# Patient Record
Sex: FEMALE | Race: WHITE | NOT HISPANIC OR LATINO | Employment: OTHER | URBAN - METROPOLITAN AREA
[De-identification: names, ages, dates, MRNs, and addresses within clinical notes are randomized per-mention and may not be internally consistent; named-entity substitution may affect disease eponyms.]

---

## 2020-09-10 ENCOUNTER — APPOINTMENT (OUTPATIENT)
Dept: LAB | Facility: HOSPITAL | Age: 55
End: 2020-09-10
Attending: SPECIALIST
Payer: MEDICARE

## 2020-09-10 ENCOUNTER — TRANSCRIBE ORDERS (OUTPATIENT)
Dept: REGISTRATION | Facility: HOSPITAL | Age: 55
End: 2020-09-10

## 2020-09-10 DIAGNOSIS — M19.011 PRIMARY OSTEOARTHRITIS, RIGHT SHOULDER: ICD-10-CM

## 2020-09-10 DIAGNOSIS — Z11.59 ENCOUNTER FOR SCREENING FOR OTHER VIRAL DISEASES: ICD-10-CM

## 2020-09-10 DIAGNOSIS — Z11.59 ENCOUNTER FOR SCREENING FOR OTHER VIRAL DISEASES: Primary | ICD-10-CM

## 2020-09-10 LAB
ABO + RH BLD: NORMAL
ALBUMIN SERPL-MCNC: 4.1 G/DL (ref 3.4–5)
ALP SERPL-CCNC: 39 IU/L (ref 35–126)
ALT SERPL-CCNC: 13 IU/L (ref 11–54)
ANION GAP SERPL CALC-SCNC: 12 MEQ/L (ref 3–15)
APTT PPP: 27 SEC (ref 23–35)
AST SERPL-CCNC: 20 IU/L (ref 15–41)
BILIRUB SERPL-MCNC: 0.6 MG/DL (ref 0.3–1.2)
BLD GP AB SCN SERPL QL: NEGATIVE
BLOOD BANK CMNT PATIENT-IMP: NORMAL
BUN SERPL-MCNC: 11 MG/DL (ref 8–20)
CALCIUM SERPL-MCNC: 9.3 MG/DL (ref 8.9–10.3)
CHLORIDE SERPL-SCNC: 104 MEQ/L (ref 98–109)
CO2 SERPL-SCNC: 21 MEQ/L (ref 22–32)
CREAT SERPL-MCNC: 0.7 MG/DL (ref 0.6–1.1)
D AG BLD QL: POSITIVE
ERYTHROCYTE [DISTWIDTH] IN BLOOD BY AUTOMATED COUNT: 12.3 % (ref 11.7–14.4)
EST. AVERAGE GLUCOSE BLD GHB EST-MCNC: 111 MG/DL
GFR SERPL CREATININE-BSD FRML MDRD: >60 ML/MIN/1.73M*2
GLUCOSE SERPL-MCNC: 84 MG/DL (ref 70–99)
HBA1C MFR BLD HPLC: 5.5 %
HCT VFR BLDCO AUTO: 34.3 % (ref 35–45)
HGB BLD-MCNC: 11.4 G/DL (ref 11.8–15.7)
INR PPP: 1.2 INR
LABORATORY COMMENT REPORT: NORMAL
MCH RBC QN AUTO: 30.2 PG (ref 28–33.2)
MCHC RBC AUTO-ENTMCNC: 33.2 G/DL (ref 32.2–35.5)
MCV RBC AUTO: 90.7 FL (ref 83–98)
PDW BLD AUTO: 9.3 FL (ref 9.4–12.3)
PLATELET # BLD AUTO: 276 K/UL (ref 150–369)
POTASSIUM SERPL-SCNC: 4.2 MEQ/L (ref 3.6–5.1)
PROT SERPL-MCNC: 6.1 G/DL (ref 6–8.2)
PROTHROMBIN TIME: 14.9 SEC (ref 12.2–14.5)
RBC # BLD AUTO: 3.78 M/UL (ref 3.93–5.22)
SODIUM SERPL-SCNC: 137 MEQ/L (ref 136–144)
WBC # BLD AUTO: 4.52 K/UL (ref 3.8–10.5)

## 2020-09-10 PROCEDURE — 85730 THROMBOPLASTIN TIME PARTIAL: CPT

## 2020-09-10 PROCEDURE — 80053 COMPREHEN METABOLIC PANEL: CPT

## 2020-09-10 PROCEDURE — 85610 PROTHROMBIN TIME: CPT

## 2020-09-10 PROCEDURE — 83036 HEMOGLOBIN GLYCOSYLATED A1C: CPT | Mod: GA

## 2020-09-10 PROCEDURE — 85027 COMPLETE CBC AUTOMATED: CPT

## 2020-09-10 PROCEDURE — 36415 COLL VENOUS BLD VENIPUNCTURE: CPT | Mod: GA

## 2020-09-10 PROCEDURE — 86850 RBC ANTIBODY SCREEN: CPT

## 2020-09-12 ENCOUNTER — APPOINTMENT (OUTPATIENT)
Dept: PREADMISSION TESTING | Facility: HOSPITAL | Age: 55
End: 2020-09-12
Attending: INTERNAL MEDICINE
Payer: MEDICARE

## 2020-09-12 DIAGNOSIS — M19.011 ARTHRITIS OF RIGHT SHOULDER REGION: Primary | ICD-10-CM

## 2020-09-12 PROCEDURE — U0003 INFECTIOUS AGENT DETECTION BY NUCLEIC ACID (DNA OR RNA); SEVERE ACUTE RESPIRATORY SYNDROME CORONAVIRUS 2 (SARS-COV-2) (CORONAVIRUS DISEASE [COVID-19]), AMPLIFIED PROBE TECHNIQUE, MAKING USE OF HIGH THROUGHPUT TECHNOLOGIES AS DESCRIBED BY CMS-2020-01-R: HCPCS

## 2020-09-14 LAB — SARS-COV-2 RNA RESP QL NAA+PROBE: NOT DETECTED

## 2020-09-16 ENCOUNTER — ANESTHESIA EVENT (OUTPATIENT)
Dept: OPERATING ROOM | Facility: HOSPITAL | Age: 55
Setting detail: SURGERY ADMIT
DRG: 483 | End: 2020-09-16
Payer: MEDICARE

## 2020-09-17 ENCOUNTER — APPOINTMENT (INPATIENT)
Dept: RADIOLOGY | Facility: HOSPITAL | Age: 55
DRG: 483 | End: 2020-09-17
Attending: SPECIALIST
Payer: MEDICARE

## 2020-09-17 ENCOUNTER — HOSPITAL ENCOUNTER (INPATIENT)
Facility: HOSPITAL | Age: 55
LOS: 3 days | Discharge: NURSING, REHAB OR LONG-TERM CARE | DRG: 483 | End: 2020-09-20
Attending: SPECIALIST | Admitting: SPECIALIST
Payer: MEDICARE

## 2020-09-17 ENCOUNTER — ANESTHESIA (OUTPATIENT)
Dept: OPERATING ROOM | Facility: HOSPITAL | Age: 55
Setting detail: SURGERY ADMIT
DRG: 483 | End: 2020-09-17
Payer: MEDICARE

## 2020-09-17 ENCOUNTER — BMR PREADMISSION ASSESSMENT (OUTPATIENT)
Dept: ADMISSIONS | Facility: REHABILITATION | Age: 55
End: 2020-09-17

## 2020-09-17 PROBLEM — M19.011 DJD OF RIGHT SHOULDER: Status: ACTIVE | Noted: 2020-09-17

## 2020-09-17 LAB
ABO + RH BLD: NORMAL
D AG BLD QL: POSITIVE
LABORATORY COMMENT REPORT: NORMAL

## 2020-09-17 PROCEDURE — 63700000 HC SELF-ADMINISTRABLE DRUG: Performed by: STUDENT IN AN ORGANIZED HEALTH CARE EDUCATION/TRAINING PROGRAM

## 2020-09-17 PROCEDURE — 63700000 HC SELF-ADMINISTRABLE DRUG: Performed by: INTERNAL MEDICINE

## 2020-09-17 PROCEDURE — 25800000 HC PHARMACY IV SOLUTIONS: Performed by: SPECIALIST

## 2020-09-17 PROCEDURE — 63600000 HC DRUGS/DETAIL CODE: Performed by: NURSE ANESTHETIST, CERTIFIED REGISTERED

## 2020-09-17 PROCEDURE — 25000000 HC PHARMACY GENERAL: Performed by: SPECIALIST

## 2020-09-17 PROCEDURE — 63700000 HC SELF-ADMINISTRABLE DRUG: Performed by: NURSE PRACTITIONER

## 2020-09-17 PROCEDURE — 36000015 HC OR LEVEL 5 EA ADDL MIN: Performed by: SPECIALIST

## 2020-09-17 PROCEDURE — 63700000 HC SELF-ADMINISTRABLE DRUG: Performed by: SPECIALIST

## 2020-09-17 PROCEDURE — L3650 SO 8 ABD RESTRAINT PRE OTS: HCPCS | Performed by: SPECIALIST

## 2020-09-17 PROCEDURE — 37000001 HC ANESTHESIA GENERAL: Performed by: SPECIALIST

## 2020-09-17 PROCEDURE — 99221 1ST HOSP IP/OBS SF/LOW 40: CPT | Performed by: INTERNAL MEDICINE

## 2020-09-17 PROCEDURE — 73030 X-RAY EXAM OF SHOULDER: CPT | Mod: RT

## 2020-09-17 PROCEDURE — C1769 GUIDE WIRE: HCPCS | Performed by: SPECIALIST

## 2020-09-17 PROCEDURE — 71000001 HC PACU PHASE 1 INITIAL 30MIN: Performed by: SPECIALIST

## 2020-09-17 PROCEDURE — 0RRJ0JZ REPLACEMENT OF RIGHT SHOULDER JOINT WITH SYNTHETIC SUBSTITUTE, OPEN APPROACH: ICD-10-PCS | Performed by: SPECIALIST

## 2020-09-17 PROCEDURE — C1776 JOINT DEVICE (IMPLANTABLE): HCPCS | Performed by: SPECIALIST

## 2020-09-17 PROCEDURE — 63600000 HC DRUGS/DETAIL CODE: Performed by: SPECIALIST

## 2020-09-17 PROCEDURE — 36415 COLL VENOUS BLD VENIPUNCTURE: CPT | Performed by: SPECIALIST

## 2020-09-17 PROCEDURE — 27200000 HC STERILE SUPPLY: Performed by: SPECIALIST

## 2020-09-17 PROCEDURE — 63600000 HC DRUGS/DETAIL CODE: Performed by: NURSE PRACTITIONER

## 2020-09-17 PROCEDURE — 63700000 HC SELF-ADMINISTRABLE DRUG: Performed by: HOSPITALIST

## 2020-09-17 PROCEDURE — 25000000 HC PHARMACY GENERAL: Performed by: NURSE ANESTHETIST, CERTIFIED REGISTERED

## 2020-09-17 PROCEDURE — 12000000 HC ROOM AND CARE MED/SURG

## 2020-09-17 PROCEDURE — 71000011 HC PACU PHASE 1 EA ADDL MIN: Performed by: SPECIALIST

## 2020-09-17 PROCEDURE — 36000005 HC OR LEVEL 5 INITIAL 30MIN: Performed by: SPECIALIST

## 2020-09-17 PROCEDURE — C1713 ANCHOR/SCREW BN/BN,TIS/BN: HCPCS | Performed by: SPECIALIST

## 2020-09-17 DEVICE — CEMENT BONE SIMPLEX FULL DOSE: Type: IMPLANTABLE DEVICE | Site: SHOULDER | Status: FUNCTIONAL

## 2020-09-17 DEVICE — *T* NUCLEUS SIMPLICITI SIZE 2: Type: IMPLANTABLE DEVICE | Site: SHOULDER | Status: FUNCTIONAL

## 2020-09-17 DEVICE — GLENOID M35 PEGGED AEQUALIS PERFORM: Type: IMPLANTABLE DEVICE | Site: SHOULDER | Status: FUNCTIONAL

## 2020-09-17 DEVICE — IMPLANTABLE DEVICE: Type: IMPLANTABLE DEVICE | Site: SHOULDER | Status: FUNCTIONAL

## 2020-09-17 RX ORDER — PROPOFOL 10 MG/ML
INJECTION, EMULSION INTRAVENOUS AS NEEDED
Status: DISCONTINUED | OUTPATIENT
Start: 2020-09-17 | End: 2020-09-17 | Stop reason: SURG

## 2020-09-17 RX ORDER — EPHEDRINE SULFATE/0.9% NACL/PF 50 MG/5 ML
10 SYRINGE (ML) INTRAVENOUS AS NEEDED
Status: DISCONTINUED | OUTPATIENT
Start: 2020-09-17 | End: 2020-09-17 | Stop reason: HOSPADM

## 2020-09-17 RX ORDER — DEXTROSE 50 % IN WATER (D50W) INTRAVENOUS SYRINGE
25 AS NEEDED
Status: DISCONTINUED | OUTPATIENT
Start: 2020-09-17 | End: 2020-09-20 | Stop reason: HOSPADM

## 2020-09-17 RX ORDER — FENOFIBRATE 134 MG/1
134 CAPSULE ORAL
Status: DISCONTINUED | OUTPATIENT
Start: 2020-09-18 | End: 2020-09-18

## 2020-09-17 RX ORDER — CARBAMAZEPINE 200 MG/1
600 TABLET, EXTENDED RELEASE ORAL 2 TIMES DAILY
Status: DISCONTINUED | OUTPATIENT
Start: 2020-09-17 | End: 2020-09-18

## 2020-09-17 RX ORDER — DEXTROSE 40 %
15-30 GEL (GRAM) ORAL AS NEEDED
Status: DISCONTINUED | OUTPATIENT
Start: 2020-09-17 | End: 2020-09-20 | Stop reason: HOSPADM

## 2020-09-17 RX ORDER — EPHEDRINE SULFATE 50 MG/ML
INJECTION, SOLUTION INTRAVENOUS AS NEEDED
Status: DISCONTINUED | OUTPATIENT
Start: 2020-09-17 | End: 2020-09-17 | Stop reason: SURG

## 2020-09-17 RX ORDER — ACETAMINOPHEN 500 MG
5 TABLET ORAL NIGHTLY PRN
Status: DISCONTINUED | OUTPATIENT
Start: 2020-09-17 | End: 2020-09-20 | Stop reason: HOSPADM

## 2020-09-17 RX ORDER — GABAPENTIN 300 MG/1
300 CAPSULE ORAL 2 TIMES DAILY
Status: DISCONTINUED | OUTPATIENT
Start: 2020-09-17 | End: 2020-09-17

## 2020-09-17 RX ORDER — ZIPRASIDONE HYDROCHLORIDE 60 MG/1
60 CAPSULE ORAL 2 TIMES DAILY WITH MEALS
Status: DISCONTINUED | OUTPATIENT
Start: 2020-09-17 | End: 2020-09-18

## 2020-09-17 RX ORDER — KETOROLAC TROMETHAMINE 15 MG/ML
15 INJECTION, SOLUTION INTRAMUSCULAR; INTRAVENOUS EVERY 6 HOURS
Status: COMPLETED | OUTPATIENT
Start: 2020-09-17 | End: 2020-09-18

## 2020-09-17 RX ORDER — MEPERIDINE HYDROCHLORIDE 25 MG/ML
12.5 INJECTION INTRAMUSCULAR; INTRAVENOUS; SUBCUTANEOUS EVERY 10 MIN PRN
Status: DISCONTINUED | OUTPATIENT
Start: 2020-09-17 | End: 2020-09-17 | Stop reason: HOSPADM

## 2020-09-17 RX ORDER — HYDROMORPHONE HYDROCHLORIDE 1 MG/ML
0.5 INJECTION, SOLUTION INTRAMUSCULAR; INTRAVENOUS; SUBCUTANEOUS EVERY 2 HOUR PRN
Status: DISCONTINUED | OUTPATIENT
Start: 2020-09-17 | End: 2020-09-20 | Stop reason: HOSPADM

## 2020-09-17 RX ORDER — DIPHENHYDRAMINE HCL 50 MG/ML
12.5 VIAL (ML) INJECTION
Status: DISCONTINUED | OUTPATIENT
Start: 2020-09-17 | End: 2020-09-17 | Stop reason: HOSPADM

## 2020-09-17 RX ORDER — HYDROMORPHONE HYDROCHLORIDE 1 MG/ML
0.5 INJECTION, SOLUTION INTRAMUSCULAR; INTRAVENOUS; SUBCUTANEOUS
Status: DISCONTINUED | OUTPATIENT
Start: 2020-09-17 | End: 2020-09-17 | Stop reason: HOSPADM

## 2020-09-17 RX ORDER — OXYCODONE HYDROCHLORIDE 5 MG/1
5 TABLET ORAL EVERY 4 HOURS PRN
Status: CANCELLED | OUTPATIENT
Start: 2020-09-17

## 2020-09-17 RX ORDER — SODIUM CHLORIDE 9 MG/ML
INJECTION, SOLUTION INTRAVENOUS CONTINUOUS
Status: DISCONTINUED | OUTPATIENT
Start: 2020-09-17 | End: 2020-09-17 | Stop reason: HOSPADM

## 2020-09-17 RX ORDER — VANCOMYCIN HYDROCHLORIDE
1250
Status: COMPLETED | OUTPATIENT
Start: 2020-09-17 | End: 2020-09-17

## 2020-09-17 RX ORDER — BUPIVACAINE HCL/EPINEPHRINE 0.5-1:200K
VIAL (ML) INJECTION AS NEEDED
Status: DISCONTINUED | OUTPATIENT
Start: 2020-09-17 | End: 2020-09-17 | Stop reason: HOSPADM

## 2020-09-17 RX ORDER — FAMOTIDINE 20 MG/1
20 TABLET, FILM COATED ORAL DAILY
Status: DISCONTINUED | OUTPATIENT
Start: 2020-09-18 | End: 2020-09-20 | Stop reason: HOSPADM

## 2020-09-17 RX ORDER — GLYCOPYRROLATE 0.6MG/3ML
SYRINGE (ML) INTRAVENOUS AS NEEDED
Status: DISCONTINUED | OUTPATIENT
Start: 2020-09-17 | End: 2020-09-17 | Stop reason: SURG

## 2020-09-17 RX ORDER — FAMOTIDINE 20 MG/1
20 TABLET, FILM COATED ORAL
Status: COMPLETED | OUTPATIENT
Start: 2020-09-17 | End: 2020-09-17

## 2020-09-17 RX ORDER — DEXAMETHASONE SODIUM PHOSPHATE 4 MG/ML
INJECTION, SOLUTION INTRA-ARTICULAR; INTRALESIONAL; INTRAMUSCULAR; INTRAVENOUS; SOFT TISSUE AS NEEDED
Status: DISCONTINUED | OUTPATIENT
Start: 2020-09-17 | End: 2020-09-17 | Stop reason: SURG

## 2020-09-17 RX ORDER — FENTANYL CITRATE 50 UG/ML
50 INJECTION, SOLUTION INTRAMUSCULAR; INTRAVENOUS
Status: DISCONTINUED | OUTPATIENT
Start: 2020-09-17 | End: 2020-09-17 | Stop reason: HOSPADM

## 2020-09-17 RX ORDER — GABAPENTIN 300 MG/1
600 CAPSULE ORAL NIGHTLY
Status: DISCONTINUED | OUTPATIENT
Start: 2020-09-17 | End: 2020-09-20 | Stop reason: HOSPADM

## 2020-09-17 RX ORDER — ROCURONIUM BROMIDE 10 MG/ML
INJECTION, SOLUTION INTRAVENOUS AS NEEDED
Status: DISCONTINUED | OUTPATIENT
Start: 2020-09-17 | End: 2020-09-17 | Stop reason: SURG

## 2020-09-17 RX ORDER — LIDOCAINE HYDROCHLORIDE 10 MG/ML
INJECTION, SOLUTION INFILTRATION; PERINEURAL AS NEEDED
Status: DISCONTINUED | OUTPATIENT
Start: 2020-09-17 | End: 2020-09-17 | Stop reason: SURG

## 2020-09-17 RX ORDER — ONDANSETRON HYDROCHLORIDE 2 MG/ML
INJECTION, SOLUTION INTRAVENOUS AS NEEDED
Status: DISCONTINUED | OUTPATIENT
Start: 2020-09-17 | End: 2020-09-17 | Stop reason: SURG

## 2020-09-17 RX ORDER — FENTANYL CITRATE 50 UG/ML
INJECTION, SOLUTION INTRAMUSCULAR; INTRAVENOUS AS NEEDED
Status: DISCONTINUED | OUTPATIENT
Start: 2020-09-17 | End: 2020-09-17 | Stop reason: SURG

## 2020-09-17 RX ORDER — ONDANSETRON HYDROCHLORIDE 2 MG/ML
4 INJECTION, SOLUTION INTRAVENOUS EVERY 8 HOURS PRN
Status: DISCONTINUED | OUTPATIENT
Start: 2020-09-17 | End: 2020-09-20 | Stop reason: HOSPADM

## 2020-09-17 RX ORDER — CARBOXYMETHYLCELLULOSE SODIUM 5 MG/ML
1 SOLUTION/ DROPS OPHTHALMIC 2 TIMES DAILY
Status: DISCONTINUED | OUTPATIENT
Start: 2020-09-17 | End: 2020-09-20 | Stop reason: HOSPADM

## 2020-09-17 RX ORDER — CARBAMAZEPINE 200 MG/1
1200 TABLET ORAL NIGHTLY
Status: DISCONTINUED | OUTPATIENT
Start: 2020-09-17 | End: 2020-09-17

## 2020-09-17 RX ORDER — CEFAZOLIN SODIUM/WATER 2 G/20 ML
2 SYRINGE (ML) INTRAVENOUS
Status: COMPLETED | OUTPATIENT
Start: 2020-09-17 | End: 2020-09-17

## 2020-09-17 RX ORDER — HYDROMORPHONE HYDROCHLORIDE 1 MG/ML
1 INJECTION, SOLUTION INTRAMUSCULAR; INTRAVENOUS; SUBCUTANEOUS EVERY 2 HOUR PRN
Status: CANCELLED | OUTPATIENT
Start: 2020-09-17

## 2020-09-17 RX ORDER — GABAPENTIN 300 MG/1
300 CAPSULE ORAL EVERY MORNING
Status: DISCONTINUED | OUTPATIENT
Start: 2020-09-18 | End: 2020-09-20 | Stop reason: HOSPADM

## 2020-09-17 RX ORDER — GABAPENTIN 300 MG/1
600 CAPSULE ORAL 2 TIMES DAILY
Status: DISCONTINUED | OUTPATIENT
Start: 2020-09-17 | End: 2020-09-17

## 2020-09-17 RX ORDER — OXYCODONE HYDROCHLORIDE 5 MG/1
5-10 TABLET ORAL EVERY 4 HOURS PRN
Status: DISCONTINUED | OUTPATIENT
Start: 2020-09-17 | End: 2020-09-18

## 2020-09-17 RX ORDER — LISINOPRIL 10 MG/1
10 TABLET ORAL DAILY
Status: DISCONTINUED | OUTPATIENT
Start: 2020-09-17 | End: 2020-09-20 | Stop reason: HOSPADM

## 2020-09-17 RX ORDER — DOCUSATE SODIUM 100 MG/1
100 CAPSULE, LIQUID FILLED ORAL 2 TIMES DAILY
Status: DISCONTINUED | OUTPATIENT
Start: 2020-09-17 | End: 2020-09-20 | Stop reason: HOSPADM

## 2020-09-17 RX ORDER — ONDANSETRON 8 MG/1
8 TABLET, ORALLY DISINTEGRATING ORAL
Status: COMPLETED | OUTPATIENT
Start: 2020-09-17 | End: 2020-09-17

## 2020-09-17 RX ORDER — PRAVASTATIN SODIUM 20 MG/1
20 TABLET ORAL NIGHTLY
Status: DISCONTINUED | OUTPATIENT
Start: 2020-09-17 | End: 2020-09-20 | Stop reason: HOSPADM

## 2020-09-17 RX ORDER — ACETAMINOPHEN 325 MG/1
650 TABLET ORAL EVERY 4 HOURS PRN
Status: DISCONTINUED | OUTPATIENT
Start: 2020-09-17 | End: 2020-09-18

## 2020-09-17 RX ORDER — SODIUM CHLORIDE 9 MG/ML
INJECTION, SOLUTION INTRAVENOUS CONTINUOUS
Status: ACTIVE | OUTPATIENT
Start: 2020-09-17 | End: 2020-09-18

## 2020-09-17 RX ORDER — NEOSTIGMINE METHYLSULFATE 1 MG/ML
INJECTION INTRAVENOUS AS NEEDED
Status: DISCONTINUED | OUTPATIENT
Start: 2020-09-17 | End: 2020-09-17 | Stop reason: SURG

## 2020-09-17 RX ORDER — BISACODYL 10 MG/1
10 SUPPOSITORY RECTAL DAILY PRN
Status: DISCONTINUED | OUTPATIENT
Start: 2020-09-17 | End: 2020-09-20 | Stop reason: HOSPADM

## 2020-09-17 RX ORDER — OXYCODONE HCL 10 MG/1
10 TABLET, FILM COATED, EXTENDED RELEASE ORAL
Status: COMPLETED | OUTPATIENT
Start: 2020-09-17 | End: 2020-09-17

## 2020-09-17 RX ORDER — LABETALOL HYDROCHLORIDE 5 MG/ML
5 INJECTION, SOLUTION INTRAVENOUS AS NEEDED
Status: DISCONTINUED | OUTPATIENT
Start: 2020-09-17 | End: 2020-09-17 | Stop reason: HOSPADM

## 2020-09-17 RX ORDER — SODIUM CHLORIDE 5 %
OINTMENT (GRAM) OPHTHALMIC (EYE) 2 TIMES DAILY
Status: DISCONTINUED | OUTPATIENT
Start: 2020-09-17 | End: 2020-09-20 | Stop reason: HOSPADM

## 2020-09-17 RX ORDER — PANTOPRAZOLE SODIUM 40 MG/1
40 TABLET, DELAYED RELEASE ORAL DAILY
Status: DISCONTINUED | OUTPATIENT
Start: 2020-09-18 | End: 2020-09-20 | Stop reason: HOSPADM

## 2020-09-17 RX ORDER — TRAZODONE HYDROCHLORIDE 100 MG/1
100 TABLET ORAL NIGHTLY
Status: DISCONTINUED | OUTPATIENT
Start: 2020-09-17 | End: 2020-09-20 | Stop reason: HOSPADM

## 2020-09-17 RX ORDER — MIDAZOLAM HYDROCHLORIDE 2 MG/2ML
INJECTION, SOLUTION INTRAMUSCULAR; INTRAVENOUS AS NEEDED
Status: DISCONTINUED | OUTPATIENT
Start: 2020-09-17 | End: 2020-09-17 | Stop reason: SURG

## 2020-09-17 RX ORDER — SENNOSIDES 8.6 MG/1
1 TABLET ORAL 2 TIMES DAILY PRN
Status: DISCONTINUED | OUTPATIENT
Start: 2020-09-17 | End: 2020-09-20 | Stop reason: HOSPADM

## 2020-09-17 RX ORDER — ONDANSETRON HYDROCHLORIDE 2 MG/ML
4 INJECTION, SOLUTION INTRAVENOUS
Status: DISCONTINUED | OUTPATIENT
Start: 2020-09-17 | End: 2020-09-17 | Stop reason: HOSPADM

## 2020-09-17 RX ORDER — METOCLOPRAMIDE 10 MG/1
10 TABLET ORAL
Status: COMPLETED | OUTPATIENT
Start: 2020-09-17 | End: 2020-09-17

## 2020-09-17 RX ORDER — PHENYLEPHRINE HYDROCHLORIDE 10 MG/ML
INJECTION INTRAVENOUS AS NEEDED
Status: DISCONTINUED | OUTPATIENT
Start: 2020-09-17 | End: 2020-09-17 | Stop reason: SURG

## 2020-09-17 RX ORDER — DULOXETIN HYDROCHLORIDE 60 MG/1
60 CAPSULE, DELAYED RELEASE ORAL DAILY
Status: DISCONTINUED | OUTPATIENT
Start: 2020-09-18 | End: 2020-09-20 | Stop reason: HOSPADM

## 2020-09-17 RX ORDER — IBUPROFEN 200 MG
16-32 TABLET ORAL AS NEEDED
Status: DISCONTINUED | OUTPATIENT
Start: 2020-09-17 | End: 2020-09-20 | Stop reason: HOSPADM

## 2020-09-17 RX ADMIN — EPHEDRINE SULFATE 10 MG: 50 INJECTION INTRAVENOUS at 11:35

## 2020-09-17 RX ADMIN — LIDOCAINE HYDROCHLORIDE 5 ML: 10 INJECTION, SOLUTION INFILTRATION; PERINEURAL at 10:57

## 2020-09-17 RX ADMIN — ONDANSETRON 8 MG: 8 TABLET, ORALLY DISINTEGRATING ORAL at 10:24

## 2020-09-17 RX ADMIN — SODIUM CHLORIDE: 9 INJECTION, SOLUTION INTRAVENOUS at 16:45

## 2020-09-17 RX ADMIN — FAMOTIDINE 20 MG: 20 TABLET, FILM COATED ORAL at 10:24

## 2020-09-17 RX ADMIN — OXYCODONE HYDROCHLORIDE 10 MG: 10 TABLET, FILM COATED, EXTENDED RELEASE ORAL at 10:24

## 2020-09-17 RX ADMIN — ACETAMINOPHEN 650 MG: 325 TABLET, FILM COATED ORAL at 21:54

## 2020-09-17 RX ADMIN — KETOROLAC TROMETHAMINE 15 MG: 15 INJECTION, SOLUTION INTRAMUSCULAR; INTRAVENOUS at 17:04

## 2020-09-17 RX ADMIN — PHENYLEPHRINE HYDROCHLORIDE 100 MCG: 10 INJECTION INTRAVENOUS at 11:56

## 2020-09-17 RX ADMIN — ROCURONIUM BROMIDE 50 MG: 10 INJECTION INTRAVENOUS at 10:57

## 2020-09-17 RX ADMIN — DOCUSATE SODIUM 100 MG: 100 CAPSULE, LIQUID FILLED ORAL at 19:54

## 2020-09-17 RX ADMIN — GABAPENTIN 600 MG: 300 CAPSULE ORAL at 21:39

## 2020-09-17 RX ADMIN — CARBAMAZEPINE 600 MG: 200 TABLET, EXTENDED RELEASE ORAL at 21:40

## 2020-09-17 RX ADMIN — CEFAZOLIN SODIUM 2 G: 10 POWDER, FOR SOLUTION INTRAVENOUS at 18:32

## 2020-09-17 RX ADMIN — GLYCOPYRROLATE 0.4 MG: 0.2 INJECTION, SOLUTION INTRAMUSCULAR; INTRAVITREAL at 13:34

## 2020-09-17 RX ADMIN — PHENYLEPHRINE HYDROCHLORIDE 100 MCG: 10 INJECTION INTRAVENOUS at 12:08

## 2020-09-17 RX ADMIN — ONDANSETRON 4 MG: 2 INJECTION INTRAMUSCULAR; INTRAVENOUS at 11:17

## 2020-09-17 RX ADMIN — MIDAZOLAM HYDROCHLORIDE 2 MG: 1 INJECTION, SOLUTION INTRAMUSCULAR; INTRAVENOUS at 10:44

## 2020-09-17 RX ADMIN — Medication 2 G: at 10:58

## 2020-09-17 RX ADMIN — VANCOMYCIN HYDROCHLORIDE 1250 G: 1 INJECTION, POWDER, LYOPHILIZED, FOR SOLUTION INTRAVENOUS at 10:48

## 2020-09-17 RX ADMIN — SODIUM CHLORIDE: 9 INJECTION, SOLUTION INTRAVENOUS at 10:24

## 2020-09-17 RX ADMIN — TRANEXAMIC ACID 1900 MG: 100 INJECTION, SOLUTION INTRAVENOUS at 11:17

## 2020-09-17 RX ADMIN — PRAVASTATIN SODIUM 20 MG: 20 TABLET ORAL at 21:39

## 2020-09-17 RX ADMIN — TRAZODONE HYDROCHLORIDE 100 MG: 100 TABLET ORAL at 21:39

## 2020-09-17 RX ADMIN — LISINOPRIL 10 MG: 10 TABLET ORAL at 21:39

## 2020-09-17 RX ADMIN — FENTANYL CITRATE 50 MCG: 50 INJECTION INTRAMUSCULAR; INTRAVENOUS at 10:44

## 2020-09-17 RX ADMIN — VANCOMYCIN HYDROCHLORIDE 1250 MG: 1 INJECTION, POWDER, LYOPHILIZED, FOR SOLUTION INTRAVENOUS at 10:24

## 2020-09-17 RX ADMIN — CARBOXYMETHYLCELLULOSE SODIUM 1 DROP: 0.5 SOLUTION/ DROPS OPHTHALMIC at 21:39

## 2020-09-17 RX ADMIN — DEXAMETHASONE SODIUM PHOSPHATE 4 MG: 4 INJECTION, SOLUTION INTRAMUSCULAR; INTRAVENOUS at 11:17

## 2020-09-17 RX ADMIN — ZIPRASIDONE HYDROCHLORIDE 60 MG: 60 CAPSULE ORAL at 21:40

## 2020-09-17 RX ADMIN — PROPOFOL 200 MG: 10 INJECTION, EMULSION INTRAVENOUS at 10:57

## 2020-09-17 RX ADMIN — METOCLOPRAMIDE 10 MG: 10 TABLET ORAL at 10:24

## 2020-09-17 RX ADMIN — NEOSTIGMINE METHYLSULFATE 3 MG: 1 INJECTION INTRAVENOUS at 13:34

## 2020-09-17 RX ADMIN — EPHEDRINE SULFATE 10 MG: 50 INJECTION INTRAVENOUS at 11:23

## 2020-09-17 RX ADMIN — EPHEDRINE SULFATE 10 MG: 50 INJECTION INTRAVENOUS at 11:56

## 2020-09-17 RX ADMIN — FENTANYL CITRATE 50 MCG: 50 INJECTION INTRAMUSCULAR; INTRAVENOUS at 10:57

## 2020-09-17 RX ADMIN — OXYCODONE HYDROCHLORIDE 5 MG: 5 TABLET ORAL at 22:19

## 2020-09-17 ASSESSMENT — COGNITIVE AND FUNCTIONAL STATUS - GENERAL
STANDING UP FROM CHAIR USING ARMS: 3 - A LITTLE
HELP NEEDED FOR BATHING: 2 - A LOT
WALKING IN HOSPITAL ROOM: 3 - A LITTLE
TOILETING: 2 - A LOT
MOVING TO AND FROM BED TO CHAIR: 3 - A LITTLE
DRESSING REGULAR LOWER BODY CLOTHING: 2 - A LOT
CLIMB 3 TO 5 STEPS WITH RAILING: 3 - A LITTLE
EATING MEALS: 3 - A LITTLE
HELP NEEDED FOR PERSONAL GROOMING: 2 - A LOT
DRESSING REGULAR UPPER BODY CLOTHING: 2 - A LOT

## 2020-09-17 NOTE — PLAN OF CARE
Problem: Adult Inpatient Plan of Care  Goal: Plan of Care Review  Outcome: Progressing  Flowsheets (Taken 9/17/2020 8169)  Progress: improving  Plan of Care Reviewed With: patient  Outcome Summary: Patient received to unit from pacu, VS and shift assessment as charted. Pt denies any pain. Helped pt dial phone, now talking to family. MILO monitoring suspended as pt awake/alert. Pt with no questions/concerns at this time, call bell within reach, will monitor.  Goal: Patient-Specific Goal (Individualization)  Outcome: Progressing  Goal: Absence of Hospital-Acquired Illness or Injury  Outcome: Progressing  Goal: Optimal Comfort and Wellbeing  Outcome: Progressing  Goal: Readiness for Transition of Care  Outcome: Progressing  Goal: Rounds/Family Conference  Outcome: Progressing

## 2020-09-17 NOTE — ANESTHESIA PROCEDURE NOTES
Airway  Urgency: elective    Start Time: 9/17/2020 10:56 AM    General Information and Staff    Patient location during procedure: OR  Anesthesiologist: Rolando Ortiz MD  Resident/CRNA: Lorri Easley CRNA  Performed: resident/CRNA     Indications and Patient Condition  Indications for airway management: anesthesia  Sedation level: general  Preoxygenated: yes  MILS not maintained throughout  Mask difficulty assessment: 1 - vent by mask    Final Airway Details  Final airway type: endotracheal airway      Successful airway: ETT    Successful intubation technique: direct laryngoscopy  Facilitating devices/methods: intubating stylet  Endotracheal tube insertion site: oral  Blade: White  Blade size: #2  Cormack-Lehane Classification: grade I - full view of glottis  Placement verified by: chest auscultation   Measured from: lips  Number of attempts at approach: 1  Atraumatic airway insertion

## 2020-09-17 NOTE — ANESTHESIOLOGIST PRE-PROCEDURE ATTESTATION
Pre-Procedure Patient Identification:  I am the Primary Anesthesiologist and have identified the patient on 09/17/20 at 10:25 AM.  I have confirmed the following procedure(s) RIGHT SHOULDER ARTHROPLASTY TOTAL (R) will be performed by the following surgeon/proceduralist Heath Ornelas MD.
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
## 2020-09-17 NOTE — ANESTHESIA PROCEDURE NOTES
Peripheral Block    Patient location during procedure: pre-op  Start time: 9/17/2020 10:40 AM  Reason for block: at surgeon's request and post-op pain management  Staffing  Anesthesiologist: Rolando Ortiz MD  Resident/CRNA: Lorri Easley CRNA  Performed: anesthesiologist   Preanesthetic Checklist  Completed: patient identified, surgical consent, pre-op evaluation, timeout performed, IV checked, risks and benefits discussed, monitors and equipment checked and sterile field maintained during procedure  Peripheral Block  Patient position: supine  Prep: ChloraPrep and site prepped and draped  Patient monitoring: heart rate, cardiac monitor, continuous pulse ox and blood pressure  Block type: interscalene  Laterality: right      Procedures: ultrasound guided and nerve stimulator          Local infiltration: ropivacaine  Infiltration strength: 0.5 %  Dose: 30 mL  Needle  Needle gauge: 22 G  Needle length: 2 in  Needle localization: nerve stimulator, anatomical landmarks and ultrasound guidance  Test dose: negative  Assessment  Injection assessment: negative aspiration for heme, incremental injection, no paresthesia on injection, local visualized surrounding nerve on ultrasound and transient paresthesias  Paresthesia pain: immediately resolved  Heart rate change: no  Additional Notes  Right Interscalene Block of the Brachial Plexus  The informed consent process was completed.   Timeout signed.  Nasal oxygen was administered.   Continuous pulse oximetry and the usual noninvasive monitors were used. Tinted 2% chlorhexidine gluconate was used as the skin prep. Intravenous sedation was used. The scalene muscles were identified. The trunks / early divisions of the brachial plexus were identified between the scalene muscles. Real time ultrasound was used for guidance of the insulated needle's insertion to the proper proximity relative to the brachial plexus. A peripheral nerve stimulator was used. 3 mL to 5 mL  incremental injections were made with aspiration before and after each injection. Spread of the local anesthetic agent was observed by ultrasound. No adverse events were observed.  Local anesthetic: 0.5% ropivicaine  The total local anesthetic volume: 30 mL  The final threshold current: 0.4 mA  Insulated needle: 2-inch 22 gauge needle

## 2020-09-17 NOTE — OP NOTE
Preoperative diagnosis: Severe osteoarthritis right shoulder    Postoperative diagnosis: Severe osteoarthritis right shoulder    Procedure: Right total shoulder arthroplasty with Carmen simplicity size 2 nucleus, size 46 head  and Perform size M 35 pegged glenoid component    Surgeon: Heath Ornelas MD    Assistant: Analisa    Anesthesiologist: SOFÍA Masterson    IV fluids: 600 ml crystalloid    Blood loss: 100 ml    Specimens: None    Complications: None    Disposition: Patient was taken to recovery room in stable condition    Indication for surgery:     The patient is a 54 year old female with a history of a brain injury with essentially no use of her left upper extremity who presents with severe and progressive osteoarthritis of the right shoulder causing significant pain, sleep disturbance, and affecting her ADL's.  Having failed conservative treatment, options for treatment were discussed with the patient and informed consent was obtained for a right total shoulder arthroplasty.    Details of procedure:    The patient was brought into the room and identified and the correct shoulder was identified.  She had an interscalene block placed in the preop holding area.  After successful endotracheal anesthesia, she was placed in a modified beachchair position.  The right shoulder was then prepped and draped in usual sterile manner and preoperative antibiotics were given intravenously.  The incision was then marked and injected with quarter percent Marcaine with epinephrine.  Next, an anterior longitudinal incision was then made starting at the coracoid process extending inferiorly towards the axillary fold.  A deltopectoral approach was utilized and the cephalic vein was retracted laterally along with the deltoid.  This exposed the clavipectoral fascia, which was incised just lateral to the conjoined tendon.  A self-retaining retractor was then placed exposing the subscapularis tendon.  The rotator interval was then  entered and the joint was then suctioned out.  The subscapularis tendon was then sharply released from the lesser tuberosity and tagged.  There were large osteophytes on the anterior aspect of the humeral head which were removed with a rongeur.  A Hohmann retractor was then placed inferiorly to protect the axillary nerve. The inferior capsule was sharply released from the humeral neck.  A Darrach retractor was then placed in the glenohumeral joint and the shoulder was externally rotated delivering the humeral head out through the incision.  Inferior osteophytes were then removed with a curved osteotome and a rongeur.    A ring cutting guide was then placed around the humeral neck and an anatomic cut was made. The surfaces were measured to best fit a simplicity size 2 nucleus.  Using the appropriate guide a guidepin was inserted through the center of the humeral head cut surface and advanced to the far cortex.  Reaming of the cut surface was then performed to flatten the cut surface.  A size 2 trial nucleus was then inserted with a plate protector. Attention was directed to the glenoid.  After glenoid retractors were placed, the biceps tendon was sharply released from the supraglenoid tubercle.  The anterior capsule was then released to mobilize the subscapularis tendon.  The glenoid labrum was then removed circumferentially.  The glenoid was then measured to best fit a size M 35 perform glenoid.  The center of the glenoid was then marked.  The appropriate guide and guide pin was then inserted into the center of the glenoid.  Reaming of the glenoid was then carefully performed.  Small peripheral osteophytes were then removed.  The center hole was then enlarged and the guide was placed for the peripheral holes.  The peripheral holes were then drilled.  The appropriate sized trial glenoid component was then placed and there was noted to be excellent fit and stability.  Trial was then removed and surfaces were copiously  irrigated and suctioned dry.  When the cement was in a doughy phase, it was pressurized into the peripheral holes.  The appropriate sized pegged glenoid component was then inserted.  It was held in place until the cement had cured.  Attention was then directed back to the humerus.  A size 46 head was then placed in the shoulder was taken through a range of motion.  There is noted to be good stability and range of motion.  The trial humeral component was then removed.  The humeral surfaces were copiously irrigated and suctioned dry.  Drill holes were then created into the lesser tuberosity and #5 FiberWire sutures were placed.  Next the 46 head was then inserted on the trial nucleus once again there was good range of motion and stability.  The subscapularis tendon was then repaired back to the lesser tuberosity using a D #5 FiberWire sutures utilizing a modified Austin Tim type stitch.  The shoulder at 30° of external rotation the rotator interval was closed with #2 FiberWire in interrupted figure-of-eight fashion.  The FiberWire was also used to reinforce the subscapularis repair.  In addition the biceps, which had been released was then tenodesed to the lower half of the subscapularis tendon using a #2 FiberWire suture.  Once again the wound was irrigated with copious amounts of sterile saline solution.  The deltopectoral interval was closed with #1 Vicryl in a running fashion. The skin was then closed with 2-0  Vicryl followed 3-0 Monocryl and glue..  A sterile dressing was applied.  The patient was then placed in a sling, extubated, and taken to recovery room in stable condition.

## 2020-09-18 PROBLEM — F31.9 BIPOLAR DEPRESSION (CMS/HCC): Status: ACTIVE | Noted: 2020-09-18

## 2020-09-18 PROBLEM — E11.9 TYPE 2 DIABETES MELLITUS (CMS/HCC): Status: ACTIVE | Noted: 2020-09-18

## 2020-09-18 PROBLEM — Z86.718 HX OF DEEP VENOUS THROMBOSIS: Status: ACTIVE | Noted: 2020-09-18

## 2020-09-18 PROBLEM — G47.30 SLEEP APNEA: Status: ACTIVE | Noted: 2020-09-18

## 2020-09-18 PROBLEM — I10 HYPERTENSION: Status: ACTIVE | Noted: 2020-09-18

## 2020-09-18 PROBLEM — R41.9 NEUROCOGNITIVE DISORDER: Status: ACTIVE | Noted: 2020-09-18

## 2020-09-18 LAB
ANION GAP SERPL CALC-SCNC: 7 MEQ/L (ref 3–15)
BUN SERPL-MCNC: 11 MG/DL (ref 8–20)
CALCIUM SERPL-MCNC: 8.7 MG/DL (ref 8.9–10.3)
CHLORIDE SERPL-SCNC: 107 MEQ/L (ref 98–109)
CO2 SERPL-SCNC: 27 MEQ/L (ref 22–32)
CREAT SERPL-MCNC: 0.6 MG/DL (ref 0.6–1.1)
ERYTHROCYTE [DISTWIDTH] IN BLOOD BY AUTOMATED COUNT: 12.4 % (ref 11.7–14.4)
GFR SERPL CREATININE-BSD FRML MDRD: >60 ML/MIN/1.73M*2
GLUCOSE SERPL-MCNC: 115 MG/DL (ref 70–99)
HCT VFR BLDCO AUTO: 33.7 % (ref 35–45)
HGB BLD-MCNC: 10.9 G/DL (ref 11.8–15.7)
MCH RBC QN AUTO: 30.4 PG (ref 28–33.2)
MCHC RBC AUTO-ENTMCNC: 32.3 G/DL (ref 32.2–35.5)
MCV RBC AUTO: 94.1 FL (ref 83–98)
PDW BLD AUTO: 8.8 FL (ref 9.4–12.3)
PLATELET # BLD AUTO: 223 K/UL (ref 150–369)
POTASSIUM SERPL-SCNC: 4.2 MEQ/L (ref 3.6–5.1)
RBC # BLD AUTO: 3.58 M/UL (ref 3.93–5.22)
SODIUM SERPL-SCNC: 141 MEQ/L (ref 136–144)
WBC # BLD AUTO: 6.51 K/UL (ref 3.8–10.5)

## 2020-09-18 PROCEDURE — 63700000 HC SELF-ADMINISTRABLE DRUG: Performed by: HOSPITALIST

## 2020-09-18 PROCEDURE — 63600000 HC DRUGS/DETAIL CODE: Performed by: SPECIALIST

## 2020-09-18 PROCEDURE — 63600000 HC DRUGS/DETAIL CODE: Performed by: NURSE PRACTITIONER

## 2020-09-18 PROCEDURE — 97167 OT EVAL HIGH COMPLEX 60 MIN: CPT | Mod: GO

## 2020-09-18 PROCEDURE — 86803 HEPATITIS C AB TEST: CPT | Performed by: NURSE PRACTITIONER

## 2020-09-18 PROCEDURE — 97162 PT EVAL MOD COMPLEX 30 MIN: CPT | Mod: GP

## 2020-09-18 PROCEDURE — 63700000 HC SELF-ADMINISTRABLE DRUG: Performed by: SPECIALIST

## 2020-09-18 PROCEDURE — 12000000 HC ROOM AND CARE MED/SURG

## 2020-09-18 PROCEDURE — 99232 SBSQ HOSP IP/OBS MODERATE 35: CPT | Performed by: HOSPITALIST

## 2020-09-18 PROCEDURE — 97535 SELF CARE MNGMENT TRAINING: CPT | Mod: GO

## 2020-09-18 PROCEDURE — 63700000 HC SELF-ADMINISTRABLE DRUG: Performed by: INTERNAL MEDICINE

## 2020-09-18 PROCEDURE — 25000000 HC PHARMACY GENERAL: Performed by: SPECIALIST

## 2020-09-18 PROCEDURE — 25800000 HC PHARMACY IV SOLUTIONS: Performed by: SPECIALIST

## 2020-09-18 PROCEDURE — 85027 COMPLETE CBC AUTOMATED: CPT | Performed by: SPECIALIST

## 2020-09-18 PROCEDURE — 80048 BASIC METABOLIC PNL TOTAL CA: CPT | Performed by: SPECIALIST

## 2020-09-18 PROCEDURE — 36415 COLL VENOUS BLD VENIPUNCTURE: CPT | Performed by: NURSE PRACTITIONER

## 2020-09-18 PROCEDURE — 63700000 HC SELF-ADMINISTRABLE DRUG: Performed by: NURSE PRACTITIONER

## 2020-09-18 RX ORDER — KETOROLAC TROMETHAMINE 15 MG/ML
15 INJECTION, SOLUTION INTRAMUSCULAR; INTRAVENOUS ONCE
Status: COMPLETED | OUTPATIENT
Start: 2020-09-18 | End: 2020-09-18

## 2020-09-18 RX ORDER — DOCUSATE SODIUM 100 MG/1
100 CAPSULE, LIQUID FILLED ORAL 2 TIMES DAILY
Qty: 60 CAPSULE | Refills: 0 | COMMUNITY
Start: 2020-09-18 | End: 2020-10-18

## 2020-09-18 RX ORDER — HYDROMORPHONE HYDROCHLORIDE 2 MG/1
2-4 TABLET ORAL EVERY 4 HOURS PRN
Qty: 30 TABLET | Refills: 0 | Status: SHIPPED | OUTPATIENT
Start: 2020-09-18 | End: 2020-09-23

## 2020-09-18 RX ORDER — ACETAMINOPHEN 325 MG/1
650 TABLET ORAL EVERY 4 HOURS PRN
COMMUNITY
Start: 2020-09-18 | End: 2020-10-18

## 2020-09-18 RX ORDER — ACETAMINOPHEN 325 MG/1
650 TABLET ORAL EVERY 6 HOURS
Status: DISCONTINUED | OUTPATIENT
Start: 2020-09-18 | End: 2020-09-20 | Stop reason: HOSPADM

## 2020-09-18 RX ORDER — FENOFIBRATE 134 MG/1
134 CAPSULE ORAL NIGHTLY
Status: DISCONTINUED | OUTPATIENT
Start: 2020-09-18 | End: 2020-09-20 | Stop reason: HOSPADM

## 2020-09-18 RX ORDER — ZIPRASIDONE HYDROCHLORIDE 60 MG/1
120 CAPSULE ORAL 2 TIMES DAILY WITH MEALS
Status: DISCONTINUED | OUTPATIENT
Start: 2020-09-18 | End: 2020-09-20 | Stop reason: HOSPADM

## 2020-09-18 RX ORDER — HYDROMORPHONE HYDROCHLORIDE 2 MG/1
2-4 TABLET ORAL
Status: DISCONTINUED | OUTPATIENT
Start: 2020-09-18 | End: 2020-09-20 | Stop reason: HOSPADM

## 2020-09-18 RX ORDER — DIPHENHYDRAMINE HCL 25 MG
50 CAPSULE ORAL NIGHTLY PRN
COMMUNITY

## 2020-09-18 RX ORDER — CARBAMAZEPINE 200 MG/1
1200 TABLET, EXTENDED RELEASE ORAL NIGHTLY
Status: DISCONTINUED | OUTPATIENT
Start: 2020-09-18 | End: 2020-09-20 | Stop reason: HOSPADM

## 2020-09-18 RX ORDER — FAMOTIDINE 20 MG/1
20 TABLET, FILM COATED ORAL NIGHTLY PRN
COMMUNITY

## 2020-09-18 RX ORDER — ZIPRASIDONE HYDROCHLORIDE 80 MG/1
80 CAPSULE ORAL 2 TIMES DAILY WITH MEALS
COMMUNITY

## 2020-09-18 RX ORDER — SENNOSIDES 8.6 MG/1
1 TABLET ORAL 2 TIMES DAILY PRN
COMMUNITY
Start: 2020-09-18 | End: 2020-10-18

## 2020-09-18 RX ORDER — CARBAMAZEPINE 400 MG/1
1200 TABLET, EXTENDED RELEASE ORAL NIGHTLY
COMMUNITY

## 2020-09-18 RX ORDER — NAPROXEN 375 MG/1
375 TABLET ORAL 2 TIMES DAILY PRN
COMMUNITY
End: 2020-09-20 | Stop reason: HOSPADM

## 2020-09-18 RX ADMIN — HYDROMORPHONE HYDROCHLORIDE 2 MG: 2 TABLET ORAL at 09:32

## 2020-09-18 RX ADMIN — ZIPRASIDONE HYDROCHLORIDE 120 MG: 60 CAPSULE ORAL at 18:01

## 2020-09-18 RX ADMIN — KETOROLAC TROMETHAMINE 15 MG: 15 INJECTION, SOLUTION INTRAMUSCULAR; INTRAVENOUS at 00:29

## 2020-09-18 RX ADMIN — OXYCODONE HYDROCHLORIDE 10 MG: 5 TABLET ORAL at 04:39

## 2020-09-18 RX ADMIN — KETOROLAC TROMETHAMINE 15 MG: 15 INJECTION, SOLUTION INTRAMUSCULAR; INTRAVENOUS at 05:22

## 2020-09-18 RX ADMIN — GABAPENTIN 600 MG: 300 CAPSULE ORAL at 21:28

## 2020-09-18 RX ADMIN — DOCUSATE SODIUM 100 MG: 100 CAPSULE, LIQUID FILLED ORAL at 08:27

## 2020-09-18 RX ADMIN — HYDROMORPHONE HYDROCHLORIDE 0.5 MG: 1 INJECTION, SOLUTION INTRAMUSCULAR; INTRAVENOUS; SUBCUTANEOUS at 08:26

## 2020-09-18 RX ADMIN — PANTOPRAZOLE SODIUM 40 MG: 40 TABLET, DELAYED RELEASE ORAL at 08:27

## 2020-09-18 RX ADMIN — GABAPENTIN 300 MG: 300 CAPSULE ORAL at 10:21

## 2020-09-18 RX ADMIN — KETOROLAC TROMETHAMINE 15 MG: 15 INJECTION, SOLUTION INTRAMUSCULAR; INTRAVENOUS at 15:07

## 2020-09-18 RX ADMIN — PRAVASTATIN SODIUM 20 MG: 20 TABLET ORAL at 21:28

## 2020-09-18 RX ADMIN — FAMOTIDINE 20 MG: 20 TABLET ORAL at 08:27

## 2020-09-18 RX ADMIN — APIXABAN 5 MG: 5 TABLET, FILM COATED ORAL at 10:30

## 2020-09-18 RX ADMIN — ZIPRASIDONE HYDROCHLORIDE 120 MG: 60 CAPSULE ORAL at 10:31

## 2020-09-18 RX ADMIN — CARBOXYMETHYLCELLULOSE SODIUM 1 DROP: 0.5 SOLUTION/ DROPS OPHTHALMIC at 20:56

## 2020-09-18 RX ADMIN — ACETAMINOPHEN 650 MG: 325 TABLET, FILM COATED ORAL at 18:00

## 2020-09-18 RX ADMIN — HYDROMORPHONE HYDROCHLORIDE 4 MG: 2 TABLET ORAL at 12:31

## 2020-09-18 RX ADMIN — CARBOXYMETHYLCELLULOSE SODIUM 1 DROP: 0.5 SOLUTION/ DROPS OPHTHALMIC at 08:27

## 2020-09-18 RX ADMIN — APIXABAN 5 MG: 5 TABLET, FILM COATED ORAL at 20:56

## 2020-09-18 RX ADMIN — CEFAZOLIN SODIUM 2 G: 10 POWDER, FOR SOLUTION INTRAVENOUS at 02:25

## 2020-09-18 RX ADMIN — DULOXETINE HYDROCHLORIDE 60 MG: 60 CAPSULE, DELAYED RELEASE ORAL at 10:22

## 2020-09-18 RX ADMIN — FENOFIBRATE 134 MG: 134 CAPSULE ORAL at 21:28

## 2020-09-18 RX ADMIN — HYDROMORPHONE HYDROCHLORIDE 0.5 MG: 1 INJECTION, SOLUTION INTRAMUSCULAR; INTRAVENOUS; SUBCUTANEOUS at 00:33

## 2020-09-18 RX ADMIN — TRAZODONE HYDROCHLORIDE 100 MG: 100 TABLET ORAL at 21:28

## 2020-09-18 RX ADMIN — DOCUSATE SODIUM 100 MG: 100 CAPSULE, LIQUID FILLED ORAL at 20:56

## 2020-09-18 RX ADMIN — CARBAMAZEPINE 1200 MG: 200 TABLET, EXTENDED RELEASE ORAL at 21:28

## 2020-09-18 RX ADMIN — SODIUM CHLORIDE: 9 INJECTION, SOLUTION INTRAVENOUS at 05:27

## 2020-09-18 RX ADMIN — ACETAMINOPHEN 650 MG: 325 TABLET, FILM COATED ORAL at 12:32

## 2020-09-18 RX ADMIN — HYDROMORPHONE HYDROCHLORIDE 0.5 MG: 1 INJECTION, SOLUTION INTRAMUSCULAR; INTRAVENOUS; SUBCUTANEOUS at 06:25

## 2020-09-18 RX ADMIN — HYDROMORPHONE HYDROCHLORIDE 4 MG: 2 TABLET ORAL at 18:01

## 2020-09-18 ASSESSMENT — COGNITIVE AND FUNCTIONAL STATUS - GENERAL
AFFECT: ANXIOUS
DRESSING REGULAR LOWER BODY CLOTHING: 2 - A LOT
EATING MEALS: 3 - A LITTLE
TOILETING: 2 - A LOT
HELP NEEDED FOR BATHING: 2 - A LOT
AFFECT: ANXIOUS;LOW AROUSAL/LETHARGIC
HELP NEEDED FOR PERSONAL GROOMING: 3 - A LITTLE
DRESSING REGULAR UPPER BODY CLOTHING: 2 - A LOT
WALKING IN HOSPITAL ROOM: 3 - A LITTLE
CLIMB 3 TO 5 STEPS WITH RAILING: 2 - A LOT
STANDING UP FROM CHAIR USING ARMS: 3 - A LITTLE
MOVING TO AND FROM BED TO CHAIR: 3 - A LITTLE

## 2020-09-18 NOTE — PROGRESS NOTES
Patient: Tarah Holley  Location: [place] [address]  MRN: [id number]  Today's date: 9/18/2020     Patient supine in bed on fitted sheet,HOB elevated,  incontinence pad, bed alarm on, call bell and all needs in reach, RN aware. Florence NATALY Rascon is a 54 y.o. female admitted on 9/17/2020 with DJD of right shoulder. Principal problem is DJD of right shoulder.    Past Medical History  Tarah has a past medical history of Arthritis, Asthma, Bipolar depression (CMS/[facility]), BMI 35.0-35.9,adult, DDD (degenerative disc disease), lumbar, Deep vein thrombosis (CMS/[facility]), Foot drop, left foot, RAVINDER (generalized anxiety disorder), GERD (gastroesophageal reflux disease), Hypertension, Kidney stone (2010), Lipid disorder, Lymphedema of left leg, Shoulder pain, acute, Sleep apnea, Spinal stenosis, lumbar region with neurogenic claudication, Traumatic brain injury (CMS/[facility]) (1980), and Type 2 diabetes mellitus (CMS/[facility]).    History of Present Illness   Right total shoulder arthroplasty [facility] 09/17/2020    OT Vitals    Date/Time Pulse BP BP Location BP Method Pt Position Observations [facility]   09/18/20 1347 79 141/67 Left upper arm Automatic [place]ying [place] [facility]   09/18/20 1400 80 169/80 Left upper arm Automatic Lying PT/OT - post mobility  [facility]      [place] Pain    Date/Time Pain Type Pref Pain Scale Side Location Rating: Rest Rating: Activity Interventions [facility]   09/18/20 1347 Pain Assessment number (Numeric Rating Pain Scale) Right shoulder 10 10 cold applied;position adjusted [facility]   09/18/20 1403 Pain Assessment number (Numeric Rating Pain Scale) Right shoulder 10 10 position adjusted KK          Prior Living Environment      Most Recent Value   Living Arrangements  group home   Living Environment Comment  lives at [facility], unclear about CE but pt reports first floor set up, tub shower.           Prior Level of Function      Most Recent Value   Dominant Hand  ambidextrous   Ambulation  assistive equipment    Transferring  assistive equipment   Toileting  assistive equipment   Bathing  assistive equipment   Dressing  independent   Prior Level of Function Comment  Pt somewhat unclear historian, reports she uses rollator,no assist for ADLs   Assistive Device/Animal Currently Used at Home  grab bar, shower chair [+ rollator, per EMR has RW & SPC]          OT Evaluation and Treatment - 09/18/20 1347        Time Calculation    Start Time  1347     Stop Time  1411     Time Calculation (min)  24 min        Session Details    Document Type  initial evaluation     Mode of Treatment  occupational therapy        General Information    Patient Profile Reviewed?  yes     General Observations of Patient  in bed, sling to R UE under gown      Existing Precautions/Restrictions  fall;range of motion;shoulder;weight bearing        Weight-Bearing Status    Right UE Weight-Bearing Status  non weight-bearing (NWB)        Occupational Profile    Reason for Services/Referral (Occupational Profile)  shoulder surgery, decreased ADLs     Successful Occupations (Occupational Profile)  IND ADLs     Occupational History/Life Experiences (Occupational Profile)  PMHx TBI when 14      Performance Patterns (Occupational Profile)  enjoys reading, bible     Environmental Supports and Barriers (Occupational Profile)  from group home     Patient Goals (Occupational Profile)  go to rehab         Cognition/Psychosocial    Affect/Mental Status (Cognitive)  anxious    pain/lack of sleep     Orientation Status (Cognition)  oriented x 4     Follows Commands (Cognition)  follows one step commands;over 90% accuracy;delayed response/completion;initiation impaired;increased processing time needed;repetition of directions required     Cognitive Function (Cognitive)  attention deficit;executive function deficit;safety deficit;memory deficit     Attention Deficit (Cognitive)  minimal deficit;distractible in noisy environment;focused/sustained attention     Executive  Function Deficit (Cognition)  minimal deficit;information processing;initiation;insight/awareness of deficits;judgment;planning/decision making;problem solving/reasoning     Comment, Executive Function  history of TBI      Safety Deficit (Cognitive)  minimal deficit;impulsivity;insight into deficits/self awareness;judgment        Vision Assessment/Intervention    Visual Impairment/Limitations  corrective lenses full time        Sensory Assessment (Somatosensory)    Sensory Assessment (Somatosensory)  sensation intact;bilateral UE        Range of Motion (ROM)    Range of Motion  ROM is WFL;left upper extremity;right upper extremity ROM deficit     Right Upper Extremity (ROM)  right UE ROM is WFL except;shoulder     Shoulder, Right (ROM)  not tested, not recpetive to Regi due to pain         Strength (Manual Muscle Testing)    Strength (Manual Muscle Testing)  strength is WFL;left upper extremity;right upper extremity strength deficit     Right Upper Extremity Strength  right UE strength is WFL;shoulder     Shoulder, Right (Strength)  not tested         Bed Mobility    [place], Supine to Sit  verbal cues     Verbal Cues (Supine to Sit)  maintaining precautions;safety;technique     [place], Sit to Supine  minimum assist (75% or more patient effort);verbal cues     Verbal Cues (Sit to Supine)  hand placement;maintaining precautions;safety     Assistive Device (Bed Mobility)  head of bed elevated     Comment (Bed Mobility)  to R side, increased time, cues for WB         Transfers    Maintains Weight-Bearing Status (Transfers)  cues to maintain weight-bearing status        Sit to Stand Transfer    [place], Sit to Stand Transfer  minimum assist (75% or more patient effort);1 person assist;verbal cues     Verbal Cues  safety     Assistive Device  none    hand held    Comment  from bed, reports dizzy     impulsive        Stand to Sit Transfer    [place], Stand to Sit Transfer  minimum assist (75% or more  patient effort);1 person assist;verbal cues     Verbal Cues  safety     Assistive Device  none     Comment  side steps to HOB with MIN A and hand held assist        Safety Issues, Functional Mobility    Safety Issues Affecting Function (Mobility)  impulsivity;insight into deficits/self awareness;judgment        Balance    Balance Assessment  sitting static balance;sitting dynamic balance;sit to stand dynamic balance;standing static balance;standing dynamic balance     Static Sitting Balance  WFL;unsupported;sitting, edge of bed     Dynamic Sitting Balance  mild impairment;unsupported;sitting, edge of bed     Sit to Stand Dynamic Balance  mild impairment;supported    hand held assist MIN A    Static Standing Balance  mild impairment;supported    MIN A hand held    Dynamic Standing Balance  mild impairment;supported    MIN A hand held        Therapeutic Exercise    Therapeutic Exercise  hand;upper extremity        Upper Extremity (Therapeutic Exercise)    Exercise Position/Type (UE Therapeutic Exercise)  AROM (active range of motion);supine    R elbow/wrist/hand, completed hand pumps/wrist circles    General Exercise (Upper Extremity Therapeutic Exercise)  right    MD ordered FF/ex rot: Pt declined due to pain        Upper Body Dressing    [place] Assistance  threads right arm, shirt;pulls shirt over head/around back     [place]  maximum assist (25-49% patient effort)     Position  edge of bed sitting     Comment  presents with gown over R UE and sling, assist to doff and then bennett through sleeve of gown         Lower Body Dressing    [place] Assistance  dons/doffs left sock;dons/doffs right sock     [place]  dependent (less than 25% patient effort)     Position  supine     Comment  decreased distal reach         Toileting    Comment  declined         Orthotics & Prosthetics Management    Orthosis Location  support device        Support Device Management    Type (Support/Positioning Device)  arm sling;right     MAX A to properly bennett        Coping    Observed Emotional State  anxious        AM-PAC (TM) - ADL (Current Function)    Putting on and taking off regular lower body clothing?  2 - A Lot     Bathing?  2 - A Lot     Toileting?  2 - A Lot     Putting on/taking off regular upper body clothing?  2 - A Lot     How much help for taking care of personal grooming?  3 - A Little     Eating meals?  3 - A Little     AM-PAC (TM) ADL Score  14        Therapy Assessment/Plan (OT)    Rehab Potential (OT)  good, to achieve stated therapy goals     Therapy Frequency (OT)  3-5 times/wk        Progress Summary (OT)    Daily Outcome Statement (OT)  [facility] 14, MIN A supine to sit/sit to stand/stand to sit, MAX A UB dressing, DEP LB dressing, cues for safety and attention as Pt with PMHX of TBI, Rec continued OT to promote safety and IND with ADLs        Therapy Plan Review/Discharge Plan (OT)    OT Recommended Discharge Disposition  skilled nursing facility     Anticipated Equipment Needs At Discharge (OT)  shower chair;cane, straight                   Education provided this session. See the Patient Education summary report for full details.         OT Goals      Most Recent Value   Transfer Goal 1   Activity/Assistive Device  sit-to-stand/stand-to-sit, bed-to-chair/chair-to-bed, toilet at 09/18/2020 1347   [place]  supervision required at 09/18/2020 1347   Time Frame  by discharge at 09/18/2020 1347   Progress/Outcome  goal ongoing at 09/18/2020 1347   Dressing Goal 1   Activity/Adaptive Equipment  upper body dressing at 09/18/2020 1347   [place]  minimum assist (75% or more patient effort) at 09/18/2020 1347   Time Frame  by discharge at 09/18/2020 1347   Progress/Outcome  goal ongoing at 09/18/2020 1347   Dressing Goal 2   Activity/Adaptive Equipment  lower body dressing at 09/18/2020 1347   [place]  minimum assist (75% or more patient effort) at 09/18/2020 1347   Time Frame  by discharge at 09/18/2020 1347    Progress/Outcome  goal ongoing at 09/18/2020 1347   Toileting Goal 1   Activity/Assistive Device  toileting skills, all at 09/18/2020 1347   [place]  supervision required at 09/18/2020 1347   Time Frame  by discharge at 09/18/2020 1347   Progress/Outcome  goal ongoing at 09/18/2020 1347   Grooming Goal 1   Activity/Assistive Device  grooming skills, all at 09/18/2020 1347   [place]  supervision required at 09/18/2020 1347   Time Frame  by discharge at 09/18/2020 1347   Progress/Outcome  goal ongoing at 09/18/2020 1347   Problem Specific Goal 1   Problem-Specific Goal 1  Pt will complete MD ordered UE Regi with SUP and MIN VCs at 09/18/2020 1347   Time Frame  by discharge at 09/18/2020 1347   Progress/Outcome  goal ongoing at 09/18/2020 1347

## 2020-09-18 NOTE — PROGRESS NOTES
Hospital Medicine Service -  Daily Progress Note       SUBJECTIVE   Interval History: Tarah Holley was seen and examined, no acute overnight event reported, patient reported no chest pain, sob, N/V/D, overt bleeding.  Pt report pain is slightly better controlled, no other co, reviewed with pharmacist will resume home meds.    OBJECTIVE      Vital signs in last 24 hours:  Vitals:    09/18/20 0501   BP: 137/66   Pulse: 72   Resp: 18   Temp: 36.9 °C (98.5 °F)   SpO2: 95%       Intake/Output Summary (Last 24 hours) at 9/18/2020 1011  Last data filed at 9/18/2020 0800  Gross per 24 hour   Intake 1140 ml   Output 1150 ml   Net -10 ml       PHYSICAL EXAMINATION      General Appearance:  Awake, Alert, no distress     Head:  Normocephalic, without obvious abnormality, atraumatic   Neck: Supple      Lungs:   Clear to auscultation bilaterally  respirations unlabored  no rales  no wheezing     Heart:  Regular rhythm  S1 and S2 normal  no murmur, rub or gallop     Abdomen:   Soft, non-tender, no masses, no organomegaly     Vascular: Pulses 2+ and symmetric all extremities     Extremities: no calf tenderness      Behavior/Emotional: Appropriate, cooperative          LINES, CATHETERS, DRAINS, AIRWAYS, AND WOUNDS   Lines, Drains, Airways, Wounds:  Peripheral IV 09/17/20 Left;Posterior Hand (Active)   Number of days: 1       Surgical Incision Shoulder Right (Active)   Number of days: 1       Comments:      LABS / IMAGING / TELE      Labs  CMP Results       09/18/20 09/10/20                       0933 1017           137          K 4.2 4.2          Cl 107 104          CO2 27 21          Glucose 115 84          BUN 11 11          Creatinine 0.6 0.7          Calcium 8.7 9.3          Anion Gap 7 12          AST -- 20          ALT -- 13          Albumin -- 4.1          EGFR >60.0 >60.0                       CBC Results       09/18/20 09/10/20                       0933 1017          WBC 6.51 4.52          RBC 3.58 3.78           HGB 10.9 11.4          HCT 33.7 34.3          MCV 94.1 90.7          MCH 30.4 30.2          MCHC 32.3 33.2           276                       Troponin I Results     No lab values to display.        PT/PTT Results       09/10/20                          1017           PT 14.9           INR 1.2           PTT 27           Comment for INR at 1017 on 09/10/20:    Moderate Intensity Anticoagulation = 2.0 to 3.0, High Intensity = 2.5 to 3.5        Lab Results   Component Value Date    HGBA1C 5.5 09/10/2020     Imaging  X-ray Shoulder Right 2+ Views    Result Date: 9/17/2020  IMPRESSION: Right shoulder prosthesis in place. COMMENT: .Two views of the right shoulder were performed. We have no prior, similar studies for comparison. There is a right shoulder prosthesis which appears to be in good position. There are postsurgical changes in the soft tissues.      ECG/Telemetry  Tele and or EKG was Reviewed     ASSESSMENT AND PLAN        Hx of deep venous thrombosis  Assessment & Plan  Patient on Eliquis at home  On Eliquis now     Sleep apnea  Assessment & Plan  Continue with CPAP at night    Type 2 diabetes mellitus (CMS/[facility])  Assessment & Plan  Continue with Accu-Cheks and sliding scale insulin    Hypertension  Assessment & Plan  Blood Pressure stable  Continue with lisinopril    Neurocognitive disorder  Assessment & Plan  Continue supportive care  Continue with psych medications from home    Bipolar depression (CMS/[facility])  Assessment & Plan  Reviewed with pharmacist resume HMs.    Traumatic brain injury (CMS/[facility])  Assessment & Plan  Cont home meds   Talked Pharmacist will resume home meds     * DJD of right shoulder  Assessment & Plan  S/p Right shoulder total arthroplasty  POD day 1  DVT prophylaxis per primary team  Antibiotic prophylaxis per primary team  Pain management per primary team  PT OT.             VTE Assessment: [place]    VTE Prophylaxis Plan: Continue current DVT Prophylaxis   Code Status: Full  Code  Estimated Discharge Date: 9/18/2020  Disposition Planning: Pending progression    This patient note has been dictated using speech recognition software. Inadvertent speech recognition errors should be disregarded. Please do not hesitate to call [facility] office for clarifications.     Yanira England MD  9/18/2020

## 2020-09-18 NOTE — HOSPITAL COURSE
Tarah is a 54 y.o. female admitted on 9/17/2020 with DJD of right shoulder. Principal problem is DJD of right shoulder.    Past Medical History  Tarah has a past medical history of Arthritis, Asthma, Bipolar depression (CMS/[facility]), BMI 35.0-35.9,adult, DDD (degenerative disc disease), lumbar, Deep vein thrombosis (CMS/[facility]), Foot drop, left foot, RAVINDER (generalized anxiety disorder), GERD (gastroesophageal reflux disease), Hypertension, Kidney stone (2010), Lipid disorder, Lymphedema of left leg, Shoulder pain, acute, Sleep apnea, Spinal stenosis, lumbar region with neurogenic claudication, Traumatic brain injury (CMS/[facility]) (1980), and Type 2 diabetes mellitus (CMS/[facility]).    History of Present Illness   [facility]

## 2020-09-18 NOTE — NURSING NOTE
Patient temp 100.5f, notified Ortho resident, telephone read back order for Tylenol 650mg PO Q4hr prn Temp >100.5, or pain.

## 2020-09-18 NOTE — PLAN OF CARE
Problem: Adult Inpatient Plan of Care  Goal: Plan of Care Review  Outcome: Progressing  Flowsheets (Taken 9/18/2020 0889)  Progress: improving  Plan of Care Reviewed With: patient  Outcome Summary: OT eval: MIN A functional transfers, MAX A UB dressing, DEP LB dressing

## 2020-09-18 NOTE — PROGRESS NOTES
Patient: Tarah Holley  Location: [place] [address]  MRN: [id number]  Today's date: 9/18/2020    Attempted to see patient for therapy. Unable due to medical hold.   RN reports pt w/ uncontrolled pain at this time. Will follow up for PT eval at later time.

## 2020-09-18 NOTE — PLAN OF CARE
Problem: Adult Inpatient Plan of Care  Goal: Readiness for Transition of Care  Intervention: Mutually Develop Transition Plan  Flowsheets  Taken 9/18/2020 1213 by Marcy Fajardo MSW  Equipment Needed After Discharge: none  Anticipated Changes Related to Illness: none  Transportation Concerns: car, none  Readmission Within the Last 30 Days: no previous admission in last 30 days  Patient/Family Anticipated Services at Transition: none  Patient/Family Anticipates Transition to: home  Concerns to be Addressed: no discharge needs identified  Taken 9/17/2020 1700 by Bushra Ahmadi RN  Assistive Device/Animal Currently Used at Home: cane, quad;walker, front-wheeled;CPAP;grab bar;shower chair   MSW CC spoke with pts sister, Harleen, as requested. Pts sister said she wants pt to go to [facility] or [facility]. Pt is feeling that [facility] would be best as she says she called both [facility] and [facility] and has preference now for [facility]. Referral made. Pt is to see PT and OT. Pts sister said that pt lives at [facility] and is independent and plans to dc to her moms [place] after [facility]. She uses a cane, walker and rollator, per sister, at [facility]. Pts sister said she is aware that Oskar is at [facility] this weekend. Referral out to [facility], at this time.    PLAN: dc to [facility].
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
## 2020-09-18 NOTE — CONSULTS
Spoke with nurse before entering PT room. PT welcomed visit. PT was standing by the bed when I entered room. PT began to cry. PT shared her medical concerns with me. I had a supportive and spiritual conversation with PT. I gave PT a few scriptures for remembrance. PT thanked me for the visit.

## 2020-09-18 NOTE — PLAN OF CARE
Problem: Adult Inpatient Plan of Care  Goal: Plan of Care Review  Outcome: Progressing  Flowsheets (Taken 9/18/2020 0776)  Progress: improving  Plan of Care Reviewed With: patient  Outcome Summary: Maxim for mobility. C/o inc R shoulder pain during session.

## 2020-09-18 NOTE — ANESTHESIA POSTPROCEDURE EVALUATION
Patient: Tarah Holley    Procedure Summary     Date:  09/17/20 Room / Location:   OR 6 / PH OR    Anesthesia Start:  1048 Anesthesia Stop:  1357    Procedure:  RIGHT SHOULDER ARTHROPLASTY TOTAL (Right ) Diagnosis:       Osteoarthritis of right shoulder, unspecified osteoarthritis type      (Right Shoulder Osteoarthritis M19.011)    Surgeon:  Heath Ornelas MD Responsible Provider:  Rolando Ortiz MD    Anesthesia Type:  general ASA Status:  3          Anesthesia Type: general  PACU Vitals  9/17/2020 1346 - 9/17/2020 1446      9/17/2020  1354 9/17/2020  1400 9/17/2020  1415 9/17/2020  1430    BP:  (!) 109/56  (!) 111/58  (!) 129/56  121/61    Temp:  36.3 °C (97.4 °F)  --  --  --    Pulse:  65  70  68  70    Resp:  (!) 10  13  14  14    SpO2:  99 %  98 %  99 %  98 %              9/17/2020  1445             BP:  121/60       Temp:  --       Pulse:  66       Resp:  (!) 11       SpO2:  98 %               Anesthesia Post Evaluation    Pain management: adequate  Patient location during evaluation: PACU  Patient participation: complete - patient participated  Level of consciousness: awake and alert  Cardiovascular status: acceptable  Airway Patency: adequate  Respiratory status: acceptable  Hydration status: acceptable  Anesthetic complications: no

## 2020-09-18 NOTE — PLAN OF CARE
Problem: Adult Inpatient Plan of Care  Goal: Plan of Care Review  9/18/2020 0658 by Narciso Burroughs, RN  Outcome: Progressing  Flowsheets (Taken 9/18/2020 0658)  Progress: no change  Plan of Care Reviewed With: patient  Outcome Summary: Patient reporting increase in pain overnight, IV prn pain medication administered in addition po pain medication, ortho resident aware. Pt denies numbness/tingling at this time. Ext warm, pink, pos pulses, and cap refill, drsg cdi, sling intact. Adequate po intake and urine output. Oob assist x1, pt denies n/v. Call bell within reach, bed alarm active.

## 2020-09-18 NOTE — PROGRESS NOTES
Patient: Tarah Holley  Location: [place] [address]  MRN: [id number]  Today's date: 9/18/2020    Attempted to see patient for therapy. Unable due to medical hold.    High pain level, discussed with RN, will re address today  when pain better managed
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
## 2020-09-18 NOTE — PROGRESS NOTES
Patient: Tarah Holley  Location: [place] [address]  MRN: [id number]  Today's date: 9/18/2020     Patient left lying in bed w/ call bell w/in reach, RUE sling in place & bed alarm activated.     Tarah is a 54 y.o. female admitted on 9/17/2020 with DJD of right shoulder. Principal problem is DJD of right shoulder.    Past Medical History  Tarah has a past medical history of Arthritis, Asthma, Bipolar depression (CMS/[facility]), BMI 35.0-35.9,adult, DDD (degenerative disc disease), lumbar, Deep vein thrombosis (CMS/[facility]), Foot drop, left foot, RAVINDER (generalized anxiety disorder), GERD (gastroesophageal reflux disease), Hypertension, Kidney stone (2010), Lipid disorder, Lymphedema of left leg, Shoulder pain, acute, Sleep apnea, Spinal stenosis, lumbar region with neurogenic claudication, Traumatic brain injury (CMS/[facility]) (1980), and Type 2 diabetes mellitus (CMS/[facility]).    History of Present Illness   [facility]    PT Vitals    Date/Time Pulse BP BP Location BP Method Pt Position Observations [facility]   09/18/20 1347 79 141/67 Left upper arm Automatic Lying PT/OT [facility]   09/18/20 1400 80 169/80 Left upper arm Automatic Lying PT/OT - post mobility  [facility]      PT Pain    Date/Time Pain Type Pref Pain Scale Side Location Rating: Rest Rating: Activity Interventions [facility]   09/18/20 1347 Pain Assessment number (Numeric Rating Pain Scale) Right shoulder 10 10 cold applied;position adjusted [facility]          Prior Living Environment      Most Recent Value   Living Arrangements  group home   Living Environment Comment  lives at [facility], unclear about CE but pt reports first floor set up, tub shower.           Prior Level of Function      Most Recent Value   Dominant Hand  ambidextrous   Ambulation  assistive equipment   Transferring  assistive equipment   Toileting  assistive equipment   Bathing  assistive equipment   Dressing  independent   Prior Level of Function Comment  Pt  somewhat unclear historian, reports she uses rollator,no assist for ADLs   Assistive Device/Animal Currently Used at Home  grab bar, shower chair [+ rollator, per EMR has RW & SPC]          PT Evaluation and Treatment - 09/18/20 1346        Time Calculation    Start Time  1346     Stop Time  1405     Time Calculation (min)  19 min        Session Details    Document Type  initial evaluation     Mode of Treatment  physical therapy        General Information    Patient Profile Reviewed?  yes     Onset of Illness/Injury or Date of Surgery  09/17/20     Referring Physician  Johnson     General Observations of Patient  Pt received resting in bed, RUE sling in place.     Existing Precautions/Restrictions  fall;weight bearing     Limitations/Impairments  safety/cognitive        Weight-Bearing Status    Right UE Weight-Bearing Status  non weight-bearing (NWB)        Cognition/Psychosocial    Affect/Mental Status (Cognitive)  anxious;low arousal/lethargic    somewhat anxious d/t pain, lack of sleep    Orientation Status (Cognition)  oriented x 4     Follows Commands (Cognition)  follows one step commands;increased processing time needed     Comment, Cognition  cooperative, limited d/t increased pain, req'd cues/encouragement to participate in fxnl mobility. See OT note for full cog assessment        Sensory    Hearing Status  WFL        Vision Assessment/Intervention    Visual Impairment/Limitations  --    wearing glasses during session       Sensory Assessment (Somatosensory)    Sensory Assessment (Somatosensory)  LE sensation intact    grossly to light touch       Range of Motion (ROM)    Range of Motion  ROM is WFL;bilateral lower extremities        Strength Comprehensive (MMT)    Comment  Assessed functionally BLEs at least 3/5        Bed Mobility    [place], Supine to Sit  verbal cues;minimum assist (75% or more patient effort);increased time to complete     Verbal Cues (Supine to Sit)  maintaining precautions;safety      [place], Sit to Supine  verbal cues;minimum assist (75% or more patient effort)     Verbal Cues (Sit to Supine)  safety     Assistive Device (Bed Mobility)  draw sheet;head of bed elevated     Comment (Bed Mobility)  oob to the right. Assist for LE mgmt sit to supine.        Sit to Stand Transfer    [place], Sit to Stand Transfer  verbal cues;minimum assist (75% or more patient effort)     Verbal Cues  safety     Assistive Device  other (see comments)    HHA x1    Comment  from bed        Stand to Sit Transfer    [place], Stand to Sit Transfer  verbal cues;minimum assist (75% or more patient effort)     Verbal Cues  safety     Assistive Device  other (see comments)    HHA x1    Comment  to bed        Gait Training    [place], Gait  minimum assist (75% or more patient effort)     Assistive Device  other (see comments)    HHA x1    Distance in Feet  3 feet     Gait Pattern Utilized  step-to    side stepping    Deviations/Abnormal Patterns (Gait)  gait speed decreased;step length decreased;stride length decreased     Maintains Weight-Bearing Status  able to maintain    RUE in sling    Comment  side stepping along EOB.        Safety Issues, Functional Mobility    Safety Issues Affecting Function (Mobility)  insight into deficits/self awareness;problem solving     Impairments Affecting Function (Mobility)  balance;endurance/activity tolerance;pain;strength     Comment, Safety Issues/Impairments (Mobility)  pt c/o increased pain during session.        Balance    Balance Assessment  sitting static balance;sit to stand dynamic balance;standing static balance;standing dynamic balance     Static Sitting Balance  WFL;sitting, edge of bed     Sit to Stand Dynamic Balance  mild impairment     Static Standing Balance  mild impairment;supported    HHA x1    Dynamic Standing Balance  mild impairment;supported    HHA x1    Comment, Balance  Maxim x1 for mobility, close supervision while sitting at EOB during  adjustment of RUE sling.         AM-PAC (TM) - Mobility (Current Function)    Turning from your back to your side while in a flat bed without using bedrails?  3 - A Little     Moving from lying on your back to sitting on the side of a flat bed without using bedrails?  3 - A Little     Moving to and from a bed to a chair?  3 - A Little     Standing up from a chair using your arms?  3 - A Little     To walk in a hospital room?  3 - A Little     Climbing 3-5 steps with a railing?  2 - A Lot     AM-PAC (TM) Mobility Score  17        Therapy Assessment/Plan (PT)    Rehab Potential (PT)  good, to achieve stated therapy goals     Therapy Frequency (PT)  5 times/wk        Progress Summary (PT)    Daily Outcome Statement (PT)  [facility] 17. Patient req Maxim for mobility. She presents w/ R shoulder pain as well as dec strength, balance & endurance which, along with her NWB RUE precaution, interfere with her ability to perform safe & independent fxnl mobility. She req cont'd skilled PT to address noted deficits in order to max fxnl capacity & dec fall risk. Recommend further rehab post acute stay.     Symptoms Noted During/After Treatment  fatigue;increased pain    R shoulder       Therapy Plan Review/Discharge Plan (PT)    PT Recommended Discharge Disposition  skilled nursing facility;inpatient rehabilitation facility     Anticipated Equipment Needs at Discharge (PT Eval)  other (see comments)    TBD at next level of care       Plan of Care Review    Plan of Care Reviewed With  patient                       Education provided this session. See the Patient Education summary report for full details.    PT Goals      Most Recent Value   Bed Mobility Goal 1   Activity/Assistive Device  bed mobility activities, all at 09/18/2020 1346   [place]  independent at 09/18/2020 1346   Time Frame  by discharge at 09/18/2020 1346   Progress/Outcome  goal ongoing at 09/18/2020 1346   Transfer Goal 1   Activity/Assistive Device  all  transfers [LRAD vs no AD] at 09/18/2020 1346   [place]  modified independence at 09/18/2020 1346   Time Frame  by discharge at 09/18/2020 1346   Progress/Outcome  goal ongoing at 09/18/2020 1346   Gait Training Goal 1   Activity/Assistive Device  gait (walking locomotion) [LRAD vs no AD] at 09/18/2020 1346   [place]  modified independence at 09/18/2020 1346   Distance  100 at 09/18/2020 1346   Time Frame  by discharge at 09/18/2020 1346   Progress/Outcome  goal ongoing at 09/18/2020 1346

## 2020-09-18 NOTE — PROGRESS NOTES
Medication Reconciliation.     Attempted to reconcile medications.   Patient takes them from [facility] in [facility].   Phone [phone]. Schedule 9 am- 5.30 pm.    Patient has very unusual doses of psychiatric medications. Notably Geodon above recommended dose. Resumed home meds but prescribed lower doses of Geodon. Our team will be in contact with pharmacy in am to verify medication doses.     Apixaban was held tonight considering recent surgery. To be resumed tomorrow if safe per Ortho.     I further discussed case with pharmacy and they will assist us regarding medication reconciliation in am.

## 2020-09-18 NOTE — ASSESSMENT & PLAN NOTE
S/p Right shoulder total arthroplasty  POD day 2  DVT prophylaxis per primary team  Antibiotic prophylaxis per primary team  Pain management per primary team  PT OT.

## 2020-09-19 LAB
ANION GAP SERPL CALC-SCNC: 8 MEQ/L (ref 3–15)
BUN SERPL-MCNC: 14 MG/DL (ref 8–20)
CALCIUM SERPL-MCNC: 8.8 MG/DL (ref 8.9–10.3)
CHLORIDE SERPL-SCNC: 107 MEQ/L (ref 98–109)
CO2 SERPL-SCNC: 24 MEQ/L (ref 22–32)
CREAT SERPL-MCNC: 0.6 MG/DL (ref 0.6–1.1)
ERYTHROCYTE [DISTWIDTH] IN BLOOD BY AUTOMATED COUNT: 12.4 % (ref 11.7–14.4)
GFR SERPL CREATININE-BSD FRML MDRD: >60 ML/MIN/1.73M*2
GLUCOSE SERPL-MCNC: 123 MG/DL (ref 70–99)
HCT VFR BLDCO AUTO: 33.4 % (ref 35–45)
HCV AB SER QL: NONREACTIVE
HGB BLD-MCNC: 10.7 G/DL (ref 11.8–15.7)
MCH RBC QN AUTO: 29.3 PG (ref 28–33.2)
MCHC RBC AUTO-ENTMCNC: 32 G/DL (ref 32.2–35.5)
MCV RBC AUTO: 91.5 FL (ref 83–98)
PDW BLD AUTO: 8.9 FL (ref 9.4–12.3)
PLATELET # BLD AUTO: 223 K/UL (ref 150–369)
POTASSIUM SERPL-SCNC: 4.3 MEQ/L (ref 3.6–5.1)
RBC # BLD AUTO: 3.65 M/UL (ref 3.93–5.22)
SODIUM SERPL-SCNC: 139 MEQ/L (ref 136–144)
WBC # BLD AUTO: 6.44 K/UL (ref 3.8–10.5)

## 2020-09-19 PROCEDURE — 85027 COMPLETE CBC AUTOMATED: CPT | Performed by: SPECIALIST

## 2020-09-19 PROCEDURE — 99232 SBSQ HOSP IP/OBS MODERATE 35: CPT | Performed by: HOSPITALIST

## 2020-09-19 PROCEDURE — 36415 COLL VENOUS BLD VENIPUNCTURE: CPT | Performed by: SPECIALIST

## 2020-09-19 PROCEDURE — 63700000 HC SELF-ADMINISTRABLE DRUG: Performed by: INTERNAL MEDICINE

## 2020-09-19 PROCEDURE — 12000000 HC ROOM AND CARE MED/SURG

## 2020-09-19 PROCEDURE — 63700000 HC SELF-ADMINISTRABLE DRUG: Performed by: HOSPITALIST

## 2020-09-19 PROCEDURE — 80048 BASIC METABOLIC PNL TOTAL CA: CPT | Performed by: SPECIALIST

## 2020-09-19 PROCEDURE — 63700000 HC SELF-ADMINISTRABLE DRUG: Performed by: SPECIALIST

## 2020-09-19 PROCEDURE — 63700000 HC SELF-ADMINISTRABLE DRUG: Performed by: NURSE PRACTITIONER

## 2020-09-19 RX ADMIN — DOCUSATE SODIUM 100 MG: 100 CAPSULE, LIQUID FILLED ORAL at 08:39

## 2020-09-19 RX ADMIN — ACETAMINOPHEN 650 MG: 325 TABLET, FILM COATED ORAL at 11:17

## 2020-09-19 RX ADMIN — PRAVASTATIN SODIUM 20 MG: 20 TABLET ORAL at 20:29

## 2020-09-19 RX ADMIN — FAMOTIDINE 20 MG: 20 TABLET ORAL at 08:39

## 2020-09-19 RX ADMIN — CARBAMAZEPINE 1200 MG: 200 TABLET, EXTENDED RELEASE ORAL at 21:32

## 2020-09-19 RX ADMIN — GABAPENTIN 300 MG: 300 CAPSULE ORAL at 08:39

## 2020-09-19 RX ADMIN — HYDROMORPHONE HYDROCHLORIDE 4 MG: 2 TABLET ORAL at 15:19

## 2020-09-19 RX ADMIN — HYDROMORPHONE HYDROCHLORIDE 4 MG: 2 TABLET ORAL at 18:34

## 2020-09-19 RX ADMIN — APIXABAN 5 MG: 5 TABLET, FILM COATED ORAL at 08:39

## 2020-09-19 RX ADMIN — DULOXETINE HYDROCHLORIDE 60 MG: 60 CAPSULE, DELAYED RELEASE ORAL at 08:39

## 2020-09-19 RX ADMIN — ACETAMINOPHEN 650 MG: 325 TABLET, FILM COATED ORAL at 03:48

## 2020-09-19 RX ADMIN — HYDROMORPHONE HYDROCHLORIDE 4 MG: 2 TABLET ORAL at 21:32

## 2020-09-19 RX ADMIN — ACETAMINOPHEN 650 MG: 325 TABLET, FILM COATED ORAL at 17:16

## 2020-09-19 RX ADMIN — CARBOXYMETHYLCELLULOSE SODIUM 1 DROP: 0.5 SOLUTION/ DROPS OPHTHALMIC at 20:29

## 2020-09-19 RX ADMIN — APIXABAN 5 MG: 5 TABLET, FILM COATED ORAL at 20:29

## 2020-09-19 RX ADMIN — LISINOPRIL 10 MG: 10 TABLET ORAL at 08:38

## 2020-09-19 RX ADMIN — DOCUSATE SODIUM 100 MG: 100 CAPSULE, LIQUID FILLED ORAL at 20:28

## 2020-09-19 RX ADMIN — GABAPENTIN 600 MG: 300 CAPSULE ORAL at 20:29

## 2020-09-19 RX ADMIN — FENOFIBRATE 134 MG: 134 CAPSULE ORAL at 20:29

## 2020-09-19 RX ADMIN — ZIPRASIDONE HYDROCHLORIDE 120 MG: 60 CAPSULE ORAL at 17:16

## 2020-09-19 RX ADMIN — TRAZODONE HYDROCHLORIDE 100 MG: 100 TABLET ORAL at 20:29

## 2020-09-19 RX ADMIN — HYDROMORPHONE HYDROCHLORIDE 2 MG: 2 TABLET ORAL at 07:49

## 2020-09-19 RX ADMIN — CARBOXYMETHYLCELLULOSE SODIUM 1 DROP: 0.5 SOLUTION/ DROPS OPHTHALMIC at 08:40

## 2020-09-19 RX ADMIN — HYDROMORPHONE HYDROCHLORIDE 4 MG: 2 TABLET ORAL at 11:18

## 2020-09-19 RX ADMIN — PANTOPRAZOLE SODIUM 40 MG: 40 TABLET, DELAYED RELEASE ORAL at 08:39

## 2020-09-19 RX ADMIN — SODIUM CHLORIDE: 50 OINTMENT OPHTHALMIC at 20:30

## 2020-09-19 RX ADMIN — SODIUM CHLORIDE 1 APPLICATION.: 50 OINTMENT OPHTHALMIC at 08:40

## 2020-09-19 RX ADMIN — ZIPRASIDONE HYDROCHLORIDE 120 MG: 60 CAPSULE ORAL at 07:50

## 2020-09-19 NOTE — DISCHARGE INSTRUCTIONS
ACTIVITY:  Maintain the operative arm in the sling or abduction pillow at all times, until you are seen back in the office.  Active Asst. Range of motion/Limit forward flexion to 90 degrees/ External Rotation less then 30 degrees/pendulums/ full active range of motion elbow, wrist and hand  No pushing off bed or chair to stand up.  Perform the exercises as instructed by physical/occupational therapy.  Activate hand, wrist and elbow motion as tolerated.  DO NOT use exercise equipment unless otherwise instructed.    DRESSING CARE:  Keep dressing dry initially.  Mepilex may be removed post op day 5 then cover with gauze  You can expect some light bloody wound seepage through the bandage. DO NOT BE ALARMED. This is normal.  If the dressing does get soaked, remove and replace with dry gauze.    FOLLOW UP:  Call the doctor’s office today or tomorrow to make a return appointment with Dr. Ornelas in 10-14 days.   Call our office immediately if you develop signs and symptoms of infection including:  Numbness in the arm or hand  Excessive wound drainage  Redness  Swelling    You have Hepatitis C screening test performed, please follow up with your PCP for final Result and further management

## 2020-09-19 NOTE — PROGRESS NOTES
Ortho Daily Progress Note    Subjective     Interval History: Patient seen and examined at bedside. Pain controlled       Objective     Vital signs in last 24 hours:  Temp:  [36.6 °C (97.9 °F)-38.1 °C (100.5 °F)] 36.7 °C (98.1 °F)  Heart Rate:  [63-82] 75  Resp:  [18] 18  BP: (126-169)/(60-81) 137/81      Intake/Output Summary (Last 24 hours) at 9/18/2020 2123  Last data filed at 9/18/2020 1700  Gross per 24 hour   Intake 820 ml   Output 1050 ml   Net -230 ml     Intake/Output this shift:  No intake/output data recorded.    Labs:  Results from last 7 days   Lab Units 09/18/20  0933   WBC K/uL 6.51   HEMOGLOBIN g/dL 10.9*   HEMATOCRIT % 33.7*   PLATELETS K/uL 223           Microbiology Results     ** No results found for the last 720 hours. **            Imaging:  XR right shoulder: well aligned total shoulder prosthesis; postop soft tissue changes present      Physical Exam:  Gen: NAD, Cooperative  HEENT: No masses  CV: BCR, toes warm and well perfused  ABD: Nontender, nondistended  Neuro: Alert and oriented  Psych: yasmany mood/affect  Respiratory: no Respiratory distress  MSK:    LUE  No obvious deformity. Sling in place. Dressing CDI.  Appropriate TTP  No TTP bony prominences elsewhere. No palpable crepitus.  Appropriate pain with shoulder ROM  No pain with ROM elbow/wrist/fingers  Motor intact axillary/msk/median/radial/ulnar/AIN/PIN  SILT axillary/median/radial/ulnar n  Compartments soft and compressible  Hand WWP, radial pulse 2+    A/P:   54F s/p right TSA on 9/17 by Dr. Ornelas    Ancef 24 hours - completed  WBAT RLE  PT/OT  OOB  DC PLANNING-SNF  DVT: Eliquis   Pain control

## 2020-09-19 NOTE — PLAN OF CARE
Problem: Adult Inpatient Plan of Care  Goal: Plan of Care Review  9/19/2020 0422 by Narciso Burroughs, RN  Outcome: Progressing  Flowsheets  Taken 9/19/2020 0422  Plan of Care Reviewed With: patient  Taken 9/19/2020 0418  Outcome Summary: Patient pain controlled by current pain regimen, pt received po Dilaudid on previous shift. sleepy and a little lethargic after, unsteady gait noted, AAO upon arrousal. Denies n/v, sob, chest pain, headache. Drsg to R shoulder cdi, sling intact, ext warm and pink, pos pulses, denies numbness or tingling. Adequate po intake and void. Call bell within reach, bed alarm active.

## 2020-09-19 NOTE — PLAN OF CARE
Problem: Adult Inpatient Plan of Care  Goal: Plan of Care Review  Outcome: Progressing  Flowsheets  Taken 9/18/2020 1528 by Martine Matos OT  Progress: improving  Taken 9/19/2020 0418 by Narciso Burroughs RN  Plan of Care Reviewed With: patient  Outcome Summary: Patient pain controlled by current pain regimen, pt received po Dilaudid on previous shift. sleepy and a little lethargic after, unsteady gait noted, AAO upon arousal. Denies n/v, sob, chest pain, headache. Drsg to R shoulder cdi, sling intact, ext warm and pink, pos pulses, denies numbness or tingling. Adequate po intake and void. Call bell within reach, bed alarm active.

## 2020-09-19 NOTE — PROGRESS NOTES
Hospital Medicine Service -  Daily Progress Note       SUBJECTIVE   Interval History: Tarah Holley was seen and examined, no acute overnight event reported, patient reported no chest pain, sob, N/V/D, overt bleeding. awaiting for SNF.    OBJECTIVE      Vital signs in last 24 hours:  Vitals:    09/19/20 1105   BP: 139/72   Pulse: 87   Resp: 16   Temp: 36.9 °C (98.5 °F)   SpO2: 95%       Intake/Output Summary (Last 24 hours) at 9/19/2020 1111  Last data filed at 9/19/2020 0417  Gross per 24 hour   Intake 820 ml   Output 700 ml   Net 120 ml       PHYSICAL EXAMINATION      General Appearance:  Awake, Alert, no distress     Head:  Normocephalic, without obvious abnormality, atraumatic   Neck: Supple      Lungs:   Clear to auscultation bilaterally  respirations unlabored  no rales  no wheezing     Heart:  Regular rhythm  S1 and S2 normal  no murmur, rub or gallop     Abdomen:   Soft, non-tender, no masses, no organomegaly     Vascular: Pulses 2+ and symmetric all extremities     Extremities: no calf tenderness      Behavior/Emotional: Appropriate, cooperative          LINES, CATHETERS, DRAINS, AIRWAYS, AND WOUNDS   Lines, Drains, Airways, Wounds:  Peripheral IV 09/17/20 Left;Posterior Hand (Active)   Number of days: 2       Surgical Incision Shoulder Right (Active)   Number of days: 2       Comments:      LABS / IMAGING / TELE      Labs  CMP Results       09/19/20 09/18/20 09/10/20                    0413 0933 1017          141 137         K 4.3 4.2 4.2         Cl 107 107 104         CO2 24 27 21         Glucose 123 115 84         BUN 14 11 11         Creatinine 0.6 0.6 0.7         Calcium 8.8 8.7 9.3         Anion Gap 8 7 12         AST -- -- 20         ALT -- -- 13         Albumin -- -- 4.1         EGFR >60.0 >60.0 >60.0                     CBC Results       09/19/20 09/18/20 09/10/20                    0413 0933 1017         WBC 6.44 6.51 4.52         RBC 3.65 3.58 3.78         HGB 10.7 10.9 11.4          HCT 33.4 33.7 34.3         MCV 91.5 94.1 90.7         MCH 29.3 30.4 30.2         MCHC 32.0 32.3 33.2          223 276                     Troponin I Results     No lab values to display.        PT/PTT Results       09/10/20                          1017           PT 14.9           INR 1.2           PTT 27           Comment for INR at 1017 on 09/10/20:    Moderate Intensity Anticoagulation = 2.0 to 3.0, High Intensity = 2.5 to 3.5        Lab Results   Component Value Date    HGBA1C 5.5 09/10/2020     Imaging  X-ray Shoulder Right 2+ Views    Result Date: 9/17/2020  IMPRESSION: Right shoulder prosthesis in place. COMMENT: .Two views of the right shoulder were performed. We have no prior, similar studies for comparison. There is a right shoulder prosthesis which appears to be in good position. There are postsurgical changes in the soft tissues.      ECG/Telemetry  Tele and or EKG was Reviewed     ASSESSMENT AND PLAN        Hx of deep venous thrombosis  Assessment & Plan  Patient on Eliquis at home  On Eliquis now     Sleep apnea  Assessment & Plan  Continue with CPAP at night    Type 2 diabetes mellitus (CMS/[facility])  Assessment & Plan  Continue with Accu-Cheks and sliding scale insulin    Hypertension  Assessment & Plan  Blood Pressure stable  Continue with lisinopril    Neurocognitive disorder  Assessment & Plan  Continue supportive care  Continue with psych medications from home    Bipolar depression (CMS/[facility])  Assessment & Plan  Reviewed with pharmacist resume [facility]    Traumatic brain injury (CMS/[facility])  Assessment & Plan  Cont home meds   Talked Pharmacist will resume home meds     * DJD of right shoulder  Assessment & Plan  S/p Right shoulder total arthroplasty  POD day 2  DVT prophylaxis per primary team  Antibiotic prophylaxis per primary team  Pain management per primary team  PT OT.             VTE Assessment: [place]    VTE Prophylaxis Plan: Continue current DVT Prophylaxis   Code Status: Full  Code  Estimated Discharge Date: 9/18/2020  Disposition Planning: Pending progression    This patient note has been dictated using speech recognition software. Inadvertent speech recognition errors should be disregarded. Please do not hesitate to [place] [facility] [place]ifications.     Yanira England MD  9/19/2020

## 2020-09-19 NOTE — PLAN OF CARE
Problem: Adult Inpatient Plan of Care  Goal: Plan of Care Review  Outcome: Progressing  Flowsheets  Taken 9/19/2020 7081 by Lori Hough RN  Plan of Care Reviewed With: patient  Outcome Summary: Pt continues to express pain at surgical site, Given scheduled and PRN medications when due. OOB x 1 earlier during shift. Assist x 2 out of bed. Surgical dressing CDI and sling in place. Declined wanting to update family at this time. No other needs expressed and call bell within reach. Will conitnue to monitor.

## 2020-09-20 VITALS
DIASTOLIC BLOOD PRESSURE: 66 MMHG | RESPIRATION RATE: 17 BRPM | SYSTOLIC BLOOD PRESSURE: 116 MMHG | TEMPERATURE: 98.1 F | BODY MASS INDEX: 36.32 KG/M2 | OXYGEN SATURATION: 95 % | HEIGHT: 63 IN | HEART RATE: 80 BPM | WEIGHT: 205 LBS

## 2020-09-20 LAB
ANION GAP SERPL CALC-SCNC: 8 MEQ/L (ref 3–15)
BUN SERPL-MCNC: 11 MG/DL (ref 8–20)
CALCIUM SERPL-MCNC: 8.6 MG/DL (ref 8.9–10.3)
CHLORIDE SERPL-SCNC: 103 MEQ/L (ref 98–109)
CO2 SERPL-SCNC: 25 MEQ/L (ref 22–32)
CREAT SERPL-MCNC: 0.6 MG/DL (ref 0.6–1.1)
ERYTHROCYTE [DISTWIDTH] IN BLOOD BY AUTOMATED COUNT: 12.4 % (ref 11.7–14.4)
GFR SERPL CREATININE-BSD FRML MDRD: >60 ML/MIN/1.73M*2
GLUCOSE SERPL-MCNC: 111 MG/DL (ref 70–99)
HCT VFR BLDCO AUTO: 31 % (ref 35–45)
HGB BLD-MCNC: 10 G/DL (ref 11.8–15.7)
MCH RBC QN AUTO: 29.6 PG (ref 28–33.2)
MCHC RBC AUTO-ENTMCNC: 32.3 G/DL (ref 32.2–35.5)
MCV RBC AUTO: 91.7 FL (ref 83–98)
PDW BLD AUTO: 9 FL (ref 9.4–12.3)
PLATELET # BLD AUTO: 218 K/UL (ref 150–369)
POTASSIUM SERPL-SCNC: 4.1 MEQ/L (ref 3.6–5.1)
RBC # BLD AUTO: 3.38 M/UL (ref 3.93–5.22)
SODIUM SERPL-SCNC: 136 MEQ/L (ref 136–144)
WBC # BLD AUTO: 5.1 K/UL (ref 3.8–10.5)

## 2020-09-20 PROCEDURE — 63700000 HC SELF-ADMINISTRABLE DRUG: Performed by: SPECIALIST

## 2020-09-20 PROCEDURE — 36415 COLL VENOUS BLD VENIPUNCTURE: CPT | Performed by: SPECIALIST

## 2020-09-20 PROCEDURE — 99232 SBSQ HOSP IP/OBS MODERATE 35: CPT | Performed by: HOSPITALIST

## 2020-09-20 PROCEDURE — 80048 BASIC METABOLIC PNL TOTAL CA: CPT | Performed by: SPECIALIST

## 2020-09-20 PROCEDURE — 63700000 HC SELF-ADMINISTRABLE DRUG: Performed by: HOSPITALIST

## 2020-09-20 PROCEDURE — 85027 COMPLETE CBC AUTOMATED: CPT | Performed by: SPECIALIST

## 2020-09-20 PROCEDURE — 97535 SELF CARE MNGMENT TRAINING: CPT | Mod: GO

## 2020-09-20 PROCEDURE — 63700000 HC SELF-ADMINISTRABLE DRUG: Performed by: NURSE PRACTITIONER

## 2020-09-20 PROCEDURE — 63700000 HC SELF-ADMINISTRABLE DRUG: Performed by: INTERNAL MEDICINE

## 2020-09-20 RX ADMIN — ZIPRASIDONE HYDROCHLORIDE 120 MG: 60 CAPSULE ORAL at 08:09

## 2020-09-20 RX ADMIN — GABAPENTIN 300 MG: 300 CAPSULE ORAL at 08:09

## 2020-09-20 RX ADMIN — ACETAMINOPHEN 650 MG: 325 TABLET, FILM COATED ORAL at 01:55

## 2020-09-20 RX ADMIN — PANTOPRAZOLE SODIUM 40 MG: 40 TABLET, DELAYED RELEASE ORAL at 08:09

## 2020-09-20 RX ADMIN — ACETAMINOPHEN 650 MG: 325 TABLET, FILM COATED ORAL at 05:06

## 2020-09-20 RX ADMIN — HYDROMORPHONE HYDROCHLORIDE 2 MG: 2 TABLET ORAL at 13:13

## 2020-09-20 RX ADMIN — ACETAMINOPHEN 650 MG: 325 TABLET, FILM COATED ORAL at 11:20

## 2020-09-20 RX ADMIN — FAMOTIDINE 20 MG: 20 TABLET ORAL at 08:09

## 2020-09-20 RX ADMIN — CARBOXYMETHYLCELLULOSE SODIUM 1 DROP: 0.5 SOLUTION/ DROPS OPHTHALMIC at 08:10

## 2020-09-20 RX ADMIN — HYDROMORPHONE HYDROCHLORIDE 2 MG: 2 TABLET ORAL at 05:06

## 2020-09-20 RX ADMIN — SODIUM CHLORIDE 1 APPLICATION.: 50 OINTMENT OPHTHALMIC at 08:10

## 2020-09-20 RX ADMIN — LISINOPRIL 10 MG: 10 TABLET ORAL at 08:09

## 2020-09-20 RX ADMIN — DOCUSATE SODIUM 100 MG: 100 CAPSULE, LIQUID FILLED ORAL at 08:09

## 2020-09-20 RX ADMIN — DULOXETINE HYDROCHLORIDE 60 MG: 60 CAPSULE, DELAYED RELEASE ORAL at 08:09

## 2020-09-20 RX ADMIN — HYDROMORPHONE HYDROCHLORIDE 4 MG: 2 TABLET ORAL at 01:55

## 2020-09-20 RX ADMIN — APIXABAN 5 MG: 5 TABLET, FILM COATED ORAL at 08:09

## 2020-09-20 ASSESSMENT — COGNITIVE AND FUNCTIONAL STATUS - GENERAL
AFFECT: LOW AROUSAL/LETHARGIC;FLAT/BLUNTED AFFECT
EATING MEALS: 3 - A LITTLE
HELP NEEDED FOR BATHING: 2 - A LOT
TOILETING: 1 - TOTAL
DRESSING REGULAR UPPER BODY CLOTHING: 2 - A LOT
HELP NEEDED FOR PERSONAL GROOMING: 3 - A LITTLE
DRESSING REGULAR LOWER BODY CLOTHING: 1 - TOTAL

## 2020-09-20 NOTE — NURSING NOTE
Called report to Jackie ZARAGOZA on [address] at [facility]. All questions and concerns answered. Discharge summary, important info and prescription for Dilaudid to be sent with pt at time of transport. IV removed. Awaiting transport arrival.

## 2020-09-20 NOTE — PROGRESS NOTES
Patient: Tarah Holley  Location: [place] [address]  MRN: [id number]  Today's date: 9/20/2020    Pt resting in chair at end of session with chair alarm on, incontinence pad in place and call bell in reach. RN notified      Tarah is a 54 y.o. female admitted on 9/17/2020 with DJD of right shoulder. Principal problem is DJD of right shoulder.    Past Medical History  Tarah has a past medical history of Arthritis, Asthma, Bipolar depression (CMS/[facility]), BMI 35.0-35.9,adult, DDD (degenerative disc disease), lumbar, Deep vein thrombosis (CMS/[facility]), Foot drop, left foot, RAVINDER (generalized anxiety disorder), GERD (gastroesophageal reflux disease), Hypertension, Kidney stone (2010), Lipid disorder, Lymphedema of left leg, Shoulder pain, acute, Sleep apnea, Spinal stenosis, lumbar region with neurogenic claudication, Traumatic brain injury (CMS/[facility]) (1980), and Type 2 diabetes mellitus (CMS/[facility]).    History of Present Illness   [facility]    OT Vitals    Date/Time Pulse SpO2 Pt Activity O2 Therapy BP BP Location BP Method Pt Position [facility]   09/20/20 1117 80 95 % At rest None (Room air) 116/66 Left upper arm Automatic Sitting ECM      OT Pain    Date/Time Pain Type Pref Pain Scale Side Location Rating: Rest Rating: Activity Interventions [facility]   09/20/20 1117 Pain Assessment word (verbal rating pain scale) Right shoulder 6 - moderate-severe pain 6 - moderate-severe pain premedicated for activity;position adjusted [facility]   09/20/20 1120 Pain Assessment word (verbal rating pain scale) -- shoulder 4 - moderate pain 4 - moderate pain -- EMG          Prior Living Environment      Most Recent Value   Living Arrangements  group home   Living Environment Comment  lives at [facility], unclear about CE but pt reports first floor set up, tub shower.           Prior Level of Function      Most Recent Value   Dominant Hand  ambidextrous   Ambulation  assistive equipment    Transferring  assistive equipment   Toileting  assistive equipment   Bathing  assistive equipment   Dressing  independent   Prior Level of Function Comment  Pt somewhat unclear historian, reports she uses rollator,no assist for ADLs   Assistive Device/Animal Currently Used at Home  grab bar, shower chair [+ rollator, per EMR has RW & SPC]          Occupational Profile      Most Recent Value   Reason for Services/Referral  ADL deficit s/p R shoulder surgery    Successful Occupations  IND ADLs   Occupational History/Life Experiences  PMHx TBI at age 14    Performance Patterns  enjoys watching [employer]    Environmental Supports and Barriers  from group home    Patient Goals  go to rehab           OT Evaluation and Treatment - 09/20/20 1115        Time Calculation    Start Time  1115     Stop Time  1135     Time Calculation (min)  20 min        Session Details    Document Type  daily treatment/progress note     Mode of Treatment  occupational therapy        General Information    Patient Profile Reviewed?  yes     Onset of Illness/Injury or Date of Surgery  09/17/20     Referring Physician  Johnson      General Observations of Patient  Pt is awake in chair w/ sling on R UE agreeable to OT      Existing Precautions/Restrictions  fall;range of motion;shoulder;weight bearing     Limitations/Impairments  safety/cognitive        Weight-Bearing Status    Right UE Weight-Bearing Status  non weight-bearing (NWB)        Cognition/Psychosocial    Affect/Mental Status (Cognitive)  low arousal/lethargic;flat/blunted affect     Orientation Status (Cognition)  oriented x 4     Follows Commands (Cognition)  follows one step commands;over 90% accuracy;delayed response/completion;increased processing time needed;repetition of directions required;verbal cues/prompting required     Cognitive Function (Cognitive)  attention deficit     Attention Deficit (Cognitive)  minimal deficit;concentration     Comment, Attention  easily distracted  by visual stimulus in room      Executive Function Deficit (Cognition)  minimal deficit;insight/awareness of deficits;judgment;problem solving/reasoning;self-monitoring/self-correction     Comment, Executive Function  VC to adhere to WBing restrictions in session h/o TBI      Safety Deficit (Cognitive)  minimal deficit;insight into deficits/self awareness;awareness of need for assistance;impulsivity     Comment, Cognition  attempts to move w/o A , cooperative w/ VC and increased processing time         Bed Mobility    Comment (Bed Mobility)  NT rec'd OOB         Transfers    Transfers  toilet transfer     Maintains Weight-Bearing Status (Transfers)  cues to maintain weight-bearing status     Comment  chair>3-in-1 commode        Sit to Stand Transfer    [place], Sit to Stand Transfer  minimum assist (75% or more patient effort);verbal cues     Verbal Cues  maintaining precautions;technique     Comment  from chair         Stand to Sit Transfer    [place], Stand to Sit Transfer  minimum assist (75% or more patient effort);verbal cues     Verbal Cues  safety;hand placement     Comment  to chair         Toilet Transfer    Transfer Technique  sit-stand;stand-sit     [place], Toilet Transfer  minimum assist (75% or more patient effort);moderate assist (50-74% patient effort);verbal cues     Verbal Cues  hand placement;safety;maintaining precautions     Assistive Device  commode, 3-in-1     Comment  chair > 3-in-1 MIN-MOD A for balance/safety         Safety Issues, Functional Mobility    Safety Issues Affecting Function (Mobility)  impulsivity;insight into deficits/self awareness;judgment;safety precautions follow-through/compliance;unable to maintain weight-bearing restrictions     Impairments Affecting Function (Mobility)  balance;cognition;endurance/activity tolerance;pain;range of motion;strength     Cognitive Impairments, Mobility Safety/Performance  awareness, need for assistance;impulsivity;insight  into deficits/self awareness;judgment     Comment, Safety Issues/Impairments (Mobility)  VC for hand placement/safety during ADL        Balance    Static Sitting Balance  WFL;sitting in chair     Dynamic Sitting Balance  WFL;sitting in chair     Sit to Stand Dynamic Balance  mild impairment     Static Standing Balance  mild impairment     Dynamic Standing Balance  mild impairment     Comment, Balance  MIN A x1 for standing balance during ADL         Motor Skills    Motor Skills  coordination;functional endurance     Coordination  WFL     Functional Endurance  fair      Motor Control/Coordination Interventions  occupation/activity based treatment        Upper Extremity (Therapeutic Exercise)    Exercise Position/Type (UE Therapeutic Exercise)  AROM (active range of motion);other (see comments)    hand pumps, finger flex/ext    General Exercise (Upper Extremity Therapeutic Exercise)  right     Comment (UE Therapeutic Exercise)  declines elbow 2/2 pain         Lower Body Dressing    [place] Assistance  threads left leg, underpants;threads right leg, underpants;pulls underpants up or down     [place]  dependent (less than 25% patient effort)     Position  unsupported standing;supported sitting     Comment  DEP to bennett underwear requires A while in stance for balance + A to hike pants to waist         Toileting    [place]  perform bladder hygiene;dependent (less than 25% patient effort)     Position  unsupported standing;unsupported sitting     Setup Assistance  obtain supplies     Comment  DEP for posterior/anterior hygiene while in stance         Orthotics & Prosthetics Management    Orthosis Location  support device        Support Device Management    Type (Support/Positioning Device)  right;arm sling     Wearing Schedule (Support/Positioning Device)  wear full time;remove for hygiene/bathing;other (see comments);remove for exercise    dressing    Orthosis Training (Support/Positioning Device)  unable to  "meet, needs instruction        Coping    Observed Emotional State  calm;cooperative     Verbalized Emotional State  acceptance        AM-PAC (TM) - ADL (Current Function)    Putting on and taking off regular lower body clothing?  1 - Total     Bathing?  2 - A Lot     Toileting?  1 - Total     Putting on/taking off regular upper body clothing?  2 - A Lot     How much help for taking care of personal grooming?  3 - A Little     Eating meals?  3 - A Little     AM-PAC (TM) ADL Score  12        Therapy Assessment/Plan (OT)    Rehab Potential (OT)  good, to achieve stated therapy goals     Therapy Frequency (OT)  3-5 times/wk        Progress Summary (OT)    Daily Outcome Statement (OT)  OT tx; requires VC to adhere to NWB during ADL. Pt is DEP for toileting this session reporting \"I can't do that on my own\". Pt is DEP for LBD requiring A to thread B LE into pants + hike to waist. Completes hand/wrist AROM ther ex, declines elbow 2/2 pain. Continue to recommend SNF     Symptoms Noted During/After Treatment  increased pain        Therapy Plan Review/Discharge Plan (OT)    OT Recommended Discharge Disposition  skilled nursing facility     Anticipated Equipment Needs At Discharge (OT)  other (see comments)    TBD @ rehab                   Education provided this session. See the Patient Education summary report for full details.         OT Goals      Most Recent Value   Transfer Goal 1   Activity/Assistive Device  sit-to-stand/stand-to-sit, bed-to-chair/chair-to-bed, toilet at 09/18/2020 1347   [place]  supervision required at 09/18/2020 1347   Time Frame  by discharge at 09/18/2020 1347   Progress/Outcome  goal ongoing at 09/20/2020 1115   Dressing Goal 1   Activity/Adaptive Equipment  upper body dressing at 09/18/2020 1347   [place]  minimum assist (75% or more patient effort) at 09/18/2020 1347   Time Frame  by discharge at 09/18/2020 1347   Progress/Outcome  goal ongoing at 09/20/2020 1115   Dressing Goal 2 "   Activity/Adaptive Equipment  lower body dressing at 09/18/2020 1347   [place]  minimum assist (75% or more patient effort) at 09/18/2020 1347   Time Frame  by discharge at 09/18/2020 1347   Progress/Outcome  goal ongoing at 09/20/2020 1115   Toileting Goal 1   Activity/Assistive Device  toileting skills, all at 09/18/2020 1347   [place]  supervision required at 09/18/2020 1347   Time Frame  by discharge at 09/18/2020 1347   Progress/Outcome  goal ongoing at 09/20/2020 1115   Grooming Goal 1   Activity/Assistive Device  grooming skills, all at 09/18/2020 1347   [place]  supervision required at 09/18/2020 1347   Time Frame  by discharge at 09/18/2020 1347   Progress/Outcome  goal ongoing at 09/20/2020 1115   Problem Specific Goal 1   Problem-Specific Goal 1  Pt will complete MD ordered UE Regi with SUP and MIN VCs at 09/18/2020 1347   Time Frame  by discharge at 09/18/2020 1347   Progress/Outcome  goal ongoing at 09/20/2020 1115

## 2020-09-20 NOTE — NURSING NOTE
Transport arrived and picked up pt. IV removed. Report given to transport and questions answered. No other needs expressed. Belongings and paperwork sent with.

## 2020-09-20 NOTE — PROGRESS NOTES
Bed at [facility] for pt.arranged ambulance transport for 1pm via [facility].spoke with sister nallely to let her know of dc date and time and cost of [facility] transport at $117.she is agreeable and told me pt's brother will pay this bill when it comes to her apt.pt signed medicare letter.

## 2020-09-20 NOTE — NURSING NOTE
Paged Dr. Hauser from [facility] about d/c order and instructions for pt. D/C transport to be set up this afternoon by case management. Awaiting response back.

## 2020-09-20 NOTE — PLAN OF CARE
Problem: Adult Inpatient Plan of Care  Goal: Plan of Care Review  Outcome: Progressing  Flowsheets (Taken 9/20/2020 5166)  Progress: improving  Plan of Care Reviewed With: patient  Outcome Summary: OT tx. Cont. to recommend SNF

## 2020-09-20 NOTE — PLAN OF CARE
Problem: Adult Inpatient Plan of Care  Goal: Plan of Care Review  Outcome: Progressing  Flowsheets (Taken 9/20/2020 0405)  Progress: improving  Plan of Care Reviewed With: patient  Outcome Summary: pain level persistent but tolerable with PRN analgesic/ice packs. R shoulder dressing CDI-sling in place. OOB to BSC overnight voiding. resting in bed overnight     Problem: Adult Inpatient Plan of Care  Goal: Patient-Specific Goal (Individualization)  Outcome: Progressing  Flowsheets (Taken 9/20/2020 0405)  Anxieties, Fears or Concerns: pain control   Plan of Care Review  Plan of Care Reviewed With: patient  Progress: improving  Outcome Summary: pain level persistent but tolerable with PRN analgesic/ice packs. R shoulder dressing CDI-sling in place. OOB to BSC overnight voiding. resting in bed overnight

## 2020-09-20 NOTE — PROGRESS NOTES
Hospital Medicine Service -  Daily Progress Note       SUBJECTIVE   Interval History: Tarah Holley was seen and examined, no acute overnight event reported, patient reported no chest pain, sob, N/V/D, overt bleeding. Pt feel fine, no new co, cont current plan, rec follow up with PCP for further management, no objection for DC.        OBJECTIVE      Vital signs in last 24 hours:  Vitals:    09/20/20 0809   BP: 125/62   Pulse: 80   Resp:    Temp:    SpO2: 95%       Intake/Output Summary (Last 24 hours) at 9/20/2020 1034  Last data filed at 9/20/2020 0809  Gross per 24 hour   Intake 1280 ml   Output --   Net 1280 ml       PHYSICAL EXAMINATION      General Appearance:  Awake, Alert, no distress     Head:  Normocephalic, without obvious abnormality, atraumatic   Neck: Supple      Lungs:   Clear to auscultation bilaterally  respirations unlabored  no rales  no wheezing     Heart:  Regular rhythm  S1 and S2 normal  no murmur, rub or gallop     Abdomen:   Soft, non-tender, no masses, no organomegaly     Vascular: Pulses 2+ and symmetric all extremities     Extremities: no calf tenderness      Behavior/Emotional: Appropriate, cooperative          LINES, CATHETERS, DRAINS, AIRWAYS, AND WOUNDS   Lines, Drains, Airways, Wounds:  Peripheral IV 09/17/20 Left;Posterior Hand (Active)   Number of days: 3       Surgical Incision Shoulder Right (Active)   Number of days: 3       Comments:      LABS / IMAGING / TELE      Labs  CMP Results       09/20/20 09/19/20 09/18/20                    0511 0413 0933          139 141         K 4.1 4.3 4.2         Cl 103 107 107         CO2 25 24 27         Glucose 111 123 115         BUN 11 14 11         Creatinine 0.6 0.6 0.6         Calcium 8.6 8.8 8.7         Anion Gap 8 8 7         EGFR >60.0 >60.0 >60.0                     CBC Results       09/20/20 09/19/20 09/18/20                    0511 0413 0933         WBC 5.10 6.44 6.51         RBC 3.38 3.65 3.58         HGB 10.0 10.7  10.9         HCT 31.0 33.4 33.7         MCV 91.7 91.5 94.1         MCH 29.6 29.3 30.4         MCHC 32.3 32.0 32.3          223 223                     Troponin I Results     No lab values to display.        PT/PTT Results       09/10/20                          1017           PT 14.9           INR 1.2           PTT 27           Comment for INR at 1017 on 09/10/20:   Moderate Intensity Anticoagulation = 2.0 to 3.0, High Intensity = 2.5 to 3.5        Lab Results   Component Value Date    HGBA1C 5.5 09/10/2020     Imaging  X-ray Shoulder Right 2+ Views    Result Date: 9/17/2020  IMPRESSION: Right shoulder prosthesis in place. COMMENT: .Two views of the right shoulder were performed. We have no prior, similar studies for comparison. There is a right shoulder prosthesis which appears to be in good position. There are postsurgical changes in the soft tissues.      ECG/Telemetry  Tele and or EKG was Reviewed     ASSESSMENT AND PLAN        Hx of deep venous thrombosis  Assessment & Plan  Patient on Eliquis at home  On Eliquis now     Sleep apnea  Assessment & Plan  Continue with CPAP at night    Type 2 diabetes mellitus (CMS/[facility])  Assessment & Plan  Continue with Accu-Cheks and sliding scale insulin    Hypertension  Assessment & Plan  Blood Pressure stable  Continue with lisinopril    Neurocognitive disorder  Assessment & Plan  Continue supportive care  Continue with psych medications from home    Bipolar depression (CMS/[facility])  Assessment & Plan  Reviewed with pharmacist resume HMs.    Traumatic brain injury (CMS/[facility])  Assessment & Plan  Cont home meds   Talked Pharmacist will resume home meds     * DJD of right shoulder  Assessment & Plan  S/p Right shoulder total arthroplasty  POD day 2  DVT prophylaxis per primary team  Antibiotic prophylaxis per primary team  Pain management per primary team  PT OT.             VTE Assessment: Pad[place]    VTE Prophylaxis Plan: Continue current DVT Prophylaxis   Code Status: Full  Code  Estimated Discharge Date: 9/20/2020  Disposition Planning: Pending progression    This patient note has been dictated using speech recognition software. Inadvertent speech recognition errors should be disregarded. Please do not hesitate to call [facility] [place] office for clarifications.     Yanira England MD  9/20/2020

## 2020-09-20 NOTE — DISCHARGE SUMMARY
Inpatient Discharge Summary    BRIEF OVERVIEW  Admitting Provider:      Attending Provider: Heath Ornelas MD Attending phys phone: [phone]  Primary Care Physician at Discharge: Miles Pitts [phone]    Admission Date: 9/17/2020     Discharge Date: 9/20/2020    Primary Discharge Diagnosis  DJD of right shoulder    Secondary Discharge Diagnosis  Active Hospital Problems    Diagnosis Date Noted   • Bipolar depression (CMS/HCC) 09/18/2020   • Neurocognitive disorder 09/18/2020   • Hypertension 09/18/2020   • Type 2 diabetes mellitus (CMS/HCC) 09/18/2020   • Sleep apnea 09/18/2020   • Hx of deep venous thrombosis 09/18/2020   • DJD of right shoulder 09/17/2020   • Traumatic brain injury (CMS/HCC) 01/01/1980      Resolved Hospital Problems   No resolved problems to display.       DETAILS OF HOSPITAL STAY    Operative Procedures Performed  Procedure(s):  RIGHT SHOULDER ARTHROPLASTY TOTAL    Consults:   Consult Notes 8/21/2020 to 9/20/2020         Date of Service   Author Author Type Status Note Type File Time    09/18/20 1600  Gina Mcallister Deleted Consults 09/18/20 1602    09/17/20 2233  Fadumo Desir MD Physician Signed Consults 09/18/20 0530          Consult Orders During Admission:  IP CONSULT TO PHYSICAL MEDICINE REHAB  IP CONSULT TO CASE MANAGEMENT  IP CONSULT TO HOSPITALIST       Imaging  X-ray Shoulder Right 2+ Views    Result Date: 9/17/2020  IMPRESSION: Right shoulder prosthesis in place. COMMENT: .Two views of the right shoulder were performed. We have no prior, similar studies for comparison. There is a right shoulder prosthesis which appears to be in good position. There are postsurgical changes in the soft tissues.          Presenting Problem/History of Present Illness  DJD of right shoulder         Exam on Day of Discharge  Patient seen and examined on day of discharge.  RUE  Sling in place. Dressing CDI.  Appropriate TTP  No pain with ROM elbow/wrist/fingers  Motor intact  axillary/msk/median/radial/ulnar/AIN/PIN  SILT axillary/median/radial/ulnar n  Compartments soft and compressible  Hand WWP, radial pulse 2+    Hospital Course  The patient tolerated the procedure well, there were no perioperative complications, see operative report for details. The patient recovered as expected in the postoperative period. At the time of discharge the patient was cleared by PT/OT, the medical consultant, pain was well controlled, tolerated a regular diet, and was voiding on spontaneously. Patient was advised to seek medical attention for worsening pain, fevers, chest pain, shortness of breath,increased weakness to lower extremity, bowel/bladder changes/incontinence.     Daily dressing changes as instructed      Discharge Orders  PENDING ORDERS (720h ago, onward)         Ordered     oxyCODONE (ROXICODONE) immediate release tablet 5 mg  (Analgesics - Opiates)  Every 4 hours PRN,   Discontinued:  --,   Status:  Canceled      Signed and Held     HYDROmorphone (DILAUDID) 1 mg/mL injection 1 mg  (Analgesics - Opiates)  Every 2 hour PRN,   Discontinued:  --,   Status:  Canceled      Signed and Held               Medication List      START taking these medications    acetaminophen 325 mg tablet  Commonly known as: TYLENOL  Take 2 tablets (650 mg total) by mouth every 4 (four) hours as needed for mild pain.  Dose: 650 mg     docusate sodium 100 mg capsule  Commonly known as: COLACE  Take 1 capsule (100 mg total) by mouth 2 (two) times a day.  Dose: 100 mg     HYDROmorphone 2 mg tablet  Commonly known as: DILAUDID  Take 1-2 tablets (2-4 mg total) by mouth every 4 (four) hours as needed for moderate pain or severe pain (2mg for moderate pain, 4mg for severe pain) for up to 5 days.  Dose: 2-4 mg     senna 8.6 mg tablet  Commonly known as: SENOKOT  Take 1 tablet by mouth 2 (two) times a day as needed for constipation.  Dose: 1 tablet        CONTINUE taking these medications    apixaban 5 mg tablet  Commonly  known as: ELIQUIS  Take 5 mg by mouth 2 (two) times a day.  Dose: 5 mg     carBAMazepine  mg 12 hr tablet  Commonly known as: TEGretol  XR  Take 1,200 mg by mouth nightly.  Dose: 1,200 mg     diphenhydrAMINE 25 mg capsule  Commonly known as: BENADRYL  Take 50 mg by mouth nightly as needed for allergies or sleep.  Dose: 50 mg     DULoxetine 60 mg capsule  Commonly known as: CYMBALTA  Take 60 mg by mouth daily.  Dose: 60 mg     famotidine 20 mg tablet  Commonly known as: PEPCID  Take 20 mg by mouth nightly as needed for indigestion or heartburn.  Dose: 20 mg     fenofibrate 145 mg tablet  Commonly known as: TRICOR  Take 145 mg by mouth nightly.  Dose: 145 mg     * gabapentin 300 mg capsule  Commonly known as: NEURONTIN  Take 300 mg by mouth every morning. In addition to 600 mg nightly  Dose: 300 mg     * gabapentin 600 mg tablet  Commonly known as: NEURONTIN  Take 600 mg by mouth nightly. In addition to 300 mg every morning  Dose: 600 mg     lisinopriL 10 mg tablet  Commonly known as: PRINIVIL  Take 10 mg by mouth daily.  Dose: 10 mg     melatonin 5 mg capsule  Take 5 mg by mouth nightly.  Dose: 5 mg     omeprazole 40 mg capsule  Commonly known as: PriLOSEC  Take 40 mg by mouth daily before breakfast.  Dose: 40 mg     pravastatin 20 mg tablet  Commonly known as: PRAVACHOL  Take 20 mg by mouth nightly.  Dose: 20 mg     traZODone 100 mg tablet  Commonly known as: DESYREL  Take 100 mg by mouth nightly.  Dose: 100 mg     * ziprasidone 40 mg capsule  Commonly known as: GEODON  Take 40 mg by mouth 2 (two) times a day. In addition to 80 mg BID for total dose 120 mg BID  Dose: 40 mg     * ziprasidone 80 mg capsule  Commonly known as: GEODON  Take 80 mg by mouth 2 (two) times a day with meals. In addition to 40 mg BID for total dose 120 mg BID  Dose: 80 mg         * This list has 4 medication(s) that are the same as other medications prescribed for you. Read the directions carefully, and ask your doctor or other care  provider to review them with you.            STOP taking these medications    naproxen 375 mg tablet  Commonly known as: NAPROSYN          Instructions for after discharge     Follow-up with primary physician (PCP)      Within 1 week    Follow-up with provider      Call now for an appointment with Dr. Ornelas in 10-14 days, [phone]            Outpatient Follow-Ups  Encounter Information     You do not currently have any appointments scheduled.        Referrals:  No orders of the defined types were placed in this encounter.      Active Issues Requiring Follow-up  Issue: none  What is Needed: none      Test Results Pending at Discharge  Unresulted Labs (From admission, onward)    None                Discharge Disposition    Code Status at Discharge: Full Code  Physician Order for Life-Sustaining Treatment Document Status      No documents found

## 2020-09-20 NOTE — PROGRESS NOTES
Ortho Daily Progress Note    Subjective     Interval History: S: Patient seen and examined at bedside. Patient report no acute events overnight. Pain is well controlled. Patient denies F/C, SOB, CP. Patient has no new complaints at this time.       Objective     Vital signs in last 24 hours:  Temp:  [36.9 °C (98.5 °F)-37.6 °C (99.7 °F)] 37.6 °C (99.7 °F)  Heart Rate:  [78-88] 88  Resp:  [16-18] 18  BP: (135-164)/(67-82) 135/82      Intake/Output Summary (Last 24 hours) at 9/20/2020 0501  Last data filed at 9/19/2020 1716  Gross per 24 hour   Intake 1040 ml   Output --   Net 1040 ml     Intake/Output this shift:  No intake/output data recorded.    Labs:  Results from last 7 days   Lab Units 09/19/20  0413 09/18/20  0933   WBC K/uL 6.44 6.51   HEMOGLOBIN g/dL 10.7* 10.9*   HEMATOCRIT % 33.4* 33.7*   PLATELETS K/uL 223 223           Microbiology Results     ** No results found for the last 720 hours. **          Physical Exam:  Gen: NAD, Cooperative  MSK:    LUE  Sling in place. Dressing CDI.  Appropriate TTP  No pain with ROM elbow/wrist/fingers  Motor intact axillary/msk/median/radial/ulnar/AIN/PIN  SILT axillary/median/radial/ulnar n  Compartments soft and compressible  Hand WWP, radial pulse 2+    A/P:   54F s/p right TSA on 9/17 by Dr. Ornelas    WBAT RLE  PT/OT  OOB  DC planning-SNF, ortho stable for DC when placement available  DVT: Eliquis   Pain control

## 2021-04-19 ENCOUNTER — TRANSCRIBE ORDERS (OUTPATIENT)
Dept: ADMINISTRATIVE | Age: 56
End: 2021-04-19

## 2021-04-19 DIAGNOSIS — S92.255D NONDISPLACED FRACTURE OF NAVICULAR (SCAPHOID) OF LEFT FOOT, SUBSEQUENT ENCOUNTER FOR FRACTURE WITH ROUTINE HEALING: Primary | ICD-10-CM

## 2021-04-20 ENCOUNTER — HOSPITAL ENCOUNTER (OUTPATIENT)
Dept: PHYSICAL THERAPY | Facility: REHABILITATION | Age: 56
Setting detail: THERAPIES SERIES
Discharge: HOME | End: 2021-04-20
Attending: PODIATRIST
Payer: MEDICARE

## 2021-04-20 ENCOUNTER — TRANSCRIBE ORDERS (OUTPATIENT)
Dept: SCHEDULING | Facility: REHABILITATION | Age: 56
End: 2021-04-20

## 2021-04-20 DIAGNOSIS — S70.01XA CONTUSION OF RIGHT HIP, INITIAL ENCOUNTER: ICD-10-CM

## 2021-04-20 DIAGNOSIS — M16.11 UNILATERAL PRIMARY OSTEOARTHRITIS, RIGHT HIP: Primary | ICD-10-CM

## 2021-04-20 DIAGNOSIS — S92.255D NONDISPLACED FRACTURE OF NAVICULAR (SCAPHOID) OF LEFT FOOT, SUBSEQUENT ENCOUNTER FOR FRACTURE WITH ROUTINE HEALING: ICD-10-CM

## 2021-04-20 DIAGNOSIS — M16.11 UNILATERAL PRIMARY OSTEOARTHRITIS, RIGHT HIP: ICD-10-CM

## 2021-04-20 PROCEDURE — 97163 PT EVAL HIGH COMPLEX 45 MIN: CPT
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
## 2021-04-21 ENCOUNTER — HOSPITAL ENCOUNTER (OUTPATIENT)
Dept: PHYSICAL THERAPY | Facility: REHABILITATION | Age: 56
Setting detail: THERAPIES SERIES
Discharge: HOME | End: 2021-04-21
Attending: PODIATRIST
Payer: MEDICARE

## 2021-04-21 DIAGNOSIS — S92.255D NONDISPLACED FRACTURE OF NAVICULAR (SCAPHOID) OF LEFT FOOT, SUBSEQUENT ENCOUNTER FOR FRACTURE WITH ROUTINE HEALING: Primary | ICD-10-CM

## 2021-04-21 DIAGNOSIS — R26.9 GAIT DISORDER: ICD-10-CM

## 2021-04-21 DIAGNOSIS — M16.11 UNILATERAL PRIMARY OSTEOARTHRITIS, RIGHT HIP: ICD-10-CM

## 2021-04-21 PROCEDURE — 97110 THERAPEUTIC EXERCISES: CPT | Mod: GP

## 2021-04-21 PROCEDURE — 97530 THERAPEUTIC ACTIVITIES: CPT | Mod: GP

## 2021-04-21 NOTE — OP PT TREATMENT LOG
"ORTHO PT FLOWSHEET  IE: 4/20/21     Exercises Current Session Time    MODALITIES- HEAT/ICE TOTAL TIME FOR SESSION Not performed       Heat     Ice/Vasocompression     MODALITIES - E-STIM TOTAL TIME FOR SESSION Not performed    E-stim/H-Wave     THER ACT TOTAL TIME FOR SESSION 0-7 Minutes   Y Pt education Pt/ pt's mother instructed and advised to complete medical history, medication, LEFS, FAAM and return next visit. Pt/pt's mother instructed to call Dr. Ornelas re: recent fall history, current pain level, and inquire about imaging.    Y Transfer training Pt instructed and advised re: hand placement during sit to stand, stand to sit to increase safety.    Y  Y TUG  SSV 26.3 w RW  27 sec w RW    THER EX TOTAL TIME FOR SESSION Not performed    CARDIOVASCULAR      Nu Step     Elliptical     Treadmill     UBE     STRENGTHENING     Supine: DKTC  Hip abd in supine  Quad set  SAQ  SLR  Gastroc stretch w strap  Tband q dir     Seated  STS, repetitive     Standing:  March in place  Hip abd  Minisquat  B HR     NEURO RE-ED TOTAL TIME FOR SESSION Not performed    POSTURAL RE-ED        PRE-GAIT:   alt toe tap     BALANCE TRAINING      DAO TBA    FGA TBA    GAIT TRAINING TOTAL TIME FOR SESSION Not performed    2MWT  6MWT     Dynamic gait      Stair: 4\" step up  6\" step up  8\" step up       MANUAL TOTAL TIME FOR SESSION Not performed    Stretching     Mobilization      Massage     Taping         "

## 2021-04-22 NOTE — PROGRESS NOTES
"PT DAILY NOTE FOR OUTPATIENT THERAPY    Patient: Tarah Holley   MRN: [id number]  : 1965 55 y.o.  Referring Physician: Simon White DPM  Date of Visit: 2021    Certification Dates: 21 through 21    Diagnosis:   1. Nondisplaced fracture of navicular (scaphoid) of left foot, subsequent encounter for fracture with routine healing    2. Unilateral primary osteoarthritis, right hip    3. Gait disorder        Chief Complaints:  L foot fx, R hip OA, inc fall hisotry    Precautions:   Existing Precautions/Restrictions: fall     TODAY'S VISIT    History/Vitals/Pain/Encounter Info - 21 7867        Injury History/Precautions/Daily Required Info    Primary Therapist  Flakita     Chief Complaint/Reason for Visit   L foot fx, R hip OA, inc fall hisotry     Referring Physician  Dr Simon White, CARRIE navicular fx; Dr. Heath Ornelas R hip OA     Existing Precautions/Restrictions  fall     Limitations/Impairments  safety/cognitive     OP Specialty  Orthopedics;Neuro     Document Type  daily treatment     Patient/Family/Caregiver Comments/Observations  No falls. No change in meds. Pt presents to PT w RW. Pt's mother, Roxie, present throughout treatment.     Start Time  1400     Stop Time  1500     Time Calculation (min)  60 min     Patient reported fall since last visit  No        Pain Assessment    Currently in pain  Yes     Preferred Pain Scale  number (Numeric Rating Pain Scale)     Pain Side/Orientation  bilateral     Pain: Body location  Hip;Foot     Pain Rating (0-10): Pre Activity  7    R hip 7/10, L foot 5/10    Pain Rating (0-10): Activity  5    R hip, no foot pain    Pain Rating (0-10): Post Activity  --    When asked about pain level, pt stated,\" I don't know.\"    Pain Description  constant;sore        Pain Intervention    Intervention   TE, TA, monitored     Post Intervention Comments  dec R hip pain         Daily Treatment Assessment and Plan - 21 7577        Daily Treatment " Assessment and Plan    Progress toward goals  Progressing     Daily Outcome Summary  Pt appears unsafe when ambulating with RW and requires mod cues for safety to remain closer to RW and stand with upright posture to avoid exceesive WB throught B UE's. At present pt appears very unsteady.Tarah reports fatigue with current TE and verbalized desire for using wheel chair for mobility. Pt has been encouraged to continue walking short distances with RW and to take breaks as needed so she can gradually increase endurance. Mod I will bed mobility. Frequent cues for hand placement with transfers to increase safety.     Plan and Recommendations  Trial Nustep x 3-5 minutes. Inc reps during supine TE. (+) standing TE w UE support. Contact Dr Simon White re: [facility].           OBJECTIVE DATA TAKEN TODAY:    None taken    Today's Treatment:    ORTHO PT FLOWSHEET  IE: 4/20/21     Exercises Current Session Time    MODALITIES- HEAT/ICE TOTAL TIME FOR SESSION Not performed       Heat     Ice/Vasocompression     MODALITIES - E-STIM TOTAL TIME FOR SESSION Not performed    E-stim/H-Wave     THER ACT TOTAL TIME FOR SESSION 23-37 Minutes    Pt education Pt/ pt's mother instructed and advised to complete medical history, medication, LEFS, FAAM and return next visit. Pt/pt's mother instructed to call Dr. Ornelas re: recent fall history, current pain level, and inquire about imaging.    Y Recap since last visit Pt's mother Roxie called Dr Ornelas re: recent fall onto R hip. No xray recommended.    Y Transfer training Pt instructed and advised re: hand placement during sit to stand, stand to sit to increase safety.    Y  Y LEFS  FAAM 20/80  ADL subscale 31/84    TUG  SSV 26.3 w RW  27 sec w RW    THER EX TOTAL TIME FOR SESSION 23-37 Minutes    CARDIOVASCULAR      Nu Step     Elliptical     Treadmill     UBE     STRENGTHENING    Y  y  y  y      y Supine: SKTC  Hip abd in supine  Quad set  SAQ  SLR  Gastroc stretch w strap  Hip abd, sidelying  "10 A/A w PT assist  2x5  10 w 3 sec hold  10      2x5 w PT assist    Seated  STS, repetitive     Standing:  March in place  Hip abd  Minisquat       NEURO RE-ED TOTAL TIME FOR SESSION Not performed    POSTURAL RE-ED        PRE-GAIT:   alt toe tap     BALANCE TRAINING      DAO TBA    FGA TBA    GAIT TRAINING TOTAL TIME FOR SESSION 0-7 Minutes   Y 2MWT  6MWT 178 w RW, close sup, cues to stand closer to RW, dec safety    Dynamic gait      Stair: 4\" step up  6\" step up  8\" step up       MANUAL TOTAL TIME FOR SESSION Not performed    Stretching     Mobilization      Massage     Taping                        PT DAILY NOTE FOR OUTPATIENT THERAPY    Patient: Tarah Holley   MRN: [id number]  : 1965 55 y.o.  Referring Physician: Simon White DPM  Date of Visit: 2021    Certification Dates: 21 through 21    Diagnosis:   1. Nondisplaced fracture of navicular (scaphoid) of left foot, subsequent encounter for fracture with routine healing    2. Unilateral primary osteoarthritis, right hip    3. Gait disorder        Chief Complaints:  L foot fx, R hip OA, inc fall hisotry    Precautions:   Existing Precautions/Restrictions: fall     TODAY'S VISIT    History/Vitals/Pain/Encounter Info - 21 2219        Injury History/Precautions/Daily Required Info    Primary Therapist  Flakita     Chief Complaint/Reason for Visit   L foot fx, R hip OA, inc fall hisotry     Referring Physician  Dr Simon White, CARRIE navicular fx; Dr. Heath Ornelas R hip OA     Existing Precautions/Restrictions  fall     Limitations/Impairments  safety/cognitive     OP Specialty  Orthopedics;Neuro     Document Type  daily treatment     Patient/Family/Caregiver Comments/Observations  No falls. No change in meds. Pt presents to PT w RW. Pt's mother, Roxie, present throughout treatment.     Start Time  1400     Stop Time  1500     Time Calculation (min)  60 min     Patient reported fall since last visit  No        Pain " "Assessment    Currently in pain  Yes     Preferred Pain Scale  number (Numeric Rating Pain Scale)     Pain Side/Orientation  bilateral     Pain: Body location  Hip;Foot     Pain Rating (0-10): Pre Activity  7    R hip 7/10, L foot 5/10    Pain Rating (0-10): Activity  5    R hip, no foot pain    Pain Rating (0-10): Post Activity  --    When asked about pain level, pt stated,\" I don't know.\"    Pain Description  constant;sore        Pain Intervention    Intervention   TE, TA, monitored     Post Intervention Comments  dec R hip pain         Daily Treatment Assessment and Plan - 04/21/21 2247        Daily Treatment Assessment and Plan    Progress toward goals  Progressing     Daily Outcome Summary  Pt appears unsafe when ambulating with RW and requires mod cues for safety to remain closer to RW and stand with upright posture to avoid exceesive WB throught B UE's. At present pt appears very unsteady.Tarah reports fatigue with current TE and verbalized desire for using wheel chair for mobility. Pt has been encouraged to continue walking short distances with RW and to take breaks as needed so she can gradually increase endurance. Mod I will bed mobility. Frequent cues for hand placement with transfers to increase safety.     Plan and Recommendations  Trial Nustep x 3-5 minutes. Inc reps during supine TE. (+) standing TE w UE support. [facility]           OBJECTIVE DATA TAKEN TODAY:    None taken    Today's Treatment:    ORTHO PT FLOWSHEET  IE: 4/20/21     Exercises Current Session Time    MODALITIES- HEAT/ICE TOTAL TIME FOR SESSION Not performed       Heat     Ice/Vasocompression     MODALITIES - E-STIM TOTAL TIME FOR SESSION Not performed    E-stim/H-Wave     THER ACT TOTAL TIME FOR SESSION 23-37 Minutes    Pt education Pt/ pt's mother instructed and advised to complete medical history, medication, LEFS, FAAM and return next visit. Pt/pt's mother instructed to call Dr. Ornelas re: recent fall " "history, current pain level, and inquire about imaging.    Y Recap since last visit Pt's mother Roxie called Dr Ornelas re: recent fall onto R hip. No xray recommended.    Y Transfer training Pt instructed and advised re: hand placement during sit to stand, stand to sit to increase safety.    Y  Y LEFS  FAAM 20/80  ADL subscale 31/84    TUG  SSV 26.3 w RW  27 sec w RW    THER EX TOTAL TIME FOR SESSION 23-37 Minutes    CARDIOVASCULAR      Nu Step     Elliptical     Treadmill     UBE     STRENGTHENING    Y  y  y  y      y Supine: SKTC  Hip abd in supine  Quad set  SAQ  SLR  Gastroc stretch w strap  Hip abd, sidelying 10 A/A w PT assist  2x5  10 w 3 sec hold  10      2x5 w PT assist    Seated  STS, repetitive     Standing:  March in place  Hip abd  Minisquat       NEURO RE-ED TOTAL TIME FOR SESSION Not performed    POSTURAL RE-ED        PRE-GAIT:   alt toe tap     BALANCE TRAINING      DAO TBA    FGA TBA    GAIT TRAINING TOTAL TIME FOR SESSION 0-7 Minutes   Y 2MWT  6MWT 178 w RW, close sup, cues to stand closer to RW, dec safety    Dynamic gait      Stair: 4\" step up  6\" step up  8\" step up       MANUAL TOTAL TIME FOR SESSION Not performed    Stretching     Mobilization      Massage     Taping                          "

## 2021-04-22 NOTE — OP PT TREATMENT LOG
"ORTHO PT FLOWSHEET  IE: 4/20/21     Exercises Current Session Time    MODALITIES- HEAT/ICE TOTAL TIME FOR SESSION Not performed       Heat     Ice/Vasocompression     MODALITIES - E-STIM TOTAL TIME FOR SESSION Not performed    E-stim/H-Wave     THER ACT TOTAL TIME FOR SESSION 23-37 Minutes    Pt education Pt/ pt's mother instructed and advised to complete medical history, medication, LEFS, FAAM and return next visit. Pt/pt's mother instructed to call Dr. Ornelas re: recent fall history, current pain level, and inquire about imaging.    Y Recap since last visit Pt's mother Roxie called Dr Ornelas re: recent fall onto R hip. No xray recommended.    Y Transfer training Pt instructed and advised re: hand placement during sit to stand, stand to sit to increase safety.    Y  Y LEFS  FAAM 20/80  ADL subscale 31/84    TUG  SSV 26.3 w RW  27 sec w RW    THER EX TOTAL TIME FOR SESSION 23-37 Minutes    CARDIOVASCULAR      Nu Step     Elliptical     Treadmill     UBE     STRENGTHENING    Y  y  y  y      y Supine: SKTC  Hip abd in supine  Quad set  SAQ  SLR  Gastroc stretch w strap  Hip abd, sidelying 10 A/A w PT assist  2x5  10 w 3 sec hold  10      2x5 w PT assist    Seated  STS, repetitive     Standing:  March in place  Hip abd  Minisquat       NEURO RE-ED TOTAL TIME FOR SESSION Not performed    POSTURAL RE-ED        PRE-GAIT:   alt toe tap     BALANCE TRAINING      DAO TBA    FGA TBA    GAIT TRAINING TOTAL TIME FOR SESSION 0-7 Minutes   Y 2MWT  6MWT 178 w RW, close sup, cues to stand closer to RW, dec safety    Dynamic gait      Stair: 4\" step up  6\" step up  8\" step up       MANUAL TOTAL TIME FOR SESSION Not performed    Stretching     Mobilization      Massage     Taping         "
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
## 2021-04-26 NOTE — ADDENDUM NOTE
Encounter addended by: Nela Boggs, PT on: 4/26/2021 10:52 AM   Actions taken: Flowsheet accepted, Charge Capture section accepted

## 2021-04-27 ENCOUNTER — DOCUMENTATION (OUTPATIENT)
Dept: SOCIAL WORK | Facility: REHABILITATION | Age: 56
End: 2021-04-27

## 2021-04-27 ENCOUNTER — HOSPITAL ENCOUNTER (OUTPATIENT)
Dept: PHYSICAL THERAPY | Facility: REHABILITATION | Age: 56
Setting detail: THERAPIES SERIES
Discharge: HOME | End: 2021-04-27
Attending: PODIATRIST
Payer: MEDICARE

## 2021-04-27 DIAGNOSIS — M16.11 UNILATERAL PRIMARY OSTEOARTHRITIS, RIGHT HIP: ICD-10-CM

## 2021-04-27 DIAGNOSIS — S92.255D NONDISPLACED FRACTURE OF NAVICULAR (SCAPHOID) OF LEFT FOOT, SUBSEQUENT ENCOUNTER FOR FRACTURE WITH ROUTINE HEALING: Primary | ICD-10-CM

## 2021-04-27 DIAGNOSIS — R26.9 GAIT DISORDER: ICD-10-CM

## 2021-04-27 PROCEDURE — 97530 THERAPEUTIC ACTIVITIES: CPT | Mod: GP

## 2021-04-27 PROCEDURE — 97116 GAIT TRAINING THERAPY: CPT | Mod: GP

## 2021-04-27 PROCEDURE — 97110 THERAPEUTIC EXERCISES: CPT | Mod: GP
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
## 2021-04-27 NOTE — PROGRESS NOTES
ANTON received request from Graciela Bryant, PT, for assistance in setting up Transportation for patient. ANTON emailed Jimmy Ngo, Transportation Department with formal request. Await response.Nona Rey CM     4/28/21 ANTON called Jimmy Ngo to check on status of request for transportation assistance. Per Jimmy, he is able to accommodate request. ANTON called patient and left a voicemail message requesting a call back.Nona Rey CM    4/28/21 ANTON received confirmation that patient is aware of transport approval and that Jimmy GILLETTE Will be calling this week to touch base..Nona Rey CM

## 2021-05-04 ENCOUNTER — APPOINTMENT (OUTPATIENT)
Dept: OTHER | Facility: REHABILITATION | Age: 56
Setting detail: THERAPIES SERIES
End: 2021-05-04
Payer: MEDICARE

## 2021-05-04 ENCOUNTER — HOSPITAL ENCOUNTER (OUTPATIENT)
Dept: PHYSICAL THERAPY | Facility: REHABILITATION | Age: 56
Setting detail: THERAPIES SERIES
Discharge: HOME | End: 2021-05-04
Attending: PODIATRIST
Payer: MEDICARE

## 2021-05-04 DIAGNOSIS — S92.255D NONDISPLACED FRACTURE OF NAVICULAR (SCAPHOID) OF LEFT FOOT, SUBSEQUENT ENCOUNTER FOR FRACTURE WITH ROUTINE HEALING: Primary | ICD-10-CM

## 2021-05-04 PROCEDURE — 97110 THERAPEUTIC EXERCISES: CPT | Mod: GP

## 2021-05-04 PROCEDURE — 97112 NEUROMUSCULAR REEDUCATION: CPT | Mod: GP

## 2021-05-04 PROCEDURE — 97116 GAIT TRAINING THERAPY: CPT | Mod: GP

## 2021-05-04 ASSESSMENT — BALANCE ASSESSMENTS: TOTAL SCORE: 24
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
## 2021-05-11 ENCOUNTER — APPOINTMENT (OUTPATIENT)
Dept: OTHER | Facility: REHABILITATION | Age: 56
Setting detail: THERAPIES SERIES
End: 2021-05-11
Payer: MEDICARE

## 2021-05-11 ENCOUNTER — HOSPITAL ENCOUNTER (OUTPATIENT)
Dept: PHYSICAL THERAPY | Facility: REHABILITATION | Age: 56
Setting detail: THERAPIES SERIES
Discharge: HOME | End: 2021-05-11
Attending: PODIATRIST
Payer: MEDICARE

## 2021-05-11 DIAGNOSIS — R26.9 GAIT DISORDER: ICD-10-CM

## 2021-05-11 DIAGNOSIS — S92.255D NONDISPLACED FRACTURE OF NAVICULAR (SCAPHOID) OF LEFT FOOT, SUBSEQUENT ENCOUNTER FOR FRACTURE WITH ROUTINE HEALING: Primary | ICD-10-CM

## 2021-05-11 DIAGNOSIS — M16.11 UNILATERAL PRIMARY OSTEOARTHRITIS, RIGHT HIP: ICD-10-CM

## 2021-05-11 PROCEDURE — 97112 NEUROMUSCULAR REEDUCATION: CPT | Mod: GP,CQ

## 2021-05-11 PROCEDURE — 97110 THERAPEUTIC EXERCISES: CPT | Mod: GP,CQ

## 2021-05-11 PROCEDURE — 97116 GAIT TRAINING THERAPY: CPT | Mod: GP,CQ
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
## 2021-05-13 ENCOUNTER — HOSPITAL ENCOUNTER (OUTPATIENT)
Dept: PHYSICAL THERAPY | Facility: REHABILITATION | Age: 56
Setting detail: THERAPIES SERIES
Discharge: HOME | End: 2021-05-13
Attending: PODIATRIST
Payer: MEDICARE

## 2021-05-13 ENCOUNTER — APPOINTMENT (OUTPATIENT)
Dept: OTHER | Facility: REHABILITATION | Age: 56
Setting detail: THERAPIES SERIES
End: 2021-05-13
Payer: MEDICARE

## 2021-05-13 DIAGNOSIS — S92.255D NONDISPLACED FRACTURE OF NAVICULAR (SCAPHOID) OF LEFT FOOT, SUBSEQUENT ENCOUNTER FOR FRACTURE WITH ROUTINE HEALING: Primary | ICD-10-CM

## 2021-05-13 DIAGNOSIS — M16.11 UNILATERAL PRIMARY OSTEOARTHRITIS, RIGHT HIP: ICD-10-CM

## 2021-05-13 DIAGNOSIS — R26.9 GAIT DISORDER: ICD-10-CM

## 2021-05-13 PROCEDURE — 97530 THERAPEUTIC ACTIVITIES: CPT | Mod: GP

## 2021-05-13 PROCEDURE — 97116 GAIT TRAINING THERAPY: CPT | Mod: GP

## 2021-05-13 PROCEDURE — 97110 THERAPEUTIC EXERCISES: CPT | Mod: GP
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
## 2021-05-18 ENCOUNTER — APPOINTMENT (OUTPATIENT)
Dept: OTHER | Facility: REHABILITATION | Age: 56
Setting detail: THERAPIES SERIES
End: 2021-05-18
Payer: MEDICARE

## 2021-05-18 ENCOUNTER — HOSPITAL ENCOUNTER (OUTPATIENT)
Dept: PHYSICAL THERAPY | Facility: REHABILITATION | Age: 56
Setting detail: THERAPIES SERIES
Discharge: HOME | End: 2021-05-18
Attending: PODIATRIST
Payer: MEDICARE

## 2021-05-18 DIAGNOSIS — M16.11 UNILATERAL PRIMARY OSTEOARTHRITIS, RIGHT HIP: ICD-10-CM

## 2021-05-18 DIAGNOSIS — S92.255D NONDISPLACED FRACTURE OF NAVICULAR (SCAPHOID) OF LEFT FOOT, SUBSEQUENT ENCOUNTER FOR FRACTURE WITH ROUTINE HEALING: Primary | ICD-10-CM

## 2021-05-18 PROCEDURE — 97110 THERAPEUTIC EXERCISES: CPT | Mod: GP

## 2021-05-18 PROCEDURE — 97530 THERAPEUTIC ACTIVITIES: CPT | Mod: GP

## 2021-05-18 NOTE — OP PT TREATMENT LOG
ORTHO PT FLOWSHEET  IE: 4/20/21     Exercises Current Session Time    MODALITIES- HEAT/ICE TOTAL TIME FOR SESSION Not performed       Heat     Ice/Vasocompression     MODALITIES - E-STIM TOTAL TIME FOR SESSION Not performed    E-stim/H-Wave     THER ACT TOTAL TIME FOR SESSION 23-37 Minutes       Y Pt education Pt/ pt's mother instructed and advised to complete medical history, medication, LEFS, FAAM and return next visit. Pt/pt's mother instructed to call Dr. Ornelas re: recent fall history, cur5er L MAFO to determine if new one is applicable or if modifications can be made to current MAFO. Pt instructed not to walk outside alone due to fall risk. Pt verbalized understanding. 5/18 Explained MAFO assessment to patient. Pt to wear R shoe insert to facilitate increase L foot clearance, as well as standing upright. RW raised 1 level to improved upright posture. Pt verbalized understanding.     Recap since last visit Pt's mother Roxie called Dr Ornelas re: recent fall onto R hip. No xray recommended.     MAFO vs Ankle Support     Pt reports no L shoe that fits her MAFO.  Loaner shoes obtained which allows L MAFO to be put on/in.  She is able to demonstrate improved LLE control with MAFO vs ankle support.  She left her New Balance shoe at the orthopedist and it can't be located.  (Ortho reports ok to wear the MAFO instead).  MAFO straps need refurbishing - entire brace should be looked at for possible replacement.   Y L MAFO assessment No skin irritation noted. MAFO is loose at calf, posterior stop is broken, additional DF is needed for consistent forefoot clearance. [employer] made brace in 2018. Contacted [employer] re recommended modifications. Awaiting call back re: when MAFO will be adjusted.        Y Transfer training Pt instructed and advised re: hand placement during sit to stand, stand to sit to increase safety.   Car transfer into mother's car demos unsafe practice of stepping into car and low height causes increase in  "R hip pain as she tries to adjust.  5/13 Reviewed sit to stand by pushing up from table versus B hands on rollator to decrease fall risk. Inconsistent teach back. Will reinforce q visit.      LEFS  FAAM 20/80  ADL subscale 31/84    TUG  SSV 26.3 w RW  27 sec w RW    THER EX TOTAL TIME FOR SESSION 23-37 Minutes    CARDIOVASCULAR     y Nu Step w B UE/LE 5 min B UE/LE's level 1; 5/13 6:30 L1   Y Amb w RW RW adjusted. 250 ft x 2 w RW, cues to stand upright.     STRENGTHENING    Y  Y    y  Y    Y Supine: heel slides  Hip abd in supine  Quad set  LAQ  SLR  Gastroc stretch w strap  Hip abd, sidelying 2 x 10,  BLE ea on sliding board   2x10 B  10 w 3 sec hold  10 x 2 BLE  10x 2 B  AROM B for all the above  2x5 w PT assist; 5/13 2x10 pt assist to limit TFL substitution     Y Seated  STS, repetitive   5x from plinth with UE push, cues for hand placement     y  y   Standing:  March in place  Hip abd  Hip extension   10x 2 B w UE support  10x 2 B  10x 2 B    NEURO RE-ED TOTAL TIME FOR SESSION 8-22 Minutes    POSTURAL RE-ED        PRE-GAIT:   alt toe tap 4\" block, alternating taps, 10x    BALANCE TRAINING        Balance, static On floor, ball toss/bounce  On floor, reaching for cones, all planes    Dynamic 6\" hurdles, lateral step overs w/ B UE support    DAO Done see balance section in assessment    FGA TBA    GAIT TRAINING TOTAL TIME FOR SESSION 8-22 Minutes   N 2MWT  6MWT 178 w RW, close sup, cues to stand closer to RW, dec safety    Dynamic gait  GT with rollator, cues to keep walker closer, keep trunk extension and get equal step length. 200 feet, 100 feet, CS   Y Amb outside Up/down hill to pond w rollator, mod cues to be closer to rollator and to lift L LE higher to avoid tripping.    Stair: 4\" step up  6\" step up  8\" step up       MANUAL TOTAL TIME FOR SESSION Not performed    Stretching     Mobilization      Massage     Taping         "
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
## 2021-05-20 ENCOUNTER — HOSPITAL ENCOUNTER (OUTPATIENT)
Dept: PHYSICAL THERAPY | Facility: REHABILITATION | Age: 56
Setting detail: THERAPIES SERIES
Discharge: HOME | End: 2021-05-20
Attending: PODIATRIST
Payer: MEDICARE

## 2021-05-20 ENCOUNTER — APPOINTMENT (OUTPATIENT)
Dept: OTHER | Facility: REHABILITATION | Age: 56
Setting detail: THERAPIES SERIES
End: 2021-05-20
Payer: MEDICARE

## 2021-05-20 DIAGNOSIS — M16.11 UNILATERAL PRIMARY OSTEOARTHRITIS, RIGHT HIP: ICD-10-CM

## 2021-05-20 DIAGNOSIS — S92.255D NONDISPLACED FRACTURE OF NAVICULAR (SCAPHOID) OF LEFT FOOT, SUBSEQUENT ENCOUNTER FOR FRACTURE WITH ROUTINE HEALING: Primary | ICD-10-CM

## 2021-05-20 PROCEDURE — 97116 GAIT TRAINING THERAPY: CPT | Mod: GP,CQ

## 2021-05-20 PROCEDURE — 97112 NEUROMUSCULAR REEDUCATION: CPT | Mod: GP,CQ

## 2021-05-20 PROCEDURE — 97110 THERAPEUTIC EXERCISES: CPT | Mod: GP,CQ

## 2021-05-20 NOTE — OP PT TREATMENT LOG
ORTHO PT FLOWSHEET  IE: 4/20/21     Exercises Current Session Time    MODALITIES- HEAT/ICE TOTAL TIME FOR SESSION Not performed       Heat     Ice/Vasocompression     MODALITIES - E-STIM TOTAL TIME FOR SESSION Not performed    E-stim/H-Wave     THER ACT TOTAL TIME FOR SESSION Not performed        Pt education Pt/ pt's mother instructed and advised to complete medical history, medication, LEFS, FAAM and return next visit. Pt/pt's mother instructed to call Dr. Ornelas re: recent fall history, cur5er L MAFO to determine if new one is applicable or if modifications can be made to current MAFO. Pt instructed not to walk outside alone due to fall risk. Pt verbalized understanding. 5/18 Explained MAFO assessment to patient. Pt to wear R shoe insert to facilitate increase L foot clearance, as well as standing upright. RW raised 1 level to improved upright posture. Pt verbalized understanding.     Recap since last visit Pt's mother Roxie called Dr Ornelas re: recent fall onto R hip. No xray recommended.     MAFO vs Ankle Support     Pt reports no L shoe that fits her MAFO.  Loaner shoes obtained which allows L MAFO to be put on/in.  She is able to demonstrate improved LLE control with MAFO vs ankle support.  She left her New Balance shoe at the orthopedist and it can't be located.  (Ortho reports ok to wear the MAFO instead).  MAFO straps need refurbishing - entire brace should be looked at for possible replacement.    L MAFO assessment No skin irritation noted. MAFO is loose at calf, posterior stop is broken, additional DF is needed for consistent forefoot clearance. [employer] made brace in 2018. Contacted [employer] re recommended modifications. Awaiting call back re: when MAFO will be adjusted.        Y Transfer training Pt instructed and advised re: hand placement during sit to stand, stand to sit to increase safety.   Car transfer into mother's car demos unsafe practice of stepping into car and low height causes increase in R  "hip pain as she tries to adjust.  5/13 Reviewed sit to stand by pushing up from table versus B hands on rollator to decrease fall risk. Inconsistent teach back. Will reinforce q visit.      LEFS  FAAM 20/80  ADL subscale 31/84    TUG  SSV 26.3 w RW  27 sec w RW    THER EX TOTAL TIME FOR SESSION 23-37 Minutes    CARDIOVASCULAR      Nu Step w B UE/LE 5 min B UE/LE's level 1; 5/13 6:30 L1   Y Amb w RW RW adjusted. 250 ft x 2 w RW, cues to stand upright.     STRENGTHENING    Y  Y    y  Y     Supine: heel slides  Hip abd in supine  Quad set  LAQ  SLR  Gastroc stretch w strap  Hip abd, sidelying 2 x 10,  BLE ea on sliding board   2x10 B  10 w 3 sec hold  10 x 2 BLE  10x 2 B  AROM B for all the above  2x5 w PT assist; 5/13 2x10 pt assist to limit TFL substitution     Y Seated  STS, repetitive   5x from plinth with UE push, cues for hand placement     y  y  y  y Standing:  March in place  Hip abd  Hip extension  4\" block lateral step overs    10x 2 B w UE support  10x 2 B  10x 2 B  10x B w/ B UE support    NEURO RE-ED TOTAL TIME FOR SESSION 8-22 Minutes    POSTURAL RE-ED        PRE-GAIT:   alt toe tap 4\" block, alternating taps, 10x    BALANCE TRAINING     Yes for all   Balance, static On airex, static standing, 60 sec (CG)  On airex, ball toss 20x (Min A)  On floor, reaching for cones, all planes    Dynamic 6\" hurdles, lateral step overs w/ B UE support    DAO Done see balance section in assessment    FGA TBA    GAIT TRAINING TOTAL TIME FOR SESSION 8-22 Minutes   N 2MWT  6MWT 178 w RW, close sup, cues to stand closer to RW, dec safety    Dynamic gait  GT with rollator, cues to keep walker closer, keep trunk extension and get equal step length. 200 feet, 100 feet, CS   Y Amb outside Up/down hill to pond w rollator, mod cues to be closer to rollator and to lift L LE higher to avoid tripping.    Stair: 4\" step up  6\" step up  8\" step up       MANUAL TOTAL TIME FOR SESSION Not performed    Stretching     Mobilization      " Massage     Taping
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
## 2021-05-25 ENCOUNTER — HOSPITAL ENCOUNTER (OUTPATIENT)
Dept: PHYSICAL THERAPY | Facility: REHABILITATION | Age: 56
Setting detail: THERAPIES SERIES
Discharge: HOME | End: 2021-05-25
Attending: PODIATRIST
Payer: MEDICARE

## 2021-05-25 ENCOUNTER — DOCUMENTATION (OUTPATIENT)
Dept: PSYCHOLOGY | Facility: CLINIC | Age: 56
End: 2021-05-25
Payer: MEDICARE

## 2021-05-25 ENCOUNTER — APPOINTMENT (OUTPATIENT)
Dept: OTHER | Facility: REHABILITATION | Age: 56
Setting detail: THERAPIES SERIES
End: 2021-05-25
Payer: MEDICARE

## 2021-05-25 DIAGNOSIS — F31.9 BIPOLAR DEPRESSION (CMS/HCC): ICD-10-CM

## 2021-05-25 DIAGNOSIS — S92.255D NONDISPLACED FRACTURE OF NAVICULAR (SCAPHOID) OF LEFT FOOT, SUBSEQUENT ENCOUNTER FOR FRACTURE WITH ROUTINE HEALING: Primary | ICD-10-CM

## 2021-05-25 DIAGNOSIS — M16.11 UNILATERAL PRIMARY OSTEOARTHRITIS, RIGHT HIP: ICD-10-CM

## 2021-05-25 DIAGNOSIS — S70.01XA CONTUSION OF RIGHT HIP, INITIAL ENCOUNTER: ICD-10-CM

## 2021-05-25 DIAGNOSIS — R26.9 GAIT DISORDER: ICD-10-CM

## 2021-05-25 PROCEDURE — 90839 PSYTX CRISIS INITIAL 60 MIN: CPT | Performed by: CLINICAL NEUROPSYCHOLOGIST

## 2021-05-25 ASSESSMENT — COGNITIVE AND FUNCTIONAL STATUS - GENERAL
REMOTE MEMORY: MILD LOSS
SPEECH: REGULAR
ORIENTATION: FULLY ORIENTED
EYE_CONTACT: WNL
APPEARANCE: WELL GROOMED
ATTENTION: WNL
THOUGHT_CONTENT: APPROPRIATE
AROUSAL LEVEL: ALERT
RECENT MEMORY: MILD LOSS
AFFECT: FULL RANGE
MOOD: HOPELESS;DEPRESSED
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
## 2021-05-25 NOTE — OP PT TREATMENT LOG
No physical therapy treatment provided today due to mental health event. Resources offered to patient.

## 2021-05-26 ENCOUNTER — HOSPITAL ENCOUNTER (OUTPATIENT)
Dept: PSYCHOLOGY | Facility: CLINIC | Age: 56
Discharge: HOME | End: 2021-05-26
Payer: MEDICARE

## 2021-05-26 ENCOUNTER — APPOINTMENT (OUTPATIENT)
Dept: OTHER | Facility: REHABILITATION | Age: 56
Setting detail: THERAPIES SERIES
End: 2021-05-26
Payer: MEDICARE

## 2021-05-26 DIAGNOSIS — F31.9 BIPOLAR DEPRESSION (CMS/HCC): ICD-10-CM

## 2021-05-26 PROCEDURE — 90837 PSYTX W PT 60 MINUTES: CPT | Performed by: CLINICAL NEUROPSYCHOLOGIST

## 2021-05-27 ENCOUNTER — HOSPITAL ENCOUNTER (OUTPATIENT)
Dept: PHYSICAL THERAPY | Facility: REHABILITATION | Age: 56
Setting detail: THERAPIES SERIES
Discharge: HOME | End: 2021-05-27
Attending: PODIATRIST
Payer: MEDICARE

## 2021-05-27 ENCOUNTER — APPOINTMENT (OUTPATIENT)
Dept: OTHER | Facility: REHABILITATION | Age: 56
Setting detail: THERAPIES SERIES
End: 2021-05-27
Payer: MEDICARE

## 2021-05-27 DIAGNOSIS — S92.255D NONDISPLACED FRACTURE OF NAVICULAR (SCAPHOID) OF LEFT FOOT, SUBSEQUENT ENCOUNTER FOR FRACTURE WITH ROUTINE HEALING: Primary | ICD-10-CM

## 2021-05-27 PROCEDURE — 97112 NEUROMUSCULAR REEDUCATION: CPT | Mod: GP,CQ

## 2021-05-27 PROCEDURE — 97116 GAIT TRAINING THERAPY: CPT | Mod: GP,CQ

## 2021-05-27 PROCEDURE — 97110 THERAPEUTIC EXERCISES: CPT | Mod: GP,CQ

## 2021-05-27 ASSESSMENT — COGNITIVE AND FUNCTIONAL STATUS - GENERAL
THOUGHT_CONTENT: APPROPRIATE
AFFECT: FULL RANGE
ATTENTION: WNL
RECENT MEMORY: WNL
INSIGHT: INTACT
PSYCHOMOTOR FUNCTIONING: WNL
ORIENTATION: FULLY ORIENTED
MOOD: HOPELESS;DEPRESSED
SPEECH: REGULAR
REMOTE MEMORY: MILD LOSS
EYE_CONTACT: WNL
AROUSAL LEVEL: ATTENTIVE
APPEARANCE: WELL GROOMED
CONCENTRATION: IMPAIRED, MILD
SLEEP_WAKE_CYCLE: DECREASED

## 2021-05-27 NOTE — OP PT TREATMENT LOG
ORTHO PT FLOWSHEET  IE: 4/20/21     Exercises Current Session Time    MODALITIES- HEAT/ICE TOTAL TIME FOR SESSION Not performed       Heat     Ice/Vasocompression     MODALITIES - E-STIM TOTAL TIME FOR SESSION Not performed    E-stim/H-Wave     THER ACT TOTAL TIME FOR SESSION Not performed        Pt education Pt/ pt's mother instructed and advised to complete medical history, medication, LEFS, FAAM and return next visit. Pt/pt's mother instructed to call Dr. Ornelas re: recent fall history, cur5er L MAFO to determine if new one is applicable or if modifications can be made to current MAFO. Pt instructed not to walk outside alone due to fall risk. Pt verbalized understanding. 5/18 Explained MAFO assessment to patient. Pt to wear R shoe insert to facilitate increase L foot clearance, as well as standing upright. RW raised 1 level to improved upright posture. Pt verbalized understanding.     Recap since last visit Pt's mother Roxie called Dr Ornelas re: recent fall onto R hip. No xray recommended.     MAFO vs Ankle Support     Pt reports no L shoe that fits her MAFO.  Loaner shoes obtained which allows L MAFO to be put on/in.  She is able to demonstrate improved LLE control with MAFO vs ankle support.  She left her New Balance shoe at the orthopedist and it can't be located.  (Ortho reports ok to wear the MAFO instead).  MAFO straps need refurbishing - entire brace should be looked at for possible replacement.    L MAFO assessment No skin irritation noted. MAFO is loose at calf, posterior stop is broken, additional DF is needed for consistent forefoot clearance. [employer] made brace in 2018. Contacted [employer] re recommended modifications. Awaiting call back re: when MAFO will be adjusted.         Transfer training Pt instructed and advised re: hand placement during sit to stand, stand to sit to increase safety.   Car transfer into mother's car demos unsafe practice of stepping into car and low height causes increase in R  "hip pain as she tries to adjust.  5/13 Reviewed sit to stand by pushing up from table versus B hands on rollator to decrease fall risk. Inconsistent teach back. Will reinforce q visit.      LEFS  FAAM 20/80  ADL subscale 31/84    TUG  SSV 26.3 w RW  27 sec w RW    THER EX TOTAL TIME FOR SESSION 23-37 Minutes    CARDIOVASCULAR      Nu Step w B UE/LE 5 min B UE/LE's level 1; 5/13 6:30 L1   Y Amb w RW RW adjusted. 250 ft x 2 w RW, cues to stand upright.     STRENGTHENING    y  y  y  y       Supine: heel slides  Hip abd in supine  Quad set  SAQ  SLR  Gastroc stretch w strap  Hip abd, sidelying 2 x 10,  BLE ea on sliding board   2x10 B  10 w 3 sec hold  10 x 2 BLE 3 sec hold  10x 2 B  AROM B for all the above  2x5 w PT assist; 5/13 2x10 pt assist to limit TFL substitution     Y Seated  STS, repetitive   5x from plinth with UE push, cues for hand placement     y  y  y   Standing:  March in place  Hip abd  Hip extension  4\" block lateral step overs    10x 2 B w UE support  10x  B  10x 2 B  10x B w/ B UE support    NEURO RE-ED TOTAL TIME FOR SESSION 8-22 Minutes    POSTURAL RE-ED       y PRE-GAIT:   alt toe tap 4\" block, alternating taps, 10x w/ 1 UE support    BALANCE TRAINING     Yes for all   Balance, static On airex, static standing, 60 sec (CG)  On airex, ball toss 20x (Min A)  On floor, reaching for cones, all planes    Dynamic 6\" hurdles, lateral step overs w/ B UE support    DAO Done see balance section in assessment    FGA TBA    GAIT TRAINING TOTAL TIME FOR SESSION 8-22 Minutes   N 2MWT  6MWT 178 w RW, close sup, cues to stand closer to RW, dec safety    Dynamic gait  GT with rollator, cues to keep walker closer, keep trunk extension and get equal step length. 200 feet, 100 feet, CS   Y Amb outside Up/down hill to pond w rollator, mod cues to be closer to rollator and to lift L LE higher to avoid tripping.    Stair: 4\" step up  6\" step up  8\" step up       MANUAL TOTAL TIME FOR SESSION Not performed    Stretching "     Mobilization      Massage     Taping
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
## 2021-06-01 ENCOUNTER — APPOINTMENT (OUTPATIENT)
Dept: OTHER | Facility: REHABILITATION | Age: 56
Setting detail: THERAPIES SERIES
End: 2021-06-01
Payer: MEDICARE

## 2021-06-01 ENCOUNTER — HOSPITAL ENCOUNTER (OUTPATIENT)
Dept: PHYSICAL THERAPY | Facility: REHABILITATION | Age: 56
Setting detail: THERAPIES SERIES
Discharge: HOME | End: 2021-06-01
Attending: PODIATRIST
Payer: MEDICARE

## 2021-06-01 DIAGNOSIS — M16.11 UNILATERAL PRIMARY OSTEOARTHRITIS, RIGHT HIP: ICD-10-CM

## 2021-06-01 DIAGNOSIS — R26.9 GAIT DISORDER: ICD-10-CM

## 2021-06-01 DIAGNOSIS — S92.255D NONDISPLACED FRACTURE OF NAVICULAR (SCAPHOID) OF LEFT FOOT, SUBSEQUENT ENCOUNTER FOR FRACTURE WITH ROUTINE HEALING: Primary | ICD-10-CM

## 2021-06-01 PROCEDURE — 97116 GAIT TRAINING THERAPY: CPT | Mod: GP

## 2021-06-01 PROCEDURE — 97530 THERAPEUTIC ACTIVITIES: CPT | Mod: GP

## 2021-06-01 PROCEDURE — 97110 THERAPEUTIC EXERCISES: CPT | Mod: GP

## 2021-06-01 NOTE — OP PT TREATMENT LOG
ORTHO PT FLOWSHEET  IE: 4/20/21     Exercises Current Session Time    MODALITIES- HEAT/ICE TOTAL TIME FOR SESSION Not performed       Heat     Ice/Vasocompression     MODALITIES - E-STIM TOTAL TIME FOR SESSION Not performed    E-stim/H-Wave     THER ACT TOTAL TIME FOR SESSION 8-22 Minutes               Y Pt education Pt/ pt's mother instructed and advised to complete medical history, medication, LEFS, FAAM and return next visit. Pt/pt's mother instructed to call Dr. Ornelas re: recent fall history, cur5er L MAFO to determine if new one is applicable or if modifications can be made to current MAFO. Pt instructed not to walk outside alone due to fall risk. Pt verbalized understanding. 5/18 Explained MAFO assessment to patient. Pt to wear R shoe insert to facilitate increase L foot clearance, as well as standing upright. RW raised 1 level to improve upright posture. Pt verbalized understanding. 6/1 Progress note completed. Progress reviewed with patient. Pt did not verbalize understanding.    Recap since last visit Pt's mother Roxie called Dr Ornelas re: recent fall onto R hip. No xray recommended.     MAFO vs Ankle Support     Pt reports no L shoe that fits her MAFO.  Loaner shoes obtained which allows L MAFO to be put on/in.  She is able to demonstrate improved LLE control with MAFO vs ankle support.  She left her New Balance shoe at the orthopedist and it can't be located.  (Ortho reports ok to wear the MAFO instead).  MAFO straps need refurbishing - entire brace should be looked at for possible replacement.    L MAFO assessment No skin irritation noted. MAFO is loose at calf, posterior stop is broken, additional DF is needed for consistent forefoot clearance. [employer] made brace in 2018. Contacted [employer] re recommended modifications. Awaiting call back re: when MAFO will be adjusted.         Transfer training Pt instructed and advised re: hand placement during sit to stand, stand to sit to increase safety.   Car  "transfer into mother's car demos unsafe practice of stepping into car and low height causes increase in R hip pain as she tries to adjust.  5/13 Reviewed sit to stand by pushing up from table versus B hands on rollator to decrease fall risk. Inconsistent teach back. Will reinforce q visit.   Y   LEFS  FAAM 20/80; 6/1 25/80  ADL subscale 31/84   Y  Y TUG  SSV 26.3 w RW; 6/1 17.6, 12.8 (unsafe) w RW; reviewed safety during transfers and turns.   27 sec w RW    THER EX TOTAL TIME FOR SESSION 23-37 Minutes    CARDIOVASCULAR      Nu Step w B UE/LE 5 min B UE/LE's level 1; 5/13 6:30 L1; 6/1 deferred   Y Amb w RW RW adjusted. 250 ft x 2 w RW, cues to stand upright.     STRENGTHENING                 Supine: heel slides  Hip abd in supine  Quad set  SAQ  SLR  Gastroc stretch w strap  Hip abd, sidelying 2 x 10,  BLE ea on sliding board   2x10 B  10 w 3 sec hold  10 x 2 BLE 3 sec hold  10x 2 B  AROM B for all the above  2x5 w PT assist; 5/13 2x10 pt assist to limit TFL substitution     Y Seated  STS, repetitive   5x from plinth with UE push, cues for hand placement     y  y  y   Standing:  March in place  Hip abd  Hip extension  4\" block lateral step overs    10x 2 B w UE support  10x  B  10x 2 B  10x B w/ B UE support    NEURO RE-ED TOTAL TIME FOR SESSION Not performed    POSTURAL RE-ED        PRE-GAIT:   alt toe tap 4\" block, alternating taps, 10x w/ 1 UE support    BALANCE TRAINING        Balance, static On airex, static standing, 60 sec (CG)  On airex, ball toss 20x (Min A)  On floor, reaching for cones, all planes    Dynamic 6\" hurdles, lateral step overs w/ B UE support    DAO Done see balance section in assessment    FGA TBA    GAIT TRAINING TOTAL TIME FOR SESSION 8-22 Minutes   N  Y 2MWT  6MWT 178 w RW, close sup, cues to stand closer to RW, dec safety  576 ft in 5:47 w RW    Dynamic gait  GT with rollator, cues to keep walker closer, keep trunk extension and get equal step length. 200 feet, 100 feet, CS   Y Amb " "outside Up/down hill to pond w rollator, mod cues to be closer to rollator and to lift L LE higher to avoid tripping. Standing rest break x 5 minutes after walking down hill.     Stair: 4\" step up  6\" step up  8\" step up       MANUAL TOTAL TIME FOR SESSION Not performed    Stretching     Mobilization      Massage     Taping         "
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
## 2021-06-08 ENCOUNTER — APPOINTMENT (OUTPATIENT)
Dept: OTHER | Facility: REHABILITATION | Age: 56
Setting detail: THERAPIES SERIES
End: 2021-06-08
Payer: MEDICARE

## 2021-06-08 ENCOUNTER — HOSPITAL ENCOUNTER (OUTPATIENT)
Dept: PSYCHOLOGY | Facility: CLINIC | Age: 56
Discharge: HOME | End: 2021-06-08
Payer: MEDICARE

## 2021-06-08 ENCOUNTER — HOSPITAL ENCOUNTER (OUTPATIENT)
Dept: PHYSICAL THERAPY | Facility: REHABILITATION | Age: 56
Setting detail: THERAPIES SERIES
Discharge: HOME | End: 2021-06-08
Attending: PODIATRIST
Payer: MEDICARE

## 2021-06-08 DIAGNOSIS — F31.9 BIPOLAR DEPRESSION (CMS/HCC): ICD-10-CM

## 2021-06-08 DIAGNOSIS — R26.9 GAIT DISORDER: ICD-10-CM

## 2021-06-08 DIAGNOSIS — M16.11 UNILATERAL PRIMARY OSTEOARTHRITIS, RIGHT HIP: ICD-10-CM

## 2021-06-08 DIAGNOSIS — S92.255D NONDISPLACED FRACTURE OF NAVICULAR (SCAPHOID) OF LEFT FOOT, SUBSEQUENT ENCOUNTER FOR FRACTURE WITH ROUTINE HEALING: Primary | ICD-10-CM

## 2021-06-08 PROCEDURE — 90837 PSYTX W PT 60 MINUTES: CPT | Performed by: CLINICAL NEUROPSYCHOLOGIST

## 2021-06-08 PROCEDURE — 97116 GAIT TRAINING THERAPY: CPT | Mod: GP

## 2021-06-08 PROCEDURE — 97110 THERAPEUTIC EXERCISES: CPT | Mod: GP

## 2021-06-08 ASSESSMENT — COGNITIVE AND FUNCTIONAL STATUS - GENERAL
CONCENTRATION: IMPAIRED, MILD
APPEARANCE: WELL GROOMED
ORIENTATION: FULLY ORIENTED
MOOD: DEPRESSED
AROUSAL LEVEL: ATTENTIVE
INSIGHT: INTACT
AFFECT: FULL RANGE
ATTENTION: WNL
EYE_CONTACT: WNL
THOUGHT_CONTENT: APPROPRIATE
IMPULSE CONTROL: INTACT
RECENT MEMORY: WNL
LIBIDO: NON-CONTRIBUTORY
SPEECH: REGULAR
REMOTE MEMORY: MILD LOSS
PSYCHOMOTOR FUNCTIONING: WNL

## 2021-06-08 NOTE — OP PT TREATMENT LOG
ORTHO PT FLOWSHEET  IE: 4/20/21     Exercises Current Session Time    MODALITIES- HEAT/ICE TOTAL TIME FOR SESSION Not performed       Heat     Ice/Vasocompression     MODALITIES - E-STIM TOTAL TIME FOR SESSION Not performed    E-stim/H-Wave     THER ACT TOTAL TIME FOR SESSION 0-7 Minutes                 Y   Pt education Pt/ pt's mother instructed and advised to complete medical history, medication, LEFS, FAAM and return next visit. Pt/pt's mother instructed to call Dr. Ornelas re: recent fall history, cur5er L MAFO to determine if new one is applicable or if modifications can be made to current MAFO. Pt instructed not to walk outside alone due to fall risk. Pt verbalized understanding. 5/18 Explained MAFO assessment to patient. Pt to wear R shoe insert to facilitate increase L foot clearance, as well as standing upright. RW raised 1 level to improve upright posture. Pt verbalized understanding. 6/1 Progress note completed. Progress reviewed with patient. Pt did not verbalize understanding. 6/8 Pt educated on increased tolerance for strengthening exercise and standing exercises. LMAFO straps adjusted.     Recap since last visit Pt's mother Roxie called Dr Ornelas re: recent fall onto R hip. No xray recommended.     MAFO vs Ankle Support     Pt reports no L shoe that fits her MAFO.  Loaner shoes obtained which allows L MAFO to be put on/in.  She is able to demonstrate improved LLE control with MAFO vs ankle support.  She left her New Balance shoe at the orthopedist and it can't be located.  (Ortho reports ok to wear the MAFO instead).  MAFO straps need refurbishing - entire brace should be looked at for possible replacement.    L MAFO assessment No skin irritation noted. MAFO is loose at calf, posterior stop is broken, additional DF is needed for consistent forefoot clearance. [employer] made brace in 2018. Contacted [employer] re recommended modifications. Awaiting call back re: when MAFO will be adjusted.         Transfer  "training Pt instructed and advised re: hand placement during sit to stand, stand to sit to increase safety.   Car transfer into mother's car demos unsafe practice of stepping into car and low height causes increase in R hip pain as she tries to adjust.  5/13 Reviewed sit to stand by pushing up from table versus B hands on rollator to decrease fall risk. Inconsistent teach back. Will reinforce q visit.      LEFS  FAAM 20/80; 6/1 25/80  ADL subscale 31/84      TUG  SSV 26.3 w RW; 6/1 17.6, 12.8 (unsafe) w RW; reviewed safety during transfers and turns.   27 sec w RW    THER EX TOTAL TIME FOR SESSION 23-37 Minutes    CARDIOVASCULAR      Nu Step w B UE/LE 5 min B UE/LE's level 1; 5/13 6:30 L1; 6/1 deferred    Amb w RW RW adjusted. 250 ft x 2 w RW, cues to stand upright.     STRENGTHENING            Y    Y  Y Supine: heel slides  Hip abd in supine  Quad set  SAQ  SLR  Gastroc stretch w strap  Hip abd, sidelying  Clams[place] 2 x 10,  BLE ea on sliding board   2x10 B  10 w 3 sec hold  10 x 2 BLE 3 sec hold  10x 2 B  AROM B for all the above  2x5 w PT assist; 5/13 2x10 pt assist to limit TFL substitution;6/8 10 B,cues for hip ext  2x10 B      Seated  STS, repetitive   5x from plinth with UE push, cues for hand placement     y  y     Standing:  March in place  Hip abd  Hip extension  4\" block lateral step overs    10x 2 B w UE support  10x  B  10x 2 B  10x B w/ B UE support    NEURO RE-ED TOTAL TIME FOR SESSION Not performed    POSTURAL RE-ED        PRE-GAIT:   alt toe tap 4\" block, alternating taps, 10x w/ 1 UE support    BALANCE TRAINING        Balance, static On airex, static standing, 60 sec (CG)  On airex, ball toss 20x (Min A)  On floor, reaching for cones, all planes    Dynamic 6\" hurdles, lateral step overs w/ B UE support    DAO Done see balance section in assessment    FGA TBA    GAIT TRAINING TOTAL TIME FOR SESSION 8-22 Minutes    2MWT  6MWT 178 w RW, close sup, cues to stand closer to RW, dec safety  576 ft in " "5:47 w RW    Dynamic gait  GT with rollator, cues to keep walker closer, keep trunk extension and get equal step length. 200 feet, 100 feet, CS   Y Amb in // bars, UE support x 1 10 ft x 6 w cues to stand erect   Y Side steps // bars 10 ft x 4 w cues to stand erect    Amb outside Up/down hill to pond w rollator, mod cues to be closer to rollator and to lift L LE higher to avoid tripping. Standing rest break x 5 minutes after walking down hill.      Y Stair: 4\" step up  6\" step up  8\" step up     10 leading w R LE,B UE support    MANUAL TOTAL TIME FOR SESSION Not performed    Stretching     Mobilization      Massage     Taping         "
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
## 2021-06-10 ENCOUNTER — APPOINTMENT (OUTPATIENT)
Dept: OTHER | Facility: REHABILITATION | Age: 56
Setting detail: THERAPIES SERIES
End: 2021-06-10
Payer: MEDICARE

## 2021-06-10 ENCOUNTER — HOSPITAL ENCOUNTER (OUTPATIENT)
Dept: PHYSICAL THERAPY | Facility: REHABILITATION | Age: 56
Setting detail: THERAPIES SERIES
Discharge: HOME | End: 2021-06-10
Attending: PODIATRIST
Payer: MEDICARE

## 2021-06-10 DIAGNOSIS — S92.255D NONDISPLACED FRACTURE OF NAVICULAR (SCAPHOID) OF LEFT FOOT, SUBSEQUENT ENCOUNTER FOR FRACTURE WITH ROUTINE HEALING: Primary | ICD-10-CM

## 2021-06-10 PROCEDURE — 97110 THERAPEUTIC EXERCISES: CPT | Mod: GP,CQ

## 2021-06-10 PROCEDURE — 97112 NEUROMUSCULAR REEDUCATION: CPT | Mod: GP,CQ

## 2021-06-10 NOTE — OP PT TREATMENT LOG
ORTHO PT FLOWSHEET  IE: 4/20/21     Exercises Current Session Time    MODALITIES- HEAT/ICE TOTAL TIME FOR SESSION Not performed       Heat     Ice/Vasocompression     MODALITIES - E-STIM TOTAL TIME FOR SESSION Not performed    E-stim/H-Wave     THER ACT TOTAL TIME FOR SESSION 0-7 Minutes                    Pt education Pt/ pt's mother instructed and advised to complete medical history, medication, LEFS, FAAM and return next visit. Pt/pt's mother instructed to call Dr. Ornelas re: recent fall history, cur5er L MAFO to determine if new one is applicable or if modifications can be made to current MAFO. Pt instructed not to walk outside alone due to fall risk. Pt verbalized understanding. 5/18 Explained MAFO assessment to patient. Pt to wear R shoe insert to facilitate increase L foot clearance, as well as standing upright. RW raised 1 level to improve upright posture. Pt verbalized understanding. 6/1 Progress note completed. Progress reviewed with patient. Pt did not verbalize understanding. 6/8 Pt educated on increased tolerance for strengthening exercise and standing exercises. LMAFO straps adjusted.     Recap since last visit Pt's mother Roxie called Dr Ornelas re: recent fall onto R hip. No xray recommended.     MAFO vs Ankle Support     Pt reports no L shoe that fits her MAFO.  Loaner shoes obtained which allows L MAFO to be put on/in.  She is able to demonstrate improved LLE control with MAFO vs ankle support.  She left her New Balance shoe at the orthopedist and it can't be located.  (Ortho reports ok to wear the MAFO instead).  MAFO straps need refurbishing - entire brace should be looked at for possible replacement.    L MAFO assessment No skin irritation noted. MAFO is loose at calf, posterior stop is broken, additional DF is needed for consistent forefoot clearance. [employer] made brace in 2018. Contacted [employer] re recommended modifications. Awaiting call back re: when MAFO will be adjusted.         Transfer  "training Pt instructed and advised re: hand placement during sit to stand, stand to sit to increase safety.   Car transfer into mother's car demos unsafe practice of stepping into car and low height causes increase in R hip pain as she tries to adjust.  5/13 Reviewed sit to stand by pushing up from table versus B hands on rollator to decrease fall risk. Inconsistent teach back. Will reinforce q visit.      LEFS  FAAM 20/80; 6/1 25/80  ADL subscale 31/84      TUG  SSV 26.3 w RW; 6/1 17.6, 12.8 (unsafe) w RW; reviewed safety during transfers and turns.   27 sec w RW    THER EX TOTAL TIME FOR SESSION 38-52 Minutes    CARDIOVASCULAR      Nu Step w B UE/LE 5 min B UE/LE's level 1; 5/13 6:30 L1; 6/1 deferred    Amb w RW RW adjusted. 250 ft x 2 w RW, cues to stand upright.     STRENGTHENING                   Supine: heel slides  Hip abd in supine  Quad set  SAQ  SLR  Gastroc stretch w strap  Hip abd, sidelying  Clamshell 2 x 10,  BLE ea on sliding board   2x10 B  10 w 3 sec hold  10 x 2 BLE 3 sec hold  10x 2 B  AROM B for all the above  2x5 w PT assist; 5/13 2x10 pt assist to limit TFL substitution;6/8 10 B,cues for hip ext  2x10 B      Seated  STS, repetitive   5x from plinth with UE push, cues for hand placement     y  y  y  y  y Standing:  March in place  Hip abd  Hip extension  HS curls  4\" block lateral step overs    10x 2 B w UE support  10x  B  10x 2 B  10x  B w/ B UE support  10x w/ B UE support    NEURO RE-ED TOTAL TIME FOR SESSION 8-22 Minutes    POSTURAL RE-ED       y PRE-GAIT:   alt toe tap 4\" block, alternating taps, 10x w/ 1 UE support    BALANCE TRAINING     y  y   Balance, static On airex, static standing, 60 sec (CG)  On airex, ball toss 20x (Min A)  On floor, reaching for cones, all planes    Dynamic 6\" hurdles, lateral step overs w/ B UE support    DAO Done see balance section in assessment    FGA TBA    GAIT TRAINING TOTAL TIME FOR SESSION 0-7 Minutes    2MWT  6MWT 178 w RW, close sup, cues to stand " "closer to RW, dec safety  576 ft in 5:47 w RW   y Dynamic gait  GT with rollator, cues to keep walker closer, keep trunk extension and get equal step length. 200 feet, 100 feet, CS    Amb in // bars, UE support x 1 10 ft x 6 w cues to stand erect    Side steps // bars 10 ft x 4 w cues to stand erect    Amb outside Up/down hill to pond w rollator, mod cues to be closer to rollator and to lift L LE higher to avoid tripping. Standing rest break x 5 minutes after walking down hill.       Stair: 4\" step up  6\" step up  8\" step up     10 leading w R LE,B UE support    MANUAL TOTAL TIME FOR SESSION Not performed    Stretching     Mobilization      Massage     Taping         "
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
## 2021-06-15 ENCOUNTER — APPOINTMENT (OUTPATIENT)
Dept: OTHER | Facility: REHABILITATION | Age: 56
Setting detail: THERAPIES SERIES
End: 2021-06-15
Payer: MEDICARE

## 2021-06-15 ENCOUNTER — HOSPITAL ENCOUNTER (OUTPATIENT)
Dept: PHYSICAL THERAPY | Facility: REHABILITATION | Age: 56
Setting detail: THERAPIES SERIES
Discharge: HOME | End: 2021-06-15
Attending: PODIATRIST
Payer: MEDICARE

## 2021-06-15 ENCOUNTER — TRANSCRIBE ORDERS (OUTPATIENT)
Dept: SCHEDULING | Facility: REHABILITATION | Age: 56
End: 2021-06-15

## 2021-06-15 DIAGNOSIS — S92.255D NONDISPLACED FRACTURE OF NAVICULAR (SCAPHOID) OF LEFT FOOT, SUBSEQUENT ENCOUNTER FOR FRACTURE WITH ROUTINE HEALING: Primary | ICD-10-CM

## 2021-06-15 DIAGNOSIS — H81.4 VERTIGO OF CENTRAL ORIGIN: Primary | ICD-10-CM

## 2021-06-15 DIAGNOSIS — R26.9 GAIT DISORDER: ICD-10-CM

## 2021-06-15 DIAGNOSIS — M16.11 UNILATERAL PRIMARY OSTEOARTHRITIS, RIGHT HIP: ICD-10-CM

## 2021-06-15 PROCEDURE — 97110 THERAPEUTIC EXERCISES: CPT | Mod: GP,KX

## 2021-06-15 PROCEDURE — 97116 GAIT TRAINING THERAPY: CPT | Mod: GP
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
## 2021-06-15 NOTE — OP PT TREATMENT LOG
ORTHO PT FLOWSHEET  IE: 4/20/21     Exercises Current Session Time    MODALITIES- HEAT/ICE TOTAL TIME FOR SESSION Not performed       Heat     Ice/Vasocompression     MODALITIES - E-STIM TOTAL TIME FOR SESSION Not performed    E-stim/H-Wave     THER ACT TOTAL TIME FOR SESSION 0-7 Minutes                    Pt education Pt/ pt's mother instructed and advised to complete medical history, medication, LEFS, FAAM and return next visit. Pt/pt's mother instructed to call Dr. Ornelas re: recent fall history, cur5er L MAFO to determine if new one is applicable or if modifications can be made to current MAFO. Pt instructed not to walk outside alone due to fall risk. Pt verbalized understanding. 5/18 Explained MAFO assessment to patient. Pt to wear R shoe insert to facilitate increase L foot clearance, as well as standing upright. RW raised 1 level to improve upright posture. Pt verbalized understanding. 6/1 Progress note completed. Progress reviewed with patient. Pt did not verbalize understanding. 6/8 Pt educated on increased tolerance for strengthening exercise and standing exercises. LMAFO straps adjusted.    Y Recap since last visit [facility] for vestibular eval. CM emailed    MAFO vs Ankle Support     Pt reports no L shoe that fits her MAFO.  Loaner shoes obtained which allows L MAFO to be put on/in.  She is able to demonstrate improved LLE control with MAFO vs ankle support.  She left her New Balance shoe at the orthopedist and it can't be located.  (Ortho reports ok to wear the MAFO instead).  MAFO straps need refurbishing - entire brace should be looked at for possible replacement.    L MAFO assessment No skin irritation noted. MAFO is loose at calf, posterior stop is broken, additional DF is needed for consistent forefoot clearance. [employer] made brace in 2018. Contacted [employer] re recommended modifications. Awaiting call back re: when MAFO will be adjusted.         Transfer training Pt instructed and advised re: hand  "placement during sit to stand, stand to sit to increase safety.   Car transfer into mother's car demos unsafe practice of stepping into car and low height causes increase in R hip pain as she tries to adjust.  5/13 Reviewed sit to stand by pushing up from table versus B hands on rollator to decrease fall risk. Inconsistent teach back. Will reinforce q visit.      LEFS  FAAM 20/80; 6/1 25/80  ADL subscale 31/84      TUG  SSV 26.3 w RW; 6/1 17.6, 12.8 (unsafe) w RW; reviewed safety during transfers and turns.   27 sec w RW    THER EX TOTAL TIME FOR SESSION 23-37 Minutes    CARDIOVASCULAR      Nu Step w B UE/LE 5 min B UE/LE's level 1; 5/13 6:30 L1; 6/1 deferred    Amb w RW RW adjusted. 250 ft x 2 w RW, cues to stand upright.     STRENGTHENING          Y  Y    Y  Y   Supine: heel slides  Hip abd in supine  Quad set  SAQ  SLR  Gastroc stretch w strap  Hip abd, sidelying  Clamshell 2 x 10,  BLE ea on sliding board   2x10 B  10 w 3 sec hold  10 x 2, 2# BLE 3 sec hold  10 R, monitor pain   AROM B for all the above  2x5 w PT assist; 5/13 2x10 pt assist to limit TFL substitution;6/8 10 B,cues for hip ext  2x10 R w mod tactile cues       Seated  STS, repetitive   5x from plinth with UE push, cues for hand placement     y  y    y     Standing:  March in place  Hip abd  Hip extension  HS curls  4\" block lateral step overs      10x 2 B w UE support  10x  B  10x 2 B  10x  B w/ B UE support  10x w/ B UE support      NEURO RE-ED TOTAL TIME FOR SESSION 0-7 Minutes    POSTURAL RE-ED         y  y PRE-GAIT:   alt toe tap 4\" block, alternating taps, 10x w/ 1 UE support  6\" 2x10 UE support x 1  8\" 10 w UE support x 1    BALANCE TRAINING        Balance, static On airex, static standing, 60 sec (CG)  On airex, ball toss 20x (Min A)  On floor, reaching for cones, all planes    Dynamic 6\" hurdles, lateral step overs w/ B UE support    DAO Done see balance section in assessment    FGA TBA    GAIT TRAINING TOTAL TIME FOR SESSION 23-37 Minutes " "   2MWT  6MWT 178 w RW, close sup, cues to stand closer to RW, dec safety  576 ft in 5:47 w RW   y Dynamic gait  GT with rollator, cues to keep walker closer, keep trunk extension and get equal step length. 200 feet, 100 feet, CS   y  y  y Amb in // bars, UE support x 1 10 ft x 4 w cues to stand erect  10 ft x 2 w 6\" hurdles, FWD  10 ft x 2 w 6\" lon, LAT   y Side steps // bars 10 ft x 4 w cues to stand erect   y Trial SPC, // bars 10 ft x 3 w cues for sequencing    Amb outside Up/down hill to pond w rollator, mod cues to be closer to rollator and to lift L LE higher to avoid tripping. Standing rest break x 5 minutes after walking down hill.      y  y  y Stair: 4\" step up  6\" step up  8\" step up  Reciprocal gait, 6\" step     10 leading w R LE,B UE support  10, B LE, B UE support  8 w B UE, step to when descending    MANUAL TOTAL TIME FOR SESSION Not performed    Stretching     Mobilization      Massage     Taping         "

## 2021-06-17 ENCOUNTER — HOSPITAL ENCOUNTER (OUTPATIENT)
Dept: PHYSICAL THERAPY | Facility: REHABILITATION | Age: 56
Setting detail: THERAPIES SERIES
Discharge: HOME | End: 2021-06-17
Attending: PODIATRIST
Payer: MEDICARE

## 2021-06-17 ENCOUNTER — APPOINTMENT (OUTPATIENT)
Dept: OTHER | Facility: REHABILITATION | Age: 56
Setting detail: THERAPIES SERIES
End: 2021-06-17
Payer: MEDICARE

## 2021-06-17 DIAGNOSIS — S92.255D NONDISPLACED FRACTURE OF NAVICULAR (SCAPHOID) OF LEFT FOOT, SUBSEQUENT ENCOUNTER FOR FRACTURE WITH ROUTINE HEALING: Primary | ICD-10-CM

## 2021-06-17 DIAGNOSIS — M16.11 UNILATERAL PRIMARY OSTEOARTHRITIS, RIGHT HIP: ICD-10-CM

## 2021-06-17 PROCEDURE — 97110 THERAPEUTIC EXERCISES: CPT | Mod: GP,KX

## 2021-06-17 PROCEDURE — 97112 NEUROMUSCULAR REEDUCATION: CPT | Mod: GP,KX

## 2021-06-17 PROCEDURE — 97116 GAIT TRAINING THERAPY: CPT | Mod: GP,KX

## 2021-06-17 NOTE — OP PT TREATMENT LOG
ORTHO PT FLOWSHEET  IE: 4/20/21     Exercises Current Session Time    MODALITIES- HEAT/ICE TOTAL TIME FOR SESSION Not performed       Heat     Ice/Vasocompression     MODALITIES - E-STIM TOTAL TIME FOR SESSION Not performed    E-stim/H-Wave     THER ACT TOTAL TIME FOR SESSION 0-7 Minutes                    Pt education Pt/ pt's mother instructed and advised to complete medical history, medication, LEFS, FAAM and return next visit. Pt/pt's mother instructed to call Dr. Ornelas re: recent fall history, cur5er L MAFO to determine if new one is applicable or if modifications can be made to current MAFO. Pt instructed not to walk outside alone due to fall risk. Pt verbalized understanding. 5/18 Explained MAFO assessment to patient. Pt to wear R shoe insert to facilitate increase L foot clearance, as well as standing upright. RW raised 1 level to improve upright posture. Pt verbalized understanding. 6/1 Progress note completed. Progress reviewed with patient. Pt did not verbalize understanding. 6/8 Pt educated on increased tolerance for strengthening exercise and standing exercises. LMAFO straps adjusted.    Y Recap since last visit [facility] for vestibular eval. CM emailed    MAFO vs Ankle Support     Pt reports no L shoe that fits her MAFO.  Loaner shoes obtained which allows L MAFO to be put on/in.  She is able to demonstrate improved LLE control with MAFO vs ankle support.  She left her New Balance shoe at the orthopedist and it can't be located.  (Ortho reports ok to wear the MAFO instead).  MAFO straps need refurbishing - entire brace should be looked at for possible replacement.    L MAFO assessment No skin irritation noted. MAFO is loose at calf, posterior stop is broken, additional DF is needed for consistent forefoot clearance. all's made brace in 2018. Contacted [facility] re recommended modifications. Awaiting call back re: when MAFO will be adjusted.         Transfer training Pt instructed and advised re: hand  "placement during sit to stand, stand to sit to increase safety.   Car transfer into mother's car demos unsafe practice of stepping into car and low height causes increase in R hip pain as she tries to adjust.  5/13 Reviewed sit to stand by pushing up from table versus B hands on rollator to decrease fall risk. Inconsistent teach back. Will reinforce q visit.      LEFS  FAAM 20/80; 6/1 25/80  ADL subscale 31/84      TUG  SSV 26.3 w RW; 6/1 17.6, 12.8 (unsafe) w RW; reviewed safety during transfers and turns.   27 sec w RW    THER EX TOTAL TIME FOR SESSION 23-37 Minutes    CARDIOVASCULAR      Nu Step w B UE/LE 5 min B UE/LE's level 1; 5/13 6:30 L1; 6/1 deferred    Amb w RW RW adjusted. 250 ft x 2 w RW, cues to stand upright.     STRENGTHENING    Y  Y            Y   Supine: DKTC  heel slides  Hip abd in supine R  Quad set  SAQ  SLR  Gastroc stretch w strap  Hip abd, sidelying  Clamshell 2x10  2 x 10,  BLE ea on sliding board   2x10 B  10 w 3 sec hold  10 x 2, 2# BLE 3 sec hold  10 R, monitor pain   AROM B for all the above  2x5 w PT assist; 5/13 2x10 pt assist to limit TFL substitution;6/8 10 B,cues for hip ext  2x10 R w mod tactile cues       Seated  STS, repetitive   5x from plinth with UE push, cues for hand placement     y  y  y  y     Standing:  March in place  Hip abd  Hip extension  HS curls  4\" block lateral step overs      10x B w UE support  10x B  10x 2 B  10x  B w/ B UE support  10x w/ B UE support      NEURO RE-ED TOTAL TIME FOR SESSION 8-22 Minutes    POSTURAL RE-ED         y  y PRE-GAIT:   alt toe tap 4\" block, alternating taps, 10x w/ 1 UE support  6\" 2x10 UE support x 1  8\" 10 w UE support x 1    BALANCE TRAINING     y  y   Balance, static On airex, static standing, 60 sec (CG)  On airex, ball toss 20x (Min A)  On floor, reaching for cones, all planes    Dynamic 6\" hurdles, lateral step overs w/ B UE support    DAO Done see balance section in assessment    FGA TBA    GAIT TRAINING TOTAL TIME FOR " "SESSION 8-22 Minutes    2MWT  6MWT 178 w RW, close sup, cues to stand closer to RW, dec safety  576 ft in 5:47 w RW   y Dynamic gait  GT with rollator, cues to keep walker closer, keep trunk extension and get equal step length. 200 feet, 100 feet, CS; 6/17 amb outside on level surface to increase confidence when outside. Cues to stand upright.    y  y   Amb in // bars, UE support x 1 10 ft x 4 w cues to stand erect  10 ft x 2 w 6\" hurdles, FWD  10 ft x 2 w 6\" lon, LAT   y Side steps // bars 10 ft x 4 w cues to stand erect    Trial SPC, // bars 10 ft x 3 w cues for sequencing    Amb outside Up/down hill to pond w rollator, mod cues to be closer to rollator and to lift L LE higher to avoid tripping. Standing rest break x 5 minutes after walking down hill.           Stair: 4\" step up  6\" step up  8\" step up  Reciprocal gait, 6\" step     10 leading w R LE,B UE support  10, B LE, B UE support  8 w B UE, step to when descending    MANUAL TOTAL TIME FOR SESSION Not performed    Stretching     Mobilization      Massage     Taping         "
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
## 2021-06-23 ENCOUNTER — HOSPITAL ENCOUNTER (OUTPATIENT)
Dept: PSYCHOLOGY | Facility: CLINIC | Age: 56
Discharge: HOME | End: 2021-06-23
Payer: MEDICARE

## 2021-06-23 ENCOUNTER — HOSPITAL ENCOUNTER (OUTPATIENT)
Dept: PHYSICAL THERAPY | Facility: REHABILITATION | Age: 56
Setting detail: THERAPIES SERIES
Discharge: HOME | End: 2021-06-23
Attending: PODIATRIST
Payer: MEDICARE

## 2021-06-23 ENCOUNTER — APPOINTMENT (OUTPATIENT)
Dept: OTHER | Facility: REHABILITATION | Age: 56
Setting detail: THERAPIES SERIES
End: 2021-06-23
Payer: MEDICARE

## 2021-06-23 DIAGNOSIS — F31.9 BIPOLAR DEPRESSION (CMS/HCC): ICD-10-CM

## 2021-06-23 DIAGNOSIS — M16.11 UNILATERAL PRIMARY OSTEOARTHRITIS, RIGHT HIP: ICD-10-CM

## 2021-06-23 DIAGNOSIS — S92.255D NONDISPLACED FRACTURE OF NAVICULAR (SCAPHOID) OF LEFT FOOT, SUBSEQUENT ENCOUNTER FOR FRACTURE WITH ROUTINE HEALING: Primary | ICD-10-CM

## 2021-06-23 PROCEDURE — 97530 THERAPEUTIC ACTIVITIES: CPT | Mod: GP

## 2021-06-23 PROCEDURE — 90837 PSYTX W PT 60 MINUTES: CPT | Performed by: CLINICAL NEUROPSYCHOLOGIST

## 2021-06-23 PROCEDURE — 97110 THERAPEUTIC EXERCISES: CPT | Mod: GP

## 2021-06-23 PROCEDURE — 97116 GAIT TRAINING THERAPY: CPT | Mod: GP

## 2021-06-23 ASSESSMENT — COGNITIVE AND FUNCTIONAL STATUS - GENERAL
CONCENTRATION: IMPAIRED, MILD
IMPULSE CONTROL: INTACT
AFFECT: FULL RANGE
SPEECH: REGULAR
RECENT MEMORY: WNL
INSIGHT: INTACT
LIBIDO: NON-CONTRIBUTORY
AROUSAL LEVEL: ATTENTIVE
THOUGHT_CONTENT: APPROPRIATE
ORIENTATION: FULLY ORIENTED
MOOD: DEPRESSED
REMOTE MEMORY: MILD LOSS
EYE_CONTACT: WNL
PSYCHOMOTOR FUNCTIONING: WNL
ATTENTION: WNL
APPEARANCE: WELL GROOMED
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
## 2021-06-24 ENCOUNTER — APPOINTMENT (OUTPATIENT)
Dept: OTHER | Facility: REHABILITATION | Age: 56
Setting detail: THERAPIES SERIES
End: 2021-06-24
Payer: MEDICARE

## 2021-06-24 ENCOUNTER — HOSPITAL ENCOUNTER (OUTPATIENT)
Dept: PHYSICAL THERAPY | Facility: REHABILITATION | Age: 56
Setting detail: THERAPIES SERIES
Discharge: HOME | End: 2021-06-24
Attending: PODIATRIST
Payer: MEDICARE

## 2021-06-24 DIAGNOSIS — R26.9 GAIT DISORDER: ICD-10-CM

## 2021-06-24 DIAGNOSIS — M16.11 UNILATERAL PRIMARY OSTEOARTHRITIS, RIGHT HIP: ICD-10-CM

## 2021-06-24 DIAGNOSIS — S92.255D NONDISPLACED FRACTURE OF NAVICULAR (SCAPHOID) OF LEFT FOOT, SUBSEQUENT ENCOUNTER FOR FRACTURE WITH ROUTINE HEALING: Primary | ICD-10-CM

## 2021-06-24 PROCEDURE — 97110 THERAPEUTIC EXERCISES: CPT | Mod: GP

## 2021-06-24 PROCEDURE — 97112 NEUROMUSCULAR REEDUCATION: CPT | Mod: GP

## 2021-06-24 PROCEDURE — 97530 THERAPEUTIC ACTIVITIES: CPT | Mod: GP

## 2021-06-24 PROCEDURE — 97116 GAIT TRAINING THERAPY: CPT | Mod: GP
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
## 2021-06-24 NOTE — OP PT TREATMENT LOG
"ORTHO PT FLOWSHEET  IE: 4/20/21     Exercises Current Session Time    MODALITIES- HEAT/ICE TOTAL TIME FOR SESSION Not performed       Heat     Ice/Vasocompression     MODALITIES - E-STIM TOTAL TIME FOR SESSION Not performed    E-stim/H-Wave     THER ACT TOTAL TIME FOR SESSION 8-22 Minutes                       Y Pt education Pt/ pt's mother instructed and advised to complete medical history, medication, LEFS, FAAM and return next visit. Pt/pt's mother instructed to call Dr. Ornelas re: recent fall history, assess L MAFO to determine if new one is applicable or if modifications can be made to current MAFO. Pt instructed not to walk outside alone due to fall risk. Pt verbalized understanding. 5/18 Explained MAFO assessment to patient. Pt to wear R shoe insert to facilitate increase L foot clearance, as well as standing upright. RW raised 1 level to improve upright posture. Pt verbalized understanding. 6/1 Progress note completed. Progress reviewed with patient. Pt did not verbalize understanding. 6/8 Pt educated on increased tolerance for strPtengthening exercise and standing exercises. LMAFO straps adjusted. 6/23 Pt instructed to wear L MAFO whenever she needs to walk, even for short distances, to decrease fall risk and to minimize compensation with other body parts. Pt's response,\"oh, alright.\" 6/24 HEP issued and reviewed verbally due to L HS cramp. Pt encouraged to monitor response to tx and limit activities.   Y Recap since last visit [facility] for vestibular eval. CM emailed; 6/23 Discussed patient will re-assessed 6/24 and discharged from orthopedic PT. Pt will begin vestibular PT next week w  [facility]. Pt verbalized understanding.     MAFO vs Ankle Support     Pt reports no L shoe that fits her MAFO.  Loaner shoes obtained which allows L MAFO to be put on/in.  She is able to demonstrate improved LLE control with MAFO vs ankle support.  She left her New Balance shoe at the orthopedist and it can't be located.  " "(Ortho reports ok to wear the MAFO instead).  MAFO straps need refurbishing - entire brace should be looked at for possible replacement.    L MAFO assessment No skin irritation noted. MAFO is loose at calf, posterior stop is broken, additional DF is needed for consistent forefoot clearance. [employer] made brace in 2018. Contacted [employer] re recommended modifications. Awaiting call back re: when MAFO will be adjusted.         Transfer training Pt instructed and advised re: hand placement during sit to stand, stand to sit to increase safety.   Car transfer into mother's car demos unsafe practice of stepping into car and low height causes increase in R hip pain as she tries to adjust.  5/13 Reviewed sit to stand by pushing up from table versus B hands on rollator to decrease fall risk. Inconsistent teach back. Will reinforce q visit.   Y   LEFS  FAAM 20/80; 6/1 25/80; 6/23 39/80  ADL subscale 31/84     Y TUG  SSV 26.3 w RW; 6/1 17.6, 12.8 (unsafe) w RW; reviewed safety during transfers and turns.   27 sec w RW; see gait section    THER EX TOTAL TIME FOR SESSION 8-22 Minutes    CARDIOVASCULAR      Nu Step w B UE/LE 5 min B UE/LE's level 1; 5/13 6:30 L1; 6/1 deferred    Amb w RW RW adjusted. 250 ft x 2 w RW, cues to stand upright.     STRENGTHENING    Y  Y            Y  Y Supine: DKTC  heel slides  Hip abd   Quad set  SAQ  SLR  Gastroc stretch w strap  Hip abd, sidelying    Clamshell 2x10  2 x 10,  BLE ea on sliding board   2x10 B  10 w 3 sec hold  10 x 2, 2# BLE 3 sec hold  10 R, monitor pain     2x5 w PT assist; 5/13 2x10 pt assist to limit TFL substitution;6/8 10 B,cues for hip ext;6/23 2x10 B min cues R, mod to max L   2x10 R w mod tactile cues      Y Seated  STS, repetitive   5x from plinth with UE push, cues for hand placement, 12.7 sec     y  y         Standing:  March in place  Hip abd  Hip extension  HS curls  4\" block lateral step overs      10x B w UE support  10x B  10x 2 B  10x  B w/ B UE support  10x w/ B " "UE support      NEURO RE-ED TOTAL TIME FOR SESSION 8-22 Minutes    POSTURAL RE-ED           Y PRE-GAIT:   alt toe tap 4\" block, alternating taps, 10x w/ 1 UE support  6\" 2x10 UE support x 1  8\" 10 w UE support x 1    BALANCE TRAINING        Balance, static On airex, static standing, 60 sec (CG)  On airex, ball toss 20x (Min A)  On floor, reaching for cones, all planes    Dynamic 6\" hurdles, lateral step overs w/ B UE support   Y DAO 30/56    FGA TBA    GAIT TRAINING TOTAL TIME FOR SESSION 8-22 Minutes      2MWT  6MWT 178 w RW, close sup, cues to stand closer to RW, dec safety  576 ft in 5:47 w RW; 6/23 607 ft w RW, cues to stand upright, to be closer to RW, and to limit UE WB.     Dynamic gait  GT with rollator, cues to keep walker closer, keep trunk extension and get equal step length. 200 feet, 100 feet, CS; 6/17 amb outside on level surface to increase confidence when outside. Cues to stand upright.    y  y   Amb in // bars, UE support x 1 10 ft x 4 w cues to stand erect  10 ft x 2 w 6\" hurdles, FWD  10 ft x 2 w 6\" lon, LAT   y Side steps // bars 10 ft x 4 w cues to stand erect    Trial SPC, // bars 10 ft x 3 w cues for sequencing    Amb outside Up/down hill to pond w rollator, mod cues to be closer to rollator and to lift L LE higher to avoid tripping. Standing rest break x 5 minutes after walking down hill.      y    y Stair: 4\" step up  6\" step up  8\" step up  Reciprocal gait, 6\" step     10 leading w R LE,B UE support  10, B LE, B UE support  8 w B UE, step to when descending    MANUAL TOTAL TIME FOR SESSION Not performed    Stretching     Mobilization      Massage     Taping         "

## 2021-06-30 ENCOUNTER — HOSPITAL ENCOUNTER (OUTPATIENT)
Dept: PHYSICAL THERAPY | Facility: REHABILITATION | Age: 56
Setting detail: THERAPIES SERIES
Discharge: HOME | End: 2021-06-30
Attending: OTOLARYNGOLOGY
Payer: MEDICARE

## 2021-06-30 DIAGNOSIS — H81.4 VERTIGO OF CENTRAL ORIGIN: Primary | ICD-10-CM

## 2021-06-30 DIAGNOSIS — R26.89 IMBALANCE: ICD-10-CM

## 2021-06-30 PROCEDURE — 97530 THERAPEUTIC ACTIVITIES: CPT | Mod: GP

## 2021-06-30 PROCEDURE — 97163 PT EVAL HIGH COMPLEX 45 MIN: CPT

## 2021-06-30 ASSESSMENT — BALANCE ASSESSMENTS: TOTAL SCORE: 24

## 2021-07-01 ENCOUNTER — DOCUMENTATION (OUTPATIENT)
Dept: SOCIAL WORK | Facility: REHABILITATION | Age: 56
End: 2021-07-01

## 2021-07-01 ENCOUNTER — TELEPHONE (OUTPATIENT)
Dept: ADMISSIONS | Facility: REHABILITATION | Age: 56
End: 2021-07-01
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
## 2021-07-01 NOTE — PROGRESS NOTES
Patient evaluated for vestibular PT.  Patient to continue with transportation services through [facility] at this time.    CM called patient mother Roxie [phone]. Discussed that patient is currently living with mother due to number of falls at [facility]. Working with Jessica Beckman at [facility] for possible placement at [facility] in [place] and possible HIP program application. Per Roxie her daughter Harleen assist with patients care. CM left  for Harleen to inquire about waiver through [address], HIP, neurology through [facility], and other assistance within the home. CM left VM for Jessica and [facility] as well to discuss patient care.

## 2021-07-01 NOTE — OP PT TREATMENT LOG
"PT FLOWSHEET       Exercises Current Session Time    THER ACT TOTAL TIME FOR SESSION 8-22 Minutes    Review meds, pain, falls, vital signs    y       Pt education The findings of the initial evaluation, the POC, and falls risk were discussed with the patient however the patient did not verbalize good understanding due to cognitive limitations. The recreational therapist from [facility] was present. PT recommended the patient use the RW at all times.     L MAFO assessment PRN          Transfer training     THER EX TOTAL TIME FOR SESSION Not performed    CARDIOVASCULAR  FROM PREVIOUS COURSE OF THERAPY    Nu Step w B UE/LE 5 min B UE/LE's level 1; 5/13 6:30 L1; 6/1 deferred    Amb w RW RW adjusted. 250 ft x 2 w RW, cues to stand upright.     STRENGTHENING FROM PREVIOUS COURSE OF THERAPY      Seated  STS, repetitive   5x from plinth with UE push, cues for hand placement, 12.7 sec                Standing:  March in place  Hip abd  Hip extension  HS curls  4\" block lateral step overs      10x B w UE support  10x B  10x 2 B  10x  B w/ B UE support  10x w/ B UE support      NEURO RE-ED TOTAL TIME FOR SESSION Not performed     FROM PREVIOUS COURSE OF THERAPY          PRE-GAIT:   alt toe tap 4\" block, alternating taps, 10x w/ 1 UE support  6\" 2x10 UE support x 1  8\" 10 w UE support x 1    BALANCE TRAINING  FROM PREVIOUS COURSE OF THERAPY      Balance, static On airex, static standing, 60 sec (CG)  On airex, ball toss 20x (Min A)  On floor, reaching for cones, all planes    Dynamic 6\" hurdles, lateral step overs w/ B UE support    TUG 25.84 w/ RW    DAO 24/56    5x STS 15.68 w/ UE use    GAIT TRAINING TOTAL TIME FOR SESSION Not performed     FROM PREVIOUS COURSE OF THERAPY    Dynamic gait  GT with rollator, cues to keep walker closer, keep trunk extension and get equal step length. 200 feet, 100 feet, CS; 6/17 amb outside on level surface to increase confidence when outside. Cues to stand upright.         Amb in // bars, UE " "support x 1 10 ft x 4 w cues to stand erect  10 ft x 2 w 6\" hurdles, FWD  10 ft x 2 w 6\" lon, LAT    Side steps // bars 10 ft x 4 w cues to stand erect    Trial SPC, // bars 10 ft x 3 w cues for sequencing    Amb outside Up/down hill to pond w rollator, mod cues to be closer to rollator and to lift L LE higher to avoid tripping. Standing rest break x 5 minutes after walking down hill.           Stair: 4\" step up  6\" step up  8\" step up  Reciprocal gait, 6\" step     10 leading w R LE,B UE support  10, B LE, B UE support  8 w B UE, step to when descending       "

## 2021-07-07 ENCOUNTER — DOCUMENTATION (OUTPATIENT)
Dept: SOCIAL WORK | Facility: REHABILITATION | Age: 56
End: 2021-07-07

## 2021-07-07 NOTE — PROGRESS NOTES
Cm called sister and provider the last three appointment times in July. Caregiver Agatha to attend one of the appointments so an HEP can be carried over throughout the week. Sister Harleen appreciative of this. CM to remain available.

## 2021-07-09 ENCOUNTER — APPOINTMENT (OUTPATIENT)
Dept: OTHER | Facility: REHABILITATION | Age: 56
Setting detail: THERAPIES SERIES
End: 2021-07-09
Payer: MEDICARE

## 2021-07-09 ENCOUNTER — HOSPITAL ENCOUNTER (OUTPATIENT)
Dept: PHYSICAL THERAPY | Facility: REHABILITATION | Age: 56
Setting detail: THERAPIES SERIES
Discharge: HOME | End: 2021-07-09
Attending: OTOLARYNGOLOGY
Payer: MEDICARE

## 2021-07-09 DIAGNOSIS — H81.4 VERTIGO OF CENTRAL ORIGIN: Primary | ICD-10-CM

## 2021-07-09 DIAGNOSIS — R26.89 IMBALANCE: ICD-10-CM

## 2021-07-09 PROCEDURE — 97112 NEUROMUSCULAR REEDUCATION: CPT | Mod: GP,KX

## 2021-07-09 PROCEDURE — 97116 GAIT TRAINING THERAPY: CPT | Mod: GP,KX

## 2021-07-09 PROCEDURE — 97530 THERAPEUTIC ACTIVITIES: CPT | Mod: GP,KX

## 2021-07-09 NOTE — OP PT TREATMENT LOG
"PT FLOWSHEET       Exercises Current Session Time    THER ACT TOTAL TIME FOR SESSION 8-22 Minutes   y Review meds, pain, falls, vital signs completed   y       Pt education The patient was educated on safe use of RW however she demonstrates limited carry over of instruction.   y L MAFO assessment/skin check  no skin integrity issues w/ skin check post session        Transfer training     THER EX TOTAL TIME FOR SESSION Not performed    CARDIOVASCULAR      Nu Step w B UE/LE     Amb w RW     NEURO RE-ED TOTAL TIME FOR SESSION 23-37 Minutes    STATIC BALANCE TRAINING     y - all   Floor FT/EO 60 sec w/ no significant sway  Mod SR/EO (small space between feet) 60 sec B/L w/ min sway  FA/EC 60 sec w/ min/mod sway   y - all Airex FA/EO 60 sec w/ min sway  FT/EO 60 sec w/ mod sway  FA/EC 15 sec w/ mod sway and LOB to L   y Rocker A/P EO 10 sec x 3 w/ post LOB    Discs      DYNAMIC BALANCE 6\" hurdles, lateral step overs w/ B UE support    Hurdles w/ UE support    y Toe taps w/ UE support 4\" toe taps w/ UE support  Alt forward x 10 reps B/L  Lateral 10 reps B/L    Reaching activities on firm and airex (L arm only - R shoulder pain)    y Bending to  objects Bending to  cone and hand to PT x 10 reps w/ no instability - use L hand due to R shoulder pain              OUTCOME MEASURES     TUG 25.84 w/ RW    DAO 24/56    5x STS 15.68 w/ UE use    GAIT TRAINING TOTAL TIME FOR SESSION 8-22 Minutes     FROM PREVIOUS COURSE OF THERAPY   y RW GT with RW, cues to keep walker closer, keep trunk extension, strike w/ heel, and  get equal step length; close S x 100 ft; pt needed verbal cues to keep both hands on RW because she took her hand off the RW while walking to wave to staff x 3    y Curb/ramp Close S descending ramp due to decreased control of speed    Stairs      Amb outside         "
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
## 2021-07-13 ENCOUNTER — APPOINTMENT (OUTPATIENT)
Dept: OTHER | Facility: REHABILITATION | Age: 56
Setting detail: THERAPIES SERIES
End: 2021-07-13
Payer: MEDICARE

## 2021-07-13 ENCOUNTER — HOSPITAL ENCOUNTER (OUTPATIENT)
Dept: PHYSICAL THERAPY | Facility: REHABILITATION | Age: 56
Setting detail: THERAPIES SERIES
Discharge: HOME | End: 2021-07-13
Attending: OTOLARYNGOLOGY
Payer: MEDICARE

## 2021-07-13 ENCOUNTER — HOSPITAL ENCOUNTER (OUTPATIENT)
Dept: PSYCHOLOGY | Facility: CLINIC | Age: 56
Discharge: HOME | End: 2021-07-13
Payer: MEDICARE

## 2021-07-13 DIAGNOSIS — S92.255D NONDISPLACED FRACTURE OF NAVICULAR (SCAPHOID) OF LEFT FOOT, SUBSEQUENT ENCOUNTER FOR FRACTURE WITH ROUTINE HEALING: ICD-10-CM

## 2021-07-13 DIAGNOSIS — R26.9 GAIT DISORDER: ICD-10-CM

## 2021-07-13 DIAGNOSIS — M16.11 UNILATERAL PRIMARY OSTEOARTHRITIS, RIGHT HIP: ICD-10-CM

## 2021-07-13 DIAGNOSIS — H81.4 VERTIGO OF CENTRAL ORIGIN: Primary | ICD-10-CM

## 2021-07-13 DIAGNOSIS — R26.89 IMBALANCE: ICD-10-CM

## 2021-07-13 DIAGNOSIS — F31.9 BIPOLAR DEPRESSION (CMS/HCC): ICD-10-CM

## 2021-07-13 DIAGNOSIS — S70.01XA CONTUSION OF RIGHT HIP, INITIAL ENCOUNTER: ICD-10-CM

## 2021-07-13 PROCEDURE — 97530 THERAPEUTIC ACTIVITIES: CPT | Mod: GP,KX

## 2021-07-13 PROCEDURE — 97112 NEUROMUSCULAR REEDUCATION: CPT | Mod: GP,KX

## 2021-07-13 PROCEDURE — 90834 PSYTX W PT 45 MINUTES: CPT | Performed by: CLINICAL NEUROPSYCHOLOGIST

## 2021-07-13 ASSESSMENT — COGNITIVE AND FUNCTIONAL STATUS - GENERAL
SPEECH: REGULAR
RECENT MEMORY: WNL
APPEARANCE: WELL GROOMED
ORIENTATION: FULLY ORIENTED
SLEEP_WAKE_CYCLE: NO CHANGE
THOUGHT_CONTENT: APPROPRIATE
AFFECT: FULL RANGE
MOOD: DEPRESSED
CONCENTRATION: IMPAIRED, MILD
AROUSAL LEVEL: SLEEPY
INSIGHT: INTACT
PSYCHOMOTOR FUNCTIONING: WNL
ATTENTION: WNL
REMOTE MEMORY: MILD LOSS
EYE_CONTACT: WNL
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
## 2021-07-13 NOTE — OP PT TREATMENT LOG
"PT FLOWSHEET       Exercises Current Session Time    THER ACT TOTAL TIME FOR SESSION 8-22 Minutes   y Review meds, pain, falls, vital signs completed   y       Pt education The patient was educated on safe use of RW however she demonstrates limited carry over of instruction.   y L MAFO assessment/skin check  no skin integrity issues w/ skin check post session        Transfer training     THER EX TOTAL TIME FOR SESSION Not performed    CARDIOVASCULAR      Nu Step w B UE/LE     Amb w RW     NEURO RE-ED TOTAL TIME FOR SESSION 23-37 Minutes    STATIC BALANCE TRAINING     y - all   Floor FT/EO 60 sec w/ no significant sway  Mod SR/EO (small space between feet) 60 sec B/L w/ min sway  FA/EC 60 sec w/ min/mod sway   y - all Airex FA/EO 60 sec w/ min sway  FT/EO 60 sec w/ mod sway  FA/EC 15 sec w/ mod sway and +LOB to L    Rocker A/P EO 10 sec x 3 w/ post LOB    Discs      DYNAMIC BALANCE 6\" hurdles, lateral step overs w/ B UE support    Hurdles w/ UE support    y Toe taps w/ UE support 4\" toe taps w/ UE support  Alt forward x 10 reps B/L  Lateral 10 reps B/L    Reaching activities on firm and airex (L arm only - R shoulder pain)     Bending to  objects Bending to  cone and hand to PT x 10 reps w/ no instability - use L hand due to R shoulder pain              OUTCOME MEASURES     TUG 25.84 w/ RW    DAO 24/56    5x STS 15.68 w/ UE use    GAIT TRAINING TOTAL TIME FOR SESSION 8-22 Minutes     FROM PREVIOUS COURSE OF THERAPY   y RW GT with RW, cues to keep walker closer, keep trunk extension, strike w/ heel, and  get equal step length; close S x 100 ft; pt needed verbal cues to keep both hands on RW because she took her hand off the RW while walking to wave to staff x 3     Curb/ramp Close S descending ramp due to decreased control of speed    Stairs      Amb outside         "
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
## 2021-07-15 ENCOUNTER — HOSPITAL ENCOUNTER (OUTPATIENT)
Dept: PHYSICAL THERAPY | Facility: REHABILITATION | Age: 56
Setting detail: THERAPIES SERIES
Discharge: HOME | End: 2021-07-15
Attending: OTOLARYNGOLOGY
Payer: MEDICARE

## 2021-07-15 ENCOUNTER — APPOINTMENT (OUTPATIENT)
Dept: OTHER | Facility: REHABILITATION | Age: 56
Setting detail: THERAPIES SERIES
End: 2021-07-15
Payer: MEDICARE

## 2021-07-15 DIAGNOSIS — H81.4 VERTIGO OF CENTRAL ORIGIN: Primary | ICD-10-CM

## 2021-07-15 PROCEDURE — 97530 THERAPEUTIC ACTIVITIES: CPT | Mod: GP,CQ,KX

## 2021-07-15 PROCEDURE — 97116 GAIT TRAINING THERAPY: CPT | Mod: GP,CQ,KX

## 2021-07-15 PROCEDURE — 97112 NEUROMUSCULAR REEDUCATION: CPT | Mod: GP,CQ,KX
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
## 2021-07-15 NOTE — OP PT TREATMENT LOG
"PT FLOWSHEET       Exercises Current Session Time    THER ACT TOTAL TIME FOR SESSION 10 minutes   y Review meds, pain, falls, vital signs completed   y       Pt education The patient was educated on safe use of RW however she demonstrates limited carry over of instruction.   y L MAFO assessment/skin check  no skin integrity issues w/ skin check post session   y   Transfer training Escorted patient to restroom and helped her safely in the stall and escorted her back to gym      THER EX TOTAL TIME FOR SESSION Not performed    CARDIOVASCULAR      Nu Step w B UE/LE     Amb w RW     NEURO RE-ED TOTAL TIME FOR SESSION 30 minutes    STATIC BALANCE TRAINING     y - all   Floor FT/EO x 60 sec w/ no significant sway  Mod SR/EO (small space between feet) x 60 sec B/L w/ min sway Increased right hip pain  FA/EC x 60 sec w/ min/mod sway   y - all Airex FA/EO x 60 sec w/ min sway  FT/EO x 60 sec w/ mod sway  FA/EC x 60 sec w/ mod sway and LOB to L   n Rocker A/P EO 10 sec x 3 w/ post LOB    Discs      DYNAMIC BALANCE 6\" hurdles, lateral step overs w/ B UE support    Hurdles w/ UE support    y  y  y Toe taps w/ UE support 4\" toe taps w/ UE support  Alt forward x 10 reps B/L  Lateral 10 reps B/L    Reaching activities on firm and airex (L arm only - R shoulder pain)    n Bending to  objects Bending to  cone and hand to PT x 10 reps w/ no instability - use L hand due to R shoulder pain              OUTCOME MEASURES     TUG 25.84 w/ RW    DAO 24/56    5x STS 15.68 w/ UE use    GAIT TRAINING TOTAL TIME FOR SESSION 15 minutes     FROM PREVIOUS COURSE OF THERAPY   y RW GT with RW, cues to keep walker closer, keep trunk extension, strike w/ heel, and  get equal step length; close S     y Curb/ramp Close S descending ramp due to decreased control of speed    Stairs      Amb outside         "

## 2021-07-21 ENCOUNTER — APPOINTMENT (OUTPATIENT)
Dept: OTHER | Facility: REHABILITATION | Age: 56
Setting detail: THERAPIES SERIES
End: 2021-07-21
Payer: MEDICARE

## 2021-07-21 ENCOUNTER — HOSPITAL ENCOUNTER (OUTPATIENT)
Dept: PHYSICAL THERAPY | Facility: REHABILITATION | Age: 56
Setting detail: THERAPIES SERIES
Discharge: HOME | End: 2021-07-21
Attending: OTOLARYNGOLOGY
Payer: MEDICARE

## 2021-07-21 DIAGNOSIS — R26.89 IMBALANCE: Primary | ICD-10-CM

## 2021-07-21 PROCEDURE — 97116 GAIT TRAINING THERAPY: CPT | Mod: GP,CQ,KX

## 2021-07-21 PROCEDURE — 97112 NEUROMUSCULAR REEDUCATION: CPT | Mod: GP,CQ,KX

## 2021-07-21 PROCEDURE — 97530 THERAPEUTIC ACTIVITIES: CPT | Mod: GP,CQ
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
## 2021-07-21 NOTE — OP PT TREATMENT LOG
"PT FLOWSHEET       Exercises Current Session Time    THER ACT TOTAL TIME FOR SESSION 15 minutes   y Review meds, pain, falls, vital signs completed   y       Pt education The patient was educated on safe use of RW however she demonstrates limited carry over of instruction.   y L MAFO assessment/skin check  no skin integrity issues w/ skin check post session   y   Transfer training Escorted patient to restroom and helped her safely in the stall and escorted her back to gym      THER EX TOTAL TIME FOR SESSION Not performed    CARDIOVASCULAR      Nu Step w B UE/LE     Amb w RW     NEURO RE-ED TOTAL TIME FOR SESSION 30 minutes    STATIC BALANCE TRAINING     y  y  y Floor - FT/EO x 60 sec w/ no significant sway CS  - Mod SR/EO (small space between feet) x 50 sec R forward 30 sec L forward due to R hip pain CS  - FA/EC x 60 sec w/ min/mod sway CS   y  y  n Airex - FA/EO x 60 sec w/ min sway CG  - FT/EO x 60 sec w/ mod sway CG/min assist  - FA/EC x 60 sec w/ mod sway and LOB to L   n Rocker A/P EO 10 sec x 3 w/ post LOB    Discs      DYNAMIC BALANCE 6\" hurdles, lateral step overs w/ B UE support    Hurdles w/ UE support      y  y Toe taps w/ UE support 4\" toe taps w/ UE support  - Alt forward x 10 reps B/L CG today  - Lateral 10 reps B/L CG today   decreased balance today.     Reaching activities on firm and airex (L arm only - R shoulder pain)    n Bending to  objects Bending to  cone and hand to PT x 10 reps w/ no instability - use L hand due to R shoulder pain              OUTCOME MEASURES     TUG 25.84 w/ RW    DAO 24/56    5x STS 15.68 w/ UE use    GAIT TRAINING TOTAL TIME FOR SESSION 10 minutes     FROM PREVIOUS COURSE OF THERAPY   y RW GT with RW, cues to keep walker closer, keep trunk extension, strike w/ heel, and  get equal step length; CS     n Curb/ramp Close S descending ramp due to decreased control of speed    Stairs      Amb outside         "

## 2021-07-23 ENCOUNTER — HOSPITAL ENCOUNTER (OUTPATIENT)
Dept: PHYSICAL THERAPY | Facility: REHABILITATION | Age: 56
Setting detail: THERAPIES SERIES
Discharge: HOME | End: 2021-07-23
Attending: OTOLARYNGOLOGY
Payer: MEDICARE

## 2021-07-23 ENCOUNTER — APPOINTMENT (OUTPATIENT)
Dept: OTHER | Facility: REHABILITATION | Age: 56
Setting detail: THERAPIES SERIES
End: 2021-07-23
Payer: MEDICARE

## 2021-07-23 DIAGNOSIS — R26.89 IMBALANCE: Primary | ICD-10-CM

## 2021-07-23 PROCEDURE — 97112 NEUROMUSCULAR REEDUCATION: CPT | Mod: GP

## 2021-07-23 PROCEDURE — 97530 THERAPEUTIC ACTIVITIES: CPT | Mod: GP,KX

## 2021-07-23 NOTE — OP PT TREATMENT LOG
"PT FLOWSHEET       Exercises Current Session Time    THER ACT TOTAL TIME FOR SESSION 15 minutes   yes Review meds, pain, falls, vital signs completed   yes       Pt education The patient was educated on safe use of RW however she demonstrates limited carry over of instruction.    L MAFO assessment/skin check  no skin integrity issues w/ skin check post session      Transfer training Escorted patient to restroom and helped her safely in the stall and escorted her back to gym      THER EX TOTAL TIME FOR SESSION Not performed    CARDIOVASCULAR      Nu Step w B UE/LE     Amb w RW     NEURO RE-ED TOTAL TIME FOR SESSION 45 minutes    STATIC BALANCE TRAINING     yes  yes  yes Floor - FT/EO x 60 sec w/ no significant sway CS  - Mod SR/EO (small space between feet) x 50 sec R forward 50 sec L forward   CS  - FA/EC x 60 sec w/ min sway CS   yes  yes  yes Airex - FA/EO x 60 sec w/   - FT/EO x 60 sec w/ mod sway CG/min assist  - FA/EC x 60 sec w/ mod sway and LOB to L   n Rocker A/P EO 10 sec x 3 w/ post LOB    Discs      DYNAMIC BALANCE 6\" hurdles, lateral step overs w/ B UE support    Hurdles w/ UE support      y  y    y  y Toe taps w/ UE support 4\" toe taps w/ UE support  - Alt forward x 10 reps B/L CG today  - Lateral 10 reps B/L CG today   decreased balance today.   -Added same as above without support of //bars with min A for safety.   -trialed alternating step ups RFF and LFF and lateral up and overs with // bar upper extremity support    Reaching activities on firm and airex (L arm only - R shoulder pain)    n Bending to  objects Bending to  cone and hand to PT x 10 reps w/ no instability - use L hand due to R shoulder pain              OUTCOME MEASURES     TUG 25.84 w/ RW    DAO 24/56    5x STS 15.68 w/ UE use    GAIT TRAINING TOTAL TIME FOR SESSION minutes     FROM PREVIOUS COURSE OF THERAPY   n RW GT with RW, cues to keep walker closer, keep trunk extension, strike w/ heel, and  get equal step length; " CS     n Curb/ramp Close S descending ramp due to decreased control of speed    Stairs      Amb outside
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
## 2021-07-27 ENCOUNTER — HOSPITAL ENCOUNTER (OUTPATIENT)
Dept: PHYSICAL THERAPY | Facility: REHABILITATION | Age: 56
Setting detail: THERAPIES SERIES
Discharge: HOME | End: 2021-07-27
Attending: OTOLARYNGOLOGY
Payer: MEDICARE

## 2021-07-27 DIAGNOSIS — R26.89 IMBALANCE: Primary | ICD-10-CM

## 2021-07-27 DIAGNOSIS — H81.4 VERTIGO OF CENTRAL ORIGIN: ICD-10-CM

## 2021-07-27 PROCEDURE — 97530 THERAPEUTIC ACTIVITIES: CPT | Mod: GP,KX

## 2021-07-27 PROCEDURE — 97112 NEUROMUSCULAR REEDUCATION: CPT | Mod: GP,KX

## 2021-07-27 PROCEDURE — 97116 GAIT TRAINING THERAPY: CPT | Mod: GP,KX

## 2021-07-27 ASSESSMENT — BALANCE ASSESSMENTS: TOTAL SCORE: 29
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
## 2021-07-27 NOTE — OP PT TREATMENT LOG
"PT FLOWSHEET       Exercises Current Session Time    THER ACT TOTAL TIME FOR SESSION 15 minutes   yes Review meds, pain, falls, vital signs completed   yes       Pt education The patient and her caregiver were educated on safe use of RW however the patient demonstrates limited carry over of instruction. PT discussed concern that her R shoe may be too big which could impact her gait.    L MAFO assessment/skin check  no skin integrity issues w/ skin check post session      Transfer training Escorted patient to restroom and helped her safely in the stall and escorted her back to gym      THER EX TOTAL TIME FOR SESSION Not performed    CARDIOVASCULAR      Nu Step w B UE/LE     Amb w RW     NEURO RE-ED TOTAL TIME FOR SESSION 30 minutes    STATIC BALANCE TRAINING      Floor - FT/EO x 60 sec w/ no significant sway CS  - Mod SR/EO (small space between feet) x 50 sec R forward 50 sec L forward   CS  - FA/EC x 60 sec w/ min sway CS    Airex - FA/EO x 60 sec w/   - FT/EO x 60 sec w/ mod sway CG/min assist  - FA/EC x 60 sec w/ mod sway and LOB to L   n Rocker A/P EO 10 sec x 3 w/ post LOB    Discs      DYNAMIC BALANCE 6\" hurdles, lateral step overs w/ B UE support    Hurdles w/ UE support       Toe taps w/ UE support 4\" toe taps w/ UE support  - Alt forward x 10 reps B/L CG today  - Lateral 10 reps B/L CG today   decreased balance today.   -Added same as above without support of //bars with min A for safety.   -trialed alternating step ups RFF and LFF and lateral up and overs with // bar upper extremity support    Reaching activities on firm and airex (L arm only - R shoulder pain)    n Bending to  objects Bending to  cone and hand to PT x 10 reps w/ no instability - use L hand due to R shoulder pain              OUTCOME MEASURES    y TUG See flowsheet   y DAO See flowsheet   y 5x STS See flowsheet    GAIT TRAINING TOTAL TIME FOR SESSION 15 minutes   y RW GT with  ft x 2, cues to keep walker closer, keep " "trunk extension, strike w/ heel, and  get equal step length; CS w/ occasional steadying touch assist; verbal cues and CS for correct technique when turning   y Curb/ramp Close S descending ramp due to decreased control of speed; steadying touch assist and verbal cues on 6\" curb w/ RW    Stairs      Amb outside         "

## 2021-07-29 ENCOUNTER — APPOINTMENT (OUTPATIENT)
Dept: OTHER | Facility: REHABILITATION | Age: 56
Setting detail: THERAPIES SERIES
End: 2021-07-29
Payer: MEDICARE

## 2021-07-29 ENCOUNTER — HOSPITAL ENCOUNTER (OUTPATIENT)
Dept: PHYSICAL THERAPY | Facility: REHABILITATION | Age: 56
Setting detail: THERAPIES SERIES
Discharge: HOME | End: 2021-07-29
Attending: OTOLARYNGOLOGY
Payer: MEDICARE

## 2021-07-29 ENCOUNTER — HOSPITAL ENCOUNTER (OUTPATIENT)
Dept: PSYCHOLOGY | Facility: CLINIC | Age: 56
Discharge: HOME | End: 2021-07-29
Payer: MEDICARE

## 2021-07-29 DIAGNOSIS — R26.89 IMBALANCE: Primary | ICD-10-CM

## 2021-07-29 DIAGNOSIS — F31.9 BIPOLAR DEPRESSION (CMS/HCC): ICD-10-CM

## 2021-07-29 PROCEDURE — 90834 PSYTX W PT 45 MINUTES: CPT | Performed by: CLINICAL NEUROPSYCHOLOGIST

## 2021-07-29 PROCEDURE — 97116 GAIT TRAINING THERAPY: CPT | Mod: GP,CQ,KX

## 2021-07-29 PROCEDURE — 97530 THERAPEUTIC ACTIVITIES: CPT | Mod: GP,CQ,KX

## 2021-07-29 PROCEDURE — 97112 NEUROMUSCULAR REEDUCATION: CPT | Mod: GP,CQ,KX

## 2021-07-29 ASSESSMENT — COGNITIVE AND FUNCTIONAL STATUS - GENERAL
MOOD: DEPRESSED
RECENT MEMORY: WNL
INSIGHT: INTACT
CONCENTRATION: IMPAIRED, MILD
APPEARANCE: WELL GROOMED
AFFECT: FULL RANGE
ATTENTION: WNL
SLEEP_WAKE_CYCLE: DECREASED
EYE_CONTACT: WNL
THOUGHT_CONTENT: APPROPRIATE
AROUSAL LEVEL: ATTENTIVE
REMOTE MEMORY: MILD LOSS
ORIENTATION: FULLY ORIENTED
PSYCHOMOTOR FUNCTIONING: WNL
SPEECH: REGULAR
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
## 2021-07-29 NOTE — OP PT TREATMENT LOG
"PT FLOWSHEET       Exercises Current Session Time    THER ACT TOTAL TIME FOR SESSION 15 minutes   y Review meds, pain, falls, vital signs Completed  Rest breaks   n       Pt education The patient and her caregiver were educated on safe use of RW however the patient demonstrates limited carry over of instruction. PT discussed concern that her R shoe may be too big which could impact her gait.    L MAFO assessment/skin check  no skin integrity issues w/ skin check post session   y   Transfer training Escorted patient to restroom and helped her safely in the stall and escorted her back to gym      THER EX TOTAL TIME FOR SESSION Not performed    CARDIOVASCULAR      Nu Step w B UE/LE     Amb w RW     NEURO RE-ED TOTAL TIME FOR SESSION 25 minutes    STATIC BALANCE TRAINING     y  y  y Floor - FT/EO x 60 sec w/ no significant sway CS  - Mod SR/EO (small space between feet) x 60 sec CS Increased pain withR forward   - FA/EC x 60 sec w/ min sway CS Patient felt a little dizzy   y  y  y Airex - FA/EO x 60 sec w/   - FT/EO x 60 sec w/ mod sway CG/min assist  - FA/EC x 60 sec w/ mod sway and LOB to L   n Rocker A/P EO 10 sec x 3 w/ post LOB    Discs      DYNAMIC BALANCE 6\" hurdles, lateral step overs w/ B UE support    Hurdles w/ UE support       Toe taps w/ UE support 4\" toe taps w/ UE support  - Alt forward x 10 reps B/L CG today  - Lateral 10 reps B/L CG today   decreased balance today.   -Added same as above without support of //bars with min A for safety.   -trialed alternating step ups RFF and LFF and lateral up and overs with // bar upper extremity support    Reaching activities on firm and airex (L arm only - R shoulder pain)    n Bending to  objects Bending to  cone and hand to PT x 10 reps w/ no instability - use L hand due to R shoulder pain              OUTCOME MEASURES    n TUG See flowsheet   n DAO See flowsheet   n 5x STS See flowsheet    GAIT TRAINING TOTAL TIME FOR SESSION 15 minutes   y RW GT " "with  ft x 2, cues to keep walker closer, keep trunk extension, strike w/ heel, and  get equal step length; CS w/ occasional steadying touch assist; verbal cues and CS for correct technique when turning   n Curb/ramp Close S descending ramp due to decreased control of speed; steadying touch assist and verbal cues on 6\" curb w/ RW    Stairs      Amb outside         "

## 2021-08-06 ENCOUNTER — HOSPITAL ENCOUNTER (OUTPATIENT)
Dept: PHYSICAL THERAPY | Facility: REHABILITATION | Age: 56
Setting detail: THERAPIES SERIES
Discharge: HOME | End: 2021-08-06
Attending: OTOLARYNGOLOGY
Payer: MEDICARE

## 2021-08-06 ENCOUNTER — APPOINTMENT (OUTPATIENT)
Dept: OTHER | Facility: REHABILITATION | Age: 56
Setting detail: THERAPIES SERIES
End: 2021-08-06
Payer: MEDICARE

## 2021-08-06 DIAGNOSIS — R26.89 IMBALANCE: Primary | ICD-10-CM

## 2021-08-06 DIAGNOSIS — H81.4 VERTIGO OF CENTRAL ORIGIN: ICD-10-CM

## 2021-08-06 PROCEDURE — 97112 NEUROMUSCULAR REEDUCATION: CPT | Mod: GP,KX

## 2021-08-06 PROCEDURE — 97530 THERAPEUTIC ACTIVITIES: CPT | Mod: GP,KX

## 2021-08-06 PROCEDURE — 97116 GAIT TRAINING THERAPY: CPT | Mod: GP,KX

## 2021-08-06 NOTE — OP PT TREATMENT LOG
"PT FLOWSHEET       Exercises Current Session Time    THER ACT TOTAL TIME FOR SESSION 15 minutes   y Review meds, pain, falls, vital signs Completed  Rest breaks   n       Pt education The patient was educated on safe use of RW however the patient demonstrates limited carry over of instruction. PT discussed concern that her R shoe may be too big which could impact her gait and recommended she have her foot measured for correct fit.   y L MAFO assessment/skin check  no skin integrity issues w/ skin check post session   y   Transfer training Escorted patient to restroom and helped her safely in the stall and escorted her back to gym      THER EX TOTAL TIME FOR SESSION Not performed    CARDIOVASCULAR      Nu Step w B UE/LE     Amb w RW     NEURO RE-ED TOTAL TIME FOR SESSION 25 minutes    STATIC BALANCE TRAINING     y  y  y  y Floor - FT/EO x 60 sec w/ no significant sway CS  - Mod SR/EO (small space between feet) x 60 sec CS   - FA/EC x 60 sec w/ min sway CS  - FA/EO HT/HN 10 reps ea w/ min sway    y  y  y Airex - FA/EO x 60 sec w/ min sway close S  - FT/EO x 60 sec w/ min sway close S  - FA/EC x 60 sec w/ mod sway close S   n Rocker A/P EO 10 sec x 3 w/ post LOB    Discs      DYNAMIC BALANCE 6\" hurdles, lateral step overs w/ B UE support    Hurdles w/ UE support       Toe taps w/ UE support 4\" toe taps w/ UE support  - Alt forward x 10 reps B/L CG today  - Lateral 10 reps B/L CG today   decreased balance today.   -Added same as above without support of //bars with min A for safety.   -trialed alternating step ups RFF and LFF and lateral up and overs with // bar upper extremity support    Reaching activities on firm and airex (L arm only - R shoulder pain)    n Bending to  objects Bending to  cone and hand to PT x 10 reps w/ no instability - use L hand due to R shoulder pain   y Sit to stand  19\" chair w/ UE use 2 sets of 5 reps w/ close S         OUTCOME MEASURES    n TUG See flowsheet   n DAO See " "flowsheet   n 5x STS See flowsheet    GAIT TRAINING TOTAL TIME FOR SESSION 16 minutes   y RW GT with  ft x 2, cues to keep walker closer, keep trunk extension, strike w/ heel, and  get equal step length; CS w/ occasional steadying touch assist; verbal cues and CS for correct technique when turning   y Curb/ramp Close S descending ramp due to decreased control of speed; steadying touch assist and verbal cues on 6\" curb w/ RW    Stairs      Amb outside         "
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
## 2021-08-09 ENCOUNTER — APPOINTMENT (OUTPATIENT)
Dept: OTHER | Facility: REHABILITATION | Age: 56
Setting detail: THERAPIES SERIES
End: 2021-08-09
Payer: MEDICARE

## 2021-08-09 ENCOUNTER — HOSPITAL ENCOUNTER (OUTPATIENT)
Dept: PHYSICAL THERAPY | Facility: REHABILITATION | Age: 56
Setting detail: THERAPIES SERIES
Discharge: HOME | End: 2021-08-09
Attending: OTOLARYNGOLOGY
Payer: MEDICARE

## 2021-08-09 DIAGNOSIS — R26.89 IMBALANCE: Primary | ICD-10-CM

## 2021-08-09 DIAGNOSIS — H81.4 VERTIGO OF CENTRAL ORIGIN: ICD-10-CM

## 2021-08-09 PROCEDURE — 97112 NEUROMUSCULAR REEDUCATION: CPT | Mod: GP,KX

## 2021-08-09 PROCEDURE — 97530 THERAPEUTIC ACTIVITIES: CPT | Mod: GP,KX

## 2021-08-09 PROCEDURE — 97116 GAIT TRAINING THERAPY: CPT | Mod: GP,KX

## 2021-08-09 NOTE — OP PT TREATMENT LOG
"PT FLOWSHEET       Exercises Current Session Time    THER ACT TOTAL TIME FOR SESSION 20 minutes   y Review meds, pain, falls, vital signs Completed  Rest breaks   n       Pt education The patient was educated on safe use of RW however the patient demonstrates limited carry over of instruction. PT discussed ongoing concern that her R shoe may be too big which could impact her gait. PT discussed concerns with  regarding shoes and patient report that she is using the SPC at home. The  will discuss these concerns with the patient's sister. PT contacted orthotist and scheduled a follow-up in therapy to check posterior stop and lift in R shoe.   n L MAFO assessment/skin check  no skin integrity issues w/ skin check post session   n   Transfer training Escorted patient to restroom and helped her safely in the stall and escorted her back to gym      THER EX TOTAL TIME FOR SESSION Not performed    CARDIOVASCULAR      Nu Step w B UE/LE     Amb w RW     NEURO RE-ED TOTAL TIME FOR SESSION 15 minutes    STATIC BALANCE TRAINING     n Floor - FT/EO x 60 sec w/ no significant sway CS  - Mod SR/EO (small space between feet) x 60 sec CS   - FA/EC x 60 sec w/ min sway CS  - FA/EO HT/HN 10 reps ea w/ min sway    n Airex - FA/EO x 60 sec w/ min sway close S  - FT/EO x 60 sec w/ min sway close S  - FA/EC x 60 sec w/ mod sway close S   n Rocker A/P EO 10 sec x 3 w/ post LOB    Discs      DYNAMIC BALANCE 6\" hurdles, lateral step overs w/ B UE support    Hurdles w/ UE support    n Toe taps w/ UE support 4\" toe taps w/ UE support  - Alt forward x 10 reps B/L CG today  - Lateral 10 reps B/L CG today   decreased balance today.   -Added same as above without support of //bars with min A for safety.   -trialed alternating step ups RFF and LFF and lateral up and overs with // bar upper extremity support    Reaching activities on firm and airex (L arm only - R shoulder pain)    n Bending to  objects Bending to pick " "up cone and hand to PT x 10 reps w/ no instability - use L hand due to R shoulder pain   n Sit to stand  19\" chair w/ UE use 2 sets of 5 reps w/ close S         OUTCOME MEASURES    y TUG See flowsheet   n DAO See flowsheet   y 5x STS See flowsheet    GAIT TRAINING TOTAL TIME FOR SESSION 20 minutes   y RW GT with  ft x 2, cues to keep walker closer, keep trunk extension, strike w/ heel, and  get equal step length; Close S; verbal cues and CS for correct technique when turning   y  n Ramp  Curb  Close S descending/ascending ramp due to decreased control of speed   steadying touch assist and verbal cues on 6\" curb w/ RW    Stairs     y SPC Steadying touch assist w/ SPC w/ min A x 2 for (+) LOB, 50 ft, 75 ft - decreased clearance of L foot, decreased trunk control, decreased balance w/ turning compared to use of RW    Amb outside         "

## 2021-08-11 ENCOUNTER — APPOINTMENT (OUTPATIENT)
Dept: OTHER | Facility: REHABILITATION | Age: 56
Setting detail: THERAPIES SERIES
End: 2021-08-11
Payer: MEDICARE

## 2021-08-11 ENCOUNTER — HOSPITAL ENCOUNTER (OUTPATIENT)
Dept: PHYSICAL THERAPY | Facility: REHABILITATION | Age: 56
Setting detail: THERAPIES SERIES
Discharge: HOME | End: 2021-08-11
Attending: OTOLARYNGOLOGY
Payer: MEDICARE

## 2021-08-11 ENCOUNTER — HOSPITAL ENCOUNTER (OUTPATIENT)
Dept: PSYCHOLOGY | Facility: CLINIC | Age: 56
Discharge: HOME | End: 2021-08-11
Payer: MEDICARE

## 2021-08-11 DIAGNOSIS — F31.9 BIPOLAR DEPRESSION (CMS/HCC): ICD-10-CM

## 2021-08-11 DIAGNOSIS — H81.4 VERTIGO OF CENTRAL ORIGIN: ICD-10-CM

## 2021-08-11 DIAGNOSIS — R26.89 IMBALANCE: Primary | ICD-10-CM

## 2021-08-11 PROCEDURE — 97112 NEUROMUSCULAR REEDUCATION: CPT | Mod: GP,KX

## 2021-08-11 PROCEDURE — 90834 PSYTX W PT 45 MINUTES: CPT | Performed by: CLINICAL NEUROPSYCHOLOGIST

## 2021-08-11 PROCEDURE — 97530 THERAPEUTIC ACTIVITIES: CPT | Mod: GP,KX

## 2021-08-11 PROCEDURE — 97116 GAIT TRAINING THERAPY: CPT | Mod: GP,KX

## 2021-08-11 ASSESSMENT — COGNITIVE AND FUNCTIONAL STATUS - GENERAL
APPEARANCE: WELL GROOMED
ORIENTATION: FULLY ORIENTED
REMOTE MEMORY: MILD LOSS
AFFECT: FULL RANGE
PSYCHOMOTOR FUNCTIONING: WNL
ATTENTION: WNL
RECENT MEMORY: WNL
EYE_CONTACT: WNL
AROUSAL LEVEL: ATTENTIVE
CONCENTRATION: IMPAIRED, MILD
SLEEP_WAKE_CYCLE: NO CHANGE
MOOD: DEPRESSED
THOUGHT_CONTENT: APPROPRIATE
INSIGHT: INTACT
SPEECH: REGULAR

## 2021-08-11 NOTE — PATIENT INSTRUCTIONS
Patient Education   General Safety Tip Card        1.For safety, all exercises must be performed close to a support (wall, countertop, person, etc.) or in a corner with back of chair in front.  2.Only perform those exercises as instructed by the therapist. If instructions are not clearly understood, wait for clarification by therapist before attempting to perform.    Copyright © I. All rights reserved.        Patient Education   Feet Together, Varied Arm Positions - Eyes Open        With eyes open, feet together, arms at sides, look straight ahead at a stationary object. Hold 30 seconds.  Repeat 2 times per session. Do 1 sessions per day.    Copyright © [employer]. All rights reserved.        Patient Education   Feet Partial Heel-Toe, Varied Arm Positions - Eyes Open        With eyes open, right foot partially in front of the other, arms at sides, look straight ahead at a stationary object. Hold 30 seconds. Repeat with other foot in front.  Repeat 2 times per session. Do 1 sessions per day.    Copyright © I. All rights reserved.        Patient Education   Feet Apart, Varied Arm Positions - Eyes Closed        Stand with feet shoulder width apart and arms at sides. Close eyes and visualize upright position. Hold 30 seconds.  Repeat 2 times per session. Do 1 sessions per day.    Copyright © I. All rights reserved.        Patient Education   Feet Together, Head Motion - Eyes Open        With eyes open, feet together, move head slowly: up and down. Repeat moving head side to side.  Repeat 10 times per session. Do 1 sessions per day.    Copyright © I. All rights reserved.
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
## 2021-08-11 NOTE — OP PT TREATMENT LOG
"PT FLOWSHEET       Exercises Current Session Time    THER ACT TOTAL TIME FOR SESSION 20 minutes   y Review meds, pain, falls, vital signs Completed - PT cut tennis ball for patient to replace old one on her walker     y       Pt education The patient and caregiver were educated on safe use of RW however the patient demonstrates limited carry over of instruction. PT discussed ongoing concern that her R shoe may be too big which could impact her gait. The caregiver is aware of the recommendation that the patient use the RW or rollator walker at all times. The patient was provided with a balance HEP which was reviewed with the caregiver, including a safe location to perform the HEP. She verbalized good understanding.   n L MAFO assessment/skin check  no skin integrity issues w/ skin check post session   y   Transfer training Escorted patient to restroom and helped her safely in the stall and escorted her back to gym      THER EX TOTAL TIME FOR SESSION Not performed    CARDIOVASCULAR      Nu Step w B UE/LE     Amb w RW     NEURO RE-ED TOTAL TIME FOR SESSION 15 minutes    STATIC BALANCE TRAINING     y - all Floor - FT/EO x 60 sec w/ no significant sway CS  - Mod SR/EO (small space between feet) x 60 sec CS   - FA/EC x 60 sec w/ min sway CS  - FA/EO HT/HN 10 reps ea w/ min sway    y  n  n Airex - FA/EO x 60 sec w/ min sway close S  - FT/EO x 60 sec w/ min sway close S  - FA/EC x 60 sec w/ mod sway close S   n Rocker A/P EO 10 sec x 3 w/ post LOB    Discs      DYNAMIC BALANCE 6\" hurdles, lateral step overs w/ B UE support    Hurdles w/ UE support    n Toe taps w/ UE support 4\" toe taps w/ UE support  - Alt forward x 10 reps B/L CG today  - Lateral 10 reps B/L CG today   decreased balance today.   -Added same as above without support of //bars with min A for safety.   -trialed alternating step ups RFF and LFF and lateral up and overs with // bar upper extremity support    Reaching activities on firm and airex (L arm only - " "R shoulder pain)    n Bending to  objects Bending to  cone and hand to PT x 10 reps w/ no instability - use L hand due to R shoulder pain   n Sit to stand  19\" chair w/ UE use 2 sets of 5 reps w/ close S         OUTCOME MEASURES    n TUG See flowsheet   n DAO See flowsheet   n 5x STS See flowsheet    GAIT TRAINING TOTAL TIME FOR SESSION 20 minutes   y RW GT with  ft, cues to keep walker closer, keep trunk extension, strike w/ heel, and  get equal step length; Close S; verbal cues and CS for correct technique when turning   y    n Ramp    Curb  Close S descending/ascending ramp w/ rollator walker - pt controlled speed w/ use of brakes w/o verbal cues   steadying touch assist and verbal cues on 6\" curb w/ RW    Stairs     y SPC Min A/steadying touch assist w/ SPC 50 ft - decreased clearance of L foot, decreased trunk control, decreased balance w/ turning compared to use of RW   y Rollator walker GT with  ft x 2, cues to keep rollator closer, keep trunk extension, strike w/ heel, and  get equal step length; Close S; verbal cues and CS for correct technique when turning       "
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
## 2021-09-03 ENCOUNTER — DOCUMENTATION (OUTPATIENT)
Dept: SOCIAL WORK | Facility: REHABILITATION | Age: 56
End: 2021-09-03

## 2021-09-03 NOTE — PROGRESS NOTES
Patient and Agatha made aware that new sneakers are preferred when patient is seen next for her brace. Reviewed going to a running store and being properly fitted would be best.  CM reviewed recommendation of not using the cane since patient is a high falls risk.Patient states she will still use the cane since she gets upset with the walker. CM informed Agatha of recommendation and to discuss removing cane from the home with patient mother/sister/brother. CM to remain available.

## 2021-09-09 ENCOUNTER — HOSPITAL ENCOUNTER (OUTPATIENT)
Dept: PSYCHOLOGY | Facility: CLINIC | Age: 56
Discharge: HOME | End: 2021-09-09
Payer: MEDICARE

## 2021-09-09 ENCOUNTER — HOSPITAL ENCOUNTER (OUTPATIENT)
Dept: PHYSICAL THERAPY | Facility: REHABILITATION | Age: 56
Setting detail: THERAPIES SERIES
Discharge: HOME | End: 2021-09-09
Attending: OTOLARYNGOLOGY
Payer: MEDICARE

## 2021-09-09 ENCOUNTER — APPOINTMENT (OUTPATIENT)
Dept: OTHER | Facility: REHABILITATION | Age: 56
Setting detail: THERAPIES SERIES
End: 2021-09-09
Payer: MEDICARE

## 2021-09-09 DIAGNOSIS — R26.89 IMBALANCE: Primary | ICD-10-CM

## 2021-09-09 DIAGNOSIS — F31.9 BIPOLAR DEPRESSION (CMS/HCC): ICD-10-CM

## 2021-09-09 DIAGNOSIS — H81.4 VERTIGO OF CENTRAL ORIGIN: ICD-10-CM

## 2021-09-09 PROCEDURE — 97116 GAIT TRAINING THERAPY: CPT | Mod: GP,KX

## 2021-09-09 PROCEDURE — 90837 PSYTX W PT 60 MINUTES: CPT | Performed by: CLINICAL NEUROPSYCHOLOGIST

## 2021-09-09 PROCEDURE — 97530 THERAPEUTIC ACTIVITIES: CPT | Mod: GP,KX

## 2021-09-09 PROCEDURE — 97112 NEUROMUSCULAR REEDUCATION: CPT | Mod: GP,KX

## 2021-09-09 ASSESSMENT — COGNITIVE AND FUNCTIONAL STATUS - GENERAL
AFFECT: FULL RANGE
THOUGHT_CONTENT: APPROPRIATE
CONCENTRATION: IMPAIRED, MILD
MOOD: DEPRESSED
PSYCHOMOTOR FUNCTIONING: WNL
EYE_CONTACT: WNL
SLEEP_WAKE_CYCLE: NO CHANGE
ORIENTATION: FULLY ORIENTED
RECENT MEMORY: WNL
ATTENTION: WNL
APPEARANCE: WELL GROOMED
REMOTE MEMORY: MILD LOSS
SPEECH: REGULAR
AROUSAL LEVEL: ATTENTIVE
INSIGHT: IMPAIRED, MINIMALLY
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
## 2021-09-09 NOTE — OP PT TREATMENT LOG
"PT FLOWSHEET       Exercises Current Session Time    THER ACT TOTAL TIME FOR SESSION 30 minutes   y Review meds, pain, falls, vital signs Completed     y       Pt education The patient and mother were educated on safe use of RW however the patient demonstrates limited carry over of instruction.The patient and her mother are aware of the recommendation that the patient use the RW or rollator walker at all times. PT recommended to mother that the patient be supervised with ambulation. The patient was also advised against ambulating without the brace. PT explained the recommendation for a slider on the L shoe and gave mother contact information to schedule appointment with the orthotist. She verbalized good understanding.   y L MAFO assessment/skin check  Ted Motley orthotist was present at session to perform brace checkout with PT. The brace was set in 2-3 degrees of DF to facilitate improved heel strike. The patient was assessed with trial slider on front of L shoe. The patient demonstrates consistent step through with L foot without catching toe with temporary slider. Without the slider, the patient inconsistently catches toe while advancing L foot. The patient purchased new sneakers with more appropriate fit.   n   Transfer training Escorted patient to restroom and helped her safely in the stall and escorted her back to gym      THER EX TOTAL TIME FOR SESSION Not performed    CARDIOVASCULAR      Nu Step w B UE/LE     Amb w RW     NEURO RE-ED TOTAL TIME FOR SESSION 8 minutes    STATIC BALANCE TRAINING      Floor - FT/EO x 60 sec w/ no significant sway CS  - Mod SR/EO (small space between feet) x 60 sec CS   - FA/EC x 60 sec w/ min sway CS  - FA/EO HT/HN 10 reps ea w/ min sway      n  n Airex - FA/EO x 60 sec w/ min sway close S  - FT/EO x 60 sec w/ min sway close S  - FA/EC x 60 sec w/ mod sway close S   n Rocker A/P EO 10 sec x 3 w/ post LOB    Discs      DYNAMIC BALANCE 6\" hurdles, lateral step overs w/ B UE " "support    Hurdles w/ UE support      y    n    n  n Toe taps w/ UE support 6\" toe taps w/ UE support  - Alt forward x 10 reps B/L CG w/ good clearance of foot, verbal cues to maintain hip and trunk ext on stance side, with verbal cues patient able to increase ext  - Lateral 10 reps B/L CG today   decreased balance today.   -Added same as above without support of //bars with min A for safety.   -trialed alternating step ups RFF and LFF and lateral up and overs with // bar upper extremity support    Reaching activities on firm and airex (L arm only - R shoulder pain)    n Bending to  objects Bending to  cone and hand to PT x 10 reps w/ no instability - use L hand due to R shoulder pain   n Sit to stand  19\" chair w/ UE use 2 sets of 5 reps w/ close S   y Step ups w/ UE support 6\" fwd step ups w/ CG A - good clearance of B/L foot, good L hip and knee ext w/ step up    OUTCOME MEASURES    n TUG See flowsheet   n DAO See flowsheet   n 5x STS See flowsheet    GAIT TRAINING TOTAL TIME FOR SESSION 17 minutes   y RW GT with  ft, cues to keep walker closer, keep trunk extension, strike w/ heel, and  get equal step length; Close S; verbal cues and CS for correct technique when turning; patient demonstrates poor carryover of instruction   y    n Ramp    Curb  Close S descending ramp w/ RW - verbal cues to control speed     steadying touch assist and verbal cues on 6\" curb w/ RW    Stairs     n SPC Min A/steadying touch assist w/ SPC 50 ft - decreased clearance of L foot, decreased trunk control, decreased balance w/ turning compared to use of RW   n Rollator walker GT with  ft x 2, cues to keep rollator closer, keep trunk extension, strike w/ heel, and  get equal step length; Close S; verbal cues and CS for correct technique when turning       "
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
## 2021-09-13 ENCOUNTER — TRANSCRIBE ORDERS (OUTPATIENT)
Dept: SCHEDULING | Age: 56
End: 2021-09-13
Payer: MEDICARE

## 2021-09-13 DIAGNOSIS — Z96.611 PRESENCE OF RIGHT ARTIFICIAL SHOULDER JOINT: Primary | ICD-10-CM

## 2021-09-22 ENCOUNTER — HOSPITAL ENCOUNTER (OUTPATIENT)
Dept: RADIOLOGY | Facility: HOSPITAL | Age: 56
Discharge: HOME | End: 2021-09-22
Attending: SPECIALIST
Payer: MEDICARE

## 2021-09-22 DIAGNOSIS — Z96.611 PRESENCE OF RIGHT ARTIFICIAL SHOULDER JOINT: ICD-10-CM

## 2021-09-22 PROCEDURE — 73200 CT UPPER EXTREMITY W/O DYE: CPT | Mod: RT

## 2021-09-28 ENCOUNTER — TELEPHONE (OUTPATIENT)
Dept: ADMISSIONS | Facility: REHABILITATION | Age: 56
End: 2021-09-28

## 2021-09-29 ENCOUNTER — HOSPITAL ENCOUNTER (OUTPATIENT)
Dept: PSYCHOLOGY | Facility: CLINIC | Age: 56
Discharge: HOME | End: 2021-09-29
Payer: MEDICARE

## 2021-09-29 DIAGNOSIS — F31.9 BIPOLAR DEPRESSION (CMS/HCC): ICD-10-CM

## 2021-09-29 PROCEDURE — 90837 PSYTX W PT 60 MINUTES: CPT | Performed by: CLINICAL NEUROPSYCHOLOGIST

## 2021-09-29 ASSESSMENT — COGNITIVE AND FUNCTIONAL STATUS - GENERAL
APPEARANCE: WELL GROOMED
SPEECH: REGULAR
IMPULSE CONTROL: INTACT
ORIENTATION: FULLY ORIENTED
MOOD: DEPRESSED
SLEEP_WAKE_CYCLE: NO CHANGE
CONCENTRATION: IMPAIRED, MILD
INSIGHT: IMPAIRED, MINIMALLY
REMOTE MEMORY: WNL
LIBIDO: NON-CONTRIBUTORY
ATTENTION: WNL
AROUSAL LEVEL: ATTENTIVE
AFFECT: FULL RANGE
THOUGHT_CONTENT: APPROPRIATE
PSYCHOMOTOR FUNCTIONING: WNL
EYE_CONTACT: WNL
RECENT MEMORY: MILD LOSS
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
## 2021-10-18 ENCOUNTER — APPOINTMENT (OUTPATIENT)
Dept: LAB | Facility: HOSPITAL | Age: 56
End: 2021-10-18
Attending: INTERNAL MEDICINE
Payer: MEDICARE

## 2021-10-18 ENCOUNTER — TRANSCRIBE ORDERS (OUTPATIENT)
Dept: REGISTRATION | Facility: HOSPITAL | Age: 56
End: 2021-10-18
Payer: MEDICARE

## 2021-10-18 DIAGNOSIS — R53.83 OTHER FATIGUE: ICD-10-CM

## 2021-10-18 DIAGNOSIS — Z00.00 ENCOUNTER FOR GENERAL ADULT MEDICAL EXAMINATION WITHOUT ABNORMAL FINDINGS: ICD-10-CM

## 2021-10-18 DIAGNOSIS — E78.5 HYPERLIPIDEMIA, UNSPECIFIED: ICD-10-CM

## 2021-10-18 DIAGNOSIS — R73.09 OTHER ABNORMAL GLUCOSE: ICD-10-CM

## 2021-10-18 DIAGNOSIS — E78.5 HYPERLIPIDEMIA, UNSPECIFIED: Primary | ICD-10-CM

## 2021-10-18 LAB
ALBUMIN SERPL-MCNC: 4.1 G/DL (ref 3.4–5)
ALBUMIN/CREAT UR: 3.3 UG/MG
ALP SERPL-CCNC: 40 IU/L (ref 35–126)
ALT SERPL-CCNC: 17 IU/L (ref 11–54)
ANION GAP SERPL CALC-SCNC: 9 MEQ/L (ref 3–15)
AST SERPL-CCNC: 22 IU/L (ref 15–41)
BACTERIA URNS QL MICRO: ABNORMAL /HPF
BASOPHILS # BLD: 0 K/UL (ref 0.01–0.1)
BASOPHILS NFR BLD: 0 %
BILIRUB SERPL-MCNC: 0.5 MG/DL (ref 0.3–1.2)
BILIRUB UR QL STRIP.AUTO: NEGATIVE MG/DL
BUN SERPL-MCNC: 7 MG/DL (ref 8–20)
CALCIUM SERPL-MCNC: 9.3 MG/DL (ref 8.9–10.3)
CHLORIDE SERPL-SCNC: 102 MEQ/L (ref 98–109)
CHOLEST SERPL-MCNC: 173 MG/DL
CLARITY UR REFRACT.AUTO: ABNORMAL
CO2 SERPL-SCNC: 24 MEQ/L (ref 22–32)
COLOR UR AUTO: YELLOW
CREAT SERPL-MCNC: 0.7 MG/DL (ref 0.6–1.1)
CREAT UR-MCNC: 90.3 MG/DL
DIFFERENTIAL METHOD BLD: ABNORMAL
EOSINOPHIL # BLD: 0.11 K/UL (ref 0.04–0.36)
EOSINOPHIL NFR BLD: 2.8 %
ERYTHROCYTE [DISTWIDTH] IN BLOOD BY AUTOMATED COUNT: 12.9 % (ref 11.7–14.4)
EST. AVERAGE GLUCOSE BLD GHB EST-MCNC: 111 MG/DL
GFR SERPL CREATININE-BSD FRML MDRD: >60 ML/MIN/1.73M*2
GLUCOSE SERPL-MCNC: 87 MG/DL (ref 70–99)
GLUCOSE UR STRIP.AUTO-MCNC: NEGATIVE MG/DL
HBA1C MFR BLD HPLC: 5.5 %
HCT VFR BLDCO AUTO: 35.6 % (ref 35–45)
HDLC SERPL-MCNC: 72 MG/DL
HDLC SERPL: 2.4 {RATIO}
HGB BLD-MCNC: 12 G/DL (ref 11.8–15.7)
HGB UR QL STRIP.AUTO: NEGATIVE
HYALINE CASTS #/AREA URNS LPF: ABNORMAL /LPF
IMM GRANULOCYTES # BLD AUTO: 0.01 K/UL (ref 0–0.08)
IMM GRANULOCYTES NFR BLD AUTO: 0.3 %
KETONES UR STRIP.AUTO-MCNC: NEGATIVE MG/DL
LDLC SERPL CALC-MCNC: 91 MG/DL
LEUKOCYTE ESTERASE UR QL STRIP.AUTO: 1
LYMPHOCYTES # BLD: 0.97 K/UL (ref 1.2–3.5)
LYMPHOCYTES NFR BLD: 24.8 %
MCH RBC QN AUTO: 29.6 PG (ref 28–33.2)
MCHC RBC AUTO-ENTMCNC: 33.7 G/DL (ref 32.2–35.5)
MCV RBC AUTO: 87.9 FL (ref 83–98)
MICROALBUMIN UR-MCNC: 3 MG/L
MONOCYTES # BLD: 0.3 K/UL (ref 0.28–0.8)
MONOCYTES NFR BLD: 7.7 %
NEUTROPHILS # BLD: 2.52 K/UL (ref 1.7–7)
NEUTS SEG NFR BLD: 64.4 %
NITRITE UR QL STRIP.AUTO: NEGATIVE
NONHDLC SERPL-MCNC: 101 MG/DL
NRBC BLD-RTO: 0 %
PDW BLD AUTO: 9.2 FL (ref 9.4–12.3)
PH UR STRIP.AUTO: 7 [PH]
PLAT MORPH BLD: NORMAL
PLATELET # BLD AUTO: 295 K/UL (ref 150–369)
PLATELET # BLD EST: ABNORMAL 10*3/UL
POTASSIUM SERPL-SCNC: 4.6 MEQ/L (ref 3.6–5.1)
PROT SERPL-MCNC: 6.3 G/DL (ref 6–8.2)
PROT UR QL STRIP.AUTO: NEGATIVE
RBC # BLD AUTO: 4.05 M/UL (ref 3.93–5.22)
RBC #/AREA URNS HPF: ABNORMAL /HPF
RBC MORPH BLD: NORMAL
SODIUM SERPL-SCNC: 135 MEQ/L (ref 136–144)
SP GR UR REFRACT.AUTO: 1.02
SQUAMOUS URNS QL MICRO: ABNORMAL /HPF
TRIGL SERPL-MCNC: 49 MG/DL (ref 30–149)
TSH SERPL DL<=0.05 MIU/L-ACNC: 1.57 MIU/L (ref 0.34–5.6)
UROBILINOGEN UR STRIP-ACNC: 0.2 EU/DL
WBC # BLD AUTO: 3.91 K/UL (ref 3.8–10.5)
WBC #/AREA URNS HPF: ABNORMAL /HPF

## 2021-10-18 PROCEDURE — 36415 COLL VENOUS BLD VENIPUNCTURE: CPT

## 2021-10-18 PROCEDURE — 81001 URINALYSIS AUTO W/SCOPE: CPT

## 2021-10-18 PROCEDURE — 82043 UR ALBUMIN QUANTITATIVE: CPT

## 2021-10-18 PROCEDURE — 84443 ASSAY THYROID STIM HORMONE: CPT

## 2021-10-18 PROCEDURE — 80053 COMPREHEN METABOLIC PANEL: CPT

## 2021-10-18 PROCEDURE — 82570 ASSAY OF URINE CREATININE: CPT

## 2021-10-18 PROCEDURE — 84478 ASSAY OF TRIGLYCERIDES: CPT

## 2021-10-18 PROCEDURE — 83036 HEMOGLOBIN GLYCOSYLATED A1C: CPT

## 2021-10-18 PROCEDURE — 85025 COMPLETE CBC W/AUTO DIFF WBC: CPT

## 2021-10-28 ENCOUNTER — HOSPITAL ENCOUNTER (OUTPATIENT)
Dept: PSYCHOLOGY | Facility: CLINIC | Age: 56
Discharge: HOME | End: 2021-10-28
Payer: MEDICARE

## 2021-10-28 DIAGNOSIS — F31.9 BIPOLAR DEPRESSION (CMS/HCC): ICD-10-CM

## 2021-10-28 PROCEDURE — 90837 PSYTX W PT 60 MINUTES: CPT | Performed by: CLINICAL NEUROPSYCHOLOGIST

## 2021-10-28 ASSESSMENT — COGNITIVE AND FUNCTIONAL STATUS - GENERAL
IMPULSE CONTROL: INTACT
AFFECT: FULL RANGE
ORIENTATION: FULLY ORIENTED
APPEARANCE: WELL GROOMED
INSIGHT: IMPAIRED, MODERATELY
RECENT MEMORY: MILD LOSS
REMOTE MEMORY: WNL
APPETITE: NO CHANGE
MOOD: DEPRESSED
THOUGHT_CONTENT: APPROPRIATE
AROUSAL LEVEL: ATTENTIVE
PSYCHOMOTOR FUNCTIONING: DECREASED
ATTENTION: WNL
SPEECH: REGULAR
LIBIDO: NON-CONTRIBUTORY
EYE_CONTACT: WNL
EST. PREMORBID INTELLIGENCE: AVERAGE
SLEEP_WAKE_CYCLE: NO CHANGE
CONCENTRATION: IMPAIRED, MILD
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
## 2021-11-18 ENCOUNTER — HOSPITAL ENCOUNTER (OUTPATIENT)
Dept: PSYCHOLOGY | Facility: CLINIC | Age: 56
Discharge: HOME | End: 2021-11-18
Payer: MEDICARE

## 2021-11-18 DIAGNOSIS — F31.9 BIPOLAR DEPRESSION (CMS/HCC): ICD-10-CM

## 2021-11-18 PROCEDURE — 90834 PSYTX W PT 45 MINUTES: CPT | Performed by: CLINICAL NEUROPSYCHOLOGIST

## 2021-11-18 ASSESSMENT — COGNITIVE AND FUNCTIONAL STATUS - GENERAL
LIBIDO: NON-CONTRIBUTORY
APPETITE: NO CHANGE
REMOTE MEMORY: WNL
MOOD: DEPRESSED
AROUSAL LEVEL: ATTENTIVE
CONCENTRATION: IMPAIRED, MILD
EYE_CONTACT: WNL
INSIGHT: IMPAIRED, MODERATELY
SLEEP_WAKE_CYCLE: NO CHANGE
APPEARANCE: WELL GROOMED
AFFECT: FULL RANGE
SPEECH: REGULAR
ATTENTION: WNL
PSYCHOMOTOR FUNCTIONING: DECREASED
IMPULSE CONTROL: INTACT
ORIENTATION: FULLY ORIENTED
THOUGHT_CONTENT: APPROPRIATE
EST. PREMORBID INTELLIGENCE: AVERAGE
RECENT MEMORY: MILD LOSS
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
## 2021-11-23 ENCOUNTER — TRANSCRIBE ORDERS (OUTPATIENT)
Dept: SCHEDULING | Facility: REHABILITATION | Age: 56
End: 2021-11-23
Payer: MEDICARE

## 2021-11-23 DIAGNOSIS — M75.121 COMPLETE ROTATOR CUFF TEAR OR RUPTURE OF RIGHT SHOULDER, NOT SPECIFIED AS TRAUMATIC: Primary | ICD-10-CM

## 2022-01-18 ENCOUNTER — HOSPITAL ENCOUNTER (OUTPATIENT)
Dept: PHYSICAL THERAPY | Facility: REHABILITATION | Age: 57
Setting detail: THERAPIES SERIES
Discharge: HOME | End: 2022-01-18
Attending: INTERNAL MEDICINE
Payer: MEDICARE

## 2022-01-18 DIAGNOSIS — M75.121 COMPLETE ROTATOR CUFF TEAR OR RUPTURE OF RIGHT SHOULDER, NOT SPECIFIED AS TRAUMATIC: ICD-10-CM

## 2022-01-18 DIAGNOSIS — M75.101 ROTATOR CUFF TEAR ARTHROPATHY OF RIGHT SHOULDER: Primary | ICD-10-CM

## 2022-01-18 DIAGNOSIS — M12.811 ROTATOR CUFF TEAR ARTHROPATHY OF RIGHT SHOULDER: Primary | ICD-10-CM

## 2022-01-18 PROCEDURE — 97162 PT EVAL MOD COMPLEX 30 MIN: CPT | Mod: GP

## 2022-01-18 PROCEDURE — 97530 THERAPEUTIC ACTIVITIES: CPT | Mod: GP

## 2022-01-18 RX ORDER — NAPROXEN 375 MG/1
375 TABLET ORAL 2 TIMES DAILY WITH MEALS
COMMUNITY

## 2022-01-18 ASSESSMENT — BALANCE ASSESSMENTS: TOTAL SCORE: 30
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
## 2022-01-18 NOTE — OP PT TREATMENT LOG
PT Neuro Exercises Current Session Time Completed   THER ACT   (CPT Code 31124) TOTAL TIME FOR SESSION 23-37 Minutes    Monitored Vitals, Pain monitored yes   Transfer training From mat, grossly 12x and from red chair grossly 8x, from WC 2x. clS-(I) Yes   Orthotic assessment     Patient Education POC, role of PT, using RW at all times at home. PT to email CM Gwen about wanting to restart psych services and needing transportation to PT Yes   THER EX  (CPT Code 06264) TOTAL TIME FOR SESSION Not performed Completed   STRETCHING     Stretching  pec stretch, DF and hamstring stretch    Supine There-Ex AROM/PROM BUE  Core strengthening    Seated Ther-Ex Scapular stabilization  Bicep eccentric control, B    Standing There-Ex Shoulder stabilization  Core strengthening    Nu Step (time/level/SPM) BLE    Stationary Bike     Treadmill     NEURO RE-ED  (CPT Code 51185) TOTAL TIME FOR SESSION Not performed Completed   Balance Training Static and dynamic, variable surface, EO, EC, head turns    Coordination Cone tapping    Pre Gait Activities Modified SLS  Split stance  Block tapping and step ups    Postural Re-Education Midline orientation    Outcome Assessments Mittal, 5x sit<>stand    GAIT TRAINING  (CPT Code 15541) TOTAL TIME FOR SESSION Not performed Completed   Ambulation With RW, with SPC? (used at home)    Walking Assessments 2 MWT, gait speed TBA    Stair negotiation TBA    Curb negotiation TBA    Ramp negotiation TBA (fatigued at end of session and returned to outpatient entrance via WC)    Outdoor ambulation     MANUAL  (CPT Code 88257) TOTAL TIME FOR SESSION Not performed Completed   Stretching by therapist/PROM     Mobilization      MODALITIES   TOTAL TIME FOR SESSION Not performed Completed   Ice     Heat     ATTENDED E-STIM  (CPT Code 19308) TOTAL TIME FOR SESSION Not performed Completed   Attended E-Stim TENs?
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
## 2022-01-19 NOTE — ADDENDUM NOTE
Encounter addended by: Roland White, PT on: 1/19/2022 12:15 PM   Actions taken: Charge Capture section accepted

## 2022-03-16 ENCOUNTER — TRANSCRIBE ORDERS (OUTPATIENT)
Dept: SCHEDULING | Facility: REHABILITATION | Age: 57
End: 2022-03-16

## 2022-03-16 DIAGNOSIS — R26.89 OTHER ABNORMALITIES OF GAIT AND MOBILITY: Primary | ICD-10-CM

## 2022-03-29 ENCOUNTER — HOSPITAL ENCOUNTER (OUTPATIENT)
Dept: PHYSICAL THERAPY | Facility: REHABILITATION | Age: 57
Setting detail: THERAPIES SERIES
Discharge: HOME | End: 2022-03-29
Attending: INTERNAL MEDICINE
Payer: MEDICARE

## 2022-03-29 DIAGNOSIS — R26.89 OTHER ABNORMALITIES OF GAIT AND MOBILITY: ICD-10-CM

## 2022-03-29 PROCEDURE — 97163 PT EVAL HIGH COMPLEX 45 MIN: CPT

## 2022-03-29 PROCEDURE — 97530 THERAPEUTIC ACTIVITIES: CPT | Mod: GP

## 2022-03-29 ASSESSMENT — BALANCE ASSESSMENTS: TOTAL SCORE: 31

## 2022-03-29 NOTE — OP PT TREATMENT LOG
PT Neuro Exercises Current Session  Performed Today? (y/n)   THER ACT   (CPT 67831) TOTAL TIME FOR SESSION 8-22 min    Pain, vitals, subjective See flow yes   Patient Education Patient and CM educated on outcomes of testing, goals, and POC, as well as scheduling.  Both verbalized understanding. yes   Bathroom Pt utilized bathroom with A of CM 2x yes   Bed Mobility     Transfer training     Subjective Outcomes Measures     ABC Scale     PSFS assess    THER EX  (CPT 69752) TOTAL TIME FOR SESSION     STRETCHING     Stretching by patient     Stretching by therapist/PROM     CARDIOVASCULAR     Nu Step     Stationary Bike     Treadmill     Endurance Testing     2mWT assess    6mWT assess    STRENGTH TRAINING     Standing Ther-Ex     Side-lying there-ex     Supine Ther-Ex     Prone Ther-Ex     Core exercises     Functional Strength Testing     30 sec STS test (60y +) assess         NEURO RE-ED  (CPT 06239) TOTAL TIME FOR SESSION     VESTIBULAR     SOT     MSQ     Adaptation     Compensation     Habiutation     COORDINATION     Target Tapping     Coordination ladder               Pushing weighted shopping cart     Cary's     POSTURAL RE-ED     Seated & standing reaching for trunk strengthening     Janeth-scapular strengthening     PRE-GAIT ACTIVITIES      Tap-ups     Step-ups     Standing weight shifting     Step-taps     BALANCE TRAINING     Standing balance - static/dynamic     Airex     Rockerboard     Hurdles     Split Stance          Dynamic gait      Side stepping     Retro walking     Tandem Walking     Gait with eyes closed          Balance Testing     Mittal     DGI assess    TUG assess    10mWT     GAIT TRAINING  (CPT 91154) TOTAL TIME FOR SESSION     Ambulation with AD           Stair negotiation     Curb negotiation     Ramp negotiation     Outdoor ambulation          MANUAL  (CPT 31247) TOTAL TIME FOR SESSION     Mobilization      MODALITIES TOTAL TIME FOR SESSION     Ice     Heat     ATTENDED E-STIM  (CPT  90143) TOTAL TIME FOR SESSION     Attended E-Stim     Unattended E-stim
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
## 2022-04-05 ENCOUNTER — HOSPITAL ENCOUNTER (OUTPATIENT)
Dept: PHYSICAL THERAPY | Facility: REHABILITATION | Age: 57
Setting detail: THERAPIES SERIES
Discharge: HOME | End: 2022-04-05
Attending: INTERNAL MEDICINE
Payer: MEDICARE

## 2022-04-05 ENCOUNTER — APPOINTMENT (OUTPATIENT)
Dept: OTHER | Facility: REHABILITATION | Age: 57
Setting detail: THERAPIES SERIES
End: 2022-04-05
Payer: MEDICARE

## 2022-04-05 DIAGNOSIS — R26.89 OTHER ABNORMALITIES OF GAIT AND MOBILITY: Primary | ICD-10-CM

## 2022-04-05 PROCEDURE — 97530 THERAPEUTIC ACTIVITIES: CPT | Mod: GP

## 2022-04-05 PROCEDURE — 97110 THERAPEUTIC EXERCISES: CPT | Mod: GP

## 2022-04-05 PROCEDURE — 97112 NEUROMUSCULAR REEDUCATION: CPT | Mod: GP

## 2022-04-05 ASSESSMENT — GAIT ASSESSMENTS: TUG TIME: 22.12

## 2022-04-05 NOTE — OP PT TREATMENT LOG
"  PT Neuro Exercises Current Session  Performed Today? (y/n)   THER ACT   (CPT 20541) TOTAL TIME FOR SESSION 8-22 min    Pain, vitals, subjective See flow yes   Patient Education Patient and CM educated on outcomes of testing, goals, and POC, as well as scheduling.  Both verbalized understanding.    Pt issued iniital HEP with seated LE exc as noted.  Pt verbalized understanding.           yes   Bathroom Pt utilized bathroom with A of PT at end of session yes   Bed Mobility     Transfer training     Subjective Outcomes Measures     ABC Scale     PSFS assess    THER EX  (CPT 09597) TOTAL TIME FOR SESSION 8-22 min    STRETCHING     Stretching by patient     Stretching by therapist/PROM     CARDIOVASCULAR     Nu Step Trial nustep L1 >= 5 min    Stationary Bike     Treadmill     Endurance Testing     2mWT assessed yes   6mWT Assessed (completed 4.5 min) yes   STRENGTH TRAINING     Standing Ther-Ex STS  Standing marches at half wall    Side-lying there-ex     Supine Ther-Ex     Seated Ther-Ex Marches x10 B  LAQ x10 B with 2\" hold  Ankle pumps x10 B    Trial seated hip add ball squeeze, glut sets Yes to all        Next visit   Core exercises Seated- overhead ball lift, chest press with ball or band, trunk rotation with ball,    Functional Strength Testing     30 sec STS test (60y +) assessed yes   5x STS assessed yes   NEURO RE-ED  (CPT 02424) TOTAL TIME FOR SESSION 30 min    VESTIBULAR     SOT     MSQ     Adaptation     Compensation     Habiutation     COORDINATION     Target Tapping     Coordination ladder               Pushing weighted shopping cart     Cary's     POSTURAL RE-ED     Seated & standing reaching for trunk strengthening Seated reaching out of RACHAEL      Janeth-scapular strengthening     PRE-GAIT ACTIVITIES      Tap-ups     Step-ups     Standing weight shifting     Step-taps     BALANCE TRAINING     Standing balance - static/dynamic     Tile Trial  rhomberg/EO  FA/EC    Airex     Rockerboard     Hurdles   " "  Split Stance          Dynamic gait      Side stepping Trial at half wall    Retro walking     Tandem Walking     Gait with eyes closed          Balance Testing     Mittal     DGI assessed yes   TUG assessed yes   10mWT     GAIT TRAINING  (CPT 01928) TOTAL TIME FOR SESSION     Ambulation with AD  Gait with RW and L MAFO, Cl S         Stair negotiation 4,6\" steps with R rail up and down, reciprocal up and down, cl S Yes (billed with TA)   Curb negotiation Practice 6-8\" curb with RW    Ramp negotiation Practice with RW    Outdoor ambulation With RW and L MAFO, cL S         MANUAL  (CPT 08149) TOTAL TIME FOR SESSION     Mobilization      MODALITIES TOTAL TIME FOR SESSION     Ice     Heat     ATTENDED E-STIM  (CPT 18438) TOTAL TIME FOR SESSION     Attended E-Stim     Unattended E-stim         "
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
## 2022-04-05 NOTE — ADDENDUM NOTE
Encounter addended by: Chaz Interiano, PT on: 4/5/2022 12:49 PM   Actions taken: Clinical Note Signed

## 2022-04-12 ENCOUNTER — HOSPITAL ENCOUNTER (OUTPATIENT)
Dept: PHYSICAL THERAPY | Facility: REHABILITATION | Age: 57
Setting detail: THERAPIES SERIES
Discharge: HOME | End: 2022-04-12
Attending: INTERNAL MEDICINE
Payer: MEDICARE

## 2022-04-12 ENCOUNTER — APPOINTMENT (OUTPATIENT)
Dept: OTHER | Facility: REHABILITATION | Age: 57
Setting detail: THERAPIES SERIES
End: 2022-04-12
Payer: MEDICARE

## 2022-04-12 DIAGNOSIS — R26.89 OTHER ABNORMALITIES OF GAIT AND MOBILITY: Primary | ICD-10-CM

## 2022-04-12 PROCEDURE — 97110 THERAPEUTIC EXERCISES: CPT | Mod: GP,CQ

## 2022-04-12 PROCEDURE — 97112 NEUROMUSCULAR REEDUCATION: CPT | Mod: GP,CQ

## 2022-04-12 NOTE — OP PT TREATMENT LOG
"  PT Neuro Exercises Current Session  Performed Today? (y/n)   THER ACT   (CPT 45494) TOTAL TIME FOR SESSION 0-7 min    Pain, vitals, subjective See flow yes   Patient Education Patient and CM educated on outcomes of testing, goals, and POC, as well as scheduling.  Both verbalized understanding.    Pt issued iniital HEP with seated LE exc as noted.  Pt verbalized understanding.              Bathroom Pt utilized bathroom with A of PT at mid  session yes   Bed Mobility     Transfer training     Subjective Outcomes Measures     ABC Scale     PSFS assess    THER EX  (CPT 32709) TOTAL TIME FOR SESSION 23-37 min    STRETCHING     Stretching by patient     Stretching by therapist/PROM     CARDIOVASCULAR     Nu Step Trial nustep L1 , LE only   5 min yes   Stationary Bike     Treadmill     Endurance Testing     2mWT assessed    6mWT Assessed (completed 4.5 min)    STRENGTH TRAINING     Standing Ther-Ex STS off EOM w/ cueing for anterior wt shift, 10x  Standing marches at half wall 10x  Hip abd, alternating, 10x Yes for all       Side-lying there-ex     Supine Ther-Ex     Seated Ther-Ex Marches x10 B  LAQ x10 B with 2\" hold  Ankle pumps x10 B    Trial seated hip add ball squeeze, 3 sec hold 10x   glut sets 10x w/ 3 sec hold Yes to all        Yes    yes   Core exercises Seated- overhead ball lift 10x  chest press with ball or band 10x   trunk rotation with ball 10x Yes  Yes  yes   Functional Strength Testing     30 sec STS test (60y +) assessed    5x STS assessed    NEURO RE-ED  (CPT 51281) TOTAL TIME FOR SESSION 23-37 min    VESTIBULAR     SOT     MSQ     Adaptation     Compensation     Habiutation     COORDINATION     Target Tapping     Coordination ladder               Pushing weighted shopping cart     Cary's     POSTURAL RE-ED     Seated & standing reaching for trunk strengthening Seated reaching out of RACHAEL and across midline for cones, placing on half wall    yes   Janeth-scapular strengthening     PRE-GAIT ACTIVITIES   " "   Tap-ups To 4\" block, alternating 10x w/ 1 UE support yes   Step-ups     Standing weight shifting     Step-taps     BALANCE TRAINING     Standing balance - static/dynamic     Tile Trial  rhomberg/EO  FA/EC    Airex Static stdg w/ FA 60 sec yes   Rockerboard     Hurdles 6\" hurdles, lateral step overs w/ B UE support Yes   Split Stance          Dynamic gait      Side stepping 12 ft x 2 w/o UE support yes   Retro walking 12 ft x 2 w/ one UE support yes   Tandem Walking     Gait with eyes closed          Balance Testing     Mittal     DGI assessed    TUG assessed    10mWT     GAIT TRAINING  (CPT 69170) TOTAL TIME FOR SESSION  Not performed   Ambulation with AD  Gait with RW and L MAFO, Cl S         Stair negotiation 4,6\" steps with R rail up and down, reciprocal up and down, cl S    Curb negotiation Practice 6-8\" curb with RW    Ramp negotiation Practice with RW    Outdoor ambulation With RW and L MAFO, cL S         MANUAL  (CPT 39943) TOTAL TIME FOR SESSION  Not performed   Mobilization      MODALITIES TOTAL TIME FOR SESSION  Not performed   Ice     Heat     ATTENDED E-STIM  (CPT 55551) TOTAL TIME FOR SESSION  Not performed   Attended E-Stim     Unattended E-stim         "
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
## 2022-04-20 ENCOUNTER — HOSPITAL ENCOUNTER (OUTPATIENT)
Dept: PHYSICAL THERAPY | Facility: REHABILITATION | Age: 57
Setting detail: THERAPIES SERIES
Discharge: HOME | End: 2022-04-20
Attending: INTERNAL MEDICINE
Payer: MEDICARE

## 2022-04-20 DIAGNOSIS — S70.01XA CONTUSION OF RIGHT HIP, INITIAL ENCOUNTER: ICD-10-CM

## 2022-04-20 DIAGNOSIS — M12.811 ROTATOR CUFF TEAR ARTHROPATHY OF RIGHT SHOULDER: ICD-10-CM

## 2022-04-20 DIAGNOSIS — M75.101 ROTATOR CUFF TEAR ARTHROPATHY OF RIGHT SHOULDER: ICD-10-CM

## 2022-04-20 DIAGNOSIS — M16.11 UNILATERAL PRIMARY OSTEOARTHRITIS, RIGHT HIP: ICD-10-CM

## 2022-04-20 DIAGNOSIS — R26.89 IMBALANCE: ICD-10-CM

## 2022-04-20 DIAGNOSIS — R26.9 GAIT DISORDER: ICD-10-CM

## 2022-04-20 DIAGNOSIS — R26.89 OTHER ABNORMALITIES OF GAIT AND MOBILITY: Primary | ICD-10-CM

## 2022-04-20 PROCEDURE — 97116 GAIT TRAINING THERAPY: CPT | Mod: GP

## 2022-04-20 PROCEDURE — 97112 NEUROMUSCULAR REEDUCATION: CPT | Mod: GP

## 2022-04-20 PROCEDURE — 97110 THERAPEUTIC EXERCISES: CPT | Mod: GP
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
## 2022-04-20 NOTE — OP PT TREATMENT LOG
"  PT Neuro Exercises Current Session  Performed Today? (y/n)   THER ACT   (CPT 48376) TOTAL TIME FOR SESSION 0-7 min    Pain, vitals, subjective See flow yes   Patient Education Patient and CM educated on outcomes of testing, goals, and POC, as well as scheduling.  Both verbalized understanding.    Pt issued iniital HEP with seated LE exc as noted.  Pt verbalized understanding.              Bathroom Pt utilized bathroom with A of PT at mid  session yes   Bed Mobility     Transfer training     Subjective Outcomes Measures     ABC Scale     PSFS assess    THER EX  (CPT 15470) TOTAL TIME FOR SESSION 23-37 min    STRETCHING     Stretching by patient     Stretching by therapist/PROM     CARDIOVASCULAR     Nu Step Trial nustep L1 , LE only   5 min yes   Stationary Bike     Treadmill     Endurance Testing     2mWT assessed    6mWT Assessed (completed 4.5 min)    STRENGTH TRAINING     Standing Ther-Ex STS off EOM w/ cueing for anterior wt shift, 10x  Standing marches at half wall 10x  Hip abd, alternating, 10x  Step touch 5x Yes     Yes    Yes   Side-lying there-ex     Supine Ther-Ex     Seated Ther-Ex Marches x10 B  LAQ x10 B with 2\" hold  Ankle pumps x10 B    Trial seated hip add ball squeeze, 3 sec hold 10x   glut sets 10x w/ 3 sec hold Y  Y  Y           Core exercises Seated- overhead ball lift 10x  chest press with ball or band 10x   trunk rotation with ball 10x  Unsupported bicep curls 15x       Y   Functional Strength Testing     30 sec STS test (60y +) assessed    5x STS assessed    NEURO RE-ED  (CPT 83128) TOTAL TIME FOR SESSION 23-37 min    VESTIBULAR     SOT     MSQ     Adaptation     Compensation     Habiutation     COORDINATION     Target Tapping     Coordination ladder               Pushing weighted shopping cart     Cary's     POSTURAL RE-ED     Seated & standing reaching for trunk strengthening Seated reaching out of RACHAEL and across midline for cones, placing on half wall       Janeth-scapular strengthening  " "   PRE-GAIT ACTIVITIES      Tap-ups To 6\" block, alternating 10x w/ 1 UE support yes   Step-ups     Standing weight shifting     Step-taps     BALANCE TRAINING     Standing balance - static/dynamic     Tile Trial  rhomberg/EO  FA/EC    Airex Static stdg w/ FA 60 sec yes   Rockerboard     Hurdles 6\" hurdles, lateral step overs w/ B UE support, fwd steps 2x U/L UE Yes   Split Stance          Dynamic gait      Side stepping 12 ft x 2 w/o UE support yes   Retro walking 12 ft x 2 w/ one UE support    Tandem Walking     Gait with eyes closed          Balance Testing     Mittal     DGI assessed    TUG assessed    10mWT     GAIT TRAINING  (CPT 20481) TOTAL TIME FOR SESSION  0-7 minutes   Ambulation with AD  Gait with RW and L MAFO, Cl S Y        Stair negotiation 4,6\" steps with R rail up and down, reciprocal up and down, cl S    Curb negotiation Practice 6-8\" curb with RW    Ramp negotiation Practice with RW    Outdoor ambulation With RW and L MAFO, cL S         MANUAL  (CPT 59639) TOTAL TIME FOR SESSION  Not performed   Mobilization      MODALITIES TOTAL TIME FOR SESSION  Not performed   Ice     Heat     ATTENDED E-STIM  (CPT 50686) TOTAL TIME FOR SESSION  Not performed   Attended E-Stim     Unattended E-stim         "

## 2022-04-21 ENCOUNTER — APPOINTMENT (OUTPATIENT)
Dept: OTHER | Facility: REHABILITATION | Age: 57
Setting detail: THERAPIES SERIES
End: 2022-04-21
Payer: MEDICARE

## 2022-04-21 ENCOUNTER — HOSPITAL ENCOUNTER (OUTPATIENT)
Dept: PHYSICAL THERAPY | Facility: REHABILITATION | Age: 57
Setting detail: THERAPIES SERIES
Discharge: HOME | End: 2022-04-21
Attending: INTERNAL MEDICINE
Payer: MEDICARE

## 2022-04-21 DIAGNOSIS — R26.9 GAIT DISORDER: ICD-10-CM

## 2022-04-21 DIAGNOSIS — S70.01XA CONTUSION OF RIGHT HIP, INITIAL ENCOUNTER: ICD-10-CM

## 2022-04-21 DIAGNOSIS — M16.11 UNILATERAL PRIMARY OSTEOARTHRITIS, RIGHT HIP: ICD-10-CM

## 2022-04-21 DIAGNOSIS — R26.89 OTHER ABNORMALITIES OF GAIT AND MOBILITY: Primary | ICD-10-CM

## 2022-04-21 PROCEDURE — 97112 NEUROMUSCULAR REEDUCATION: CPT | Mod: GP

## 2022-04-21 PROCEDURE — 97530 THERAPEUTIC ACTIVITIES: CPT | Mod: GP

## 2022-04-21 PROCEDURE — 97110 THERAPEUTIC EXERCISES: CPT | Mod: GP

## 2022-04-21 PROCEDURE — 97116 GAIT TRAINING THERAPY: CPT | Mod: GP

## 2022-04-21 NOTE — OP PT TREATMENT LOG
"  PT Neuro Exercises Current Session  Performed Today? (y/n)   THER ACT   (CPT 91057) TOTAL TIME FOR SESSION 8-22 min    Pain, vitals, subjective See flow yes   Patient Education Patient and CM educated on outcomes of testing, goals, and POC, as well as scheduling.  Both verbalized understanding.    Pt issued iniital HEP with seated LE exc as noted.  Pt verbalized understanding.    Discussed use of toe slide and how it can help prevent toe catching and LOB. Informed pt that her mother would be contacted to let her know how the process works; pt got her AFO through [employer] and toe slide will go through them as well. Pt verbalized understanding.                    yes   Bathroom Pt utilized bathroom with A of PT at mid  session    Bed Mobility     Transfer training     Subjective Outcomes Measures     ABC Scale     PSFS assess    THER EX  (CPT 06865) TOTAL TIME FOR SESSION 8-22 min    STRETCHING     Stretching by patient     Stretching by therapist/PROM     CARDIOVASCULAR     Nu Step Trial nustep L1 , LE only   5 min    Stationary Bike     Treadmill     Endurance Testing     2mWT assessed    6mWT Assessed (completed 4.5 min)    STRENGTH TRAINING     Standing Ther-Ex STS off EOM w/ cueing for anterior wt shift, 2 x 10  Standing marches at raised mat on one UE, handheld assist on the other 2 x 10  Hip abd, alternating, 10x, facing elevated mat, B UE support  Step touch 5x Yes     Yes  Yes  No       Side-lying there-ex     Supine Ther-Ex     Seated Ther-Ex Marches x10 B  LAQ x10 B with 2\" hold  Ankle pumps x10 B    Trial seated hip add ball squeeze, 3 sec hold 10x   glut sets 10x w/ 3 sec hold              Core exercises Seated- overhead ball lift 10x  chest press with ball or band 10x   trunk rotation with ball 10x  Unsupported bicep curls 15x          Functional Strength Testing     30 sec STS test (60y +) assessed    5x STS assessed    NEURO RE-ED  (CPT 22152) TOTAL TIME FOR SESSION 8-22 min    VESTIBULAR     SOT   " "  MSQ     Adaptation     Compensation     Habiutation     COORDINATION     Target Tapping     Coordination ladder               Pushing weighted shopping cart     Cary's     POSTURAL RE-ED     Seated & standing reaching for trunk strengthening Standing reaching out of RACHAEL and across midline for cones on half wall and putting them on seat of chair and floor, then returning them to half wall. No UE support to intermittent 1 UE support, close supervision with 1 minor LOB needing min A yes   Janeth-scapular strengthening     PRE-GAIT ACTIVITIES      Tap-ups At half wall:  To 6\" block, alternating 10x w/ 1 UE support in forward direction,  Toe taps on 6\" block laterally x 10 each side, 1 UE support   Yes    yes     Step-ups     Standing weight shifting     Step-taps     BALANCE TRAINING     Standing balance - static/dynamic     Tile Trial  rhomberg/EO  FA/EC    Airex Static stdg w/ FA 60 sec      Rockerboard     Hurdles 6\" hurdles, lateral step overs w/ B UE support, fwd steps 2x U/L UE    Split Stance          Dynamic gait      Side stepping 12 ft x 2 w/o UE support    Retro walking 12 ft x 2 w/ one UE support    Tandem Walking     Gait with eyes closed          Balance Testing     Mittal     DGI assessed    TUG assessed    10mWT     GAIT TRAINING  (CPT 81586) TOTAL TIME FOR SESSION  8-22 minutes   Ambulation with AD  Gait with RW and L MAFO, Cl S. Noted catching of L toe intermittently. Trialed toe slide on L, ambulated without any toe catch x 150 feet on tile and carpet and close supervision. Removed toe slide and pt exhibited intermittent toe catch again, verifying need for acquisition to prevent falls Yes        Stair negotiation 4,6\" steps with R rail up and down, reciprocal up and down, cl S    Curb negotiation Practice 6-8\" curb with RW    Ramp negotiation With toe slide and close supervision/RW, no catch observed yes   Outdoor ambulation With RW and L MAFO, cL S         MANUAL  (CPT 65956) TOTAL TIME FOR SESSION  " Not performed   Mobilization      MODALITIES TOTAL TIME FOR SESSION  Not performed   Ice     Heat     ATTENDED E-STIM  (CPT 80622) TOTAL TIME FOR SESSION  Not performed   Attended E-Stim     Unattended E-stim
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
## 2022-04-25 ENCOUNTER — DOCUMENTATION (OUTPATIENT)
Dept: SOCIAL WORK | Facility: REHABILITATION | Age: 57
End: 2022-04-25
Payer: MEDICARE

## 2022-04-25 NOTE — PROGRESS NOTES
4/25/22: CM was notified that the patient is in need of a left toe slide for her shoe. CM sent the pcp a message asking for a script. PHILIP Marquez.

## 2022-04-26 ENCOUNTER — HOSPITAL ENCOUNTER (OUTPATIENT)
Dept: PHYSICAL THERAPY | Facility: REHABILITATION | Age: 57
Setting detail: THERAPIES SERIES
Discharge: HOME | End: 2022-04-26
Attending: INTERNAL MEDICINE
Payer: MEDICARE

## 2022-04-26 ENCOUNTER — APPOINTMENT (OUTPATIENT)
Dept: OTHER | Facility: REHABILITATION | Age: 57
Setting detail: THERAPIES SERIES
End: 2022-04-26
Payer: MEDICARE

## 2022-04-26 DIAGNOSIS — R26.89 OTHER ABNORMALITIES OF GAIT AND MOBILITY: Primary | ICD-10-CM

## 2022-04-26 DIAGNOSIS — R26.9 GAIT DISORDER: ICD-10-CM

## 2022-04-26 DIAGNOSIS — S70.01XA CONTUSION OF RIGHT HIP, INITIAL ENCOUNTER: ICD-10-CM

## 2022-04-26 DIAGNOSIS — M16.11 UNILATERAL PRIMARY OSTEOARTHRITIS, RIGHT HIP: ICD-10-CM

## 2022-04-26 PROCEDURE — 97112 NEUROMUSCULAR REEDUCATION: CPT | Mod: GP

## 2022-04-26 PROCEDURE — 97530 THERAPEUTIC ACTIVITIES: CPT | Mod: GP

## 2022-04-26 PROCEDURE — 97116 GAIT TRAINING THERAPY: CPT | Mod: GP

## 2022-04-26 NOTE — OP PT TREATMENT LOG
PT Neuro Exercises Current Session  Performed Today? (y/n)   THER ACT   (CPT 37433) TOTAL TIME FOR SESSION 23-37 min    Pain, vitals, subjective See flow yes   Patient Education Patient and CM educated on outcomes of testing, goals, and POC, as well as scheduling.  Both verbalized understanding.    Pt issued iniital HEP with seated LE exc as noted.  Pt verbalized understanding.    Discussed use of toe slide and how it can help prevent toe catching and LOB. Informed pt that her mother would be contacted to let her know how the process works; pt got her AFO through [facility] and toe slide will go through them as well. Pt verbalized understanding.     Time spent trying to find substitute shoe to be worn with AFO when she needs to leave her own shoe here for modification. Pt brought her own [employer] shoe, but it is too small; she wears a men's 9W to accommodate her AFO, but a women's 9 when not using brace. Trialed shoes from [facility]; found [employer] in a 9W that fit pt's AFO with insert removed. Plan made for this shoe to be reserved and issued to pt when script for toe slide arrives and pt leaves her own shoe for Ted Motley, orthotist from [facility], to apply. Pt and mother verbalized understanding.                   Yes                yes   Bathroom Pt utilized bathroom with A of PT at mid  session yes   Bed Mobility     Transfer training     Subjective Outcomes Measures     ABC Scale     PSFS assess    THER EX  (CPT 26689) TOTAL TIME FOR SESSION 0 min    STRETCHING     Stretching by patient     Stretching by therapist/PROM     CARDIOVASCULAR     Nu Step Trial nustep L1 , LE only   5 min    Stationary Bike     Treadmill     Endurance Testing     2mWT assessed    6mWT Assessed (completed 4.5 min)    STRENGTH TRAINING     Standing Ther-Ex STS off EOM w/ cueing for anterior wt shift, 2 x 10  Standing marches at raised mat on one UE, handheld assist on the other 2 x 10  Hip abd, alternating, 10x, facing elevated mat, B  "UE support  Step touch 5x          Side-lying there-ex     Supine Ther-Ex     Seated Ther-Ex Marches x10 B  LAQ x10 B with 2\" hold  Ankle pumps x10 B    Trial seated hip add ball squeeze, 3 sec hold 10x   glut sets 10x w/ 3 sec hold              Core exercises Seated- overhead ball lift 10x  chest press with ball or band 10x   trunk rotation with ball 10x  Unsupported bicep curls 15x          Functional Strength Testing     30 sec STS test (60y +) assessed    5x STS assessed    NEURO RE-ED  (CPT 42599) TOTAL TIME FOR SESSION 8-22 min    VESTIBULAR     SOT     MSQ     Adaptation     Compensation     Habiutation     COORDINATION     Target Tapping     Coordination ladder               Pushing weighted shopping cart     Cary's     POSTURAL RE-ED     Seated & standing reaching for trunk strengthening Standing reaching out of RACHAEL and across midline for cones on half wall and putting them on seat of chair and floor, then returning them to half wall. No UE support to intermittent 1 UE support, close supervision with 1 minor LOB needing min A    Janeth-scapular strengthening     PRE-GAIT ACTIVITIES      Tap-ups At half wall:  To 6\" block, 2 x 10 w/ 1 UE support in forward direction,  Toe taps on 6\" block laterally 2 x 10 each side, 1 UE support   Yes    yes     Step-ups 10 reps with 1-2 UE each LE in forward and lateral direction, touching assist on 6\" block yes   Standing weight shifting          BALANCE TRAINING     Standing balance - static/dynamic     Tile Trial  rhomberg/EO  FA/EC    Airex Static stdg w/ FA 60 sec      Rockerboard     Hurdles 6\" hurdles, lateral step overs w/ B UE support, fwd steps 2x U/L UE    Split Stance          Dynamic gait      Side stepping 12 ft x 2 w/o UE support    Retro walking 10 ft x 8 w/ one UE support yes   Tandem Walking     Gait with eyes closed          Balance Testing     Mittal     DGI assessed    TUG assessed    10mWT     GAIT TRAINING  (CPT 14165) TOTAL TIME FOR SESSION  8-22 " "minutes   Ambulation with AD  Gait with RW and L MAFO, Cl S. Noted catching of L toe intermittently. Trialed toe slide on L, ambulated without any toe catch x 150 feet on tile and carpet and close supervision. Removed toe slide and pt exhibited intermittent toe catch again, verifying need for acquisition to prevent falls. Cues needed to keep RW closer to body for safer ambulation and better posture Yes        Stair negotiation 4,6\" steps with R rail up and down, reciprocal up and down, cl S    Curb negotiation Practice 6-8\" curb with RW    Ramp negotiation With toe slide and close supervision/RW, no catch observed    Outdoor ambulation With RW and L MAFO, cL S         MANUAL  (CPT 59752) TOTAL TIME FOR SESSION  Not performed   Mobilization      MODALITIES TOTAL TIME FOR SESSION  Not performed   Ice     Heat     ATTENDED E-STIM  (CPT 90237) TOTAL TIME FOR SESSION  Not performed   Attended E-Stim     Unattended E-stim         "

## 2022-04-26 NOTE — PROGRESS NOTES
PT DAILY NOTE FOR OUTPATIENT THERAPY    Patient: Tarah Holley   MRN: [id number]  : 1965 56 y.o.  Referring Physician: Miles Pitts MD  Date of Visit: 2022    Certification Dates: 22 through 22    Diagnosis:   1. Other abnormalities of gait and mobility    2. Gait disorder    3. Contusion of right hip, initial encounter    4. Unilateral primary osteoarthritis, right hip        Chief Complaints:  imbalance, decreased strength, deconditioning    Precautions:   Precautions comments: poor safety awareness, hx of TBI  Existing Precautions/Restrictions: fall, cardiac     TODAY'S VISIT    Time In Session:  Start Time: 1005  Stop Time: 1100  Time Calculation (min): 55 min   History/Vitals/Pain/Encounter Info - 22 1007        Injury History/Precautions/Daily Required Info    Start Time 1005     Stop Time 1100     Time Calculation (min) 55 min        Pain Assessment    Currently in pain Yes     Preferred Pain Scale number (Numeric Rating Pain Scale)     Pain Side/Orientation right     Pain: Body location Shoulder     Pain Rating (0-10): Pre Activity 4        Pain Intervention    Intervention  monitored     Post Intervention Comments none        Pre Activity Vital Signs    Pulse 73     /88     BP Location Left upper arm     BP Method Automatic     Patient Position Sitting        Post Activity Vital Signs    Post Activity Pulse 67     Post Activity /67     Post Activity BP Location Left upper arm     Post Activity BP Method Automatic     Patient Position Sitting                Daily Treatment Assessment and Plan - 22 1214        Daily Treatment Assessment and Plan    Progress toward goals Progressing     Daily Outcome Summary Trial of larger loaner L shoe successful with use of current AFO for pt to use when she needs to leave her own shoe here for application of toe slide. Pt was able to perform step-ups today with encouragement, utilizing deep breathing techniques  for anxiety. Mom reported that pt is due to tour group home in [place] in May; still needs to apply and does not know if she would be moving there any time soon, so PT will continue as planned     Plan and Recommendations Continue with endurance, balance, strengthening, gait with toe slide                     OBJECTIVE DATA TAKEN TODAY:    None taken    Today's Treatment:      PT Neuro Exercises Current Session  Performed Today? (y/n)   THER ACT   (CPT 95320) TOTAL TIME FOR SESSION 23-37 min    Pain, vitals, subjective See flow yes   Patient Education Patient and CM educated on outcomes of testing, goals, and POC, as well as scheduling.  Both verbalized understanding.    Pt issued iniital HEP with seated LE exc as noted.  Pt verbalized understanding.    Discussed use of toe slide and how it can help prevent toe catching and LOB. Informed pt that her mother would be contacted to let her know how the process works; pt got her AFO through [facility] and toe slide will go through them as well. Pt verbalized understanding.     Time spent trying to find substitute shoe to be worn with AFO when she needs to leave her own shoe here for modification. Pt brought her own [employer] shoe, but it is too small; she wears a men's 9W to accommodate her AFO, but a women's 9 when not using brace. Trialed shoes from [facility]; found [employer] Balance in a 9W that fit pt's AFO with insert removed. Plan made for this shoe to be reserved and issued to pt when script for toe slide arrives and pt leaves her own shoe for Ted Motley, orthotist from [facility], to apply. Pt and mother verbalized understanding.                   Yes                yes   Bathroom Pt utilized bathroom with A of PT at mid  session yes   Bed Mobility     Transfer training     Subjective Outcomes Measures     ABC Scale     PSFS assess    THER EX  (CPT 73895) TOTAL TIME FOR SESSION 0 min    STRETCHING     Stretching by patient     Stretching by therapist/PROM     CARDIOVASCULAR  "    Nu Step Trial nustep L1 , LE only   5 min    Stationary Bike     Treadmill     Endurance Testing     2mWT assessed    6mWT Assessed (completed 4.5 min)    STRENGTH TRAINING     Standing Ther-Ex STS off EOM w/ cueing for anterior wt shift, 2 x 10  Standing marches at raised mat on one UE, handheld assist on the other 2 x 10  Hip abd, alternating, 10x, facing elevated mat, B UE support  Step touch 5x          Side-lying there-ex     Supine Ther-Ex     Seated Ther-Ex Marches x10 B  LAQ x10 B with 2\" hold  Ankle pumps x10 B    Trial seated hip add ball squeeze, 3 sec hold 10x   glut sets 10x w/ 3 sec hold              Core exercises Seated- overhead ball lift 10x  chest press with ball or band 10x   trunk rotation with ball 10x  Unsupported bicep curls 15x          Functional Strength Testing     30 sec STS test (60y +) assessed    5x STS assessed    NEURO RE-ED  (CPT 34040) TOTAL TIME FOR SESSION 8-22 min    VESTIBULAR     SOT     MSQ     Adaptation     Compensation     Habiutation     COORDINATION     Target Tapping     Coordination ladder               Pushing weighted shopping cart     Cary's     POSTURAL RE-ED     Seated & standing reaching for trunk strengthening Standing reaching out of RACHAEL and across midline for cones on half wall and putting them on seat of chair and floor, then returning them to half wall. No UE support to intermittent 1 UE support, close supervision with 1 minor LOB needing min A    Janeth-scapular strengthening     PRE-GAIT ACTIVITIES      Tap-ups At half wall:  To 6\" block, 2 x 10 w/ 1 UE support in forward direction,  Toe taps on 6\" block laterally 2 x 10 each side, 1 UE support   Yes    yes     Step-ups 10 reps with 1-2 UE each LE in forward and lateral direction, touching assist on 6\" block yes   Standing weight shifting          BALANCE TRAINING     Standing balance - static/dynamic     Tile Trial  rhomberg/EO  FA/EC    Airex Static stdg w/ FA 60 sec      Rockerboard     Hurdles 6\" " "hurdles, lateral step overs w/ B UE support, fwd steps 2x U/L UE    Split Stance          Dynamic gait      Side stepping 12 ft x 2 w/o UE support    Retro walking 10 ft x 8 w/ one UE support yes   Tandem Walking     Gait with eyes closed          Balance Testing     Mittal     DGI assessed    TUG assessed    10mWT     GAIT TRAINING  (CPT 29607) TOTAL TIME FOR SESSION  8-22 minutes   Ambulation with AD  Gait with RW and L MAFO, Cl S. Noted catching of L toe intermittently. Trialed toe slide on L, ambulated without any toe catch x 150 feet on tile and carpet and close supervision. Removed toe slide and pt exhibited intermittent toe catch again, verifying need for acquisition to prevent falls. Cues needed to keep RW closer to body for safer ambulation and better posture Yes        Stair negotiation 4,6\" steps with R rail up and down, reciprocal up and down, cl S    Curb negotiation Practice 6-8\" curb with RW    Ramp negotiation With toe slide and close supervision/RW, no catch observed    Outdoor ambulation With RW and L MAFO, cL S         MANUAL  (CPT 19813) TOTAL TIME FOR SESSION  Not performed   Mobilization      MODALITIES TOTAL TIME FOR SESSION  Not performed   Ice     Heat     ATTENDED E-STIM  (CPT 35394) TOTAL TIME FOR SESSION  Not performed   Attended E-Stim     Unattended E-stim                              "

## 2022-04-28 ENCOUNTER — HOSPITAL ENCOUNTER (OUTPATIENT)
Dept: PHYSICAL THERAPY | Facility: REHABILITATION | Age: 57
Setting detail: THERAPIES SERIES
Discharge: HOME | End: 2022-04-28
Attending: INTERNAL MEDICINE
Payer: MEDICARE

## 2022-04-28 ENCOUNTER — APPOINTMENT (OUTPATIENT)
Dept: OTHER | Facility: REHABILITATION | Age: 57
Setting detail: THERAPIES SERIES
End: 2022-04-28
Payer: MEDICARE

## 2022-04-28 DIAGNOSIS — R26.89 OTHER ABNORMALITIES OF GAIT AND MOBILITY: Primary | ICD-10-CM

## 2022-04-28 DIAGNOSIS — R26.9 GAIT DISORDER: ICD-10-CM

## 2022-04-28 PROCEDURE — 97112 NEUROMUSCULAR REEDUCATION: CPT | Mod: GP

## 2022-04-28 PROCEDURE — 97530 THERAPEUTIC ACTIVITIES: CPT | Mod: GP

## 2022-04-28 ASSESSMENT — BALANCE ASSESSMENTS: TOTAL SCORE: 38

## 2022-04-28 NOTE — OP PT TREATMENT LOG
"  PT Neuro Exercises Current Session  Performed Today? (y/n)   THER ACT   (CPT 99772) TOTAL TIME FOR SESSION 38-52 min    Pain, vitals, subjective See flow yes   Patient Education Patient and CM educated on outcomes of testing, goals, and POC, as well as scheduling.  Both verbalized understanding.    Pt issued iniital HEP with seated LE exc as noted.  Pt verbalized understanding.    Discussed use of toe slide and how it can help prevent toe catching and LOB. Informed pt that her mother would be contacted to let her know how the process works; pt got her AFO through [facility] and toe slide will go through them as well. Pt verbalized understanding.     Time spent trying to find substitute shoe to be worn with AFO when she needs to leave her own shoe here for modification. Pt brought her own [employer] shoe, but it is too small; she wears a men's 9W to accommodate her AFO, but a women's 9 when not using brace. Trialed shoes from [facility]; found [employer] in a 9W that fit pt's AFO with insert removed. Plan made for this shoe to be reserved and issued to pt when script for toe slide arrives and pt leaves her own shoe for Ted Motley, orthotist from [facility], to apply. Pt and mother verbalized understanding.    4/28 pt agitated at start of session, perserverating on phone call from [facility] that told her to be here early (had arrived at 11:40 for noon session) and she was confused about session time and transportation. Pt required calming and distraction to settle down.                                                                    yes   Bathroom Pt utilized bathroom with A of PT at mid  session yes   HEP Pt stated she is \"lazy\" at home, does not do much. Provided sit-stand exercise for HEP and encouraged to do 15 times, 3-4 times a day to get used to doing one thing consistently. Handout provided, pt verbalized understanding yes   Transfer training     Subjective Outcomes Measures     ABC Scale     PSFS assess    THER " "EX  (CPT 38352) TOTAL TIME FOR SESSION 0-7 min billed with TA    STRETCHING     Stretching by patient     Stretching by therapist/PROM     CARDIOVASCULAR     Nu Step Trial nustep L1 , LE only   5 min    Stationary Bike     Treadmill     Endurance Testing     2mWT assessed yes   6mWT Assessed  yes   STRENGTH TRAINING     Standing Ther-Ex STS off EOM w/ cueing for anterior wt shift, 2 x 10  Standing marches at raised mat on one UE, handheld assist on the other 2 x 10  Hip abd, alternating, 10x, facing elevated mat, B UE support  Step touch 5x          Side-lying there-ex     Supine Ther-Ex     Seated Ther-Ex Marches x10 B  LAQ x10 B with 2\" hold  Ankle pumps x10 B    Trial seated hip add ball squeeze, 3 sec hold 10x   glut sets 10x w/ 3 sec hold              Core exercises Seated- overhead ball lift 10x  chest press with ball or band 10x   trunk rotation with ball 10x  Unsupported bicep curls 15x          Functional Strength Testing     30 sec STS test (60y +) Assessed--see flowsheet yes   5x STS     NEURO RE-ED  (CPT 60989) TOTAL TIME FOR SESSION 8-22 min    VESTIBULAR     SOT     MSQ     Adaptation     Compensation     Habiutation     COORDINATION     Target Tapping     Coordination ladder               Pushing weighted shopping cart     Cary's     POSTURAL RE-ED     Seated & standing reaching for trunk strengthening Standing reaching out of RACHAEL and across midline for cones on half wall and putting them on seat of chair and floor, then returning them to half wall. No UE support to intermittent 1 UE support, close supervision with 1 minor LOB needing min A    Janeth-scapular strengthening     PRE-GAIT ACTIVITIES      Tap-ups At half wall:  To 6\" block, 2 x 10 w/ 1 UE support in forward direction,  Toe taps on 6\" block laterally 2 x 10 each side, 1 UE support      Step-ups 10 reps with 1-2 UE each LE in forward and lateral direction, touching assist on 6\" block    Standing weight shifting          BALANCE TRAINING   " "  Standing balance - static/dynamic     Tile Trial  rhomberg/EO  FA/EC    Airex Static stdg w/ FA 60 sec      Rockerboard     Hurdles 6\" hurdles, lateral step overs w/ B UE support, fwd steps 2x U/L UE    Split Stance          Dynamic gait      Side stepping 12 ft x 2 w/o UE support    Retro walking 10 ft x 8 w/ one UE support    Tandem Walking     Gait with eyes closed          Balance Testing     Mittal Assessed; see flowsheet yes   DGI     TUG     10mWT Assessed; see flowsheet yes   GAIT TRAINING  (CPT 05835) TOTAL TIME FOR SESSION  0-7 min billed with TA   Ambulation with AD  Gait with RW in and out of session with cues to keep RW closer to body    Gait with RW and L MAFO, Cl S. Noted catching of L toe intermittently. Trialed toe slide on L, ambulated without any toe catch x 150 feet on tile and carpet and close supervision. Removed toe slide and pt exhibited intermittent toe catch again, verifying need for acquisition to prevent falls. Cues needed to keep RW closer to body for safer ambulation and better posture Yes        Stair negotiation 4,6\" steps with R rail up and down, reciprocal up and down, cl S    Curb negotiation Practice 6-8\" curb with RW    Ramp negotiation With toe slide and close supervision/RW, no catch observed    Outdoor ambulation With RW and L MAFO, cL S         MANUAL  (CPT 57891) TOTAL TIME FOR SESSION  Not performed   Mobilization      MODALITIES TOTAL TIME FOR SESSION  Not performed   Ice     Heat     ATTENDED E-STIM  (CPT 30533) TOTAL TIME FOR SESSION  Not performed   Attended E-Stim     Unattended E-stim         "
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
## 2022-05-03 ENCOUNTER — APPOINTMENT (OUTPATIENT)
Dept: OTHER | Facility: REHABILITATION | Age: 57
Setting detail: THERAPIES SERIES
End: 2022-05-03
Payer: MEDICARE

## 2022-05-03 ENCOUNTER — HOSPITAL ENCOUNTER (OUTPATIENT)
Dept: PHYSICAL THERAPY | Facility: REHABILITATION | Age: 57
Setting detail: THERAPIES SERIES
Discharge: HOME | End: 2022-05-03
Attending: INTERNAL MEDICINE
Payer: MEDICARE

## 2022-05-03 DIAGNOSIS — M75.101 ROTATOR CUFF TEAR ARTHROPATHY OF RIGHT SHOULDER: ICD-10-CM

## 2022-05-03 DIAGNOSIS — R26.89 IMBALANCE: ICD-10-CM

## 2022-05-03 DIAGNOSIS — R26.9 GAIT DISORDER: ICD-10-CM

## 2022-05-03 DIAGNOSIS — M16.11 UNILATERAL PRIMARY OSTEOARTHRITIS, RIGHT HIP: ICD-10-CM

## 2022-05-03 DIAGNOSIS — S70.01XA CONTUSION OF RIGHT HIP, INITIAL ENCOUNTER: ICD-10-CM

## 2022-05-03 DIAGNOSIS — M12.811 ROTATOR CUFF TEAR ARTHROPATHY OF RIGHT SHOULDER: ICD-10-CM

## 2022-05-03 DIAGNOSIS — R26.89 OTHER ABNORMALITIES OF GAIT AND MOBILITY: Primary | ICD-10-CM

## 2022-05-03 PROCEDURE — 97110 THERAPEUTIC EXERCISES: CPT | Mod: GP

## 2022-05-03 PROCEDURE — 97530 THERAPEUTIC ACTIVITIES: CPT | Mod: GP

## 2022-05-03 NOTE — OP PT TREATMENT LOG
PT Neuro Exercises Current Session  Performed Today? (y/n)   THER ACT   (CPT 07331) TOTAL TIME FOR SESSION 35 min    Pain, vitals, subjective See flow yes   Patient Education Patient and CM educated on outcomes of testing, goals, and POC, as well as scheduling.  Both verbalized understanding.    Pt issued iniital HEP with seated LE exc as noted.  Pt verbalized understanding.    Discussed use of toe slide and how it can help prevent toe catching and LOB. Informed pt that her mother would be contacted to let her know how the process works; pt got her AFO through [facility] and toe slide will go through them as well. Pt verbalized understanding.     Time spent trying to find substitute shoe to be worn with AFO when she needs to leave her own shoe here for modification. Pt brought her own [employer] shoe, but it is too small; she wears a men's 9W to accommodate her AFO, but a women's 9 when not using brace. Trialed shoes from [facility]; found New Balance in a 9W that fit pt's AFO with insert removed. Plan made for this shoe to be reserved and issued to pt when script for toe slide arrives and pt leaves her own shoe for Ted Motley, orthotist from [facility], to apply. Pt and mother verbalized understanding.    4/28 pt agitated at start of session, perserverating on phone call from [facility] that told her to be here early (had arrived at 11:40 for noon session) and she was confused about session time and transportation. Pt required calming and distraction to settle down.     5/3 Pt very resistant to participating in PT today due to fatigue and verbalizing not wanting to do anything or be here. Long day yesterday and pt needing redirection. Offered private room with mat and allowed pt to rest to try and bolster participation. Continued to require coaxing to participate, but pt remained minimally willing. Session terminated 10 min early due to this.                                                                                  yes  "  Bathroom Pt utilized bathroom with A of PT at mid session--supervision for standing balance tasks for hygiene and clothing management, transfers yes   HEP Pt stated she is \"lazy\" at home, does not do much. Provided sit-stand exercise for HEP and encouraged to do 15 times, 3-4 times a day to get used to doing one thing consistently. Handout provided, pt verbalized understanding. Pt stated she could not find her handout--reprinted and handed to her Yes    Transfer training     Subjective Outcomes Measures     ABC Scale     PSFS assess    THER EX  (CPT 25803) TOTAL TIME FOR SESSION 15 min    STRETCHING     Stretching by patient     Stretching by therapist/PROM     CARDIOVASCULAR     Nu Step Trial nustep L1 , LE only   5 min    Stationary Bike     Treadmill     Endurance Testing     2mWT assessed      6mWT Assessed     STRENGTH TRAINING     Standing Ther-Ex STS off EOM w/ cueing for anterior wt shift, 2 x 10  Standing marches at raised mat on one UE, handheld assist on the other 2 x 10  Hip abd, alternating, 10x, facing elevated mat, B UE support  Step touch 5x          Side-lying there-ex     Supine Ther-Ex SAQ x 20  Hooklying clamshells x 20  Lower truncal rotation x 20  Bridges x 7--terminated due to discomfort in L thigh (resolved quickly but pt not wanting to continue)   Yes  Yes  Yes  yes   Seated Ther-Ex Marches x10 B  LAQ x10 B with 2\" hold  Ankle pumps x10 B    Trial seated hip add ball squeeze, 3 sec hold 10x   glut sets 10x w/ 3 sec hold              Core exercises Seated- overhead ball lift 10x  chest press with ball or band 10x   trunk rotation with ball 10x  Unsupported bicep curls 15x          Functional Strength Testing     30 sec STS test (60y +) Assessed--see flowsheet    5x STS     NEURO RE-ED  (CPT 90896) TOTAL TIME FOR SESSION 0 min    VESTIBULAR     SOT     MSQ     Adaptation     Compensation     Habiutation     COORDINATION     Target Tapping     Coordination ladder               Pushing " "weighted shopping cart     Cary's     POSTURAL RE-ED     Seated & standing reaching for trunk strengthening Standing reaching out of RACHAEL and across midline for cones on half wall and putting them on seat of chair and floor, then returning them to half wall. No UE support to intermittent 1 UE support, close supervision with 1 minor LOB needing min A    Janeth-scapular strengthening     PRE-GAIT ACTIVITIES      Tap-ups At half wall:  To 6\" block, 2 x 10 w/ 1 UE support in forward direction,  Toe taps on 6\" block laterally 2 x 10 each side, 1 UE support      Step-ups 10 reps with 1-2 UE each LE in forward and lateral direction, touching assist on 6\" block    Standing weight shifting          BALANCE TRAINING     Standing balance - static/dynamic     Tile Trial  rhomberg/EO  FA/EC    Airex Static stdg w/ FA 60 sec      Rockerboard     Hurdles 6\" hurdles, lateral step overs w/ B UE support, fwd steps 2x U/L UE    Split Stance          Dynamic gait      Side stepping 12 ft x 2 w/o UE support    Retro walking 10 ft x 8 w/ one UE support    Tandem Walking     Gait with eyes closed          Balance Testing     Mittal Assessed; see flowsheet yes   DGI     TUG     10mWT Assessed; see flowsheet yes   GAIT TRAINING  (CPT 30014) TOTAL TIME FOR SESSION  0-7 min billed with TA   Ambulation with AD  Gait with RW in and out of session with cues to keep RW closer to body, use of toe slide when ambulating to/from bathroom    Gait with RW and L MAFO, Cl S. Noted catching of L toe intermittently. Trialed toe slide on L, ambulated without any toe catch x 150 feet on tile and carpet and close supervision. Removed toe slide and pt exhibited intermittent toe catch again, verifying need for acquisition to prevent falls. Cues needed to keep RW closer to body for safer ambulation and better posture Yes        Stair negotiation 4,6\" steps with R rail up and down, reciprocal up and down, cl S    Curb negotiation Practice 6-8\" curb with RW    Ramp " negotiation With toe slide and close supervision/RW, no catch observed    Outdoor ambulation With RW and L MAFO, cL S         MANUAL  (CPT 61609) TOTAL TIME FOR SESSION  Not performed   Mobilization      MODALITIES TOTAL TIME FOR SESSION  Not performed   Ice     Heat     ATTENDED E-STIM  (CPT 17459) TOTAL TIME FOR SESSION  Not performed   Attended E-Stim     Unattended E-stim
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
## 2022-05-04 ENCOUNTER — DOCUMENTATION (OUTPATIENT)
Dept: SOCIAL WORK | Facility: REHABILITATION | Age: 57
End: 2022-05-04
Payer: MEDICARE

## 2022-05-05 ENCOUNTER — HOSPITAL ENCOUNTER (OUTPATIENT)
Dept: PHYSICAL THERAPY | Facility: REHABILITATION | Age: 57
Setting detail: THERAPIES SERIES
Discharge: HOME | End: 2022-05-05
Attending: INTERNAL MEDICINE
Payer: MEDICARE

## 2022-05-05 ENCOUNTER — APPOINTMENT (OUTPATIENT)
Dept: OTHER | Facility: REHABILITATION | Age: 57
Setting detail: THERAPIES SERIES
End: 2022-05-05
Payer: MEDICARE

## 2022-05-05 DIAGNOSIS — M16.11 UNILATERAL PRIMARY OSTEOARTHRITIS, RIGHT HIP: ICD-10-CM

## 2022-05-05 DIAGNOSIS — M12.811 ROTATOR CUFF TEAR ARTHROPATHY OF RIGHT SHOULDER: ICD-10-CM

## 2022-05-05 DIAGNOSIS — R26.89 OTHER ABNORMALITIES OF GAIT AND MOBILITY: Primary | ICD-10-CM

## 2022-05-05 DIAGNOSIS — R26.89 IMBALANCE: ICD-10-CM

## 2022-05-05 DIAGNOSIS — S70.01XA CONTUSION OF RIGHT HIP, INITIAL ENCOUNTER: ICD-10-CM

## 2022-05-05 DIAGNOSIS — M75.101 ROTATOR CUFF TEAR ARTHROPATHY OF RIGHT SHOULDER: ICD-10-CM

## 2022-05-05 DIAGNOSIS — R26.9 GAIT DISORDER: ICD-10-CM

## 2022-05-05 PROCEDURE — 97112 NEUROMUSCULAR REEDUCATION: CPT | Mod: GP

## 2022-05-05 PROCEDURE — 97116 GAIT TRAINING THERAPY: CPT | Mod: GP

## 2022-05-05 PROCEDURE — 97530 THERAPEUTIC ACTIVITIES: CPT | Mod: GP

## 2022-05-05 NOTE — OP PT TREATMENT LOG
PT Neuro Exercises Current Session  Performed Today? (y/n)   THER ACT   (CPT 08241) TOTAL TIME FOR SESSION 30 min    Pain, vitals, subjective See flow yes   Patient Education Patient and CM educated on outcomes of testing, goals, and POC, as well as scheduling.  Both verbalized understanding.    Pt issued iniital HEP with seated LE exc as noted.  Pt verbalized understanding.    Discussed use of toe slide and how it can help prevent toe catching and LOB. Informed pt that her mother would be contacted to let her know how the process works; pt got her AFO through [facility] and toe slide will go through them as well. Pt verbalized understanding.     Time spent trying to find substitute shoe to be worn with AFO when she needs to leave her own shoe here for modification. Pt brought her own [employer] shoe, but it is too small; she wears a men's 9W to accommodate her AFO, but a women's 9 when not using brace. Trialed shoes from [facility]; found New Balance in a 9W that fit pt's AFO with insert removed. Plan made for this shoe to be reserved and issued to pt when script for toe slide arrives and pt leaves her own shoe for Ted Motley, orthotist from [facility], to apply. Pt and mother verbalized understanding.      5/5 Printed up new calendar and discussed plan with pt to d/c on 5/13. Pt's mother had been contacted to discuss concerns about resuming PT post-operatively (had PT visits on the schedule) and agreed that she will likely have a change in medical status and assessment post-op by MD will determine readiness to resume therapies. May even need home care to start. Pt provided list of upcoming appointments and verified that no conflict would occur in preparation for surgery. Pt was provided with loaner shoe today for L foot in order for orthotist to take pt's shoe for toe slide application (script still pending), but orthotist aware of situation and will coordinate with mother to get pt's shoe back to her when completed if  "she discharges before then. Pt and mother both verbalized understanding.                                                                        yes             Bathroom Pt utilized bathroom with A of PT at mid session--supervision for standing balance tasks for hygiene and clothing management, transfers yes   HEP Pt stated she is \"lazy\" at home, does not do much. Provided sit-stand exercise for HEP and encouraged to do 15 times, 3-4 times a day to get used to doing one thing consistently. Handout provided, pt verbalized understanding. Pt stated she could not find her handout--reprinted and handed to her    Transfer training Supervision sit-stand due to hand placement cues, pt also pushes RW too far from her with SPT yes   Subjective Outcomes Measures     ABC Scale     PSFS assess    THER EX  (CPT 16077) TOTAL TIME FOR SESSION 0 min    STRETCHING     Stretching by patient     Stretching by therapist/PROM     CARDIOVASCULAR     Nu Step Trial nustep L1 , LE only   5 min    Stationary Bike     Treadmill     Endurance Testing     2mWT assessed      6mWT Assessed     STRENGTH TRAINING     Standing Ther-Ex STS off EOM w/ cueing for anterior wt shift, 2 x 10  Standing marches at raised mat on one UE, handheld assist on the other 2 x 10  Hip abd, alternating, 10x, facing elevated mat, B UE support  Step touch 5x          Side-lying there-ex     Supine Ther-Ex SAQ x 20  Hooklying clamshells x 20  Lower truncal rotation x 20  Bridges x 7--terminated due to discomfort in L thigh (resolved quickly but pt not wanting to continue)      Seated Ther-Ex Marches x10 B  LAQ x10 B with 2\" hold  Ankle pumps x10 B    Trial seated hip add ball squeeze, 3 sec hold 10x   glut sets 10x w/ 3 sec hold              Core exercises Seated- overhead ball lift 10x  chest press with ball or band 10x   trunk rotation with ball 10x  Unsupported bicep curls 15x          Functional Strength Testing     30 sec STS test (60y +) Assessed--see flowsheet  " "  5x STS     NEURO RE-ED  (CPT 79345) TOTAL TIME FOR SESSION 15 min    VESTIBULAR     SOT     MSQ     Adaptation     Compensation     Habiutation     COORDINATION     Target Tapping     Coordination ladder               Pushing weighted shopping cart     Cary's     POSTURAL RE-ED     Seated & standing reaching for trunk strengthening Standing reaching out of RACHAEL and across midline for cones on half wall and putting them on seat of chair and floor, then returning them to half wall. No UE support to intermittent 1 UE support, close supervision with 1 minor LOB needing min A    Janeth-scapular strengthening     PRE-GAIT ACTIVITIES      Tap-ups At half wall:  To 6\" block, 2 x 10 w/ 1 UE support in forward direction,  Toe taps on 6\" block laterally 2 x 10 each side, 1 UE support      Step-ups 10 reps with 1-2 UE each LE in forward and lateral direction, touching assist on 6\" block    Standing weight shifting          BALANCE TRAINING     Standing balance - static/dynamic     Tile Stood x 15 min without UE support while playing [employer] (pt's favorite game). No sitting breaks, no agitation. Noted pt to contact L LE against mat when fatigued, but could stand without instability when mat was not against body yes   Airex Static stdg w/ FA 60 sec      Rockerboard     Hurdles 6\" hurdles, lateral step overs w/ B UE support, fwd steps 2x U/L UE    Split Stance          Dynamic gait      Side stepping 12 ft x 2 w/o UE support    Retro walking 10 ft x 8 w/ one UE support    Tandem Walking     Gait with eyes closed          Balance Testing     Mittal Assessed; see flowsheet    DGI     TUG     10mWT Assessed; see flowsheet    GAIT TRAINING  (CPT 59330) TOTAL TIME FOR SESSION  10 min    Ambulation with AD  Gait with RW in and out of session with cues to keep RW closer to body, cues to lift L foot when ambulating to/from bathroom (no toe slide)      Switched shoes and applied L toe slide to loaner shoe. Pt ambulated with RW x 300 feet " "with use of green theraband around her pelvis and affixed to RW to provide more tactile cue to keep RW closer to body vs verbal cue. Noted better success with L step length and posture with tactile cue, and pt was able to have conversation discussing recipes during ambulation as cognitive task, no LOB        Gait with RW and L MAFO, Cl S. Noted catching of L toe intermittently. Trialed toe slide on L, ambulated without any toe catch x 150 feet on tile and carpet and close supervision. Removed toe slide and pt exhibited intermittent toe catch again, verifying need for acquisition to prevent falls. Cues needed to keep RW closer to body for safer ambulation and better posture Yes            yes        Stair negotiation 4,6\" steps with R rail up and down, reciprocal up and down, cl S    Curb negotiation Practice 6-8\" curb with RW    Ramp negotiation With toe slide and close supervision/RW, no catch observed    Outdoor ambulation With RW and L MAFO, cL S         MANUAL  (CPT 76853) TOTAL TIME FOR SESSION  Not performed   Mobilization      MODALITIES TOTAL TIME FOR SESSION  Not performed   Ice     Heat     ATTENDED E-STIM  (CPT 76010) TOTAL TIME FOR SESSION  Not performed   Attended E-Stim     Unattended E-stim         "
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
## 2022-05-09 ENCOUNTER — APPOINTMENT (OUTPATIENT)
Dept: OTHER | Facility: REHABILITATION | Age: 57
Setting detail: THERAPIES SERIES
End: 2022-05-09
Payer: MEDICARE

## 2022-05-09 ENCOUNTER — HOSPITAL ENCOUNTER (OUTPATIENT)
Dept: PHYSICAL THERAPY | Facility: REHABILITATION | Age: 57
Setting detail: THERAPIES SERIES
Discharge: HOME | End: 2022-05-09
Attending: INTERNAL MEDICINE
Payer: MEDICARE

## 2022-05-09 DIAGNOSIS — R26.89 OTHER ABNORMALITIES OF GAIT AND MOBILITY: Primary | ICD-10-CM

## 2022-05-09 DIAGNOSIS — R26.9 GAIT DISORDER: ICD-10-CM

## 2022-05-09 PROCEDURE — 97112 NEUROMUSCULAR REEDUCATION: CPT | Mod: GP

## 2022-05-09 PROCEDURE — 97530 THERAPEUTIC ACTIVITIES: CPT | Mod: GP

## 2022-05-09 PROCEDURE — 97116 GAIT TRAINING THERAPY: CPT | Mod: GP
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
## 2022-05-09 NOTE — OP PT TREATMENT LOG
PT Neuro Exercises Current Session  Performed Today? (y/n)   THER ACT   (CPT 39870) TOTAL TIME FOR SESSION 8-22 min    Pain, vitals, subjective See flow yes   Patient Education Patient and CM educated on outcomes of testing, goals, and POC, as well as scheduling.  Both verbalized understanding.    Pt issued iniital HEP with seated LE exc as noted.  Pt verbalized understanding.    Discussed use of toe slide and how it can help prevent toe catching and LOB. Informed pt that her mother would be contacted to let her know how the process works; pt got her AFO through [facility] and toe slide will go through them as well. Pt verbalized understanding.     Time spent trying to find substitute shoe to be worn with AFO when she needs to leave her own shoe here for modification. Pt brought her own [employer] shoe, but it is too small; she wears a men's 9W to accommodate her AFO, but a women's 9 when not using brace. Trialed shoes from [facility]; found New Balance in a 9W that fit pt's AFO with insert removed. Plan made for this shoe to be reserved and issued to pt when script for toe slide arrives and pt leaves her own shoe for Ted Motley, orthotist from [facility], to apply. Pt and mother verbalized understanding.      5/5 Printed up new calendar and discussed plan with pt to d/c on 5/13. Pt's mother had been contacted to discuss concerns about resuming PT post-operatively (had PT visits on the schedule) and agreed that she will likely have a change in medical status and assessment post-op by MD will determine readiness to resume therapies. May even need home care to start. Pt provided list of upcoming appointments and verified that no conflict would occur in preparation for surgery. Pt was provided with loaner shoe today for L foot in order for orthotist to take pt's shoe for toe slide application (script still pending), but orthotist aware of situation and will coordinate with mother to get pt's shoe back to her when completed if  "she discharges before then. Pt and mother both verbalized understanding.                                                                                     Bathroom Pt utilized bathroom with A of PT at beginning and end of session--supervision for standing balance tasks for hygiene and clothing management, transfers yes   HEP Pt stated she is \"lazy\" at home, does not do much. Provided sit-stand exercise for HEP and encouraged to do 15 times, 3-4 times a day to get used to doing one thing consistently. Handout provided, pt verbalized understanding. Pt stated she could not find her handout--reprinted and handed to her    Transfer training Supervision sit-stand due to hand placement cues, pt also pushes RW too far from her with SPT yes   Subjective Outcomes Measures     ABC Scale     PSFS assess    THER EX  (CPT 44610) TOTAL TIME FOR SESSION 0 min    STRETCHING     Stretching by patient     Stretching by therapist/PROM     CARDIOVASCULAR     Nu Step Trial nustep L1 , LE only   5 min    Stationary Bike     Treadmill     Endurance Testing     2mWT assessed      6mWT Assessed     STRENGTH TRAINING     Standing Ther-Ex STS off EOM w/ cueing for anterior wt shift, 2 x 10  Standing marches at raised mat on one UE, handheld assist on the other 2 x 10  Hip abd, alternating, 10x, facing elevated mat, B UE support  Step touch 5x          Side-lying there-ex     Supine Ther-Ex SAQ x 20  Hooklying clamshells x 20  Lower truncal rotation x 20  Bridges x 7--terminated due to discomfort in L thigh (resolved quickly but pt not wanting to continue)      Seated Ther-Ex Marches x10 B  LAQ x10 B with 2\" hold  Ankle pumps x10 B    Trial seated hip add ball squeeze, 3 sec hold 10x   glut sets 10x w/ 3 sec hold              Core exercises Seated- overhead ball lift 10x  chest press with ball or band 10x   trunk rotation with ball 10x  Unsupported bicep curls 15x          Functional Strength Testing     30 sec STS test (60y +) Assessed--see " "flowsheet    5x STS     NEURO RE-ED  (CPT 05756) TOTAL TIME FOR SESSION 30 min    VESTIBULAR     SOT     MSQ     Adaptation     Compensation     Habiutation     COORDINATION     Target Tapping     Coordination ladder               Pushing weighted shopping cart     Cary's     POSTURAL RE-ED     Seated & standing reaching for trunk strengthening Standing reaching out of RACHAEL and across midline for cones on half wall and putting them on seat of chair and floor, then returning them to half wall. No UE support to intermittent 1 UE support, close supervision with 1 minor LOB needing min A    Janeth-scapular strengthening     PRE-GAIT ACTIVITIES      Tap-ups At half wall:  To 6\" block, 2 x 10 w/ 1 UE support in forward direction,  Toe taps on 6\" block laterally 2 x 10 each side, 1 UE support      Step-ups 10 reps with 1-2 UE each LE in forward and lateral direction, touching assist on 6\" block    Standing weight shifting          BALANCE TRAINING     Standing balance - static/dynamic     Tile Stood x 33 min without UE support while playing [employer] (T/A) (pt's favorite game). No sitting breaks, no agitation. Noted pt to contact L LE against mat when fatigued, but could stand without instability when mat was not against body yes   Airex Static stdg w/ FA 60 sec      Rockerboard     Hurdles 6\" hurdles, lateral step overs w/ B UE support, fwd steps 2x U/L UE    Split Stance          Dynamic gait      Side stepping 12 ft x 2 w/o UE support    Retro walking 10 ft x 8 w/ one UE support    Tandem Walking     Gait with eyes closed          Balance Testing     Mittal Assessed; see flowsheet    DGI     TUG     10mWT Assessed; see flowsheet    GAIT TRAINING  (CPT 52731) TOTAL TIME FOR SESSION  8-22 min    Ambulation with AD  Gait with RW in and out of session, 200 ft x3 with cues to keep RW closer to body, cues to lift L foot when ambulating to/from bathroom (no toe slide)      Switched shoes and applied L toe slide to loaner shoe. Pt " "ambulated with RW x 300 feet with use of green theraband around her pelvis and affixed to RW to provide more tactile cue to keep RW closer to body vs verbal cue. Noted better success with L step length and posture with tactile cue, and pt was able to have conversation discussing recipes during ambulation as cognitive task, no LOB        Gait with RW and L MAFO, Cl S. Noted catching of L toe intermittently. Trialed toe slide on L, ambulated without any toe catch x 150 feet on tile and carpet and close supervision. Removed toe slide and pt exhibited intermittent toe catch again, verifying need for acquisition to prevent falls. Cues needed to keep RW closer to body for safer ambulation and better posture Yes                    Stair negotiation 4,6\" steps with R rail up and down, reciprocal up and down, cl S    Curb negotiation Practice 6-8\" curb with RW    Ramp negotiation Without toe slide and close supervision/RW, no catch observed yes   Outdoor ambulation With RW, cL S from building to van yes        MANUAL  (CPT 94296) TOTAL TIME FOR SESSION  Not performed   Mobilization      MODALITIES TOTAL TIME FOR SESSION  Not performed   Ice     Heat     ATTENDED E-STIM  (CPT 86561) TOTAL TIME FOR SESSION  Not performed   Attended E-Stim     Unattended E-stim         "

## 2022-05-19 ENCOUNTER — HOSPITAL ENCOUNTER (OUTPATIENT)
Dept: PHYSICAL THERAPY | Facility: REHABILITATION | Age: 57
Setting detail: THERAPIES SERIES
Discharge: HOME | End: 2022-05-19
Attending: INTERNAL MEDICINE
Payer: MEDICARE

## 2022-05-19 ENCOUNTER — APPOINTMENT (OUTPATIENT)
Dept: OTHER | Facility: REHABILITATION | Age: 57
Setting detail: THERAPIES SERIES
End: 2022-05-19
Payer: MEDICARE

## 2022-05-19 ENCOUNTER — DOCUMENTATION (OUTPATIENT)
Dept: SOCIAL WORK | Facility: REHABILITATION | Age: 57
End: 2022-05-19
Payer: MEDICARE

## 2022-05-19 DIAGNOSIS — R26.9 GAIT DISORDER: ICD-10-CM

## 2022-05-19 DIAGNOSIS — R26.89 OTHER ABNORMALITIES OF GAIT AND MOBILITY: Primary | ICD-10-CM

## 2022-05-19 PROCEDURE — 97530 THERAPEUTIC ACTIVITIES: CPT | Mod: GP

## 2022-05-19 PROCEDURE — 97112 NEUROMUSCULAR REEDUCATION: CPT | Mod: GP

## 2022-05-19 ASSESSMENT — GAIT ASSESSMENTS
TUG TIME: 19.3
TUG TIME: 21
TUG TIME: 22.9
TUG TIME: 19.55

## 2022-05-19 ASSESSMENT — BALANCE ASSESSMENTS: TOTAL SCORE: 44

## 2022-05-19 NOTE — OP PT TREATMENT LOG
PT Neuro Exercises Current Session  Performed Today? (y/n)   THER ACT   (CPT 94425) TOTAL TIME FOR SESSION 23-37 min    Pain, vitals, subjective See flow yes   Patient Education Patient and CM educated on outcomes of testing, goals, and POC, as well as scheduling.  Both verbalized understanding.    Pt issued iniital HEP with seated LE exc as noted.  Pt verbalized understanding.    Discussed use of toe slide and how it can help prevent toe catching and LOB. Informed pt that her mother would be contacted to let her know how the process works; pt got her AFO through [facility] and toe slide will go through them as well. Pt verbalized understanding.     Time spent trying to find substitute shoe to be worn with AFO when she needs to leave her own shoe here for modification. Pt brought her own [employer] shoe, but it is too small; she wears a men's 9W to accommodate her AFO, but a women's 9 when not using brace. Trialed shoes from [facility]; found New Balance in a 9W that fit pt's AFO with insert removed. Plan made for this shoe to be reserved and issued to pt when script for toe slide arrives and pt leaves her own shoe for Ted Motley, orthotist from [facility], to apply. Pt and mother verbalized understanding.      5/5 Printed up new calendar and discussed plan with pt to d/c on 5/13. Pt's mother had been contacted to discuss concerns about resuming PT post-operatively (had PT visits on the schedule) and agreed that she will likely have a change in medical status and assessment post-op by MD will determine readiness to resume therapies. May even need home care to start. Pt provided list of upcoming appointments and verified that no conflict would occur in preparation for surgery. Pt was provided with loaner shoe today for L foot in order for orthotist to take pt's shoe for toe slide application (script still pending), but orthotist aware of situation and will coordinate with mother to get pt's shoe back to her when completed  "if she discharges before then. Pt and mother both verbalized understanding.     5/19 Educated pt on benefit of new toe slide; she stated she does not like it but could not describe why. She reports not using brace often at home; reinforced to pt that use of brace along with toe slide is imperative to helping prevent falls. Pt acknowledged education but continues to be rigid and resistant to change at times. Mother contacted by CM to reinforce d/c today; pt was asking for more visits.                                                                                                                    yes   Bathroom Pt utilized bathroom with A of PT at beginning and end of session--supervision for standing balance tasks for hygiene and clothing management, transfers   yes   HEP Pt stated she is \"lazy\" at home, does not do much. Provided sit-stand exercise for HEP and encouraged to do 15 times, 3-4 times a day to get used to doing one thing consistently. Handout provided, pt verbalized understanding. Pt stated she could not find her handout--reprinted and handed to her    Transfer training Supervision sit-stand due to hand placement cues, pt also pushes RW too far from her with SPT yes   Subjective Outcomes Measures     ABC Scale     PSFS assess    THER EX  (CPT 07858) TOTAL TIME FOR SESSION Billed with TA    STRETCHING     Stretching by patient     Stretching by therapist/PROM     CARDIOVASCULAR     Nu Step Trial nustep L1 , LE only   5 min    Stationary Bike     Treadmill     Endurance Testing     2mWT assessed   yes   6mWT Assessed  yes   STRENGTH TRAINING     Standing Ther-Ex STS off EOM w/ cueing for anterior wt shift, 2 x 10  Standing marches at raised mat on one UE, handheld assist on the other 2 x 10  Hip abd, alternating, 10x, facing elevated mat, B UE support  Step touch 5x          Side-lying there-ex     Supine Ther-Ex SAQ x 20  Hooklying clamshells x 20  Lower truncal rotation x 20  Bridges x 7--terminated " "due to discomfort in L thigh (resolved quickly but pt not wanting to continue)      Seated Ther-Ex Marches x10 B  LAQ x10 B with 2\" hold  Ankle pumps x10 B    Trial seated hip add ball squeeze, 3 sec hold 10x   glut sets 10x w/ 3 sec hold              Core exercises Seated- overhead ball lift 10x  chest press with ball or band 10x   trunk rotation with ball 10x  Unsupported bicep curls 15x          Functional Strength Testing     30 sec STS test (60y +) Assessed--see flowsheet yes   5x STS     NEURO RE-ED  (CPT 50833) TOTAL TIME FOR SESSION 30 min    VESTIBULAR     SOT     MSQ     Adaptation     Compensation     Habiutation     COORDINATION     Target Tapping     Coordination ladder               Pushing weighted shopping cart     Cary's     POSTURAL RE-ED     Seated & standing reaching for trunk strengthening Standing reaching out of RACHAEL and across midline for cones on half wall and putting them on seat of chair and floor, then returning them to half wall. No UE support to intermittent 1 UE support, close supervision with 1 minor LOB needing min A    Janeth-scapular strengthening     PRE-GAIT ACTIVITIES      Tap-ups At half wall:  To 6\" block, 2 x 10 w/ 1 UE support in forward direction,  Toe taps on 6\" block laterally 2 x 10 each side, 1 UE support      Step-ups 10 reps with 1-2 UE each LE in forward and lateral direction, touching assist on 6\" block    Standing weight shifting          BALANCE TRAINING     Standing balance - static/dynamic     Tile Stood x 33 min without UE support while playing Sc[employer] (pt's favorite game). No sitting breaks, no agitation. Noted pt to contact L LE against mat when fatigued, but could stand without instability when mat was not against body      Airex Static stdg w/ FA 60 sec      Rockerboard     Hurdles 6\" hurdles, lateral step overs w/ B UE support, fwd steps 2x U/L UE    Split Stance          Dynamic gait      Side stepping 12 ft x 2 w/o UE support    Retro walking 10 ft x 8 " "w/ one UE support    Tandem Walking     Gait with eyes closed          Balance Testing     Mittal Assessed; see flowsheet yes   DGI     TUG Assessed; see flowsheet yes   10mWT Assessed; see flowsheet yes   GAIT TRAINING  (CPT 11808) TOTAL TIME FOR SESSION  Billed with TA   Ambulation with AD  Gait with RW in and out of session, 200 ft x3 with cues to keep RW closer to body, cues to lift L foot when ambulating to/from bathroom (no toe slide)      Switched shoes and applied L toe slide to loaner shoe. Pt ambulated with RW x 300 feet with use of green theraband around her pelvis and affixed to RW to provide more tactile cue to keep RW closer to body vs verbal cue. Noted better success with L step length and posture with tactile cue, and pt was able to have conversation discussing recipes during ambulation as cognitive task, no LOB        Gait with RW and L MAFO, Cl S. Noted catching of L toe intermittently. Trialed toe slide on L, ambulated without any toe catch x 150 feet on tile and carpet and close supervision. Removed toe slide and pt exhibited intermittent toe catch again, verifying need for acquisition to prevent falls. Cues needed to keep RW closer to body for safer ambulation and better posture Yes                    Stair negotiation 4,6\" steps with R rail up and down, reciprocal up and down, cl S    Curb negotiation Practice 6-8\" curb with RW    Ramp negotiation Without toe slide and close supervision/RW, no catch observed      Outdoor ambulation With RW, cL S from building to van         MANUAL  (CPT 94681) TOTAL TIME FOR SESSION  Not performed   Mobilization      MODALITIES TOTAL TIME FOR SESSION  Not performed   Ice     Heat     ATTENDED E-STIM  (CPT 95646) TOTAL TIME FOR SESSION  Not performed   Attended E-Stim     Unattended E-stim         "

## 2022-05-19 NOTE — PROGRESS NOTES
5/19/22: CM was notified that the patient's mom had questions regarding discharge today. CM called the patient's mom and was unable to get through. No voice mail set up. PHILIP Marquez.

## 2022-06-28 ENCOUNTER — TELEPHONE (OUTPATIENT)
Dept: PSYCHOLOGY | Facility: CLINIC | Age: 57
End: 2022-06-28
Payer: MEDICARE

## 2022-06-28 NOTE — TELEPHONE ENCOUNTER
Patient called on 06/27/2022 from 9:30-9:45am. Information and resources were provided for potential outpatient psychotherapy services including services through [facility] (where she receives other clinical services) and [facility]. Patient expressed gratitude for the service.

## 2022-07-19 ENCOUNTER — TRANSCRIBE ORDERS (OUTPATIENT)
Dept: SCHEDULING | Facility: REHABILITATION | Age: 57
End: 2022-07-19

## 2022-07-19 DIAGNOSIS — R26.9 UNSPECIFIED ABNORMALITIES OF GAIT AND MOBILITY: Primary | ICD-10-CM

## 2022-09-07 ENCOUNTER — HOSPITAL ENCOUNTER (OUTPATIENT)
Dept: PHYSICAL THERAPY | Facility: REHABILITATION | Age: 57
Setting detail: THERAPIES SERIES
Discharge: HOME | End: 2022-09-07
Attending: INTERNAL MEDICINE
Payer: MEDICARE

## 2022-09-07 DIAGNOSIS — R26.9 UNSPECIFIED ABNORMALITIES OF GAIT AND MOBILITY: ICD-10-CM

## 2022-09-07 PROCEDURE — 97163 PT EVAL HIGH COMPLEX 45 MIN: CPT

## 2022-09-07 PROCEDURE — 97530 THERAPEUTIC ACTIVITIES: CPT | Mod: GP

## 2022-09-07 RX ORDER — LISINOPRIL AND HYDROCHLOROTHIAZIDE 10; 12.5 MG/1; MG/1
TABLET ORAL
COMMUNITY

## 2022-09-07 RX ORDER — BUSPIRONE HYDROCHLORIDE 15 MG/1
15 TABLET ORAL
COMMUNITY
Start: 2022-08-19

## 2022-09-07 ASSESSMENT — BALANCE ASSESSMENTS: TOTAL SCORE: 40
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
## 2022-09-07 NOTE — OP PT TREATMENT LOG
PT Neuro Exercises Current Session Time   THER ACT   CPT 08327 TOTAL TIME FOR SESSION 8-22 Minutes   Bed mobility    Transfer training    Assess 6MWT  For endurance and main complaint at this time   Orthotist 9/16/22: Need for new AFO?  Current is 10 years old, good ankle strength though limited motor control, has L shoe slider   Patient Education Patient and  educated on POC, goals and results of assessment    Provide with outpatient scheduling sheet NV    Educated to document activity in journal ie walking with RW 4 hours today prior to use of WC   THER EX  CPT 68489 TOTAL TIME FOR SESSION Not performed   STRETCHING    Stretching by patient and manual by PT BLE   CARDIOVASCULAR    Nu Step    Standing Ther-Ex    Supine Ther-Ex    NEURO RE-ED  CPT 35539 TOTAL TIME FOR SESSION Not performed   COORDINATION    POSTURAL RE-ED    PRE-GAIT ACTIVITIES     BALANCE TRAINING     Standing balance - static    Standing balance - dynamic    Standing balance - varied surface    GAIT TRAINING  CPT 69361 TOTAL TIME FOR SESSION Not performed   Ambulation with AD  RW   Dynamic gait  Add obstacles in small areas (current home) and practice in wider open areas (new home)   Stair negotiation    Curb negotiation    Ramp negotiation    Outdoor ambulation

## 2022-09-12 ENCOUNTER — HOSPITAL ENCOUNTER (OUTPATIENT)
Dept: PHYSICAL THERAPY | Facility: REHABILITATION | Age: 57
Setting detail: THERAPIES SERIES
Discharge: HOME | End: 2022-09-12
Attending: INTERNAL MEDICINE
Payer: MEDICARE

## 2022-09-12 ENCOUNTER — APPOINTMENT (OUTPATIENT)
Dept: OTHER | Facility: REHABILITATION | Age: 57
Setting detail: THERAPIES SERIES
End: 2022-09-12
Payer: MEDICARE

## 2022-09-12 DIAGNOSIS — R26.89 IMBALANCE: ICD-10-CM

## 2022-09-12 DIAGNOSIS — M12.811 ROTATOR CUFF TEAR ARTHROPATHY OF RIGHT SHOULDER: ICD-10-CM

## 2022-09-12 DIAGNOSIS — R26.9 GAIT DISORDER: ICD-10-CM

## 2022-09-12 DIAGNOSIS — R26.89 OTHER ABNORMALITIES OF GAIT AND MOBILITY: ICD-10-CM

## 2022-09-12 DIAGNOSIS — S70.01XA CONTUSION OF RIGHT HIP, INITIAL ENCOUNTER: ICD-10-CM

## 2022-09-12 DIAGNOSIS — M16.11 UNILATERAL PRIMARY OSTEOARTHRITIS, RIGHT HIP: ICD-10-CM

## 2022-09-12 DIAGNOSIS — R26.9 UNSPECIFIED ABNORMALITIES OF GAIT AND MOBILITY: Primary | ICD-10-CM

## 2022-09-12 DIAGNOSIS — M75.101 ROTATOR CUFF TEAR ARTHROPATHY OF RIGHT SHOULDER: ICD-10-CM

## 2022-09-12 PROCEDURE — 97110 THERAPEUTIC EXERCISES: CPT | Mod: GP

## 2022-09-12 PROCEDURE — 97116 GAIT TRAINING THERAPY: CPT | Mod: GP,KX

## 2022-09-12 PROCEDURE — 97530 THERAPEUTIC ACTIVITIES: CPT | Mod: GP,KX
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
## 2022-09-12 NOTE — OP PT TREATMENT LOG
PT Neuro Exercises Current Session Time   THER ACT   CPT 90873 TOTAL TIME FOR SESSION 8-22 Minutes   Bed mobility Supine<>sit with Cl S; rolling L<>R with Cl S   Transfer training PT assisted patient with getting into transport van to ensure safety - patient using step stool to get in   Assess 6MWT  Patient declined   Orthotist 9/16/22: Need for new AFO?  Current is 10 years old, good ankle strength though limited motor control, has L shoe slider   Patient Education    THER EX  CPT 90945 TOTAL TIME FOR SESSION 23-37 Minutes   STRETCHING    Stretching by patient and manual by PT trialed prone lying x 1 min but patient reporting discomfort so discontinued    CARDIOVASCULAR    Nu Step    Standing Ther-Ex Sit<>stand x 10 reps from low mat    Supine Ther-Ex SLR 3 x 5 B; trialed bridge but patient with L LE pain during bridging   sidelying hip abduction 2 x 10 reps   NEURO RE-ED  CPT 47520 TOTAL TIME FOR SESSION Not performed   COORDINATION    POSTURAL RE-ED    PRE-GAIT ACTIVITIES     BALANCE TRAINING     Standing balance - static    Standing balance - dynamic    Standing balance - varied surface    GAIT TRAINING  CPT 06332 TOTAL TIME FOR SESSION 8-22 Minutes   Ambulation with AD  Ambulation with RW x 100' into session and out of session with AFO with CGA due to decreased L toe clearance in swing. Also ambulation down moderate grade ramp with one instance of mod A due to LOB. Slow good, increased trunk flexion with swing   Dynamic gait     Stair negotiation    Curb negotiation    Ramp negotiation    Outdoor ambulation

## 2022-09-16 ENCOUNTER — HOSPITAL ENCOUNTER (OUTPATIENT)
Dept: PHYSICAL THERAPY | Facility: REHABILITATION | Age: 57
Setting detail: THERAPIES SERIES
Discharge: HOME | End: 2022-09-16
Attending: INTERNAL MEDICINE
Payer: MEDICARE

## 2022-09-16 ENCOUNTER — APPOINTMENT (OUTPATIENT)
Dept: OTHER | Facility: REHABILITATION | Age: 57
Setting detail: THERAPIES SERIES
End: 2022-09-16
Payer: MEDICARE

## 2022-09-16 DIAGNOSIS — R26.9 UNSPECIFIED ABNORMALITIES OF GAIT AND MOBILITY: Primary | ICD-10-CM

## 2022-09-16 PROCEDURE — 97116 GAIT TRAINING THERAPY: CPT | Mod: GP,KX

## 2022-09-16 PROCEDURE — 97110 THERAPEUTIC EXERCISES: CPT | Mod: GP,KX

## 2022-09-16 PROCEDURE — 97530 THERAPEUTIC ACTIVITIES: CPT | Mod: GP,KX
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
## 2022-09-16 NOTE — OP PT TREATMENT LOG
PT Neuro Exercises Current Session Time Done Today   THER ACT   CPT 77466 TOTAL TIME FOR SESSION 23-37 Minutes    Bed mobility Supine<>sit with Cl S; rolling L<>R with Cl S    Transfer training PT assisted patient with getting into transport van to ensure safety - patient using step stool to get in    Assess 6MWT  Patient declined    Orthotist 9/16/22: Met with orthotist, Ted Motley from [facility] [place], operated by [facility], for evaluation for new MAFO.  Pt casted for new MAFO, and chose pattern and strap color.    Brace to be ordered: Custom high temp thermoplastic MAFO with posterior adjustable PF stop, camber axis, varus ankle strap, 3/4 length footplate with padding   yes   Patient Education     THER EX  CPT 08862 TOTAL TIME FOR SESSION 8-22 Minutes    STRETCHING     Stretching by patient and manual by PT trialed prone lying x 1 min but patient reporting discomfort so discontinued     CARDIOVASCULAR 6MWT assessed yes   Nu Step     Standing Ther-Ex Sit<>stand x5 reps from chair w/UE A, x5 without UE A    At half wall:  Hip abd x10 B  Hip ext x10 B  HS curl x10 B  March x10 B Yes to all   Supine Ther-Ex SLR 3 x 5 B; trialed bridge but patient with L LE pain during bridging   sidelying hip abduction 2 x 10 reps    NEURO RE-ED  CPT 80923 TOTAL TIME FOR SESSION Not performed    COORDINATION     POSTURAL RE-ED     PRE-GAIT ACTIVITIES      BALANCE TRAINING      Standing balance - static     Standing balance - dynamic     Standing balance - varied surface     GAIT TRAINING  CPT 14958 TOTAL TIME FOR SESSION 8-22 Minutes    Ambulation with AD  Ambulation with RW into session and out of session with AFO with CGA due to decreased L toe clearance in swing. Slow good, increased trunk flexion with swing yes   Dynamic gait      Stair negotiation     Curb negotiation     Ramp negotiation Amb with RW down long indoor ramp at end of session, min A yes   Outdoor ambulation

## 2022-09-21 ENCOUNTER — APPOINTMENT (OUTPATIENT)
Dept: OTHER | Facility: REHABILITATION | Age: 57
Setting detail: THERAPIES SERIES
End: 2022-09-21
Payer: MEDICARE

## 2022-09-21 ENCOUNTER — HOSPITAL ENCOUNTER (OUTPATIENT)
Dept: PHYSICAL THERAPY | Facility: REHABILITATION | Age: 57
Setting detail: THERAPIES SERIES
Discharge: HOME | End: 2022-09-21
Attending: INTERNAL MEDICINE
Payer: MEDICARE

## 2022-09-21 DIAGNOSIS — R26.9 UNSPECIFIED ABNORMALITIES OF GAIT AND MOBILITY: Primary | ICD-10-CM

## 2022-09-21 DIAGNOSIS — R26.9 GAIT DISORDER: ICD-10-CM

## 2022-09-21 DIAGNOSIS — R26.89 OTHER ABNORMALITIES OF GAIT AND MOBILITY: ICD-10-CM

## 2022-09-21 PROCEDURE — 97530 THERAPEUTIC ACTIVITIES: CPT | Mod: GP,KX

## 2022-09-21 PROCEDURE — 97110 THERAPEUTIC EXERCISES: CPT | Mod: GP,KX

## 2022-09-21 PROCEDURE — 97116 GAIT TRAINING THERAPY: CPT | Mod: GP,KX
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
## 2022-09-21 NOTE — OP PT TREATMENT LOG
PT Neuro Exercises Current Session Time Done Today   THER ACT   CPT 24242 TOTAL TIME FOR SESSION 8-22 Minutes    Bed mobility Supine<>sit with modified independence, increased time yes   Transfer training PT assisted patient with getting into transport van to ensure safety - patient using step stool to get in    Close supervision for sit-stand and SPT, including toilet transfers (pt with bathroom break required mid-way through session)         yes   Assess 6MWT  Patient declined    Orthotist 9/16/22: Met with orthotist, Ted Motley from [facility], for evaluation for new MAFO.  Pt casted for new MAFO, and chose pattern and strap color.    Brace to be ordered: Custom high temp thermoplastic MAFO with posterior adjustable PF stop, camber axis, varus ankle strap, 3/4 length footplate with padding      Patient Education Increased time to allow pt to calm in private room due to anxiety. Pt verbalized understanding of plan to perform exercises today in session to increase endurance and strength yes   THER EX  CPT 42626 TOTAL TIME FOR SESSION 8-22 Minutes    STRETCHING     Stretching by patient and manual by PT trialed prone lying x 1 min but patient reporting discomfort so discontinued     Seated L hamstring stretch with LE elevated on therapist's leg 2 x 30       yes   CARDIOVASCULAR 6MWT assessed    Nu Step     Standing Ther-Ex Sit<>stand 2 x 10 reps from mat without UE, 1 LOB second pass with min A to steady    At half wall:  Hip abd x10 B  Hip ext x10 B  HS curl x10 R, initiated on L but c/o pain in posterior thigh L  March x10 B Yes             yes   Supine Ther-Ex  trialed bridge but patient with L LE pain during bridging     sidelying hip abduction 2 x 10 reps    Lower truncal rotation 2 x 10  Hip logroll 2 x 10  SLR 2 x 10  Heelslides 2 x 10     No    No    yes  Yes  Yes  yes   NEURO RE-ED  CPT 77633 TOTAL TIME FOR SESSION Not performed    COORDINATION     POSTURAL RE-ED     PRE-GAIT ACTIVITIES      BALANCE TRAINING       Standing balance - static     Standing balance - dynamic     Standing balance - varied surface     GAIT TRAINING  CPT 31610 TOTAL TIME FOR SESSION 8-22 Minutes    Ambulation with AD  Ambulation with RW into session and out of session with AFO with close supervision due to decreased L toe clearance in swing. Slow good, increased trunk flexion with swing.     Amb x 30 feet x 2 to/from bathroom with close supervision, L AFO, cues to keep closer to walker Yes            yes   Dynamic gait      Stair negotiation     Curb negotiation     Ramp negotiation Amb with RW down long indoor ramp at end of session, close supervision yes   Outdoor ambulation

## 2022-09-26 ENCOUNTER — HOSPITAL ENCOUNTER (OUTPATIENT)
Dept: PHYSICAL THERAPY | Facility: REHABILITATION | Age: 57
Setting detail: THERAPIES SERIES
Discharge: HOME | End: 2022-09-26
Attending: INTERNAL MEDICINE
Payer: MEDICARE

## 2022-09-26 ENCOUNTER — APPOINTMENT (OUTPATIENT)
Dept: OTHER | Facility: REHABILITATION | Age: 57
Setting detail: THERAPIES SERIES
End: 2022-09-26
Payer: MEDICARE

## 2022-09-26 DIAGNOSIS — R26.9 UNSPECIFIED ABNORMALITIES OF GAIT AND MOBILITY: Primary | ICD-10-CM

## 2022-09-26 DIAGNOSIS — R26.89 OTHER ABNORMALITIES OF GAIT AND MOBILITY: ICD-10-CM

## 2022-09-26 PROCEDURE — 97110 THERAPEUTIC EXERCISES: CPT | Mod: GP,KX

## 2022-09-26 PROCEDURE — 97112 NEUROMUSCULAR REEDUCATION: CPT | Mod: GP,KX

## 2022-09-26 PROCEDURE — 97530 THERAPEUTIC ACTIVITIES: CPT | Mod: GP,KX

## 2022-09-26 PROCEDURE — 97116 GAIT TRAINING THERAPY: CPT | Mod: GP,KX

## 2022-09-26 NOTE — OP PT TREATMENT LOG
PT Neuro Exercises Current Session Time Done Today   THER ACT   CPT 36404 TOTAL TIME FOR SESSION 8-22 Minutes    Bed mobility Supine<>sit with modified independence, increased time yes   Transfer training PT assisted patient with getting into transport van to ensure safety - patient using step stool to get in    Close supervision for sit-stand and SPT, including toilet transfers (pt with bathroom break required mid-way through session)         yes   Assess 6MWT  Patient declined    Orthotist 9/16/22: Met with orthotist, Ted Motley from [facility], for evaluation for new MAFO.  Pt casted for new MAFO, and chose pattern and strap color.    Brace to be ordered: Custom high temp thermoplastic MAFO with posterior adjustable PF stop, camber axis, varus ankle strap, 3/4 length footplate with padding      Patient Education Increased time to allow pt to calm in private room due to anxiety. Pt verbalized understanding of plan to perform exercises today in session to increase endurance and strength yes   THER EX  CPT 26396 TOTAL TIME FOR SESSION 8-22 Minutes    STRETCHING     Stretching by patient and manual by PT trialed prone lying x 1 min but patient reporting discomfort so discontinued     Seated L hamstring stretch with LE elevated on therapist's leg 2 x 30          CARDIOVASCULAR 6MWT assessed    Nu Step     Standing Ther-Ex Sit<>stand 2 x 10 reps from mat without UE, cS and cues for improved controlled of descent    At half wall or counter:  Hip abd x10 B  Hip ext x10 B  HS curl x10 B  March x10 B Yes         yes    Yes  yes   Supine Ther-Ex  trialed bridge but patient with L LE pain during bridging     sidelying hip abduction 2 x 10 reps    Lower truncal rotation 2 x 10  Hip logroll 2 x 10  SLR 2 x 10  Heelslides 2 x 10     No    No    yes    Yes     NEURO RE-ED  CPT 80500 TOTAL TIME FOR SESSION 8-22 Minutes    COORDINATION     POSTURAL RE-ED     PRE-GAIT ACTIVITIES      BALANCE TRAINING      Standing balance - static      Standing balance - dynamic Ball toss:  FA 10x                  FT 10x  Step and ball bounce 10x L step/10x R step Maxim Yes  Yes  yes   Standing balance - varied surface     GAIT TRAINING  CPT 85194 TOTAL TIME FOR SESSION 8-22 Minutes    Ambulation with AD  Ambulation with RW into session and out of session with AFO with close supervision due to decreased L toe clearance in swing. Slow good, increased trunk flexion with swing.     Amb x 30 feet x 2 to/from bathroom with close supervision, L AFO, cues to keep closer to walker Yes            Yes        Trialed Gait with Monica walker to encourage more upright posture cS  50'     yes   Stair negotiation     Curb negotiation     Ramp negotiation Amb with RW down long indoor ramp at end of session, close supervision yes   Outdoor ambulation
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
## 2022-09-28 ENCOUNTER — HOSPITAL ENCOUNTER (OUTPATIENT)
Dept: PHYSICAL THERAPY | Facility: REHABILITATION | Age: 57
Setting detail: THERAPIES SERIES
Discharge: HOME | End: 2022-09-28
Attending: INTERNAL MEDICINE
Payer: MEDICARE

## 2022-09-28 ENCOUNTER — APPOINTMENT (OUTPATIENT)
Dept: OTHER | Facility: REHABILITATION | Age: 57
Setting detail: THERAPIES SERIES
End: 2022-09-28
Payer: MEDICARE

## 2022-09-28 DIAGNOSIS — R26.9 UNSPECIFIED ABNORMALITIES OF GAIT AND MOBILITY: Primary | ICD-10-CM

## 2022-09-28 PROCEDURE — 97116 GAIT TRAINING THERAPY: CPT | Mod: GP,KX

## 2022-09-28 PROCEDURE — 97110 THERAPEUTIC EXERCISES: CPT | Mod: GP,KX

## 2022-09-28 PROCEDURE — 97530 THERAPEUTIC ACTIVITIES: CPT | Mod: GP,KX

## 2022-09-28 NOTE — OP PT TREATMENT LOG
PT Neuro Exercises Current Session Time Done Today   THER ACT   CPT 96056 TOTAL TIME FOR SESSION 8-22 Minutes    Bed mobility Supine<>sit with modified independence, increased time yes   Transfer training PT assisted patient with getting into transport van to ensure safety - patient using step stool to get in    Close supervision for sit-stand and SPT, including toilet transfers (pt with bathroom break required mid-way through session)         yes   Assess 6MWT  Patient declined    Orthotist 9/16/22: Met with orthotist, Ted Motley from [facility], for evaluation for new MAFO.  Pt casted for new MAFO, and chose pattern and strap color.    Brace to be ordered: Custom high temp thermoplastic MAFO with posterior adjustable PF stop, camber axis, varus ankle strap, 3/4 length footplate with padding      Patient Education Increased time to allow pt to calm in private room due to anxiety. Pt verbalized understanding of plan to perform exercises today in session to increase endurance and strength yes   THER EX  CPT 30003 TOTAL TIME FOR SESSION 8-22 Minutes    STRETCHING     Stretching by patient and manual by PT trialed prone lying x 1 min but patient reporting discomfort so discontinued     Seated L hamstring stretch with LE elevated on therapist's leg 2 x 30    Hamstring EOM 2 x 30sec             Yes   CARDIOVASCULAR 6MWT assessed    Nu Step     Standing Ther-Ex Sit<>stand 2 x 10 reps from mat without UE, cS and cues for improved controlled of descent    At half wall or counter:  Hip abd x10 B  Hip ext x10 B  HS curl x10 B  March x10 B Yes         yes    Yes  yes   Supine Ther-Ex  trialed bridge but patient with L LE pain during bridging - able to complete 3 reps      sidelying hip abduction 2 x 10 reps    Lower truncal rotation 2 x 10  Hip logroll 2 x 10  SLR 2 x 10  Heelslides 2 x 10     Yes    No      yes    Yes     NEURO RE-ED  CPT 72482 TOTAL TIME FOR SESSION 8-22 Minutes    COORDINATION     POSTURAL RE-ED     PRE-GAIT  ACTIVITIES      BALANCE TRAINING      Standing balance - static     Standing balance - dynamic Ball toss:  FA 10x                  FT 10x  Step and ball bounce 10x L step/10x R step Maxim Yes  Yes  yes   Standing balance - varied surface     GAIT TRAINING  CPT 36931 TOTAL TIME FOR SESSION 8-22 Minutes    Ambulation with AD  Ambulation with RW into session and out of session with AFO with close supervision due to decreased L toe clearance in swing. Slow good, increased trunk flexion with swing.     Amb x 30 feet x 2 to/from bathroom with close supervision, L AFO, cues to keep closer to walker Yes            Yes        Trialed Gait with Monica walker to encourage more upright posture cS  50'     yes   Stair negotiation     Curb negotiation     Ramp negotiation Amb with RW down long indoor ramp at end of session, close supervision yes   Outdoor ambulation

## 2022-09-28 NOTE — OP PT TREATMENT LOG
PT Neuro Exercises Current Session Time Done Today   THER ACT   CPT 51607 TOTAL TIME FOR SESSION 8-22 Minutes    Bed mobility Supine<>sit with modified independence  yes   Transfer training PT assisted patient with getting into transport van to ensure safety - patient using step stool to get in    Close supervision for sit-stand and SPT, including toilet transfers (pt with bathroom break required end of session)         yes   Assess 6MWT  Patient declined    Orthotist 9/16/22: Met with orthotist, Ted Motley from [facility], for evaluation for new MAFO.  Pt casted for new MAFO, and chose pattern and strap color.    Brace to be ordered: Custom high temp thermoplastic MAFO with posterior adjustable PF stop, camber axis, varus ankle strap, 3/4 length footplate with padding      Patient Education Increased time to allow pt to calm in private room due to anxiety. Pt verbalized understanding of plan to perform exercises today in session to increase endurance and strength yes   THER EX  CPT 75789 TOTAL TIME FOR SESSION 8-22 Minutes    STRETCHING     Stretching by patient and manual by PT trialed prone lying x 1 min but patient reporting discomfort so discontinued     Seated L hamstring stretch with LE elevated on therapist's leg 2 x 30    Hamstring stretch by PT 2 x 30sec             Yes   CARDIOVASCULAR 6MWT assessed    Nu Step     Standing Ther-Ex Sit<>stand 2 x 10 reps from mat without UE, cS and cues for improved controlled of descent    At half wall or counter:  Hip abd x10 B  Hip ext x10 B  HS curl x10 B  March x10 B Yes         no    no  no   Supine Ther-Ex  trialed bridge but patient with L LE pain during bridging - able to complete 3 reps      sidelying hip abduction 2 x 10 reps    Lower truncal rotation 2 x 10  Hip logroll 2 x 10  SLR 2 x 10  Heelslides 2 x 10     Yes    No      yes    Yes     NEURO RE-ED  CPT 86797 TOTAL TIME FOR SESSION 0-7 Minutes    COORDINATION     POSTURAL RE-ED     PRE-GAIT ACTIVITIES       BALANCE TRAINING      Standing balance - static     Standing balance - dynamic Ball toss:  FA 10x                  FT 10x  Step and ball bounce 10x L step/10x R step Maxim no  no  no   Standing balance - varied surface     GAIT TRAINING  CPT 54966 TOTAL TIME FOR SESSION 23-37 Minutes    Ambulation with AD  Ambulation with RW into session and out of session with AFO with close supervision due to decreased L toe clearance in swing. Slow good, increased trunk flexion with swing.     Amb x 30 feet x 2 to/from bathroom with close supervision, L AFO, cues to keep closer to walker Yes            Yes        Trialed Gait with Monica walker to encourage more upright posture cS  75' x 3 trials - standing and seated rest breaks needed; cued to prevent scissoring gait     yes   Stair negotiation     Curb negotiation     Ramp negotiation Amb with RW down long indoor ramp at end of session, close supervision no   Outdoor ambulation
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
## 2022-10-03 ENCOUNTER — DOCUMENTATION (OUTPATIENT)
Dept: PHYSICAL THERAPY | Facility: REHABILITATION | Age: 57
End: 2022-10-03
Payer: MEDICARE

## 2022-10-03 ENCOUNTER — APPOINTMENT (OUTPATIENT)
Dept: OTHER | Facility: REHABILITATION | Age: 57
Setting detail: THERAPIES SERIES
End: 2022-10-03
Payer: MEDICARE

## 2022-10-03 ENCOUNTER — HOSPITAL ENCOUNTER (OUTPATIENT)
Dept: PHYSICAL THERAPY | Facility: REHABILITATION | Age: 57
Setting detail: THERAPIES SERIES
Discharge: HOME | End: 2022-10-03
Attending: INTERNAL MEDICINE
Payer: MEDICARE

## 2022-10-03 DIAGNOSIS — R26.9 UNSPECIFIED ABNORMALITIES OF GAIT AND MOBILITY: Primary | ICD-10-CM

## 2022-10-03 PROCEDURE — 97112 NEUROMUSCULAR REEDUCATION: CPT | Mod: GP,KX

## 2022-10-03 PROCEDURE — 97116 GAIT TRAINING THERAPY: CPT | Mod: GP,KX

## 2022-10-03 NOTE — PROGRESS NOTES
Called Dr Pitts's office for script for new AFO 9/28/22 and left detailed info.  Called again 10/3/22 and they had the wrong fax number.  Refaxed again today.

## 2022-10-03 NOTE — OP PT TREATMENT LOG
PT Neuro Exercises Current Session Time Done Today   THER ACT   CPT 92853 TOTAL TIME FOR SESSION 0-7 Minutes    Bed mobility Supine<>sit with modified independence     Transfer training PT assisted patient with getting into transport van to ensure safety - patient using step stool to get in    Close supervision for sit-stand and SPT, including toilet transfers (pt with bathroom break required mid session)         yes   Assess 6MWT  Patient declined    Orthotist 9/16/22: Met with orthotist, Ted Motley from [facility], for evaluation for new MAFO.  Pt casted for new MAFO, and chose pattern and strap color.    Brace to be ordered: Custom high temp thermoplastic MAFO with posterior adjustable PF stop, camber axis, varus ankle strap, 3/4 length footplate with padding      Patient Education Increased time to allow pt to calm in private room due to anxiety. Pt verbalized understanding of plan to perform exercises today in session to increase endurance and strength Yes - no issues with anxiety   THER EX  CPT 62454 TOTAL TIME FOR SESSION Not performed    STRETCHING     Stretching by patient and manual by PT trialed prone lying x 1 min but patient reporting discomfort so discontinued     Seated L hamstring stretch with LE elevated on therapist's leg 2 x 30    Hamstring stretch by PT 2 x 30sec    CARDIOVASCULAR 6MWT assessed    Nu Step     Standing Ther-Ex Sit<>stand 2 x 10 reps from mat without UE, cS and cues for improved controlled of descent    At half wall or counter:  Hip abd x10 B  Hip ext x10 B  HS curl x10 B  March x10 B    Supine Ther-Ex  trialed bridge but patient with L LE pain during bridging - able to complete 3 reps      sidelying hip abduction 2 x 10 reps    Lower truncal rotation 2 x 10  Hip logroll 2 x 10  SLR 2 x 10  Heelslides 2 x 10        NEURO RE-ED  CPT 30950 TOTAL TIME FOR SESSION 23-37 Minutes    COORDINATION     POSTURAL RE-ED     PRE-GAIT ACTIVITIES  STS, FA, no UE push off  1/4 turns no AD, B  directions steadying A and cues to complete in 2 steps, widened RACHAEL y  y   BALANCE TRAINING      Standing balance - static     Standing balance - dynamic Ball toss:  FA                    Split stance (switch lead leg)                 With conversation and cues for upright posture                 Step and toss ball 10x L step/10x R                    All completed with gait belt and steadying A.  No LOB y  y  y    y    y       Standing balance - varied surface     GAIT TRAINING  CPT 50539 TOTAL TIME FOR SESSION 23-37 Minutes    Ambulation with AD gait belt and AFO Ambulation with RW into session and out of session with AFO with close supervision due to decreased L toe clearance in swing. Slow good, increased trunk flexion with swing.   Note:  End of session pt was independently internalizing cues for widening RACHAEL and slowing down for improved stability    Walking to/from bathroom Yes        Yes        Yes        Trialed Gait with Sundeep walker to encourage more upright posture, widened RACHAEL, shorter step length to dec scissoring;  Manual control of sundeep walker to prevent too far in front  200'  - standing rest breaks;       yes   Walk no AD:  Gait belt and AFO Maxim  Visual cues on floor for widened RACHAEL (keep L foot on green tiles, R foot on white tiles.  Or use line on floor)  -add ball toss for dual task - heavier stoop results Yes all   Stair negotiation     Curb negotiation     Ramp negotiation Amb with RW down long indoor ramp at end of session, close supervision no   Outdoor ambulation
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
## 2022-10-05 ENCOUNTER — HOSPITAL ENCOUNTER (OUTPATIENT)
Dept: PHYSICAL THERAPY | Facility: REHABILITATION | Age: 57
Setting detail: THERAPIES SERIES
Discharge: HOME | End: 2022-10-05
Attending: INTERNAL MEDICINE
Payer: MEDICARE

## 2022-10-05 ENCOUNTER — APPOINTMENT (OUTPATIENT)
Dept: OTHER | Facility: REHABILITATION | Age: 57
Setting detail: THERAPIES SERIES
End: 2022-10-05
Payer: MEDICARE

## 2022-10-05 DIAGNOSIS — R26.9 UNSPECIFIED ABNORMALITIES OF GAIT AND MOBILITY: Primary | ICD-10-CM

## 2022-10-05 DIAGNOSIS — R26.89 OTHER ABNORMALITIES OF GAIT AND MOBILITY: ICD-10-CM

## 2022-10-05 PROCEDURE — 97530 THERAPEUTIC ACTIVITIES: CPT | Mod: GP,KX

## 2022-10-05 PROCEDURE — 97110 THERAPEUTIC EXERCISES: CPT | Mod: GP,KX

## 2022-10-05 PROCEDURE — 97116 GAIT TRAINING THERAPY: CPT | Mod: GP,KX

## 2022-10-05 ASSESSMENT — BALANCE ASSESSMENTS: TOTAL SCORE: 42

## 2022-10-05 NOTE — OP PT TREATMENT LOG
PT Neuro Exercises Current Session Time Done Today   THER ACT   CPT 72887 TOTAL TIME FOR SESSION 23-37 Minutes    Bed mobility Supine<>sit with modified independence     Transfer training PT assisted patient with getting into transport van to ensure safety - patient using step stool to get in    Close supervision for sit-stand and SPT, including toilet transfers (pt with bathroom break required mid session)         yes   Assess 6MWT  735 ft RW    Progress note BBS, 10MWT, 6MWT Yes   Orthotist 9/16/22: Met with orthotist, Ted Motley from [facility], for evaluation for new MAFO.  Pt casted for new MAFO, and chose pattern and strap color.    Brace to be ordered: Custom high temp thermoplastic MAFO with posterior adjustable PF stop, camber axis, varus ankle strap, 3/4 length footplate with padding      Patient Education Increased time to allow pt to calm in private room due to anxiety. Pt verbalized understanding of plan to perform exercises today in session to increase endurance and strength Yes - no issues with anxiety   Patient education Lumbar roll and pillow position at night to avoid neck pain in am and numbness L foot with prolonged sit=review next session  Y   THER EX  CPT 27722 TOTAL TIME FOR SESSION 8-22 Minutes    STRETCHING     Stretching by patient and manual by PT trialed prone lying x 1 min but patient reporting discomfort so discontinued     Seated L hamstring stretch with LE elevated on therapist's leg 2 x 30    Hamstring stretch by PT 2 x 30sec    CARDIOVASCULAR     Nu Step Patient does not like to perform     Standing Ther-Ex Sit<>stand 2 x 10 reps from mat without UE, cS and cues for improved controlled of descent    At half wall or counter:  Hip abd x10 B  Hip ext x10 B  HS curl x10 B  March x10 B         Y  Y  Y  Y   Supine Ther-Ex  trialed bridge but patient with L LE pain during bridging - able to complete 3 reps      sidelying hip abduction 2 x 10 reps    Lower truncal rotation 2 x 10  Hip logroll 2  x 10  SLR 2 x 10  Heelslides 2 x 10                   Y  Y   NEURO RE-ED  CPT 94812 TOTAL TIME FOR SESSION 0-7 Minutes    COORDINATION     POSTURAL RE-ED     PRE-GAIT ACTIVITIES  STS, FA, no UE push off  1/4 turns no AD, B directions steadying A and cues to complete in 2 steps, widened RACHAEL    BALANCE TRAINING      Standing balance - static     Standing balance - dynamic Ball toss:  FA                    Split stance (switch lead leg)                 With conversation and cues for upright posture                 Step and toss ball 10x L step/10x R                    All completed with gait belt and steadying A.  No LOB        Standing balance - varied surface     GAIT TRAINING  CPT 95267 TOTAL TIME FOR SESSION 8-22 Minutes    Ambulation with AD gait belt and AFO Ambulation with RW into session and out of session with AFO with close supervision due to decreased L toe clearance in swing. Slow good, increased trunk flexion with swing.   Note:  End of session pt was independently internalizing cues for widening RACHAEL and slowing down for improved stability  Note: 10/5 she was catching the RIGHT toe also    Walking to/from bathroom Yes        Yes        Yes        Trialed Gait with Sundeep walker to encourage more upright posture, widened RACHAEL, shorter step length to dec scissoring;  Manual control of sundeep walker to prevent too far in front  200'  - standing rest breaks;        Walk no AD:  Gait belt and AFO Maxim  Visual cues on floor for widened RACHAEL (keep L foot on green tiles, R foot on white tiles.  Or use line on floor)  -add ball toss for dual task - heavier stoop results    Stair negotiation     Curb negotiation     Ramp negotiation Amb with RW down long indoor ramp at end of session, close supervision no   Outdoor ambulation
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
## 2022-11-09 ENCOUNTER — DOCUMENTATION (OUTPATIENT)
Dept: PHYSICAL THERAPY | Facility: REHABILITATION | Age: 57
End: 2022-11-09
Payer: MEDICARE
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
## 2022-11-09 NOTE — PROGRESS NOTES
PT DISCHARGE NOTE FOR OUTPATIENT THERAPY    Patient: Tarah Holley MRN: [id number]  : 1965 57 y.o.  Referring Physician: No ref. provider found  Date of Visit: 2022      Certification Dates:   through      Total Visit Count: 8    Chief Complaints:  Chief Complaint   Patient presents with    PT Discharge       Precautions:        TODAY'S VISIT:    Time In Session:                 OBJECTIVE MEASUREMENTS/DATA:    None taken    ROM and MMT        Some values may be hidden. Unless noted otherwise, only the newest values recorded on each date are displayed.         PT UE ROM Measurements 22   No data to display.      PT LE ROM Measurements 22   No data to display.      PT Cervical/Lumbar/Other ROM Measurements 22   No data to display.      Hand ROM Measurements 22   No data to display.      PT UE MMT Measurements 22   No data to display.      PT LE MMT 22   Left Hip Flexion (4+/5) good plus   Left Knee Flexion (4/5) good   Left Knee Extension (4+/5) good plus   Left Ankle DF (4+/5) good plus   Left Ankle PF (4+/5) good plus           Outcome Measures    PT OBJECTIVE Outcome Measures 5/19/22 9/7/22 9/16/22 10/5/22   6 Minute Walk Test 532 feet with RW, permanent toe slide L  719.4 ft with RW, L MAFO and toe slider (inc SOB After, /78, HR 72, SpO2 98% n RA) 735   10 Meter Walk Test  17 seconds  14.8 seconds   Gait Speed (m/sec) 0.74 m/sec  RW with L toe slide (permanent) 0.58 m/sec  0.67 m/sec   Five Times Sit to Stand 11 reps from red chair, hands on knees      Mittal Balance Score 44 40  42   TUG (Mean) 21      TUG - Results with RW and L MAFO, toe slide (permanent) on L. Still above threshold for increased risk for falls, needing cues and supervision for safety.         PT SUBJECTIVE Outcome Measures 5/19/22 9/7/22 9/16/22 10/5/22   PSFS Total Score (%)  5 %               Today's Treatment:    Education provided:  None/NA         Goals Addressed                 This  Visit's Progress     PT Neuro Goals        Short term goals   Short Term Goals Time Frame Result Comment/Progress   Pt will be independent with STS and SPT transfers With RW 4 weeks Met    Assess for new AFO needs 1 week Met Casted delivery pending, script sent to orthotist   Patient and caregiver with be compliant with supervision for HEP 4 weeks Ongoing    Assess 6MWT and 50% age norm 4 weeks Partially Met 735 ft (age norm 1765 ft-50% 882ft)   Tolerate 10  min of CV activity with VSS 4 weeks Partially met Patient does not like to perform bikes or nustep) though able to do 6 MWT with greater stamina   BBS increase 4-6 points from 40/56 to reduce fall risk 4 weeks Not met 42/56   Progress gait speed >0.75 m/sec with RW (as 5/2022) without decline in safety or quality 4 weeks Not met 0.67 m/s RW   Pt and caregiver will be mod I with HEP to relocate to [facility] 4 weeks Ongoing      NO SHOW DC DUE TO HOSPITALIZATION AND STILL AWAITING BRACE            Discharge information for CARF:    Was care interrupted for medical reason?: Yes  Care was interrupted due to hospitalization?: Yes  Skin integrity: Unable to assess

## 2022-12-15 ENCOUNTER — HOSPITAL ENCOUNTER (EMERGENCY)
Facility: HOSPITAL | Age: 57
Discharge: NURSING, REHAB OR LONG-TERM CARE | End: 2022-12-15
Attending: SURGERY

## 2022-12-15 ENCOUNTER — APPOINTMENT (EMERGENCY)
Dept: VASCULAR ULTRASOUND | Facility: HOSPITAL | Age: 57
End: 2022-12-15

## 2022-12-15 ENCOUNTER — APPOINTMENT (EMERGENCY)
Dept: RADIOLOGY | Facility: HOSPITAL | Age: 57
End: 2022-12-15

## 2022-12-15 ENCOUNTER — APPOINTMENT (EMERGENCY)
Dept: CT IMAGING | Facility: HOSPITAL | Age: 57
End: 2022-12-15

## 2022-12-15 VITALS
WEIGHT: 227.96 LBS | TEMPERATURE: 98.3 F | SYSTOLIC BLOOD PRESSURE: 179 MMHG | OXYGEN SATURATION: 97 % | HEART RATE: 71 BPM | DIASTOLIC BLOOD PRESSURE: 78 MMHG | RESPIRATION RATE: 18 BRPM
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
## 2022-12-15 LAB
BASE EXCESS BLDA CALC-SCNC: 3 MMOL/L (ref -2–3)
CA-I BLD-SCNC: 1.15 MMOL/L (ref 1.12–1.32)
GLUCOSE SERPL-MCNC: 114 MG/DL (ref 65–140)
HCO3 BLDA-SCNC: 27.8 MMOL/L (ref 24–30)
HCT VFR BLD CALC: 34 % (ref 34.8–46.1)
HGB BLDA-MCNC: 11.6 G/DL (ref 11.5–15.4)
HOLD SPECIMEN: NORMAL
HOLD SPECIMEN: NORMAL
PCO2 BLD: 29 MMOL/L (ref 21–32)
PCO2 BLD: 41.6 MM HG (ref 42–50)
PH BLD: 7.43 [PH] (ref 7.3–7.4)
PO2 BLD: 39 MM HG (ref 35–45)
POTASSIUM BLD-SCNC: 4.1 MMOL/L (ref 3.5–5.3)
SAO2 % BLD FROM PO2: 76 % (ref 60–85)
SODIUM BLD-SCNC: 134 MMOL/L (ref 136–145)
SPECIMEN SOURCE: ABNORMAL

## 2022-12-15 RX ORDER — ACETAMINOPHEN 325 MG/1
650 TABLET ORAL EVERY 4 HOURS PRN
Status: DISCONTINUED | OUTPATIENT
Start: 2022-12-15 | End: 2022-12-15 | Stop reason: HOSPADM

## 2022-12-15 RX ORDER — METHYLPREDNISOLONE SODIUM SUCCINATE 125 MG/2ML
INJECTION, POWDER, LYOPHILIZED, FOR SOLUTION INTRAMUSCULAR; INTRAVENOUS
Status: DISCONTINUED
Start: 2022-12-15 | End: 2022-12-15 | Stop reason: HOSPADM

## 2022-12-15 RX ADMIN — ACETAMINOPHEN 650 MG: 325 TABLET ORAL at 14:32

## 2022-12-15 NOTE — PROCEDURES
POC FAST US    Date/Time: 12/15/2022 11:33 AM  Performed by: Michelle Carrasquillo PA-C  Authorized by:  Michelle Carrasquillo PA-C     Patient location:  Trauma  Other Assisting Provider: No    Procedure details:     Exam Type:  Diagnostic    Indications: blunt abdominal trauma and blunt chest trauma      Assess for:  Intra-abdominal fluid, pericardial effusion and pneumothorax    Technique: extended FAST      Views obtained:  Heart - Pericardial sac, LUQ - Splenorenal space, Suprapubic - Pouch of Pb, RUQ - Colunga's Pouch, Left thorax and Right thorax    Image quality: diagnostic      Image availability:  Images available in PACS and video obtained  FAST Findings:     RUQ (Hepatorenal) free fluid: absent      LUQ (Splenorenal) free fluid: absent      Suprapubic free fluid: absent      Cardiac wall motion: identified      Pericardial effusion: absent    extended FAST (Pulmonary) findings:     Left lung sliding: Present      Right lung sliding: Present    Interpretation:     Impressions: negative

## 2022-12-15 NOTE — H&P
H&P - Trauma   Russell Palomino 62 y o  female MRN: [id number]  Unit/Bed#: ED-30 Encounter: [id number]    Trauma Alert: Level B   Model of Arrival: Ambulance    Trauma Team: Attending Elder Miles and WENDY Choi  Consultants:     None     Assessment/Plan   Active Problems / Assessment:     - Status post unwitnessed fall with acquired coagulopathy secondary to Eliquis use  - Neck pain  - Generalized soreness  - History of TBI  - Minimal right ankle swelling with history of DVT  Plan:     - Trial oral diet  - Ambulation trial   - Oral analgesics as needed  - Obtain further imaging of left lower extremity  History of Present Illness     Chief Complaint: "I'm sore all over "  Mechanism:Fall     HPI:    Russell Palomino is a 62 y o  female who presents after a fall at her group home  Positive head strike on Eliqous  Able to follow commands and answer questions    Review of Systems   Constitutional: Negative  Negative for activity change, appetite change, chills, diaphoresis and fever  HENT: Negative  Negative for ear pain and facial swelling  Eyes: Negative  Negative for photophobia, pain and visual disturbance  Respiratory: Negative  Negative for cough, chest tightness, shortness of breath and wheezing  Cardiovascular: Negative  Negative for chest pain, palpitations and leg swelling  Gastrointestinal: Negative  Negative for abdominal distention, abdominal pain, nausea and vomiting  Endocrine: Negative  Genitourinary: Negative  Negative for difficulty urinating, dysuria, flank pain, frequency and hematuria  Musculoskeletal: Positive for back pain and neck pain  Negative for myalgias  Skin: Negative  Negative for color change, pallor, rash and wound  Allergic/Immunologic: Negative  Neurological: Negative  Negative for dizziness, syncope, weakness, light-headedness, numbness and headaches  Hematological: Negative  Psychiatric/Behavioral: Negative    Negative for agitation, confusion and self-injury  The patient is not nervous/anxious  12-point, complete review of systems was reviewed and negative except as stated above  Historical Information     Past Medical History:   Diagnosis Date   • Anxiety and depression    • Chronic deep vein thrombosis (DVT) (HCC)     LLE   • History of traumatic brain injury    • Hyperlipidemia    • Insomnia    • Obesity    • RUPAL (obstructive sleep apnea)    • Schizoaffective disorder ([facility] [address] )      History reviewed  No pertinent surgical history  History is limited due to Unreliable historian from prior TBI         There is no immunization history on file for this patient  Last Tetanus: Unknown, but believes it was updated recently  Family History: Non-contributory     Meds/Allergies   all current active meds have been reviewed and current meds:   Current Facility-Administered Medications   Medication Dose Route Frequency   • acetaminophen (TYLENOL) tablet 650 mg  650 mg Oral Q4H PRN   • methylPREDNISolone sodium succinate (Solu-MEDROL) 125 MG injection **ADS Override Pull**         Allergies have not been reviewed;   No Known Allergies    Objective   Initial Vitals:   Temperature: 98 3 °F (36 8 °C) (12/15/22 1130)  Pulse: 63 (12/15/22 1130)  Respirations: 20 (12/15/22 1130)  Blood Pressure: 157/91 (12/15/22 1130)    Primary Survey:   Airway:        Status: patent;        Pre-hospital Interventions: none        Hospital Interventions: none  Breathing:        Pre-hospital Interventions: none       Effort: normal       Right breath sounds: normal       Left breath sounds: normal  Circulation:        Rhythm: regular       Rate: regular   Right Pulses Left Pulses    R radial: 2+  R femoral: 2+  R pedal: 2+     L radial: 2+  L femoral: 2+  L pedal: 2+       Disability:        GCS: Eye: 4; Verbal: 5 Motor: 6 Total: 15       Right Pupil: 3 mm;  round;  reactive         Left Pupil:  3 mm;  round;  reactive      R Motor Strength L Motor Strength R : 5/5  R dorsiflex: 5/5  R plantarflex: 5/5 L : 5/5  L dorsiflex: 5/5  L plantarflex: 5/5          Exposure:       Completed: Yes      Secondary Survey:  Physical Exam  Vitals and nursing note reviewed  Exam conducted with a chaperone present  Constitutional:       General: She is awake  She is not in acute distress  Appearance: Normal appearance  She is not ill-appearing, toxic-appearing or diaphoretic  Interventions: She is not intubated  HENT:      Head: Normocephalic and atraumatic  No abrasion, contusion or laceration  Jaw: There is normal jaw occlusion  Right Ear: Hearing and external ear normal  No swelling or tenderness  Left Ear: Hearing and external ear normal  No swelling or tenderness  Nose: Nose normal  No nasal deformity, septal deviation, signs of injury, laceration or nasal tenderness  Mouth/Throat:      Mouth: Mucous membranes are moist       Pharynx: Oropharynx is clear  Eyes:      General: Lids are normal  Vision grossly intact  Extraocular Movements: Extraocular movements intact  Conjunctiva/sclera: Conjunctivae normal       Pupils: Pupils are equal, round, and reactive to light  Neck:      Comments: Cervical collar in place  Cardiovascular:      Rate and Rhythm: Normal rate and regular rhythm  Pulses: Normal pulses  Carotid pulses are 2+ on the right side and 2+ on the left side  Radial pulses are 2+ on the right side and 2+ on the left side  Femoral pulses are 2+ on the right side and 2+ on the left side  Dorsalis pedis pulses are 2+ on the right side and 2+ on the left side  Heart sounds: Normal heart sounds  No murmur heard  No friction rub  No gallop  Pulmonary:      Effort: Pulmonary effort is normal  No tachypnea, bradypnea, accessory muscle usage, prolonged expiration, respiratory distress or retractions  She is not intubated  Breath sounds: Normal breath sounds and air entry  No stridor or decreased air movement  No decreased breath sounds, wheezing, rhonchi or rales  Chest:      Chest wall: No lacerations, deformity, swelling, tenderness or crepitus  Abdominal:      General: Abdomen is flat  Bowel sounds are normal  There is no distension  Palpations: Abdomen is soft  Tenderness: There is no abdominal tenderness  There is no guarding or rebound  Genitourinary:     Comments: Perineum clear  Musculoskeletal:         General: No swelling or deformity  Normal range of motion  Cervical back: Neck supple  Tenderness present  No swelling, deformity or lacerations  Spinous process tenderness and muscular tenderness present  Thoracic back: No swelling, deformity or lacerations  Tenderness: Mild upper thoracic spine tenderness  Lumbar back: No swelling, deformity, lacerations or tenderness  Right lower leg: No deformity, lacerations or tenderness  Edema (Minimal nonpitting right ankle edema) present  Left lower leg: No edema  Comments: There was mild cervical spine tenderness and upper thoracic spine tenderness without step-off or deformity  No paraspinal muscular tenderness in the neck or back  No midline lumbar spine tenderness, step-offs or deformities  Normal range of motion in the bilateral upper extremities without pain, tenderness or deformity  The patient had mild pain of the left lower leg and tenderness of the left calf with bruising laterally  Skin:     General: Skin is warm and dry  Capillary Refill: Capillary refill takes less than 2 seconds  Coloration: Skin is not jaundiced or pale  Findings: Bruising (Small area of bruising over the left lateral calf) present  No abrasion, erythema, laceration, lesion, rash or wound  Neurological:      General: No focal deficit present  Mental Status: She is alert and oriented to person, place, and time  Mental status is at baseline  GCS: GCS eye subscore is 4   GCS verbal subscore is 5  GCS motor subscore is 6  Sensory: Sensation is intact  No sensory deficit  Motor: Motor function is intact  No weakness  Psychiatric:         Mood and Affect: Mood normal          Behavior: Behavior is cooperative  Invasive Devices     Peripheral Intravenous Line  Duration           Peripheral IV 12/15/22 Left Antecubital <1 day              Lab Results:     Imaging Results:   Chest Xray(s):    FAST exam(s): negative   CT Scan(s): HCT   Additional Xray(s): CT C-spine     Other Studies: CT C/A/P - non-contrast    Code Status: No Order  Advance Directive and Living Will:      Power of :    POLST:    I have spent 40 minutes with Patient and family today in which greater than 50% of this time was spent in counseling/coordination of care regarding Diagnostic results, Intructions for management, Patient and family education and Impressions

## 2022-12-15 NOTE — ED NOTES
Patient ambulated using walker with MANDIE Quiroz without issues  Anya Schmitt PA-C made aware of same        Annette Manrique RN  12/15/22 0706

## 2022-12-15 NOTE — QUICK NOTE
Cervical Collar Clearance: The patient had a CT scan of the cervical spine demonstrating no acute injury  On exam, the patient had no midline point tenderness or paresthesias/numbness/weakness in the extremities  The patient had full range of motion (was then able to flex, extend, and rotate head laterally) without pain  There were no distracting injuries and the patient was not intoxicated  The patient's cervical spine was cleared radiologically and clinically  Cervical collar removed at this time       Vee Valladares PA-C  12/15/2022 12:33 PM

## 2022-12-15 NOTE — ED PROVIDER NOTES
Emergency Department Airway Evaluation and Management Form    History  Obtained from: EMS  Patient has no allergy information on record  No chief complaint on file  HPI     61 y/o fall, on eilquis complaining of neck pain, left sided body pain    Airway intact, rest of eval and treatment by trauma team    No past medical history on file  No past surgical history on file  No family history on file  I have reviewed and agree with the history as documented  Review of Systems    Physical Exam  There were no vitals taken for this visit      Physical Exam    ED Medications  Medications - No data to display    Intubation  Procedures    Notes      Final Diagnosis  Final diagnoses:   None       ED Provider  Electronically Signed by     Eric Hutchins MD  12/15/22 7027

## 2022-12-15 NOTE — CASE MANAGEMENT
CM responded to trauma alert  Patient was transported into trauma bay by [facility] for evaluation  Patient alert and oriented to medical team's questions and instructions  CM met with patient to see who she would like contacted, patient provided mother Kenny Ferreira; [phone]  Call made to Jean-Paul Valdes, general update provided, CM confirmed number is good for updates  Trauma AP aware of above  No current identified CM needs  CM will follow and update screening, assessment, and discharge planning as appropriate

## 2022-12-15 NOTE — DISCHARGE INSTR - AVS FIRST PAGE
Trauma Discharge Instructions:    Please follow-up as instructed  If you need a follow-up appointment, please call the office when you leave to schedule an appointment  Activity:  - PT and OT evaluation and treatment as indicated  - You may resume activity as tolerated  - Walking and normal light activities are encouraged  - Normal daily activities including climbing steps are okay  Return to work:    - You may return to work once cleared by the Phi Mathur  Diet:    - You may resume your normal diet  Medications:  - You should continue your current medication regimen after discharge unless otherwise instructed  Please refer to your discharge medication list for further details  - Please take the pain medications as directed  Additional Instructions:  - May shower daily   - If you have any questions or concerns after discharge please call the office   - Call office or return to ER if fever greater than 101, chills, persistent nausea/vomiting, worsening/uncontrollable pain, develop productive cough, increasing shortness of breath, difficulty breathing, and/or increasing redness or purulent/foul smelling drainage from incision(s)

## 2022-12-16 NOTE — QUICK NOTE
Per group home nurse patient was sent to the ER due to right leg swelling and concern of DVT  Bilateral lower extremity venous duplex completed and no acute clots noted  No chronic DVT in RLE, Chronic DVT in left which was previously known  Patient non-tender over right lower extremity and she has FROM at all joints without pain  Discussed findings with RN from group home and patient will be discharged tonight

## 2022-12-19 ENCOUNTER — DOCUMENTATION (OUTPATIENT)
Dept: SOCIAL WORK | Facility: REHABILITATION | Age: 57
End: 2022-12-19
Payer: MEDICARE

## 2022-12-19 NOTE — PROGRESS NOTES
12/19/22: CM called the patient's brother regarding financial documentation to support the need for scholarship funds. CM left a detailed message. PHILIP Marquez.

## 2022-12-21 NOTE — PROGRESS NOTES
"Anesthesia Transfer of Care Note    Patient: Tres Vargas    Procedure(s) Performed: colonoscopy    Patient location: GI    Anesthesia Type: general and MAC    Transport from OR: Transported from OR on room air with adequate spontaneous ventilation    Post pain: adequate analgesia    Post assessment: no apparent anesthetic complications    Post vital signs: stable    Level of consciousness: responds to stimulation    Nausea/Vomiting: no nausea/vomiting    Complications: none    Transfer of care protocol was followed      Last vitals:   Visit Vitals  BP (!) 93/50   Pulse 66   Temp 36.4 °C (97.6 °F)   Resp 20   Ht 5' 11" (1.803 m)   Wt 93.9 kg (207 lb)   SpO2 99%   BMI 28.87 kg/m²     " 12/21/22: CM received a v/m from patient's mom asking for an update on bracing. CM returned the call and mom was unavailable. CM left a message. PHILIP Marquez.

## 2022-12-26 ENCOUNTER — DOCUMENTATION (OUTPATIENT)
Dept: SOCIAL WORK | Facility: REHABILITATION | Age: 57
End: 2022-12-26
Payer: MEDICARE

## 2022-12-26 NOTE — PROGRESS NOTES
Received VM from mother who eager for patients braces to be delievered. Patient is home until 1/2/23.

## 2023-01-20 ENCOUNTER — DOCUMENTATION (OUTPATIENT)
Dept: SOCIAL WORK | Facility: REHABILITATION | Age: 58
End: 2023-01-20
Payer: MEDICARE

## 2023-01-20 NOTE — PROGRESS NOTES
1/20/23: CM was notified that the brace being order and paid for through our financial assistance program has been approved but we need an invoice from [facility]. CM called and left two message for Ted Motley and their billing department requesting an invoice for payment. Team is aware. PHILIP Marquez

## 2023-02-07 ENCOUNTER — DOCUMENTATION (OUTPATIENT)
Dept: SOCIAL WORK | Facility: REHABILITATION | Age: 58
End: 2023-02-07
Payer: MEDICARE

## 2023-02-07 NOTE — PROGRESS NOTES
2/7/23: CM sent a request to the pcp asking for an updated PT rx for brace delivery. CM will continue to follow. PHILIP Marquez.

## 2023-02-23 ENCOUNTER — DOCUMENTATION (OUTPATIENT)
Dept: SOCIAL WORK | Facility: REHABILITATION | Age: 58
End: 2023-02-23
Payer: MEDICARE

## 2023-02-23 NOTE — PROGRESS NOTES
2/23/23: CM spoke to the staff member at patient's group home. CM requested a rx for PT so patient can receive her brace delivery here when she is able. Office staff will be faxing it over. PHILIP Marquez.

## 2023-03-09 ENCOUNTER — TRANSCRIBE ORDERS (OUTPATIENT)
Dept: SCHEDULING | Facility: REHABILITATION | Age: 58
End: 2023-03-09

## 2023-03-09 ENCOUNTER — TELEPHONE (OUTPATIENT)
Dept: SCHEDULING | Facility: REHABILITATION | Age: 58
End: 2023-03-09
Payer: MEDICARE

## 2023-03-09 DIAGNOSIS — M21.372 FOOT DROP, LEFT FOOT: Primary | ICD-10-CM

## 2023-03-16 ENCOUNTER — HOSPITAL ENCOUNTER (OUTPATIENT)
Dept: PHYSICAL THERAPY | Facility: REHABILITATION | Age: 58
Setting detail: THERAPIES SERIES
Discharge: HOME | End: 2023-03-16
Attending: FAMILY MEDICINE
Payer: MEDICARE

## 2023-03-16 DIAGNOSIS — M21.372 FOOT DROP, LEFT FOOT: ICD-10-CM

## 2023-03-16 PROCEDURE — 97162 PT EVAL MOD COMPLEX 30 MIN: CPT | Mod: GP

## 2023-03-16 NOTE — OP PT TREATMENT LOG
PT Neuro Exercises Current Session Time   THER ACT   CPT 19651 TOTAL TIME FOR SESSION 0-7 Minutes   Brace fitting,  Therapist removed old broken mafo, placing broken mafo in the bag(joint is broken on the old mafo on the left lateral aspect).  Old mafo is given to patient and mother, and asked to be discarded as it is not able to be fixed and it is unsafe for use.  New mafo is fitted at first with education provided see below.  Orthotist, Ted Motley is here for appt, and assists in fit of custom left orthotic, needing to improve circumferential fit at calf region on the left leg.      Transfer training    Patient Education Education, complete education given as far as what to do if skin issue from new mafo results, with education to remove the brace and immediately call [employer] [facility] [employer] in [place], [place] for refitting.  Education as to when to wear, and how to bennett and doff the new mafo, with fititng the brace into shoe and then fititng foot into shoe and brace, and then attaching velcro at the calf strap and dosal strap.    Educated to stay in walker when walking for safety.   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      Response: verbalized and demonstrated understanding of all above.    THER EX  CPT 60398 TOTAL TIME FOR SESSION Not performed   STRETCHING    Stretching by patient    CARDIOVASCULAR    Nu Step    Stationary Bike    Treadmill    Standing Ther-Ex    Supine Ther-Ex    NEURO RE-ED  CPT 92078 TOTAL TIME FOR SESSION Not performed   COORDINATION    POSTURAL RE-ED    PRE-GAIT ACTIVITIES     BALANCE TRAINING     Sitting balance    Standing balance - static    Standing balance - dynamic    Standing balance - varied surface    GAIT TRAINING  CPT 09394 TOTAL TIME FOR SESSION 0-7 Minutes   Ambulation with AD     Dynamic gait  See eval form.    Stair  negotiation    Curb negotiation    Ramp negotiation    Outdoor ambulation    BWS/vector training
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
## 2023-03-22 ENCOUNTER — APPOINTMENT (EMERGENCY)
Dept: RADIOLOGY | Facility: HOSPITAL | Age: 58
End: 2023-03-22
Payer: MEDICARE

## 2023-03-22 ENCOUNTER — HOSPITAL ENCOUNTER (EMERGENCY)
Facility: HOSPITAL | Age: 58
Discharge: HOME | End: 2023-03-22
Attending: EMERGENCY MEDICINE
Payer: MEDICARE

## 2023-03-22 VITALS
SYSTOLIC BLOOD PRESSURE: 128 MMHG | BODY MASS INDEX: 40.48 KG/M2 | RESPIRATION RATE: 18 BRPM | WEIGHT: 220 LBS | HEART RATE: 63 BPM | TEMPERATURE: 97.2 F | HEIGHT: 62 IN | OXYGEN SATURATION: 95 % | DIASTOLIC BLOOD PRESSURE: 75 MMHG
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
## 2023-03-22 DIAGNOSIS — S09.90XA CLOSED HEAD INJURY, INITIAL ENCOUNTER: ICD-10-CM

## 2023-03-22 PROCEDURE — 70450 CT HEAD/BRAIN W/O DYE: CPT | Mod: ME

## 2023-03-22 PROCEDURE — 73502 X-RAY EXAM HIP UNI 2-3 VIEWS: CPT | Mod: LT

## 2023-03-22 PROCEDURE — 99284 EMERGENCY DEPT VISIT MOD MDM: CPT | Mod: 25

## 2023-03-22 PROCEDURE — G1004 CDSM NDSC: HCPCS

## 2023-03-22 ASSESSMENT — ENCOUNTER SYMPTOMS
JOINT SWELLING: 0
NAUSEA: 0
SEIZURES: 0
NECK PAIN: 0
DIFFICULTY URINATING: 0
FACIAL SWELLING: 0
ACTIVITY CHANGE: 0
WEAKNESS: 0
AGITATION: 0
HEADACHES: 0
SPEECH DIFFICULTY: 0
BACK PAIN: 0
COUGH: 0
COLOR CHANGE: 0
ARTHRALGIAS: 1
WOUND: 0
DIARRHEA: 0
VOMITING: 0
NECK STIFFNESS: 0
FEVER: 0
SHORTNESS OF BREATH: 0
ABDOMINAL PAIN: 0
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
## 2023-03-22 NOTE — DISCHARGE INSTRUCTIONS
I have enclosed your imaging results.  Bring these to your next doctor's appointment for further testing and evaluation as needed.

## 2023-03-24 NOTE — ED ATTESTATION NOTE
Procedures  Physical Exam  Review of Systems  [facility]    3/24/02918:01 AM  I have personally seen and examined the patient. I personally performed the key components of the encounter and provided a substantive portion of the care and medical decision making for this patient.     I reviewed and agree with the PA/NP/Resident's assessment and plan of care, with any exceptions as documented below.    My focused history, examination, assessment, and plan of care of Tarah Holley is as follows:    The patient presents with a fall about 6 hours ago which she says was mechanical.  She is able to get up and continue about her day and decided she needed to come in.  She is on Eliquis for DVT.  She does also have some TBI, hypertension and bipolar disorder.    Exam: Currently in no distress.  Awake alert and oriented.  Lungs are clear.  Heart S1-S2 without any murmur.  Abdomen soft nontender.  Neuro exam is at baseline.    Impression/Plan/Medical Decision Making: CT scan of the head, cervical spine are negative.  Does have some pain in the hip area but is able to ambulate.  Agree with PA management and discharge.    Vital Signs Review: Vital signs have been reviewed. The oxygen saturation is  SpO2: 95 % which is normal.    This document was created using Dragon dictation software.  There might be some typographical errors due to this technology.    We are in a period of time with increased volumes and decreased capacity.      Wilber Nieves MD  03/24/23 0104    
no

## 2023-04-28 ENCOUNTER — HOSPITAL ENCOUNTER (INPATIENT)
Facility: HOSPITAL | Age: 58
LOS: 2 days | Discharge: HOME | End: 2023-05-01
Attending: EMERGENCY MEDICINE | Admitting: FAMILY MEDICINE

## 2023-04-28 ENCOUNTER — APPOINTMENT (EMERGENCY)
Dept: RADIOLOGY | Facility: HOSPITAL | Age: 58
End: 2023-04-28

## 2023-04-28 DIAGNOSIS — R47.81 SLURRED SPEECH: ICD-10-CM

## 2023-04-28 DIAGNOSIS — R29.90 STROKE-LIKE SYMPTOMS: Primary | ICD-10-CM

## 2023-04-28 DIAGNOSIS — E87.1 HYPONATREMIA: ICD-10-CM

## 2023-04-28 PROBLEM — E78.5 HYPERLIPEMIA: Status: ACTIVE | Noted: 2023-04-28

## 2023-04-28 PROBLEM — Z86.718 HISTORY OF DVT (DEEP VEIN THROMBOSIS): Status: ACTIVE | Noted: 2023-04-28

## 2023-04-28 PROBLEM — F25.9 SCHIZOAFFECTIVE DISORDER (HCC): Status: ACTIVE | Noted: 2023-04-28

## 2023-04-28 PROBLEM — S06.9XAA TBI (TRAUMATIC BRAIN INJURY) (HCC): Status: ACTIVE | Noted: 2023-04-28

## 2023-04-28 LAB
2HR DELTA HS TROPONIN: 1 NG/L
ALBUMIN SERPL BCP-MCNC: 3.1 G/DL (ref 3.5–5)
ALP SERPL-CCNC: 42 U/L (ref 46–116)
ALT SERPL W P-5'-P-CCNC: 15 U/L (ref 12–78)
AMMONIA PLAS-SCNC: 63 UMOL/L (ref 11–35)
ANION GAP SERPL CALCULATED.3IONS-SCNC: 4 MMOL/L (ref 4–13)
APTT PPP: 27 SECONDS (ref 23–37)
AST SERPL W P-5'-P-CCNC: 13 U/L (ref 5–45)
ATRIAL RATE: 66 BPM
BILIRUB DIRECT SERPL-MCNC: 0.05 MG/DL (ref 0–0.2)
BILIRUB SERPL-MCNC: 0.16 MG/DL (ref 0.2–1)
BUN SERPL-MCNC: 10 MG/DL (ref 5–25)
CALCIUM SERPL-MCNC: 7.7 MG/DL (ref 8.3–10.1)
CARDIAC TROPONIN I PNL SERPL HS: 3 NG/L
CARDIAC TROPONIN I PNL SERPL HS: 4 NG/L
CHLORIDE SERPL-SCNC: 98 MMOL/L (ref 96–108)
CO2 SERPL-SCNC: 26 MMOL/L (ref 21–32)
CREAT SERPL-MCNC: 0.54 MG/DL (ref 0.6–1.3)
ERYTHROCYTE [DISTWIDTH] IN BLOOD BY AUTOMATED COUNT: 13.2 % (ref 11.6–15.1)
GFR SERPL CREATININE-BSD FRML MDRD: 105 ML/MIN/1.73SQ M
GLUCOSE SERPL-MCNC: 108 MG/DL (ref 65–140)
GLUCOSE SERPL-MCNC: 117 MG/DL (ref 65–140)
HCT VFR BLD AUTO: 32.1 % (ref 34.8–46.1)
HGB BLD-MCNC: 10.9 G/DL (ref 11.5–15.4)
INR PPP: 1.03 (ref 0.84–1.19)
MAGNESIUM SERPL-MCNC: 2 MG/DL (ref 1.6–2.6)
MCH RBC QN AUTO: 30.2 PG (ref 26.8–34.3)
MCHC RBC AUTO-ENTMCNC: 34 G/DL (ref 31.4–37.4)
MCV RBC AUTO: 89 FL (ref 82–98)
OSMOLALITY UR/SERPL-RTO: 290 MMOL/KG (ref 282–298)
P AXIS: 60 DEGREES
PLATELET # BLD AUTO: 260 THOUSANDS/UL (ref 149–390)
PMV BLD AUTO: 8.7 FL (ref 8.9–12.7)
POTASSIUM SERPL-SCNC: 3.4 MMOL/L (ref 3.5–5.3)
PR INTERVAL: 174 MS
PROT SERPL-MCNC: 5.8 G/DL (ref 6.4–8.4)
PROTHROMBIN TIME: 13.7 SECONDS (ref 11.6–14.5)
QRS AXIS: -22 DEGREES
QRSD INTERVAL: 98 MS
QT INTERVAL: 402 MS
QTC INTERVAL: 421 MS
RBC # BLD AUTO: 3.61 MILLION/UL (ref 3.81–5.12)
SODIUM SERPL-SCNC: 128 MMOL/L (ref 135–147)
T WAVE AXIS: 39 DEGREES
VENTRICULAR RATE: 66 BPM
WBC # BLD AUTO: 5.05 THOUSAND/UL (ref 4.31–10.16)

## 2023-04-28 RX ORDER — PRAVASTATIN SODIUM 20 MG
20 TABLET ORAL DAILY
COMMUNITY

## 2023-04-28 RX ORDER — ASPIRIN 81 MG/1
81 TABLET, CHEWABLE ORAL DAILY
Status: DISCONTINUED | OUTPATIENT
Start: 2023-04-29 | End: 2023-05-01 | Stop reason: HOSPADM

## 2023-04-28 RX ORDER — GABAPENTIN 300 MG/1
300 CAPSULE ORAL 2 TIMES DAILY
Status: DISCONTINUED | OUTPATIENT
Start: 2023-04-28 | End: 2023-05-01 | Stop reason: HOSPADM

## 2023-04-28 RX ORDER — TRAZODONE HYDROCHLORIDE 50 MG/1
50 TABLET ORAL
COMMUNITY

## 2023-04-28 RX ORDER — SENNOSIDES 8.6 MG
2 TABLET ORAL DAILY
Status: DISCONTINUED | OUTPATIENT
Start: 2023-04-29 | End: 2023-04-28

## 2023-04-28 RX ORDER — ZIPRASIDONE HYDROCHLORIDE 40 MG/1
40 CAPSULE ORAL 2 TIMES DAILY WITH MEALS
COMMUNITY

## 2023-04-28 RX ORDER — OMEPRAZOLE 40 MG/1
40 CAPSULE, DELAYED RELEASE ORAL DAILY
COMMUNITY

## 2023-04-28 RX ORDER — ZIPRASIDONE HYDROCHLORIDE 40 MG/1
40 CAPSULE ORAL 2 TIMES DAILY WITH MEALS
Status: DISCONTINUED | OUTPATIENT
Start: 2023-04-28 | End: 2023-05-01 | Stop reason: HOSPADM

## 2023-04-28 RX ORDER — LORATADINE 10 MG/1
10 TABLET ORAL DAILY
COMMUNITY

## 2023-04-28 RX ORDER — NYSTATIN 100000 [USP'U]/G
1 POWDER TOPICAL
Status: DISCONTINUED | OUTPATIENT
Start: 2023-04-28 | End: 2023-05-01 | Stop reason: HOSPADM

## 2023-04-28 RX ORDER — NYSTATIN 100000 [USP'U]/G
1 POWDER TOPICAL
COMMUNITY

## 2023-04-28 RX ORDER — DOCUSATE SODIUM 100 MG/1
100 CAPSULE, LIQUID FILLED ORAL 2 TIMES DAILY
Status: DISCONTINUED | OUTPATIENT
Start: 2023-04-28 | End: 2023-04-28

## 2023-04-28 RX ORDER — GABAPENTIN 300 MG/1
300 CAPSULE ORAL 2 TIMES DAILY
COMMUNITY

## 2023-04-28 RX ORDER — ONDANSETRON 2 MG/ML
4 INJECTION INTRAMUSCULAR; INTRAVENOUS EVERY 6 HOURS PRN
Status: DISCONTINUED | OUTPATIENT
Start: 2023-04-28 | End: 2023-05-01 | Stop reason: HOSPADM

## 2023-04-28 RX ORDER — DULOXETIN HYDROCHLORIDE 60 MG/1
60 CAPSULE, DELAYED RELEASE ORAL DAILY
Status: DISCONTINUED | OUTPATIENT
Start: 2023-04-29 | End: 2023-05-01 | Stop reason: HOSPADM

## 2023-04-28 RX ORDER — ACETAMINOPHEN 325 MG/1
650 TABLET ORAL EVERY 4 HOURS PRN
Status: DISCONTINUED | OUTPATIENT
Start: 2023-04-28 | End: 2023-05-01 | Stop reason: HOSPADM

## 2023-04-28 RX ORDER — CHOLECALCIFEROL (VITAMIN D3) 125 MCG
1 CAPSULE ORAL
COMMUNITY

## 2023-04-28 RX ORDER — LOSARTAN POTASSIUM 100 MG/1
100 TABLET ORAL DAILY
COMMUNITY

## 2023-04-28 RX ORDER — CARBAMAZEPINE 200 MG/1
400 TABLET, EXTENDED RELEASE ORAL
Status: DISCONTINUED | OUTPATIENT
Start: 2023-04-28 | End: 2023-05-01 | Stop reason: HOSPADM

## 2023-04-28 RX ORDER — PANTOPRAZOLE SODIUM 40 MG/1
40 TABLET, DELAYED RELEASE ORAL
Status: DISCONTINUED | OUTPATIENT
Start: 2023-04-29 | End: 2023-05-01 | Stop reason: HOSPADM

## 2023-04-28 RX ORDER — SACCHAROMYCES BOULARDII 250 MG
250 CAPSULE ORAL DAILY
Status: DISCONTINUED | OUTPATIENT
Start: 2023-04-28 | End: 2023-05-01 | Stop reason: HOSPADM

## 2023-04-28 RX ORDER — LORATADINE 10 MG/1
10 TABLET ORAL DAILY
Status: DISCONTINUED | OUTPATIENT
Start: 2023-04-29 | End: 2023-05-01 | Stop reason: HOSPADM

## 2023-04-28 RX ORDER — ZIPRASIDONE HYDROCHLORIDE 40 MG/1
80 CAPSULE ORAL 2 TIMES DAILY WITH MEALS
Status: DISCONTINUED | OUTPATIENT
Start: 2023-04-28 | End: 2023-05-01 | Stop reason: HOSPADM

## 2023-04-28 RX ORDER — BUSPIRONE HYDROCHLORIDE 15 MG/1
15 TABLET ORAL 2 TIMES DAILY
COMMUNITY

## 2023-04-28 RX ORDER — TRAZODONE HYDROCHLORIDE 50 MG/1
50 TABLET ORAL
Status: DISCONTINUED | OUTPATIENT
Start: 2023-04-28 | End: 2023-05-01 | Stop reason: HOSPADM

## 2023-04-28 RX ORDER — SODIUM CHLORIDE 9 MG/ML
100 INJECTION, SOLUTION INTRAVENOUS CONTINUOUS
Status: DISCONTINUED | OUTPATIENT
Start: 2023-04-28 | End: 2023-04-30

## 2023-04-28 RX ORDER — LOSARTAN POTASSIUM 50 MG/1
100 TABLET ORAL DAILY
Status: DISCONTINUED | OUTPATIENT
Start: 2023-04-29 | End: 2023-05-01 | Stop reason: HOSPADM

## 2023-04-28 RX ORDER — FENOFIBRATE 145 MG/1
145 TABLET, COATED ORAL DAILY
Status: DISCONTINUED | OUTPATIENT
Start: 2023-04-29 | End: 2023-05-01 | Stop reason: HOSPADM

## 2023-04-28 RX ORDER — CARBAMAZEPINE 400 MG/1
400 TABLET, EXTENDED RELEASE ORAL
COMMUNITY

## 2023-04-28 RX ORDER — LANOLIN ALCOHOL/MO/W.PET/CERES
6 CREAM (GRAM) TOPICAL
Status: DISCONTINUED | OUTPATIENT
Start: 2023-04-28 | End: 2023-05-01 | Stop reason: HOSPADM

## 2023-04-28 RX ORDER — MELATONIN
1000 DAILY
Status: DISCONTINUED | OUTPATIENT
Start: 2023-04-29 | End: 2023-05-01 | Stop reason: HOSPADM

## 2023-04-28 RX ORDER — CALCIUM CARBONATE 200(500)MG
1000 TABLET,CHEWABLE ORAL DAILY PRN
Status: DISCONTINUED | OUTPATIENT
Start: 2023-04-28 | End: 2023-05-01 | Stop reason: HOSPADM

## 2023-04-28 RX ORDER — ASCORBIC ACID 125 MG
TABLET,CHEWABLE ORAL
COMMUNITY

## 2023-04-28 RX ORDER — FENOFIBRATE 145 MG/1
145 TABLET, COATED ORAL DAILY
COMMUNITY

## 2023-04-28 RX ORDER — ZIPRASIDONE HYDROCHLORIDE 80 MG/1
80 CAPSULE ORAL 2 TIMES DAILY WITH MEALS
COMMUNITY

## 2023-04-28 RX ORDER — DULOXETIN HYDROCHLORIDE 60 MG/1
60 CAPSULE, DELAYED RELEASE ORAL DAILY
COMMUNITY

## 2023-04-28 RX ORDER — PRAVASTATIN SODIUM 20 MG
20 TABLET ORAL DAILY
Status: DISCONTINUED | OUTPATIENT
Start: 2023-04-29 | End: 2023-05-01 | Stop reason: HOSPADM

## 2023-04-28 RX ADMIN — TRAZODONE HYDROCHLORIDE 50 MG: 50 TABLET ORAL at 21:50

## 2023-04-28 RX ADMIN — BUSPIRONE HYDROCHLORIDE 15 MG: 5 TABLET ORAL at 21:50

## 2023-04-28 RX ADMIN — CARBAMAZEPINE 400 MG: 200 TABLET, EXTENDED RELEASE ORAL at 21:45

## 2023-04-28 RX ADMIN — ZIPRASIDONE HYDROCHLORIDE 40 MG: 40 CAPSULE ORAL at 21:52

## 2023-04-28 RX ADMIN — SODIUM CHLORIDE 100 ML/HR: 0.9 INJECTION, SOLUTION INTRAVENOUS at 21:36

## 2023-04-28 RX ADMIN — NYSTATIN 1 APPLICATION.: 100000 POWDER TOPICAL at 21:47

## 2023-04-28 RX ADMIN — ZIPRASIDONE HYDROCHLORIDE 80 MG: 40 CAPSULE ORAL at 21:52

## 2023-04-28 RX ADMIN — IOHEXOL 85 ML: 350 INJECTION, SOLUTION INTRAVENOUS at 14:23

## 2023-04-28 RX ADMIN — MELATONIN 6 MG: at 21:51

## 2023-04-28 RX ADMIN — APIXABAN 5 MG: 5 TABLET, FILM COATED ORAL at 21:51

## 2023-04-28 NOTE — H&P
[facility]  H&P  Name: Donte Milton 62 y o  female I MRN: [id number]  Unit/Bed#: ED 25 I Date of Admission: 4/28/2023   Date of Service: 4/28/2023 I Hospital Day: 0      Assessment/Plan   * Stroke-like symptoms  Assessment & Plan  · presented to the ED as a stroke alert and seen by neurology  · Per neurology - low risk of CVA  · Place in observation for further evaluation and treatment  · Obtain MRI, is positive for CVa will complete stroke pathway workup  · Correct hyponatremia  · Check LFTs and ammonia  · Neurologic deficits are improved and the group home employee at her bedside reports that she is at her baseline    Hyponatremia  Assessment & Plan  · Possibly due to diarrhea  · Pt reports loose stools 2-3 times daily for the past three days  · Denies any fever, abdominal pain  · Supportive care for now  · IVF normal saline  · Will check serum osmo, urine osmo and urine Na  · BMP in AM    Hyperlipemia  Assessment & Plan  · Continue statin and fibrate    Schizoaffective disorder (HCC)  Assessment & Plan  · Continue buspirone, trazodone cymbalta and geodon    History of DVT (deep vein thrombosis)  Assessment & Plan  Has a history of chronic LLE lymphedema  Continue Eliquis    TBI (traumatic brain injury) ([facility] [address] )  Assessment & Plan  · Resident of the [place] [facility]         VTE Pharmacologic Prophylaxis:   Moderate Risk (Score 3-4) - Pharmacological DVT Prophylaxis Ordered: apixaban (Eliquis)  Code Status: Level 1 - Full Code   Discussion with family: Updated  (friend) at bedside  Anticipated Length of Stay: Patient will be admitted on an observation basis with an anticipated length of stay of less than 2 midnights secondary to possible CVA      Total Time Spent on Date of Encounter in care of patient: 40 minutes This time was spent on one or more of the following: performing physical exam; counseling and coordination of care; obtaining or reviewing "history; documenting in the medical record; reviewing/ordering tests, medications or procedures; communicating with other healthcare professionals and discussing with patient's family/caregivers  Chief Complaint: Murtaza Niño thought I had a stroke\"    History of Present Illness:  Dilma Waters is a 62 y o  female with a PMH of TBI who presents with stroke alert  The patient was at an adult day care program and was noted to have slurring of her speech  She presented to the ED for evaluation  Unfortunately she is a poor historian due to her neuropsychiatric conditions  She reports 2-3 days of loose stools  Denies vomiting, abdominal pain or fever  Appetite has been adequate recently  In the ED she was seen by neurology who recommend correction of her electrolyte abnormalities  Review of Systems:  Review of Systems   All other systems reviewed and are negative  Past Medical and Surgical History:   Past Medical History:   Diagnosis Date    Anxiety and depression     Chronic deep vein thrombosis (DVT) (HCC)     LLE    History of traumatic brain injury     Hyperlipidemia     Insomnia     Obesity     RUPAL (obstructive sleep apnea)     Schizoaffective disorder ([facility] [address] )        History reviewed  No pertinent surgical history  Meds/Allergies:  Prior to Admission medications    Medication Sig Start Date End Date Taking?  Authorizing Provider   apixaban (ELIQUIS) 5 mg Take 5 mg by mouth 2 (two) times a day   Yes Historical Provider, MD   Ascorbic Acid (Vitamin C Adult Gummies) 125 MG CHEW Chew   Yes Historical Provider, MD   busPIRone (BUSPAR) 15 mg tablet Take 15 mg by mouth 2 (two) times a day   Yes Historical Provider, MD   carBAMazepine (TEGretol XR) 400 mg 12 hr tablet Take 400 mg by mouth daily at bedtime   Yes Historical Provider, MD   Cholecalciferol 25 MCG (1000 UT) tablet Take 1,000 Units by mouth daily   Yes Historical Provider, MD   DULoxetine (CYMBALTA) 60 mg delayed release capsule Take 60 mg by " mouth daily   Yes Historical Provider, MD   fenofibrate (TRICOR) 145 mg tablet Take 145 mg by mouth daily   Yes Historical Provider, MD   gabapentin (NEURONTIN) 300 mg capsule Take 300 mg by mouth 2 (two) times a day   Yes Historical Provider, MD   loratadine (CLARITIN) 10 mg tablet Take 10 mg by mouth daily   Yes Historical Provider, MD   losartan (COZAAR) 100 MG tablet Take 100 mg by mouth daily   Yes Historical Provider, MD   Melatonin 5 MG TABS Take 1 tablet by mouth daily at bedtime   Yes Historical Provider, MD   MULTIPLE VITAMIN PO Take 1 tablet by mouth in the morning   Yes Historical Provider, MD   nystatin (MYCOSTATIN) powder Apply 1 application  topically daily at bedtime   Yes Historical Provider, MD   omeprazole (PriLOSEC) 40 MG capsule Take 40 mg by mouth daily   Yes Historical Provider, MD   pravastatin (PRAVACHOL) 20 mg tablet Take 20 mg by mouth daily   Yes Historical Provider, MD   Probiotic Product (PROBIOTIC PO) Take 250 mg by mouth in the morning   Yes Historical Provider, MD   traZODone (DESYREL) 50 mg tablet Take 50 mg by mouth daily at bedtime   Yes Historical Provider, MD   ziprasidone (GEODON) 40 mg capsule Take 40 mg by mouth 2 (two) times a day with meals   Yes Historical Provider, MD   ziprasidone (GEODON) 80 mg capsule Take 80 mg by mouth 2 (two) times a day with meals   Yes Historical Provider, MD     I have reveiwed home medications using records provided by [facility]      Allergies: No Known Allergies    Social History:  Marital Status: Unknown   Occupation: none  Patient Pre-hospital Living Situation: [facility]  Patient Pre-hospital Level of Mobility: walks with walker  Patient Pre-hospital Diet Restrictions: chopped foods, supervision  Substance Use History:   Social History     Substance and Sexual Activity   Alcohol Use Not Currently     Social History     Tobacco Use   Smoking Status Never   Smokeless Tobacco Never     Social History     Substance and Sexual Activity Drug Use Never       Family History:  History reviewed  No pertinent family history  Physical Exam:     Vitals:   Blood Pressure: 148/74 (04/28/23 1600)  Pulse: 68 (04/28/23 1600)  Temperature: 98 1 °F (36 7 °C) (04/28/23 1445)  Temp Source: Oral (04/28/23 1445)  Respirations: 20 (04/28/23 1600)  SpO2: 98 % (04/28/23 1600)    Physical Exam  Constitutional:       Appearance: Normal appearance  She is obese  HENT:      Head: Normocephalic and atraumatic  Nose: Nose normal       Mouth/Throat:      Mouth: Mucous membranes are moist       Pharynx: Oropharynx is clear  Eyes:      Extraocular Movements: Extraocular movements intact  Cardiovascular:      Rate and Rhythm: Normal rate and regular rhythm  Pulmonary:      Effort: Pulmonary effort is normal       Breath sounds: No wheezing or rales  Abdominal:      General: There is no distension  Palpations: Abdomen is soft  Tenderness: There is no abdominal tenderness  Musculoskeletal:      Left lower leg: Edema present  Skin:     General: Skin is warm and dry  Neurological:      Mental Status: She is alert and oriented to person, place, and time        Comments: Slightly dysarthric speech   Psychiatric:         Mood and Affect: Mood normal          Behavior: Behavior normal       Comments: inattentive          Additional Data:     Lab Results:  Results from last 7 days   Lab Units 04/28/23  1433   WBC Thousand/uL 5 05   HEMOGLOBIN g/dL 10 9*   HEMATOCRIT % 32 1*   PLATELETS Thousands/uL 260     Results from last 7 days   Lab Units 04/28/23  1433   SODIUM mmol/L 128*   POTASSIUM mmol/L 3 4*   CHLORIDE mmol/L 98   CO2 mmol/L 26   BUN mg/dL 10   CREATININE mg/dL 0 54*   ANION GAP mmol/L 4   CALCIUM mg/dL 7 7*   ALBUMIN g/dL 3 1*   TOTAL BILIRUBIN mg/dL 0 16*   ALK PHOS U/L 42*   ALT U/L 15   AST U/L 13   GLUCOSE RANDOM mg/dL 117     Results from last 7 days   Lab Units 04/28/23  1433   INR  1 03     Results from last 7 days   Lab Units 04/28/23  1504   POC GLUCOSE mg/dl 108               Lines/Drains:  Invasive Devices     Peripheral Intravenous Line  Duration           Peripheral IV 04/28/23 Left;Proximal;Ventral (anterior) Forearm <1 day                    Imaging: Personally reviewed the following imaging: CT head  CTA stroke alert (head/neck)   Final Result by Merry Claude, MD (04/28 1436)      1  Patent major vasculature of the [place] without high-grade stenosis  No aneurysm  2   No hemodynamically significant stenosis or dissection of cervical carotid and vertebral arteries  Findings were directly discussed with Be Godinez at 2:30 p m                              Workstation performed: [id number]         CT stroke alert brain   Final Result by Merry Claude, MD (04/28 1437)      No acute intracranial CT abnormality  Findings were directly discussed with Be Godinez at 2:20 p m  Workstation performed: [id number]         MRI inpatient order    (Results Pending)       EKG and Other Studies Reviewed on Admission:   · EKG: NSR  HR 66     ** Please Note: This note has been constructed using a voice recognition system   **

## 2023-04-28 NOTE — ASSESSMENT & PLAN NOTE
· Possibly due to diarrhea    · Pt reports loose stools 2-3 times daily for the past three days  · Denies any fever, abdominal pain  · Supportive care for now  · IVF normal saline  · Will check serum osmo, urine osmo and urine Na  · BMP in AM
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
## 2023-04-28 NOTE — ED PROVIDER NOTES
History  Chief Complaint   Patient presents with    CVA/TIA-like Symptoms     Slurred speech     HPI    None       Past Medical History:   Diagnosis Date    Anxiety and depression     Chronic deep vein thrombosis (DVT) (HCC)     LLE    History of traumatic brain injury     Hyperlipidemia     Insomnia     Obesity     RUPAL (obstructive sleep apnea)     Schizoaffective disorder ([facility] [address] )        History reviewed  No pertinent surgical history  History reviewed  No pertinent family history  I have reviewed and agree with the history as documented      E-Cigarette/Vaping    E-Cigarette Use Never User      E-Cigarette/Vaping Substances     Social History     Tobacco Use    Smoking status: Never    Smokeless tobacco: Never   Vaping Use    Vaping Use: Never used   Substance Use Topics    Alcohol use: Not Currently    Drug use: Never        Review of Systems    Physical Exam  ED Triage Vitals   Temperature Pulse Respirations Blood Pressure SpO2   04/28/23 1445 04/28/23 1408 04/28/23 1408 04/28/23 1408 04/28/23 1408   98 1 °F (36 7 °C) 67 18 167/82 95 %      Temp Source Heart Rate Source Patient Position - Orthostatic VS BP Location FiO2 (%)   04/28/23 1445 04/28/23 1408 04/28/23 1408 -- --   Oral Monitor Lying        Pain Score       --                    Orthostatic Vital Signs  Vitals:    04/28/23 1408 04/28/23 1410 04/28/23 1445   BP: 167/82  (!) 177/95   Pulse: 67  60   Patient Position - Orthostatic VS: Lying Lying Lying       Physical Exam    ED Medications  Medications   iohexol (OMNIPAQUE) 350 MG/ML injection (SINGLE-DOSE) 85 mL (85 mL Intravenous Given 4/28/23 1423)       Diagnostic Studies  Results Reviewed     Procedure Component Value Units Date/Time    Hepatic function panel [[id number]]     Lab Status: No result Specimen: Blood     Ammonia [[id number]]     Lab Status: No result Specimen: Blood     UA w Reflex to Microscopic w Reflex to Culture [[id number]]     Lab Status: No result Specimen: Urine Osmolality-If this is regarding a toxic alcohol, STOP  Test is not routinely indicated  Please consult medical  on call for further guidance   [[id number]]     Lab Status: No result Specimen: Blood     Osmolality, urine [[id number]]     Lab Status: No result Specimen: Urine     Sodium, urine, random [[id number]]     Lab Status: No result Specimen: Urine     HS Troponin I 4hr [[id number]]     Lab Status: No result Specimen: Blood     HS Troponin 0hr (reflex protocol) [[id number]]  (Normal) Collected: 04/28/23 1433    Lab Status: Final result Specimen: Blood from Arm, Left Updated: 04/28/23 1532     hs TnI 0hr 3 ng/L     HS Troponin I 2hr [[id number]]     Lab Status: No result Specimen: Blood     Fingerstick Glucose (POCT) [[id number]]  (Normal) Collected: 04/28/23 1504    Lab Status: Final result Updated: 04/28/23 1505     POC Glucose 108 mg/dl     Protime-INR [[id number]]  (Normal) Collected: 04/28/23 1433    Lab Status: Final result Specimen: Blood from Arm, Left Updated: 04/28/23 1502     Protime 13 7 seconds      INR 1 03    APTT [[id number]]  (Normal) Collected: 04/28/23 1433    Lab Status: Final result Specimen: Blood from Arm, Left Updated: 04/28/23 1502     PTT 27 seconds     Basic metabolic panel [[id number]]  (Abnormal) Collected: 04/28/23 1433    Lab Status: Final result Specimen: Blood from Arm, Left Updated: 04/28/23 1457     Sodium 128 mmol/L      Potassium 3 4 mmol/L      Chloride 98 mmol/L      CO2 26 mmol/L      ANION GAP 4 mmol/L      BUN 10 mg/dL      Creatinine 0 54 mg/dL      Glucose 117 mg/dL      Calcium 7 7 mg/dL      eGFR 105 ml/min/1 73sq m     Narrative:      [place] guidelines for Chronic Kidney Disease (CKD):     Stage 1 with normal or high GFR (GFR > 90 mL/min/1 73 square meters)    Stage 2 Mild CKD (GFR = 60-89 mL/min/1 73 square meters)    Stage 3A Moderate CKD (GFR = 45-59 mL/min/1 73 square meters)    Stage 3B Moderate CKD (GFR = 30-44 mL/min/1 73 square meters)    Stage 4 Severe CKD (GFR = 15-29 mL/min/1 73 square meters)    Stage 5 End Stage CKD (GFR <15 mL/min/1 73 square meters)  Note: GFR calculation is accurate only with a steady state creatinine    CBC and Platelet [[id number]]  (Abnormal) Collected: 04/28/23 1433    Lab Status: Final result Specimen: Blood from Arm, Left Updated: 04/28/23 1442     WBC 5 05 Thousand/uL      RBC 3 61 Million/uL      Hemoglobin 10 9 g/dL      Hematocrit 32 1 %      MCV 89 fL      MCH 30 2 pg      MCHC 34 0 g/dL      RDW 13 2 %      Platelets 405 Thousands/uL      MPV 8 7 fL                  CTA stroke alert (head/neck)   Final Result by Jessica Nascimento MD (04/28 1436)      1  Patent major vasculature of the [place] without high-grade stenosis  No aneurysm  2   No hemodynamically significant stenosis or dissection of cervical carotid and vertebral arteries  Findings were directly discussed with Marley Diez at 2:30 p m                              Workstation performed: [id number]         CT stroke alert brain   Final Result by Jessica Nascimento MD (04/28 1437)      No acute intracranial CT abnormality  Findings were directly discussed with Marley Diez at 2:20 p m        Workstation performed: [id number]               Procedures  Procedures      ED Course  ED Course as of 04/28/23 1611   Fri Apr 28, 2023   1459 Sodium(!): 128                  Stroke Assessment     Row Name 04/28/23 1547             NIH Stroke Scale    Interval Baseline      Level of Consciousness (1a ) 0      LOC Questions (1b ) 0      LOC Commands (1c ) 0      Best Gaze (2 ) 0      Visual (3 ) 0      Facial Palsy (4 ) 1      Motor Arm, Left (5a ) 0      Motor Arm, Right (5b ) 0      Motor Leg, Left (6a ) 0      Motor Leg, Right (6b ) 0      Limb Ataxia (7 ) 0      Sensory (8 ) 0      Best Language (9 ) 0      Dysarthria (10 ) 1      Extinction and Inattention (11 ) (Formerly Neglect) 0      Total 2 Flowsheet Row Most Recent Value   Thrombolytic Decision Options    Thrombolytic Decision Patient not a candidate  Patient is not a candidate options Bleeding risk  SBIRT 20yo+    Flowsheet Row Most Recent Value   Initial Alcohol Screen: US AUDIT-C     1  How often do you have a drink containing alcohol? 0 Filed at: 04/28/2023 1421   2  How many drinks containing alcohol do you have on a typical day you are drinking? 0 Filed at: 04/28/2023 1421   3a  Male UNDER 65: How often do you have five or more drinks on one occasion? 0 Filed at: 04/28/2023 1421   3b  FEMALE Any Age, or MALE 65+: How often do you have 4 or more drinks on one occassion? 0 Filed at: 04/28/2023 1421   Audit-C Score 0 Filed at: 04/28/2023 1421   RAHEL: How many times in the past year have you    Used an illegal drug or used a prescription medication for non-medical reasons? Never Filed at: 04/28/2023 1421                MDM      Disposition  Final diagnoses:   Stroke-like symptoms   Slurred speech   Hyponatremia     Time reflects when diagnosis was documented in both MDM as applicable and the Disposition within this note     Time User Action Codes Description Comment    4/28/2023  2:03 PM Hunter Slain A Add [R29 90] Stroke-like symptoms     4/28/2023  3:08 PM Luis Carlos Cons Add [R47 81] Slurred speech     4/28/2023  3:08 PM Luis Carlos Cons Add [E87 1] Hyponatremia       ED Disposition     ED Disposition   Admit    Condition   Stable    Date/Time   Fri Apr 28, 2023  4:10 PM    Comment   Case was discussed with ED and the patient's admission status was agreed to be Admission Status: observation status to the service of Dr Mak Gupta  Follow-up Information    None         Patient's Medications    No medications on file     No discharge procedures on file  PDMP Review     None           ED Provider  Attending physically available and evaluated Mahendra Manuelgurdeep WILSON managed the patient along with the ED Attending      Electronically Signed by suspect an acute stroke  Spoke with [facility] attending who agreed to take patient to their service  Patient admitted in stable condition  Hyponatremia: acute illness or injury  Stroke-like symptoms: acute illness or injury  Amount and/or Complexity of Data Reviewed  Labs: ordered  Decision-making details documented in ED Course  Radiology: ordered  Risk  Prescription drug management  Decision regarding hospitalization  Disposition  Final diagnoses:   Stroke-like symptoms   Slurred speech   Hyponatremia     Time reflects when diagnosis was documented in both MDM as applicable and the Disposition within this note     Time User Action Codes Description Comment    4/28/2023  2:03 PM Wilver CROWLEY Add [R29 90] Stroke-like symptoms     4/28/2023  3:08 PM Arnulfo Manriquez Add [R47 81] Slurred speech     4/28/2023  3:08 PM Arnulfo Manriquez Add [E87 1] Hyponatremia       ED Disposition     ED Disposition   Admit    Condition   Stable    Date/Time   Fri Apr 28, 2023  4:10 PM    Comment   Case was discussed with ED and the patient's admission status was agreed to be Admission Status: observation status to the service of Dr Darrell Lynn  Follow-up Information    None         Patient's Medications    No medications on file     No discharge procedures on file  PDMP Review     None           ED Provider  Attending physically available and evaluated Elsy Sender  I managed the patient along with the ED Attending      Electronically Signed by         Alli Franco DO  05/11/23 9886 No
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
## 2023-04-28 NOTE — ASSESSMENT & PLAN NOTE
· presented to the ED as a stroke alert and seen by neurology  · Per neurology - low risk of CVA  · Place in observation for further evaluation and treatment  · Obtain MRI, is positive for CVa will complete stroke pathway workup  · Correct hyponatremia  · Check LFTs and ammonia  · Neurologic deficits are improved and the group home employee at her bedside reports that she is at her baseline
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
## 2023-04-29 ENCOUNTER — APPOINTMENT (INPATIENT)
Dept: RADIOLOGY | Facility: HOSPITAL | Age: 58
End: 2023-04-29

## 2023-04-29 PROBLEM — E87.1 HYPONATREMIA: Status: RESOLVED | Noted: 2023-04-28 | Resolved: 2023-04-29

## 2023-04-29 LAB
ANION GAP SERPL CALCULATED.3IONS-SCNC: 4 MMOL/L (ref 4–13)
BACTERIA UR QL AUTO: ABNORMAL /HPF
BILIRUB UR QL STRIP: NEGATIVE
BUN SERPL-MCNC: 8 MG/DL (ref 5–25)
CALCIUM SERPL-MCNC: 8.9 MG/DL (ref 8.3–10.1)
CHLORIDE SERPL-SCNC: 109 MMOL/L (ref 96–108)
CHOLEST SERPL-MCNC: 184 MG/DL
CLARITY UR: CLEAR
CO2 SERPL-SCNC: 27 MMOL/L (ref 21–32)
COLOR UR: ABNORMAL
CREAT SERPL-MCNC: 0.69 MG/DL (ref 0.6–1.3)
ERYTHROCYTE [DISTWIDTH] IN BLOOD BY AUTOMATED COUNT: 13.2 % (ref 11.6–15.1)
GFR SERPL CREATININE-BSD FRML MDRD: 96 ML/MIN/1.73SQ M
GLUCOSE SERPL-MCNC: 108 MG/DL (ref 65–140)
GLUCOSE UR STRIP-MCNC: NEGATIVE MG/DL
HCT VFR BLD AUTO: 36.6 % (ref 34.8–46.1)
HDLC SERPL-MCNC: 76 MG/DL
HGB BLD-MCNC: 11.9 G/DL (ref 11.5–15.4)
HGB UR QL STRIP.AUTO: NEGATIVE
KETONES UR STRIP-MCNC: NEGATIVE MG/DL
LDLC SERPL CALC-MCNC: 97 MG/DL (ref 0–100)
LEUKOCYTE ESTERASE UR QL STRIP: ABNORMAL
MCH RBC QN AUTO: 29.1 PG (ref 26.8–34.3)
MCHC RBC AUTO-ENTMCNC: 32.5 G/DL (ref 31.4–37.4)
MCV RBC AUTO: 90 FL (ref 82–98)
NITRITE UR QL STRIP: NEGATIVE
NON-SQ EPI CELLS URNS QL MICRO: ABNORMAL /HPF
OSMOLALITY UR: 365 MMOL/KG
PH UR STRIP.AUTO: 6 [PH]
PLATELET # BLD AUTO: 278 THOUSANDS/UL (ref 149–390)
PMV BLD AUTO: 9.1 FL (ref 8.9–12.7)
POTASSIUM SERPL-SCNC: 3.6 MMOL/L (ref 3.5–5.3)
PROT UR STRIP-MCNC: NEGATIVE MG/DL
RBC # BLD AUTO: 4.09 MILLION/UL (ref 3.81–5.12)
RBC #/AREA URNS AUTO: ABNORMAL /HPF
SODIUM SERPL-SCNC: 140 MMOL/L (ref 135–147)
SP GR UR STRIP.AUTO: 1.01 (ref 1–1.03)
TRIGL SERPL-MCNC: 57 MG/DL
UROBILINOGEN UR STRIP-ACNC: <2 MG/DL
WBC # BLD AUTO: 4.43 THOUSAND/UL (ref 4.31–10.16)
WBC #/AREA URNS AUTO: ABNORMAL /HPF

## 2023-04-29 RX ORDER — SODIUM PHOSPHATE, DIBASIC AND SODIUM PHOSPHATE, MONOBASIC 3.5; 9.5 G/66ML; G/66ML
1 ENEMA RECTAL ONCE
COMMUNITY

## 2023-04-29 RX ORDER — NAPROXEN 375 MG/1
375 TABLET ORAL ONCE
COMMUNITY

## 2023-04-29 RX ORDER — BACITRACIN, NEOMYCIN, POLYMYXIN B 400; 3.5; 5 [USP'U]/G; MG/G; [USP'U]/G
OINTMENT TOPICAL DAILY
COMMUNITY

## 2023-04-29 RX ORDER — GUAIFENESIN 200 MG/10ML
200 LIQUID ORAL 3 TIMES DAILY PRN
COMMUNITY

## 2023-04-29 RX ORDER — ACETAMINOPHEN 325 MG/1
650 TABLET ORAL EVERY 4 HOURS PRN
COMMUNITY

## 2023-04-29 RX ORDER — IBUPROFEN 600 MG/1
800 TABLET ORAL EVERY 6 HOURS PRN
COMMUNITY

## 2023-04-29 RX ORDER — LORAZEPAM 2 MG/ML
0.5 INJECTION INTRAMUSCULAR ONCE AS NEEDED
Status: COMPLETED | OUTPATIENT
Start: 2023-04-29 | End: 2023-04-29

## 2023-04-29 RX ADMIN — MELATONIN 6 MG: at 20:23

## 2023-04-29 RX ADMIN — LORATADINE 10 MG: 10 TABLET ORAL at 08:19

## 2023-04-29 RX ADMIN — APIXABAN 5 MG: 5 TABLET, FILM COATED ORAL at 17:32

## 2023-04-29 RX ADMIN — PANTOPRAZOLE SODIUM 40 MG: 40 TABLET, DELAYED RELEASE ORAL at 05:26

## 2023-04-29 RX ADMIN — GABAPENTIN 300 MG: 300 CAPSULE ORAL at 17:31

## 2023-04-29 RX ADMIN — ZIPRASIDONE HYDROCHLORIDE 40 MG: 40 CAPSULE ORAL at 17:32

## 2023-04-29 RX ADMIN — Medication 1000 UNITS: at 08:19

## 2023-04-29 RX ADMIN — PRAVASTATIN SODIUM 20 MG: 20 TABLET ORAL at 08:19

## 2023-04-29 RX ADMIN — LOSARTAN POTASSIUM 100 MG: 50 TABLET, FILM COATED ORAL at 08:19

## 2023-04-29 RX ADMIN — CARBAMAZEPINE 400 MG: 200 TABLET, EXTENDED RELEASE ORAL at 20:25

## 2023-04-29 RX ADMIN — TRAZODONE HYDROCHLORIDE 50 MG: 50 TABLET ORAL at 20:24

## 2023-04-29 RX ADMIN — ZIPRASIDONE HYDROCHLORIDE 80 MG: 40 CAPSULE ORAL at 17:32

## 2023-04-29 RX ADMIN — ASPIRIN 81 MG 81 MG: 81 TABLET ORAL at 08:19

## 2023-04-29 RX ADMIN — BUSPIRONE HYDROCHLORIDE 15 MG: 5 TABLET ORAL at 20:24

## 2023-04-29 RX ADMIN — FENOFIBRATE 145 MG: 145 TABLET, FILM COATED ORAL at 08:19

## 2023-04-29 RX ADMIN — GABAPENTIN 300 MG: 300 CAPSULE ORAL at 11:10

## 2023-04-29 RX ADMIN — SODIUM CHLORIDE 100 ML/HR: 0.9 INJECTION, SOLUTION INTRAVENOUS at 20:18

## 2023-04-29 RX ADMIN — Medication 250 MG: at 08:20

## 2023-04-29 RX ADMIN — SODIUM CHLORIDE 100 ML/HR: 0.9 INJECTION, SOLUTION INTRAVENOUS at 08:24

## 2023-04-29 RX ADMIN — NYSTATIN 1 APPLICATION.: 100000 POWDER TOPICAL at 20:28

## 2023-04-29 RX ADMIN — DULOXETINE HYDROCHLORIDE 60 MG: 60 CAPSULE, DELAYED RELEASE ORAL at 08:19

## 2023-04-29 RX ADMIN — APIXABAN 5 MG: 5 TABLET, FILM COATED ORAL at 08:19

## 2023-04-29 RX ADMIN — ZIPRASIDONE HYDROCHLORIDE 80 MG: 40 CAPSULE ORAL at 11:10

## 2023-04-29 RX ADMIN — B-COMPLEX W/ C & FOLIC ACID TAB 1 TABLET: TAB at 08:19

## 2023-04-29 RX ADMIN — LORAZEPAM 0.5 MG: 2 INJECTION INTRAMUSCULAR; INTRAVENOUS at 20:35

## 2023-04-29 RX ADMIN — BUSPIRONE HYDROCHLORIDE 15 MG: 5 TABLET ORAL at 08:33

## 2023-04-29 RX ADMIN — ZIPRASIDONE HYDROCHLORIDE 40 MG: 40 CAPSULE ORAL at 11:10

## 2023-04-29 NOTE — PLAN OF CARE
Problem: MOBILITY - ADULT  Goal: Maintain or return to baseline ADL function  Description: INTERVENTIONS:  -  Assess patient's ability to carry out ADLs; assess patient's baseline for ADL function and identify physical deficits which impact ability to perform ADLs (bathing, care of mouth/teeth, toileting, grooming, dressing, etc )  - Assess/evaluate cause of self-care deficits   - Assess range of motion  - Assess patient's mobility; develop plan if impaired  - Assess patient's need for assistive devices and provide as appropriate  - Encourage maximum independence but intervene and supervise when necessary  - Involve family in performance of ADLs  - Assess for home care needs following discharge   - Consider OT consult to assist with ADL evaluation and planning for discharge  - Provide patient education as appropriate  Outcome: Progressing  Goal: Maintains/Returns to pre admission functional level  Description: INTERVENTIONS:  - Perform BMAT or MOVE assessment daily    - Set and communicate daily mobility goal to care team and patient/family/caregiver     - Collaborate with rehabilitation services on mobility goals if consulted  - Stand patient 3 times a day  - Ambulate patient 3 times a day  - Out of bed to chair 3 times a day   - Out of bed for meals 3 times a day  - Out of bed for toileting  - Record patient progress and toleration of activity level   Outcome: Progressing     Problem: SAFETY ADULT  Goal: Patient will remain free of falls  Description: INTERVENTIONS:  - Educate patient/family on patient safety including physical limitations  - Instruct patient to call for assistance with activity   - Consult OT/PT to assist with strengthening/mobility   - Keep Call bell within reach  - Keep bed low and locked with side rails adjusted as appropriate  - Keep care items and personal belongings within reach  - Initiate and maintain comfort rounds  - Make Fall Risk Sign visible to staff  - Offer Toileting every 2 Hours, in advance of need  - Initiate/Maintain bed/chair alarm  - Obtain necessary fall risk management equipment  - Apply yellow socks and bracelet for high fall risk patients  - Consider moving patient to room near nurses station  Outcome: Progressing     Problem: DISCHARGE PLANNING  Goal: Discharge to home or other facility with appropriate resources  Description: INTERVENTIONS:  - Identify barriers to discharge w/patient and caregiver  - Arrange for needed discharge resources and transportation as appropriate  - Identify discharge learning needs (meds, wound care, etc )  - Refer to Case Management Department for coordinating discharge planning if the patient needs post-hospital services based on physician/advanced practitioner order or complex needs related to functional status, cognitive ability, or social support system  Outcome: Progressing     Problem: Knowledge Deficit  Goal: Patient/family/caregiver demonstrates understanding of disease process, treatment plan, medications, and discharge instructions  Description: Complete learning assessment and assess knowledge base    Interventions:  - Provide teaching at level of understanding  - Provide teaching via preferred learning methods  Outcome: Progressing     Problem: NEUROSENSORY - ADULT  Goal: Achieves stable or improved neurological status  Description: INTERVENTIONS  - Monitor and report changes in neurological status  - Monitor vital signs such as temperature, blood pressure, glucose, and any other labs ordered   - Initiate measures to prevent increased intracranial pressure  - Monitor for seizure activity and implement precautions if appropriate      Outcome: Progressing     Problem: CARDIOVASCULAR - ADULT  Goal: Maintains optimal cardiac output and hemodynamic stability  Description: INTERVENTIONS:  - Monitor I/O, vital signs and rhythm  - Monitor for S/S and trends of decreased cardiac output  - Administer and titrate ordered vasoactive medications to optimize hemodynamic stability  - Assess quality of pulses, skin color and temperature  - Assess for signs of decreased coronary artery perfusion  - Instruct patient to report change in severity of symptoms  Outcome: Progressing     Problem: Neurological Deficit  Goal: Neurological status is stable or improving  Description: Interventions:  - Monitor and assess patient's level of consciousness, motor function, sensory function, and level of assistance needed for ADLs  - Monitor and report changes from baseline  Collaborate with interdisciplinary team to initiate plan and implement interventions as ordered  - Provide and maintain a safe environment  - Consider seizure precautions  - Consider fall precautions  - Consider aspiration precautions  - Consider bleeding precautions  Outcome: Progressing     Problem: Activity Intolerance/Impaired Mobility  Goal: Mobility/activity is maintained at optimum level for patient  Description: Interventions:  - Assess and monitor patient  barriers to mobility and need for assistive/adaptive devices  - Assess patient's emotional response to limitations  - Collaborate with interdisciplinary team and initiate plans and interventions as ordered  - Encourage independent activity per ability   - Maintain proper body alignment  - Perform active/passive rom as tolerated/ordered    - Plan activities to conserve energy   - Turn patient as appropriate  Outcome: Progressing

## 2023-04-29 NOTE — OCCUPATIONAL THERAPY NOTE
Occupational Therapy Evaluation     Patient Name: Natan Rascon  MDSRA'H Date: 4/29/2023  Problem List  Principal Problem:    Stroke-like symptoms  Active Problems:    TBI (traumatic brain injury) ([facility] )    History of DVT (deep vein thrombosis)    Schizoaffective disorder ([place] [id number] )    Hyperlipemia    Past Medical History  Past Medical History:   Diagnosis Date    Anxiety and depression     Chronic deep vein thrombosis (DVT) (HCC)     LLE    History of traumatic brain injury     Hyperlipidemia     Insomnia     Obesity     RUPAL (obstructive sleep apnea)     Schizoaffective disorder ([place] [address] )      Past Surgical History  History reviewed  No pertinent surgical history  04/29/23 0930   OT Last Visit   OT Visit Date 04/29/23   Note Type   Note type Evaluation   Pain Assessment   Pain Assessment Tool 0-10   Pain Score No Pain   Restrictions/Precautions   Weight Bearing Precautions Per Order No   Other Precautions Cognitive; Chair Alarm; Bed Alarm;1:1;Multiple lines; Fall Risk   Home Living   Type of Home Group Home   Home Layout Ramped entrance   Bathroom Shower/Tub Walk-in shower   Bathroom Toilet Raised   Bathroom Equipment Grab bars in shower; Shower chair   [address] [place]   Prior Function   Level of [place] Needs assistance with ADLs; Independent with functional mobility; Needs assistance with IADLS  (reports staff assists for washing back, otherwise she is able to wash other parts of herself )   Lives With Facility staff   Receives Help From   (group home staff)   IADLs Family/Friend/Other provides transportation; Family/Friend/Other provides meals; Family/Friend/Other provides medication management   Falls in the last 6 months 0   Vocational Unemployed   Lifestyle   Autonomy Pta, pt rq min A for bathing, otherwise I for ADL, required A for IADL and completed transfers/FMw rw for support     Reciprocal Relationships supportive staff at group [place]   Service to Others "unemployed   Intrinsic Gratification spending time w friends/family  Subjective   Subjective \"I want to walk more\"   ADL   Where Assessed Edge of bed   Eating Assistance 7  Independent   Grooming Assistance 7  Independent   UB Bathing Assistance 6  Modified Independent   LB Bathing Assistance 5  Supervision/Setup   UB Dressing Assistance 6  Modified independent   LB Dressing Assistance 5  Supervision/Setup   Toileting Assistance  5  Supervision/Setup   Functional Assistance 5  Supervision/Setup   Bed Mobility   Supine to Sit 5  Supervision   Additional items HOB elevated   Additional Comments found in bed, left in chair w all needs in reach  1:1 present, deferred chair alarm on at this time, however it is set-up if they wish to use it  Transfers   Sit to Stand 5  Supervision   Stand to Sit 5  Supervision   Additional Comments rw   Functional Mobility   Functional Mobility 5  Supervision   Additional Comments household + distance c rw   Additional items Rolling walker   Balance   Static Sitting Fair +   Dynamic Sitting Fair -   Static Standing Fair -   Dynamic Standing Poor +   Ambulatory Poor +   Activity Tolerance   Activity Tolerance Patient tolerated treatment well   Medical Staff Made Aware DPT Hellen 2' pts med complexity, comorbidities and regression from baseline  Nurse Made Aware ok per RN   RUE Assessment   RUE Assessment WFL   LUE Assessment   LUE Assessment WFL   Hand Function   Gross Motor Coordination Functional   Fine Motor Coordination Functional   Psychosocial   Psychosocial (WDL) WDL   Cognition   Overall Cognitive Status Impaired   Arousal/Participation Alert; Responsive; Cooperative   Attention Attends with cues to redirect   Orientation Level Oriented X4   Memory Decreased recall of precautions;Decreased recall of recent events   Following Commands Follows one step commands with increased time or repetition   Comments pt w baseline TBI And schizoaffective disorder   Overall dec safety " "awareness/insight to condition, requires inc time to follow one step commands  Assessment   Prognosis Good   Assessment Pt is a 62 y o  female admitted 4/28/23 w stroke like symptoms - slurred speech  CT head: \"Patent major vasculature of the [place] w/o high-grade stenosis, no aneurysm, and no hemodynamically significant stenosis or dissection of cervical carotid and vertebral arteries ' Pt w active OT eval and treat orders  PMH includes  has a past medical history of Anxiety and depression, Chronic deep vein thrombosis (DVT) ([facility] ), History of traumatic brain injury, Hyperlipidemia, Insomnia, Obesity, RUPAL (obstructive sleep apnea), and Schizoaffective disorder ([facility] )  Pt lives at group home, where she receives min a For bathing, ind for all other ADL tasks, rq A for IADL and used RW at baseline for mobility  Currently, pt is mod I for UB ADL, S for LB ADL and completed transfers/FM w S w RW for support  From OT standpoint, pt is functioning at baseline level and does not appear to require additional acute OT at this time  Discussed pt's comfort with d/c from OT and any concerns with returning to previous environment; pt does not report any at this time  The patient's raw score on the AM-PAC Daily Activity inpatient short form is 22, standardized score is 47 1, greater than 39 4  Patients at this level are likely to benefit from discharge to home  Please refer to the recommendation of the Occupational Therapist for safe discharge planning  From OT perspective, pt should D/C HOME when medically stable  Acute OT no longer rq at this time, D/C OT     Goals   Patient Goals go home   LTG Time Frame 10-14   Long Term Goal #1 see below   Recommendation   OT Discharge Recommendation No rehabilitation needs   -PAC Daily Activity Inpatient   Lower Body Dressing 3   Bathing 3   Toileting 4   Upper Body Dressing 4   Grooming 4   Eating 4   Daily Activity Raw Score 22   Daily Activity Standardized Score (Calc for Raw " Score >=11) 47  1   AM-PAC Applied Cognition Inpatient   Following a Speech/Presentation 4   Understanding Ordinary Conversation 4   Taking Medications 4   Remembering Where Things Are Placed or Put Away 4   Remembering List of 4-5 Errands 4   Taking Care of Complicated Tasks 4   Applied Cognition Raw Score 24   Applied Cognition Standardized Score 62 21   Modified Philip Scale   Modified Philip Scale 3   End of Consult   Education Provided Yes   Patient Position at End of Consult Bedside chair; All needs within reach   Nurse Communication Nurse aware of consult       Irish Liming, MOT, OTR/L
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
## 2023-04-29 NOTE — ASSESSMENT & PLAN NOTE
· Presented to the ER with slurred speech and concern for CVA  · P evaluated by neurology in the ER who felt that she was at low risk for true CVA    · Admitted for observation  · Awaiting MRI  · Electrolytes have been corrected  · Currently at baseline mental status  · If MRI negative can return to group home

## 2023-04-29 NOTE — PLAN OF CARE
Problem: MOBILITY - ADULT  Goal: Maintain or return to baseline ADL function  Description: INTERVENTIONS:  -  Assess patient's ability to carry out ADLs; assess patient's baseline for ADL function and identify physical deficits which impact ability to perform ADLs (bathing, care of mouth/teeth, toileting, grooming, dressing, etc )  - Assess/evaluate cause of self-care deficits   - Assess range of motion  - Assess patient's mobility; develop plan if impaired  - Assess patient's need for assistive devices and provide as appropriate  - Encourage maximum independence but intervene and supervise when necessary  - Involve family in performance of ADLs  - Assess for home care needs following discharge   - Consider OT consult to assist with ADL evaluation and planning for discharge  - Provide patient education as appropriate  Outcome: Progressing  Goal: Maintains/Returns to pre admission functional level  Description: INTERVENTIONS:  - Perform BMAT or MOVE assessment daily    - Set and communicate daily mobility goal to care team and patient/family/caregiver  - Collaborate with rehabilitation services on mobility goals if consulted  - Perform Range of Motion   times a day  - Reposition patient every  hours    - Dangle patient   times a day  - Stand patient 3 times a day  - Ambulate patient 3 times a day  - Out of bed to chair 3 times a day   - Out of bed for meals 3 times a day  - Out of bed for toileting  - Record patient progress and toleration of activity level   Outcome: Progressing     Problem: SAFETY ADULT  Goal: Patient will remain free of falls  Description: INTERVENTIONS:  - Educate patient/family on patient safety including physical limitations  - Instruct patient to call for assistance with activity   - Consult OT/PT to assist with strengthening/mobility   - Keep Call bell within reach  - Keep bed low and locked with side rails adjusted as appropriate  - Keep care items and personal belongings within reach  - Initiate and maintain comfort rounds  - Make Fall Risk Sign visible to staff  - Offer Toileting every 2 Hours, in advance of need  - Initiate/Maintain bed alarm  - Obtain necessary fall risk management equipment:  - Apply yellow socks and bracelet for high fall risk patients  - Consider moving patient to room near nurses station  Outcome: Progressing     Problem: DISCHARGE PLANNING  Goal: Discharge to home or other facility with appropriate resources  Description: INTERVENTIONS:  - Identify barriers to discharge w/patient and caregiver  - Arrange for needed discharge resources and transportation as appropriate  - Identify discharge learning needs (meds, wound care, etc )  - Arrange for interpretive services to assist at discharge as needed  - Refer to Case Management Department for coordinating discharge planning if the patient needs post-hospital services based on physician/advanced practitioner order or complex needs related to functional status, cognitive ability, or social support system  Outcome: Progressing     Problem: Knowledge Deficit  Goal: Patient/family/caregiver demonstrates understanding of disease process, treatment plan, medications, and discharge instructions  Description: Complete learning assessment and assess knowledge base    Interventions:  - Provide teaching at level of understanding  - Provide teaching via preferred learning methods  Outcome: Progressing     Problem: NEUROSENSORY - ADULT  Goal: Achieves stable or improved neurological status  Description: INTERVENTIONS  - Monitor and report changes in neurological status  - Monitor vital signs such as temperature, blood pressure, glucose, and any other labs ordered   - Initiate measures to prevent increased intracranial pressure  - Monitor for seizure activity and implement precautions if appropriate      Outcome: Progressing     Problem: CARDIOVASCULAR - ADULT  Goal: Maintains optimal cardiac output and hemodynamic stability  Description: INTERVENTIONS:  - Monitor I/O, vital signs and rhythm  - Monitor for S/S and trends of decreased cardiac output  - Administer and titrate ordered vasoactive medications to optimize hemodynamic stability  - Assess quality of pulses, skin color and temperature  - Assess for signs of decreased coronary artery perfusion  - Instruct patient to report change in severity of symptoms  Outcome: Progressing

## 2023-04-29 NOTE — PROGRESS NOTES
[address] [facility]  Progress Note  Name: Marco Rosas  MRN: [id number]  Unit/Bed#: [facility] 727-01 I Date of Admission: 4/28/2023   Date of Service: 4/29/2023 I Hospital Day: 0    Assessment/Plan   * Stroke-like symptoms  Assessment & Plan  · Presented to the ER with slurred speech and concern for CVA  · P evaluated by neurology in the ER who felt that she was at low risk for true CVA    · Admitted for observation  · Awaiting MRI  · Electrolytes have been corrected  · Currently at baseline mental status  · If MRI negative can return to group home    Hyperlipemia  Assessment & Plan  · Continue statin and fibrate    Schizoaffective disorder ([facility] [address] )  Assessment & Plan  · Continue buspirone, trazodone cymbalta and geodon    History of DVT (deep vein thrombosis)  Assessment & Plan  · Has a history of chronic LLE lymphedema  · Continue Eliquis    TBI (traumatic brain injury) [facility])  Assessment & Plan  · Resident of the [facility]    Hyponatremia-resolved as of 4/29/2023  Assessment & Plan  Resolved         VTE Pharmacologic Prophylaxis:   Moderate Risk (Score 3-4) - Pharmacological DVT Prophylaxis Ordered: apixaban (Eliquis)  Patient Centered Rounds: I performed bedside rounds with nursing staff today  Discussions with Specialists or Other Care Team Provider: CM     Education and Discussions with Family / Patient: Patient declined call to   Total Time Spent on Date of Encounter in care of patient: 35 minutes This time was spent on one or more of the following: performing physical exam; counseling and coordination of care; obtaining or reviewing history; documenting in the medical record; reviewing/ordering tests, medications or procedures; communicating with other healthcare professionals and discussing with patient's family/caregivers      Current Length of Stay: 0 day(s)  Current Patient Status: Observation   Certification Statement: The patient will continue to require additional inpatient hospital stay due to MRI brain  Discharge Plan: Anticipate discharge tomorrow to group home  Code Status: Level 1 - Full Code    Subjective:   Patient seen and examined  Denies any new neurologic complaints  There were no events overnight  Objective:     Vitals:   Temp (24hrs), Av 1 °F (37 3 °C), Min:98 °F (36 7 °C), Max:100 3 °F (37 9 °C)    Temp:  [98 °F (36 7 °C)-100 3 °F (37 9 °C)] 99 7 °F (37 6 °C)  HR:  [51-73] 65  Resp:  [16-21] 18  BP: (112-195)/() 145/79  SpO2:  [93 %-99 %] 97 %  Body mass index is 38 04 kg/m²  Input and Output Summary (last 24 hours): Intake/Output Summary (Last 24 hours) at 2023 1320  Last data filed at 2023 1119  Gross per 24 hour   Intake --   Output 600 ml   Net -600 ml       PHYSICAL EXAM:    Vitals signs reviewed  Constitutional   Awake and cooperative  NAD  Head/Neck   Normocephalic  Atraumatic  HEENT   No scleral icterus  EOMI  Heart   Regular rate and rhythm  No murmers  Lungs   Clear to auscultation bilaterally  Respirations unlaboured  Abdomen   Soft  Nontender  Nondistended  Skin   Skin color normal  No rashes  Extremities   No deformities  No peripheral edema  Neuro   Alert and oriented  No new deficits  Psych   Mood stable   Affect normal          Additional Data:     Labs:  Results from last 7 days   Lab Units 23  0626   WBC Thousand/uL 4 43   HEMOGLOBIN g/dL 11 9   HEMATOCRIT % 36 6   PLATELETS Thousands/uL 278     Results from last 7 days   Lab Units 23  0626 23  1433   SODIUM mmol/L 140 128*   POTASSIUM mmol/L 3 6 3 4*   CHLORIDE mmol/L 109* 98   CO2 mmol/L 27 26   BUN mg/dL 8 10   CREATININE mg/dL 0 69 0 54*   ANION GAP mmol/L 4 4   CALCIUM mg/dL 8 9 7 7*   ALBUMIN g/dL  --  3 1*   TOTAL BILIRUBIN mg/dL  --  0 16*   ALK PHOS U/L  --  42*   ALT U/L  --  15   AST U/L  --  13   GLUCOSE RANDOM mg/dL 108 117     Results from last 7 days   Lab Units 23  1433 INR  1 03     Results from last 7 days   Lab Units 04/28/23  1504   POC GLUCOSE mg/dl 108               Lines/Drains:  Invasive Devices     Peripheral Intravenous Line  Duration           Peripheral IV 04/28/23 Left;Proximal;Ventral (anterior) Forearm <1 day                  Telemetry:  Telemetry Orders (From admission, onward)             48 Hour Telemetry Monitoring  Continuous x 48 hours        References:    Telemetry Guidelines   Question:  Reason for 48 Hour Telemetry  Answer:  Acute CVA (<24 hrs old, hemispheric strokes, selected brainstem strokes, cardiac arrhythmias)                 Telemetry Reviewed: Normal Sinus Rhythm  Indication for Continued Telemetry Use: Acute CVA             Imaging: Reviewed radiology reports from this admission including: CT head    Recent Cultures (last 7 days):         Last 24 Hours Medication List:   Current Facility-Administered Medications   Medication Dose Route Frequency Provider Last Rate    acetaminophen  650 mg Oral Q4H PRN Ferdinand Mcdaniel MD      apixaban  5 mg Oral BID Ferdinand Mcdaniel MD      aspirin  81 mg Oral Daily Ferdinand Mcdaniel MD      busPIRone  15 mg Oral BID Ferdinand Mcdaniel MD      calcium carbonate  1,000 mg Oral Daily PRN Ferdinand Mcdaniel MD      carBAMazepine  400 mg Oral HS Ferdinand Mcdaniel MD      cholecalciferol  1,000 Units Oral Daily Ferdinand Mcdaniel MD      DULoxetine  60 mg Oral Daily Ferdinand Mcdaniel MD      fenofibrate  145 mg Oral Daily Ferdinand Mcdaniel MD      gabapentin  300 mg Oral BID Ferdinand Mcdaniel MD      loratadine  10 mg Oral Daily Ferdinand Mcdaniel MD      losartan  100 mg Oral Daily Ferdinand Mcdaniel MD      melatonin  6 mg Oral HS Ferdinand Mcdaniel MD      multivitamin stress formula  1 tablet Oral Daily Ferdinand Mcdaniel MD      nystatin  1 application   Topical HS Ferdinand Mcdaniel MD      ondansetron  4 mg Intravenous Q6H PRN Ferdinand Mcdaniel MD      pantoprazole  40 mg Oral Early Morning Ferdinand Mcdaniel MD      pravastatin  20 mg Oral Daily Jose Anderson MD      saccharomyces boulardii  250 mg Oral Daily Jose Anderson MD      sodium chloride  100 mL/hr Intravenous Continuous Veverbrigido Anderson  mL/hr (04/29/23 0824)    traZODone  50 mg Oral HS Jose Anderson MD      ziprasidone  40 mg Oral BID With Meals Jose Anderson MD      ziprasidone  80 mg Oral BID With Meals Jose Anderson MD          Today, Patient Was Seen By: Francisco Blount DO    **Please Note: This note may have been constructed using a voice recognition system  **

## 2023-04-29 NOTE — SPEECH THERAPY NOTE
Speech Language/Pathology    Speech-Language Pathology Bedside Swallow Evaluation      Patient Name: Nasima Tristan    [id number] Date: 4/29/2023     Problem List  Principal Problem:    Stroke-like symptoms  Active Problems:    Hyponatremia    TBI (traumatic brain injury) ([facility] )    History of DVT (deep vein thrombosis)    Schizoaffective disorder ([facility] )    Hyperlipemia      Past Medical History  Past Medical History:   Diagnosis Date    Anxiety and depression     Chronic deep vein thrombosis (DVT) (HCC)     LLE    History of traumatic brain injury     Hyperlipidemia     Insomnia     Obesity     RUPAL (obstructive sleep apnea)     Schizoaffective disorder ([facility] )        Past Surgical History  History reviewed  No pertinent surgical history  Summary   Pt presented with functional appearing oral and pharyngeal stage swallowing skills with baseline diet chopped solids/thin liquids  Mastication is min prolonged, ultimately effective  No significant oral residue upon transfer  Swallows suspected fairly prompt  No overt s/s aspiration  Pt reports at baseline material is chopped for her, expresses desire for continuation of this diet at this time  Education initiated on strategies to optimize swallow safety and s/s aspiration to notify medical team of should they arise  Pt demonstrated understanding and denied questions/concerns at this time  Risk/s for Aspiration: Mild      Recommended Diet: soft/level 3 diet and thin liquids   Recommended Form of Meds: whole with liquid   Aspiration precautions and swallowing strategies: upright posture, only feed when fully alert, slow rate of feeding and small bites/sips  Other Recommendations: Continue frequent oral care         Current Medical Status  Pt is a 62 y o  female who presented to [facility] with  PMH of TBI who presents with stroke alert  The patient was at an adult day care program and was noted to have slurring of her speech   She presented to the ED "for evaluation  Unfortunately she is a poor historian due to her neuropsychiatric conditions  She reports 2-3 days of loose stools  Denies vomiting, abdominal pain or fever  Appetite has been adequate recently  In the ED she was seen by neurology who recommend correction of her electrolyte abnormalities     Current Precautions: Allergies:  No known food allergies    Past medical history:  Please see H&P for details    Special Studies:  CTA stroke alert 4/28: 1  Patent major vasculature of the [place] of [place] without high-grade stenosis  No aneurysm      2  No hemodynamically significant stenosis or dissection of cervical carotid and vertebral arteries        CT stroke alert brain 4/28:    No acute intracranial CT abnormality  Social/Education/Vocational Hx:  Pt lives in group home    Swallow Information   Current Risks for Dysphagia & Aspiration: h/o dysphagia  Current Symptoms/Concerns: stroke protocol, \"they call me a choking risk\"   Current Diet: soft/level 3 diet and thin liquids   Baseline Diet: soft/level 3 diet and thin liquids      Baseline Assessment   Behavior/Cognition: alert  Speech/Language Status: able to participate in conversation and able to follow commands  Patient Positioning: upright in bed  Pain Status/Interventions/Response to Interventions:  No report of or nonverbal indications of pain  Swallow Mechanism Exam  Facial: symmetrical  Labial: WFL  Lingual: left sided tongue deviation  Velum: symmetrical  Mandible: adequate ROM  Dentition: partial dentures  Vocal quality:clear/adequate   Volitional Cough: strong/productive   Respiratory Status: on RA    Consistencies Assessed and Performance   Consistencies Administered: thin liquids and dysphagia 3 materials  Materials administered included chopped carrots/broccoli, cake, mac&cheese    Oral Stage:  Mastication was adequate for dysphagia 3 material, min prolonged but effective    Bolus formation and transfer were functional with " "no significant oral residue noted  No overt s/s reduced oral control  Pharyngeal Stage:  Swallow Mechanics:  Swallowing initiation appeared fairly prompt  Laryngeal rise was palpated and judged to be within functional limits  No coughing, throat clearing, change in vocal quality or respiratory status noted today  Esophageal Concerns: none reported    Strategies and Efficacy: -    Summary and Recommendations (see above)    Results Reviewed with: patient and RN     Treatment Recommended: Yes     Frequency of treatment: As able/appropriate     Patient Stated Goal: \"I don't want to choke\"     Dysphagia LTG  -Patient will demonstrate safe and effective oral intake (without overt s/s significant oral/pharyngeal dysphagia including s/s penetration or aspiration) for the highest appropriate diet level       Short Term Goals:  -Pt will tolerate Dysphagia 3/advanced (dental soft) diet and thin liquid with no significant s/s oral or pharyngeal dysphagia across 1-3 diagnostic session/s     -Patient will tolerate trials of upgraded food and/or liquid texture with no significant s/s of oral or pharyngeal dysphagia including aspiration across 1-3 diagnostic sessions     Re: Compensatory Strategies  -Patient will utilize trained compensatory strategies (eg   controlled bite/sip size, controlled rate, prep set, neck flexion, multiple swallows, alternation of food with liquid,head turn etc ) with 80% accuracy to eliminate overt s/s penetration/aspiration with the least restrictive food/liquid consistencies      Speech Therapy Prognosis   Prognosis: good    Prognosis Considerations: age, medical status, prior medical history and cognitive status      "

## 2023-04-30 LAB — SODIUM 24H UR-SCNC: 36 MOL/L

## 2023-04-30 RX ADMIN — PANTOPRAZOLE SODIUM 40 MG: 40 TABLET, DELAYED RELEASE ORAL at 05:51

## 2023-04-30 RX ADMIN — NYSTATIN 1 APPLICATION.: 100000 POWDER TOPICAL at 19:53

## 2023-04-30 RX ADMIN — GABAPENTIN 300 MG: 300 CAPSULE ORAL at 17:19

## 2023-04-30 RX ADMIN — APIXABAN 5 MG: 5 TABLET, FILM COATED ORAL at 17:18

## 2023-04-30 RX ADMIN — PRAVASTATIN SODIUM 20 MG: 20 TABLET ORAL at 08:01

## 2023-04-30 RX ADMIN — MELATONIN 6 MG: at 19:50

## 2023-04-30 RX ADMIN — B-COMPLEX W/ C & FOLIC ACID TAB 1 TABLET: TAB at 08:01

## 2023-04-30 RX ADMIN — CARBAMAZEPINE 400 MG: 200 TABLET, EXTENDED RELEASE ORAL at 19:49

## 2023-04-30 RX ADMIN — ZIPRASIDONE HYDROCHLORIDE 80 MG: 40 CAPSULE ORAL at 17:18

## 2023-04-30 RX ADMIN — Medication 250 MG: at 08:01

## 2023-04-30 RX ADMIN — ZIPRASIDONE HYDROCHLORIDE 80 MG: 40 CAPSULE ORAL at 08:01

## 2023-04-30 RX ADMIN — ZIPRASIDONE HYDROCHLORIDE 40 MG: 40 CAPSULE ORAL at 08:01

## 2023-04-30 RX ADMIN — Medication 1000 UNITS: at 08:01

## 2023-04-30 RX ADMIN — GABAPENTIN 300 MG: 300 CAPSULE ORAL at 08:01

## 2023-04-30 RX ADMIN — ASPIRIN 81 MG 81 MG: 81 TABLET ORAL at 08:01

## 2023-04-30 RX ADMIN — FENOFIBRATE 145 MG: 145 TABLET, FILM COATED ORAL at 08:01

## 2023-04-30 RX ADMIN — ZIPRASIDONE HYDROCHLORIDE 40 MG: 40 CAPSULE ORAL at 17:19

## 2023-04-30 RX ADMIN — TRAZODONE HYDROCHLORIDE 50 MG: 50 TABLET ORAL at 19:50

## 2023-04-30 RX ADMIN — BUSPIRONE HYDROCHLORIDE 15 MG: 5 TABLET ORAL at 08:01

## 2023-04-30 RX ADMIN — APIXABAN 5 MG: 5 TABLET, FILM COATED ORAL at 08:01

## 2023-04-30 RX ADMIN — BUSPIRONE HYDROCHLORIDE 15 MG: 5 TABLET ORAL at 19:49

## 2023-04-30 RX ADMIN — LOSARTAN POTASSIUM 100 MG: 50 TABLET, FILM COATED ORAL at 08:01

## 2023-04-30 RX ADMIN — DULOXETINE HYDROCHLORIDE 60 MG: 60 CAPSULE, DELAYED RELEASE ORAL at 08:01

## 2023-04-30 RX ADMIN — LORATADINE 10 MG: 10 TABLET ORAL at 08:01
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
## 2023-04-30 NOTE — ASSESSMENT & PLAN NOTE
· Presented to the ER with slurred speech and concern for CVA  · P evaluated by neurology in the ER who felt that she was at low risk for true CVA    · Admitted for observation  · MRI completed without any abnormal findings  · Electrolytes have been corrected  · Currently at baseline mental status  · Discharge back to group home on 5/1

## 2023-04-30 NOTE — PLAN OF CARE
Problem: SAFETY ADULT  Goal: Patient will remain free of falls  Description: INTERVENTIONS:  - Educate patient/family on patient safety including physical limitations  - Instruct patient to call for assistance with activity   - Consult OT/PT to assist with strengthening/mobility   - Keep Call bell within reach  - Keep bed low and locked with side rails adjusted as appropriate  - Keep care items and personal belongings within reach  - Initiate and maintain comfort rounds  - Make Fall Risk Sign visible to staff  - Offer Toileting every 2 Hours, in advance of need  - Initiate/Maintain bed/chair alarm  - Obtain necessary fall risk management equipment    - Apply yellow socks and bracelet for high fall risk patients  - Consider moving patient to room near nurses station  Outcome: Progressing     Problem: DISCHARGE PLANNING  Goal: Discharge to home or other facility with appropriate resources  Description: INTERVENTIONS:  - Identify barriers to discharge w/patient and caregiver  - Arrange for needed discharge resources and transportation as appropriate  - Identify discharge learning needs (meds, wound care, etc )  - Refer to Case Management Department for coordinating discharge planning if the patient needs post-hospital services based on physician/advanced practitioner order or complex needs related to functional status, cognitive ability, or social support system  Outcome: Progressing     Problem: NEUROSENSORY - ADULT  Goal: Achieves stable or improved neurological status  Description: INTERVENTIONS  - Monitor and report changes in neurological status  - Monitor vital signs such as temperature, blood pressure, glucose, and any other labs ordered   - Initiate measures to prevent increased intracranial pressure  - Monitor for seizure activity and implement precautions if appropriate      Outcome: Progressing

## 2023-04-30 NOTE — PROGRESS NOTES
[address] [facility]  Progress Note  Name: Yoshi Fry  MRN: [id number]  Unit/Bed#: [facility]PHP 727-01 I Date of Admission: 4/28/2023   Date of Service: 4/30/2023 I Hospital Day: 1    Assessment/Plan   * Stroke-like symptoms  Assessment & Plan  · Presented to the ER with slurred speech and concern for CVA  · P evaluated by neurology in the ER who felt that she was at low risk for true CVA    · Admitted for observation  · MRI completed without any abnormal findings  · Electrolytes have been corrected  · Currently at baseline mental status  · Discharge back to group home on 5/1    Hyperlipemia  Assessment & Plan  · Continue statin and fibrate    Schizoaffective disorder ([facility] [address] )  Assessment & Plan  · Continue buspirone, trazodone cymbalta and geodon    History of DVT (deep vein thrombosis)  Assessment & Plan  · Has a history of chronic LLE lymphedema  · Continue Eliquis    TBI (traumatic brain injury) ([facility] [address] )  Assessment & Plan  · [facility]         VTE Pharmacologic Prophylaxis:   Moderate Risk (Score 3-4) - Pharmacological DVT Prophylaxis Ordered: apixaban (Eliquis)  Patient Centered Rounds: I performed bedside rounds with nursing staff today  Discussions with Specialists or Other Care Team Provider: [email or web address]  Neurology  Education and Discussions with Family / Patient: Updated  (mother) via phone  Total Time Spent on Date of Encounter in care of patient: 35 minutes This time was spent on one or more of the following: performing physical exam; counseling and coordination of care; obtaining or reviewing history; documenting in the medical record; reviewing/ordering tests, medications or procedures; communicating with other healthcare professionals and discussing with patient's family/caregivers      Current Length of Stay: 1 day(s)  Current Patient Status: Inpatient   Certification Statement: The patient will continue to require additional inpatient hospital stay due to Neurologic monitor  Discharge Plan: Anticipate discharge tomorrow to group home  Code Status: Level 1 - Full Code    Subjective:   Patient seen and examined  No events overnight  Patient has no complaints  Objective:     Vitals:   Temp (24hrs), Av 8 °F (37 1 °C), Min:98 2 °F (36 8 °C), Max:99 7 °F (37 6 °C)    Temp:  [98 2 °F (36 8 °C)-99 7 °F (37 6 °C)] 98 2 °F (36 8 °C)  HR:  [55-75] 75  Resp:  [18] 18  BP: (136-146)/(77-86) 136/82  SpO2:  [89 %-97 %] 92 %  Body mass index is 38 04 kg/m²  Input and Output Summary (last 24 hours): Intake/Output Summary (Last 24 hours) at 2023 1115  Last data filed at 2023 2289  Gross per 24 hour   Intake   34 ml   Output 2000 ml   Net 8 34 ml       PHYSICAL EXAM:    Vitals signs reviewed  Constitutional   Awake and cooperative  NAD  Head/Neck   Normocephalic  Atraumatic  HEENT   No scleral icterus  EOMI  Heart   Regular rate and rhythm  No murmers  Lungs   Clear to auscultation bilaterally  Respirations unlaboured  Abdomen   Soft  Nontender  Nondistended  Skin   Skin color normal  No rashes  Extremities   No deformities  No peripheral edema  Neuro   Alert and oriented  No new deficits  Psych   Mood stable   Affect normal          Additional Data:     Labs:  Results from last 7 days   Lab Units 23  0626   WBC Thousand/uL 4 43   HEMOGLOBIN g/dL 11 9   HEMATOCRIT % 36 6   PLATELETS Thousands/uL 278     Results from last 7 days   Lab Units 23  0626 23  1433   SODIUM mmol/L 140 128*   POTASSIUM mmol/L 3 6 3 4*   CHLORIDE mmol/L 109* 98   CO2 mmol/L 27 26   BUN mg/dL 8 10   CREATININE mg/dL 0 69 0 54*   ANION GAP mmol/L 4 4   CALCIUM mg/dL 8 9 7 7*   ALBUMIN g/dL  --  3 1*   TOTAL BILIRUBIN mg/dL  --  0 16*   ALK PHOS U/L  --  42*   ALT U/L  --  15   AST U/L  --  13   GLUCOSE RANDOM mg/dL 108 117     Results from last 7 days   Lab Units 23  1433   INR  1 03     Results from last 7 days   Lab Units 04/28/23  1504   POC GLUCOSE mg/dl 108               Lines/Drains:  Invasive Devices     Peripheral Intravenous Line  Duration           Peripheral IV 04/29/23 Left Hand <1 day                  Telemetry:  Telemetry Orders (From admission, onward)             48 Hour Telemetry Monitoring  Continuous x 48 hours        References:    Telemetry Guidelines   Question:  Reason for 48 Hour Telemetry  Answer:  Acute CVA (<24 hrs old, hemispheric strokes, selected brainstem strokes, cardiac arrhythmias)                 Telemetry Reviewed: Normal Sinus Rhythm  Indication for Continued Telemetry Use: Acute CVA             Imaging: Reviewed radiology reports from this admission including: CT head    Recent Cultures (last 7 days):         Last 24 Hours Medication List:   Current Facility-Administered Medications   Medication Dose Route Frequency Provider Last Rate    acetaminophen  650 mg Oral Q4H PRN Arely Smallwood MD      apixaban  5 mg Oral BID Arely Smallwood MD      aspirin  81 mg Oral Daily Arely Smallwood MD      busPIRone  15 mg Oral BID Arely Smallwood MD      calcium carbonate  1,000 mg Oral Daily PRN Arely Smallwood MD      carBAMazepine  400 mg Oral HS Arely Smallwood MD      cholecalciferol  1,000 Units Oral Daily Arely Smallwood MD      DULoxetine  60 mg Oral Daily Arely Smallwood MD      fenofibrate  145 mg Oral Daily Arely Smallwood MD      gabapentin  300 mg Oral BID Arely Smallwood MD      loratadine  10 mg Oral Daily Arely Smallwood MD      losartan  100 mg Oral Daily Arely Smallwood MD      melatonin  6 mg Oral HS Arely Smallwood MD      multivitamin stress formula  1 tablet Oral Daily Arely Smallwood MD      nystatin  1 application   Topical HS Arely Smallwood MD      ondansetron  4 mg Intravenous Q6H PRN Arely Smallwood MD      pantoprazole  40 mg Oral Early Morning Arely Smallwood MD      pravastatin  20 mg Oral Daily Arely Smallwood MD      saccharomyces boulardii  250 mg Oral Daily Arely Smallwood MD      sodium chloride  100 mL/hr Intravenous Continuous Marshall Ledbetter MD Stopped (04/30/23 0730)    traZODone  50 mg Oral HS Marshall Ledbetter MD      ziprasidone  40 mg Oral BID With Meals Marshall Ledbetter MD      ziprasidone  80 mg Oral BID With Meals Marshall Ledbetter MD          Today, Patient Was Seen By: Pastora Turpin DO    **Please Note: This note may have been constructed using a voice recognition system  **

## 2023-04-30 NOTE — PROGRESS NOTES
04/30/23 1200   Clinical Encounter Type   Visited With Patient   Referral From Nurse;Patient   Confucianism Encounters   Confucianism Needs Literature;Prayer; Sacred Text   Patient Spiritual Encounters   Spiritual Assessment 4   Coping 5     Patient emotions were expressions of gratefulness for the company of this 's visit  She expresses loneliness at the group home and most of the time  She is quite lively and able to articulate her needs for human contact and friendship  She describes her family as being quite extended yet absent of visitation to her place of residence  Emotions of courage and disappointment of surviving life's complexities  She reads her Bible regularly  This  read her [id number] which she expressed emotions of gratitude  Pt to be dc tomorrow however  services here to support

## 2023-04-30 NOTE — CASE MANAGEMENT
Case Management Discharge Planning Note    Patient name Natalia Harrison  Location [address] 727/PPHP 626-63 MRN [id number]  : 1965 Date 2023       Current Admission Date: 2023  Current Admission Diagnosis:Stroke-like symptoms   Patient Active Problem List    Diagnosis Date Noted    Stroke-like symptoms 2023    TBI (traumatic brain injury) ([facility] [address] ) 2023    History of DVT (deep vein thrombosis) 2023    Schizoaffective disorder ([facility] [address] ) 2023    Hyperlipemia 2023      LOS (days): 1  Geometric Mean LOS (GMLOS) (days):   Days to GMLOS:     OBJECTIVE:  Risk of Unplanned Readmission Score: 12 59         Current admission status: Inpatient   Preferred Pharmacy:   [address] [place] [address]  No address on file      [address]  [address] [place] [address]  Phone: [phone] Fax: [phone]    Primary Care Provider: Desiree Calle MD    Primary Insurance: MEDICARE  [facility]    DISCHARGE DETAILS:        Family notified[de-identified] Phone call to pt mother  Baltazar Groves [phone]  Advisesd pt is cleared for DC  CM unsure how to reach the caregiver at the home  Kwadwo Jimenez provided the name and ph number of the RN she speaks to  [address] [place], [place] [address]  Phone call to [facility] [phone]  Ziggy Roodhouse that pt will be ready for DC, CM advised weekend admissions are noot taken  CM can set up transport for Monday  If pt is returning between 8a-4p would be transported to [address] [facility]   CM will f/u with DC time

## 2023-04-30 NOTE — CASE MANAGEMENT
Case Management Discharge Planning Note    Patient name Hallie Bui  Location [address] [place] [address] [facility] [id number]  : 1965 Date 2023       Current Admission Date: 2023  Current Admission Diagnosis:Stroke-like symptoms   Patient Active Problem List    Diagnosis Date Noted    Stroke-like symptoms 2023    TBI (traumatic brain injury) ([facility] [address] ) 2023    History of DVT (deep vein thrombosis) 2023    Schizoaffective disorder ([facility] [address] ) 2023    Hyperlipemia 2023      LOS (days): 1  Geometric Mean LOS (GMLOS) (days):   Days to GMLOS:     OBJECTIVE:  Risk of Unplanned Readmission Score: 12 59         Current admission status: Inpatient   Preferred Pharmacy:   [address] [place] [address]  No address on file      [address]  [address] [facility] [place] [address]  Phone: [phone] Fax: [phone]    Primary Care Provider: Author Javier MD    Primary Insurance: MEDICARE  Secondary Insurance: [facility]    DISCHARGE DETAILS:    Discharge planning discussed with[de-identified] Pt mother and facility staff  Freedom of Choice: No  Comments - Freedom of Choice: Pt to return to group [address] [place] [address]  CM contacted family/caregiver?: Yes  Were Treatment Team discharge recommendations reviewed with patient/caregiver?: Yes  Did patient/caregiver verbalize understanding of patient care needs?: Yes  Were patient/caregiver advised of the risks associated with not following Treatment Team discharge recommendations?: Yes    Contacts  Patient Contacts: Nasim Nava  Relationship to Patient[de-identified] Other (Comment) (Group home RN)  Contact Method: Phone  Phone Number:   Reason/Outcome: Emergency Contact, Continuity of Care, Discharge [address]         Is the patient interested in [facility] at discharge?: No    DME Referral Provided  Referral made for DME?: No    Other Referral/Resources/Interventions Provided:  Interventions: Transportation    Would you like to participate in our [address] service program?  : No - Declined    Treatment Team Recommendation: Facility Return, Group Home  Discharge Destination Plan[de-identified] Facility Return, [address]  Transport at Discharge : Darren Heller 188: Yes  Number/Name of Dispatcher: Abelardo Benavides [phone]  Transported by [facility] and Unit #):  [place] [phone]  ETA of Transport (Date): 05/01/23  ETA of Transport (Time): 1200        Accompanied by: Alone           Family notified[de-identified] CM spoke with pt mother and advised of transport time  Additional Comments: CM spoke with [facility] LISA @ Yakov Tami [phone] advised of transport time    [address] Name, [address] : [address]  Receiving Facility/Agency Phone Number: [phone]  Facility/Agency Fax Number: [phone]

## 2023-04-30 NOTE — QUICK NOTE
MRI was negative for any stroke  Given low suspicion for stroke and TIA, patient will need no adjustment for her AC or AP agents  Possibly due to unknown TME    Continue home eliquis 5mg BID for DVT  No outpatient neuro f/u needed given unlikely to be a stroke  No other further recommendations from the inpatient Neurology perspective  Please contact Neurology any further questions

## 2023-05-01 VITALS
SYSTOLIC BLOOD PRESSURE: 149 MMHG | OXYGEN SATURATION: 96 % | BODY MASS INDEX: 38.05 KG/M2 | DIASTOLIC BLOOD PRESSURE: 78 MMHG | WEIGHT: 214.73 LBS | HEIGHT: 63 IN | HEART RATE: 73 BPM | RESPIRATION RATE: 16 BRPM | TEMPERATURE: 97.9 F

## 2023-05-01 RX ADMIN — GABAPENTIN 300 MG: 300 CAPSULE ORAL at 08:49

## 2023-05-01 RX ADMIN — PANTOPRAZOLE SODIUM 40 MG: 40 TABLET, DELAYED RELEASE ORAL at 05:24

## 2023-05-01 RX ADMIN — PRAVASTATIN SODIUM 20 MG: 20 TABLET ORAL at 08:49

## 2023-05-01 RX ADMIN — ZIPRASIDONE HYDROCHLORIDE 40 MG: 40 CAPSULE ORAL at 08:50

## 2023-05-01 RX ADMIN — LOSARTAN POTASSIUM 100 MG: 50 TABLET, FILM COATED ORAL at 08:49

## 2023-05-01 RX ADMIN — ASPIRIN 81 MG 81 MG: 81 TABLET ORAL at 08:49

## 2023-05-01 RX ADMIN — Medication 250 MG: at 08:49

## 2023-05-01 RX ADMIN — FENOFIBRATE 145 MG: 145 TABLET, FILM COATED ORAL at 08:50

## 2023-05-01 RX ADMIN — Medication 1000 UNITS: at 08:50

## 2023-05-01 RX ADMIN — LORATADINE 10 MG: 10 TABLET ORAL at 08:50

## 2023-05-01 RX ADMIN — BUSPIRONE HYDROCHLORIDE 15 MG: 5 TABLET ORAL at 08:50

## 2023-05-01 RX ADMIN — DULOXETINE HYDROCHLORIDE 60 MG: 60 CAPSULE, DELAYED RELEASE ORAL at 08:49

## 2023-05-01 RX ADMIN — APIXABAN 5 MG: 5 TABLET, FILM COATED ORAL at 08:50

## 2023-05-01 RX ADMIN — B-COMPLEX W/ C & FOLIC ACID TAB 1 TABLET: TAB at 08:50

## 2023-05-01 RX ADMIN — ZIPRASIDONE HYDROCHLORIDE 80 MG: 40 CAPSULE ORAL at 08:51

## 2023-05-01 NOTE — CASE MANAGEMENT
Case Management Discharge Planning Note    Patient name Debbie Hdez  [facility] [address] [facility] [id number]  : 1965 Date 2023       Current Admission Date: 2023  Current Admission Diagnosis:Stroke-like symptoms   Patient Active Problem List    Diagnosis Date Noted    Stroke-like symptoms 2023    TBI (traumatic brain injury) ([facility] [address] ) 2023    History of DVT (deep vein thrombosis) 2023    Schizoaffective disorder ([facility] [address] ) 2023    Hyperlipemia 2023      LOS (days): 2  Geometric Mean LOS (GMLOS) (days):   Days to GMLOS:     OBJECTIVE:  Risk of Unplanned Readmission Score: 12 65         Current admission status: Inpatient   Preferred Pharmacy:   [address] [place] [address]  No address on file      [address] [place] [address]  Phone: [phone] Fax: [phone]    Primary Care Provider: Sheridan Gusman MD    Primary Insurance: MEDICARE  Secondary Insurance: [address] [place] [address]    DISCHARGE DETAILS:    Contacts  Patient Contacts: Amanda-RN  Relationship to Patient[de-identified] Other (Comment) (Group home RN)  Contact Method: Phone  Phone Number: [phone]  Reason/Outcome: Emergency Contact, Discharge Planning, [address]         Is the patient interested in [facility] at discharge?: No    DME Referral Provided  Referral made for DME?: No    Other Referral/Resources/Interventions Provided:  Interventions:  [address] Return, Transportation    Would you like to participate in our [address] service program?  : No - Declined       Discharge Destination Plan[de-identified] Facility Return, Group Home  Transport at Discharge : Wheel[facility] [place]     Number/Name of Dispatcher: Dian Alexandr [phone]  [facility]  [place] [phone]  ETA of Transport (Date): 05/01/23  ETA of Transport (Time): 1200        Accompanied by: Alone     IMM Given (Date):: 05/01/23  IMM Given to[de-identified] [address] [facility] [address] Name, [address] : Cooper Castillo  Receiving Facility/Agency Phone Number: [phone]  Facility/Agency Fax Number: [phone]

## 2023-05-01 NOTE — PLAN OF CARE
Problem: MOBILITY - ADULT  Goal: Maintain or return to baseline ADL function  Description: INTERVENTIONS:  -  Assess patient's ability to carry out ADLs; assess patient's baseline for ADL function and identify physical deficits which impact ability to perform ADLs (bathing, care of mouth/teeth, toileting, grooming, dressing, etc )  - Assess/evaluate cause of self-care deficits   - Assess range of motion  - Assess patient's mobility; develop plan if impaired  - Assess patient's need for assistive devices and provide as appropriate  - Encourage maximum independence but intervene and supervise when necessary  - Involve family in performance of ADLs  - Assess for home care needs following discharge   - Consider OT consult to assist with ADL evaluation and planning for discharge  - Provide patient education as appropriate  Outcome: Progressing  Goal: Maintains/Returns to pre admission functional level  Description: INTERVENTIONS:  - Perform BMAT or MOVE assessment daily    - Set and communicate daily mobility goal to care team and patient/family/caregiver     - Collaborate with rehabilitation services on mobility goals if consulted  - Stand patient 3 times a day  - Ambulate patient 3 times a day  - Out of bed to chair 3 times a day   - Out of bed for meals 3 times a day  - Out of bed for toileting  - Record patient progress and toleration of activity level   Outcome: Progressing     Problem: SAFETY ADULT  Goal: Patient will remain free of falls  Description: INTERVENTIONS:  - Educate patient/family on patient safety including physical limitations  - Instruct patient to call for assistance with activity   - Consult OT/PT to assist with strengthening/mobility   - Keep Call bell within reach  - Keep bed low and locked with side rails adjusted as appropriate  - Keep care items and personal belongings within reach  - Initiate and maintain comfort rounds  - Make Fall Risk Sign visible to staff  - Offer Toileting every 2 Hours, in advance of need  - Initiate/Maintain bed/chair alarm  - Obtain necessary fall risk management equipment  - Apply yellow socks and bracelet for high fall risk patients  - Consider moving patient to room near nurses station  Outcome: Progressing     Problem: DISCHARGE PLANNING  Goal: Discharge to home or other facility with appropriate resources  Description: INTERVENTIONS:  - Identify barriers to discharge w/patient and caregiver  - Arrange for needed discharge resources and transportation as appropriate  - Identify discharge learning needs (meds, wound care, etc )  - Refer to Case Management Department for coordinating discharge planning if the patient needs post-hospital services based on physician/advanced practitioner order or complex needs related to functional status, cognitive ability, or social support system  Outcome: Progressing     Problem: Knowledge Deficit  Goal: Patient/family/caregiver demonstrates understanding of disease process, treatment plan, medications, and discharge instructions  Description: Complete learning assessment and assess knowledge base  Interventions:  - Provide teaching at level of understanding  - Provide teaching via preferred learning methods  Outcome: Progressing     Problem: NEUROSENSORY - ADULT  Goal: Achieves stable or improved neurological status  Description: INTERVENTIONS  - Monitor and report changes in neurological status  - Monitor vital signs such as temperature, blood pressure, glucose, and any other labs ordered   - Initiate measures to prevent increased intracranial pressure  - Monitor for seizure activity and implement precautions if appropriate      Outcome: Progressing     Problem: Neurological Deficit  Goal: Neurological status is stable or improving  Description: Interventions:  - Monitor and assess patient's level of consciousness, motor function, sensory function, and level of assistance needed for ADLs     - Monitor and report changes from baseline  Collaborate with interdisciplinary team to initiate plan and implement interventions as ordered  - Provide and maintain a safe environment  - Consider seizure precautions  - Consider fall precautions  - Consider aspiration precautions  - Consider bleeding precautions  Outcome: Progressing     Problem: Activity Intolerance/Impaired Mobility  Goal: Mobility/activity is maintained at optimum level for patient  Description: Interventions:  - Assess and monitor patient  barriers to mobility and need for assistive/adaptive devices  - Assess patient's emotional response to limitations  - Collaborate with interdisciplinary team and initiate plans and interventions as ordered  - Encourage independent activity per ability   - Maintain proper body alignment  - Perform active/passive rom as tolerated/ordered    - Plan activities to conserve energy   - Turn patient as appropriate  Outcome: Progressing

## 2023-05-01 NOTE — DISCHARGE SUMMARY
[address] [facility]  Discharge- Keith Santana 1965, 62 y o  female MRN: [id number]  Unit/Bed#: [facility] 727-01 Encounter: [id number]  Primary Care Provider: Farzana Lozano MD   Date and time admitted to hospital: 4/28/2023  2:04 PM    * Stroke-like symptoms  Assessment & Plan  · Presented to the ER with slurred speech and concern for CVA  · P evaluated by neurology in the ER who felt that she was at low risk for true CVA    · Admitted for observation  · MRI completed without any abnormal findings  · Electrolytes have been corrected  · Currently at baseline mental status  · Discharge back to group home on 5/1    Hyperlipemia  Assessment & Plan  · Continue statin and fibrate    Schizoaffective disorder ([facility] [address] )  Assessment & Plan  · Continue buspirone, trazodone cymbalta and geodon    History of DVT (deep vein thrombosis)  Assessment & Plan  · Has a history of chronic LLE lymphedema  · Continue Eliquis    TBI (traumatic brain injury) [facility])  Assessment & Plan  · Resident of the [facility]      Medical Problems     Resolved Problems  Date Reviewed: 5/1/2023          Resolved    Hyponatremia 4/29/2023     Resolved by  Jazmine Merino DO        Discharging Physician / Practitioner: Jazmine Merino DO  PCP: Farzana Lozano MD  Admission Date:   Admission Orders (From admission, onward)     Ordered        04/29/23 1750  Inpatient Admission  Once            04/28/23 1610  Place in Observation  Once                      Discharge Date: 05/01/23    Consultations During Hospital Stay:  · Neurology    Procedures Performed:   · none    Significant Findings / Test Results:   · No evidence of acute ischemia on MRI of the brain    Incidental Findings:   · none    Test Results Pending at Discharge (will require follow up):   · none     Outpatient Tests Requested:  · none    Complications:  none    Reason for Admission: stroke like symptoms    Hospital Course:   Radha Guevara "Arnoldo Elaine is a 62 y o  female patient who originally presented to the hospital on 4/28/2023 due to slurred speech  There was concern for CVA and she was admitted to the hospitalist service for further evaluation and management  All neurologic imaging including CT of the head, CTA of the head and neck, and MRI of the brain were unrevealing  The patient's symptoms had returned to baseline even prior to admission  She was seen by neurology who felt there was no compelling evidence for acute CVA or other neurologic events  No changes were made to her medical regimen  She will be [facility] back to [facility] home [facility]  All questions and concerns were addressed  Please see above list of diagnoses and related plan for additional information  Condition at Discharge: good    Discharge Day Visit / Exam:   Subjective: Patient seen and examined on the day of discharge  Feeling well  No events overnight  Nany Amrbose to return home  Vitals: Blood Pressure: 149/78 (05/01/23 0744)  Pulse: 73 (05/01/23 0744)  Temperature: 97 9 °F (36 6 °C) (04/30/23 2220)  Temp Source: Oral (04/28/23 2135)  Respirations: 16 (05/01/23 0744)  Height: 5' 3\" (160 cm) (04/28/23 2135)  Weight - Scale: 97 4 kg (214 lb 11 7 oz) (04/28/23 2135)  SpO2: 96 % (05/01/23 0744)    PHYSICAL EXAM:    Vitals signs reviewed  Constitutional   Awake and cooperative  NAD  Head/Neck   Normocephalic  Atraumatic  HEENT   No scleral icterus  EOMI  Heart   Regular rate and rhythm  No murmers  Lungs   Clear to auscultation bilaterally  Respirations unlaboured  Abdomen   Soft  Nontender  Nondistended  Skin   Skin color normal  No rashes  Extremities   No deformities  No peripheral edema  Neuro   Alert and oriented  No new deficits  Psych   Mood stable  Affect normal          Discussion with Family: Patient declined call to        Discharge instructions/Information to patient and family:   See after visit summary for " information provided to patient and family  Provisions for Follow-Up Care:  See after visit summary for information related to follow-up care and any pertinent home health orders  Disposition:   Group Home / [facility]    Planned Readmission: NO     Discharge Statement:  I spent 40 minutes discharging the patient  This time was spent on the day of discharge  I had direct contact with the patient on the day of discharge  Greater than 50% of the total time was spent examining patient, answering all patient questions, arranging and discussing plan of care with patient as well as directly providing post-discharge instructions  Additional time then spent on discharge activities  Discharge Medications:  See after visit summary for reconciled discharge medications provided to patient and/or family        **Please Note: This note may have been constructed using a voice recognition system**

## 2023-05-01 NOTE — RESTORATIVE TECHNICIAN NOTE
Restorative Technician Note      Patient Name: Gwyn Gunn     Note Type: Mobility  Patient Position Upon Consult: Standing  Activity Performed: Ambulated; Dangled; Stood  Assistive Device: Roller walker  Education Provided: Yes  Patient Position at [employer] of Consult: Bedside chair;  All needs within reach; Bed/Chair alarm activated    Agustin PARADA, Restorative Technician, [employer]

## 2023-05-01 NOTE — PLAN OF CARE
Problem: MOBILITY - ADULT  Goal: Maintain or return to baseline ADL function  Description: INTERVENTIONS:  -  Assess patient's ability to carry out ADLs; assess patient's baseline for ADL function and identify physical deficits which impact ability to perform ADLs (bathing, care of mouth/teeth, toileting, grooming, dressing, etc )  - Assess/evaluate cause of self-care deficits   - Assess range of motion  - Assess patient's mobility; develop plan if impaired  - Assess patient's need for assistive devices and provide as appropriate  - Encourage maximum independence but intervene and supervise when necessary  - Involve family in performance of ADLs  - Assess for home care needs following discharge   - Consider OT consult to assist with ADL evaluation and planning for discharge  - Provide patient education as appropriate  Outcome: Completed  Goal: Maintains/Returns to pre admission functional level  Description: INTERVENTIONS:  - Perform BMAT or MOVE assessment daily    - Set and communicate daily mobility goal to care team and patient/family/caregiver     - Collaborate with rehabilitation services on mobility goals if consulted  - Stand patient 3 times a day  - Ambulate patient 3 times a day  - Out of bed to chair 3 times a day   - Out of bed for meals 3 times a day  - Out of bed for toileting  - Record patient progress and toleration of activity level   Outcome: Completed     Problem: SAFETY ADULT  Goal: Patient will remain free of falls  Description: INTERVENTIONS:  - Educate patient/family on patient safety including physical limitations  - Instruct patient to call for assistance with activity   - Consult OT/PT to assist with strengthening/mobility   - Keep Call bell within reach  - Keep bed low and locked with side rails adjusted as appropriate  - Keep care items and personal belongings within reach  - Initiate and maintain comfort rounds  - Make Fall Risk Sign visible to staff  - Offer Toileting every 2 Hours, in advance of need  - Initiate/Maintain bed/chair alarm  - Obtain necessary fall risk management equipment  - Apply yellow socks and bracelet for high fall risk patients  - Consider moving patient to room near nurses station  Outcome: Completed     Problem: DISCHARGE PLANNING  Goal: Discharge to home or other facility with appropriate resources  Description: INTERVENTIONS:  - Identify barriers to discharge w/patient and caregiver  - Arrange for needed discharge resources and transportation as appropriate  - Identify discharge learning needs (meds, wound care, etc )  - Refer to Case Management Department for coordinating discharge planning if the patient needs post-hospital services based on physician/advanced practitioner order or complex needs related to functional status, cognitive ability, or social support system  Outcome: Completed     Problem: Knowledge Deficit  Goal: Patient/family/caregiver demonstrates understanding of disease process, treatment plan, medications, and discharge instructions  Description: Complete learning assessment and assess knowledge base    Interventions:  - Provide teaching at level of understanding  - Provide teaching via preferred learning methods  Outcome: Completed     Problem: NEUROSENSORY - ADULT  Goal: Achieves stable or improved neurological status  Description: INTERVENTIONS  - Monitor and report changes in neurological status  - Monitor vital signs such as temperature, blood pressure, glucose, and any other labs ordered   - Initiate measures to prevent increased intracranial pressure  - Monitor for seizure activity and implement precautions if appropriate      Outcome: Completed     Problem: CARDIOVASCULAR - ADULT  Goal: Maintains optimal cardiac output and hemodynamic stability  Description: INTERVENTIONS:  - Monitor I/O, vital signs and rhythm  - Monitor for S/S and trends of decreased cardiac output  - Administer and titrate ordered vasoactive medications to optimize hemodynamic stability  - Assess quality of pulses, skin color and temperature  - Assess for signs of decreased coronary artery perfusion  - Instruct patient to report change in severity of symptoms  Outcome: Completed     Problem: Neurological Deficit  Goal: Neurological status is stable or improving  Description: Interventions:  - Monitor and assess patient's level of consciousness, motor function, sensory function, and level of assistance needed for ADLs  - Monitor and report changes from baseline  Collaborate with interdisciplinary team to initiate plan and implement interventions as ordered  - Provide and maintain a safe environment  - Consider seizure precautions  - Consider fall precautions  - Consider aspiration precautions  - Consider bleeding precautions  Outcome: Completed     Problem: Activity Intolerance/Impaired Mobility  Goal: Mobility/activity is maintained at optimum level for patient  Description: Interventions:  - Assess and monitor patient  barriers to mobility and need for assistive/adaptive devices  - Assess patient's emotional response to limitations  - Collaborate with interdisciplinary team and initiate plans and interventions as ordered  - Encourage independent activity per ability   - Maintain proper body alignment  - Perform active/passive rom as tolerated/ordered    - Plan activities to conserve energy   - Turn patient as appropriate  Outcome: Completed

## 2023-05-02 LAB
EST. AVERAGE GLUCOSE BLD GHB EST-MCNC: 114 MG/DL
HBA1C MFR BLD: 5.6 %
MRSA NOSE QL CULT: ABNORMAL

## 2023-07-08 ENCOUNTER — APPOINTMENT (EMERGENCY)
Dept: RADIOLOGY | Facility: HOSPITAL | Age: 58
End: 2023-07-08
Payer: MEDICARE

## 2023-07-08 ENCOUNTER — HOSPITAL ENCOUNTER (EMERGENCY)
Facility: HOSPITAL | Age: 58
Discharge: HOME | End: 2023-07-08
Attending: EMERGENCY MEDICINE
Payer: MEDICARE

## 2023-07-08 VITALS
OXYGEN SATURATION: 99 % | SYSTOLIC BLOOD PRESSURE: 166 MMHG | TEMPERATURE: 98.8 F | HEART RATE: 72 BPM | DIASTOLIC BLOOD PRESSURE: 80 MMHG | RESPIRATION RATE: 16 BRPM

## 2023-07-08 DIAGNOSIS — F41.9 ANXIETY: Primary | ICD-10-CM

## 2023-07-08 DIAGNOSIS — R07.9 CHEST PAIN, UNSPECIFIED TYPE: ICD-10-CM

## 2023-07-08 LAB
2HR DELTA HS TROPONIN: 0 NG/L
ALBUMIN SERPL BCP-MCNC: 4 G/DL (ref 3.5–5)
ALP SERPL-CCNC: 45 U/L (ref 34–104)
ALT SERPL W P-5'-P-CCNC: 14 U/L (ref 7–52)
ANION GAP SERPL CALCULATED.3IONS-SCNC: 5 MMOL/L
AST SERPL W P-5'-P-CCNC: 19 U/L (ref 13–39)
BASOPHILS # BLD AUTO: 0.01 THOUSANDS/ÂΜL (ref 0–0.1)
BASOPHILS NFR BLD AUTO: 0 % (ref 0–1)
BILIRUB SERPL-MCNC: 0.25 MG/DL (ref 0.2–1)
BUN SERPL-MCNC: 9 MG/DL (ref 5–25)
CALCIUM SERPL-MCNC: 8.6 MG/DL (ref 8.4–10.2)
CARDIAC TROPONIN I PNL SERPL HS: 3 NG/L
CARDIAC TROPONIN I PNL SERPL HS: 3 NG/L
CHLORIDE SERPL-SCNC: 99 MMOL/L (ref 96–108)
CO2 SERPL-SCNC: 28 MMOL/L (ref 21–32)
CREAT SERPL-MCNC: 0.58 MG/DL (ref 0.6–1.3)
EOSINOPHIL # BLD AUTO: 0.08 THOUSAND/ÂΜL (ref 0–0.61)
EOSINOPHIL NFR BLD AUTO: 2 % (ref 0–6)
ERYTHROCYTE [DISTWIDTH] IN BLOOD BY AUTOMATED COUNT: 12.4 % (ref 11.6–15.1)
GFR SERPL CREATININE-BSD FRML MDRD: 102 ML/MIN/1.73SQ M
GLUCOSE SERPL-MCNC: 111 MG/DL (ref 65–140)
HCT VFR BLD AUTO: 34.4 % (ref 34.8–46.1)
HGB BLD-MCNC: 11.4 G/DL (ref 11.5–15.4)
IMM GRANULOCYTES # BLD AUTO: 0.02 THOUSAND/UL (ref 0–0.2)
IMM GRANULOCYTES NFR BLD AUTO: 1 % (ref 0–2)
LYMPHOCYTES # BLD AUTO: 1.01 THOUSANDS/ÂΜL (ref 0.6–4.47)
LYMPHOCYTES NFR BLD AUTO: 26 % (ref 14–44)
MCH RBC QN AUTO: 29.5 PG (ref 26.8–34.3)
MCHC RBC AUTO-ENTMCNC: 33.1 G/DL (ref 31.4–37.4)
MCV RBC AUTO: 89 FL (ref 82–98)
MONOCYTES # BLD AUTO: 0.29 THOUSAND/ÂΜL (ref 0.17–1.22)
MONOCYTES NFR BLD AUTO: 7 % (ref 4–12)
NEUTROPHILS # BLD AUTO: 2.52 THOUSANDS/ÂΜL (ref 1.85–7.62)
NEUTS SEG NFR BLD AUTO: 64 % (ref 43–75)
NRBC BLD AUTO-RTO: 0 /100 WBCS
PLATELET # BLD AUTO: 272 THOUSANDS/UL (ref 149–390)
PMV BLD AUTO: 8.9 FL (ref 8.9–12.7)
POTASSIUM SERPL-SCNC: 3.9 MMOL/L (ref 3.5–5.3)
PROT SERPL-MCNC: 6.6 G/DL (ref 6.4–8.4)
RBC # BLD AUTO: 3.87 MILLION/UL (ref 3.81–5.12)
SODIUM SERPL-SCNC: 132 MMOL/L (ref 135–147)
WBC # BLD AUTO: 3.93 THOUSAND/UL (ref 4.31–10.16)

## 2023-07-08 PROCEDURE — 85025 COMPLETE CBC W/AUTO DIFF WBC: CPT

## 2023-07-08 PROCEDURE — 99284 EMERGENCY DEPT VISIT MOD MDM: CPT

## 2023-07-08 PROCEDURE — 36415 COLL VENOUS BLD VENIPUNCTURE: CPT

## 2023-07-08 PROCEDURE — 96374 THER/PROPH/DIAG INJ IV PUSH: CPT

## 2023-07-08 PROCEDURE — 71045 X-RAY EXAM CHEST 1 VIEW: CPT

## 2023-07-08 PROCEDURE — 99285 EMERGENCY DEPT VISIT HI MDM: CPT | Performed by: EMERGENCY MEDICINE

## 2023-07-08 PROCEDURE — 93005 ELECTROCARDIOGRAM TRACING: CPT

## 2023-07-08 PROCEDURE — 80053 COMPREHEN METABOLIC PANEL: CPT

## 2023-07-08 PROCEDURE — 96376 TX/PRO/DX INJ SAME DRUG ADON: CPT

## 2023-07-08 PROCEDURE — 84484 ASSAY OF TROPONIN QUANT: CPT

## 2023-07-08 RX ORDER — LORAZEPAM 2 MG/ML
1 INJECTION INTRAMUSCULAR ONCE
Status: COMPLETED | OUTPATIENT
Start: 2023-07-08 | End: 2023-07-08

## 2023-07-08 RX ADMIN — LORAZEPAM 1 MG: 2 INJECTION INTRAMUSCULAR; INTRAVENOUS at 17:03

## 2023-07-08 RX ADMIN — LORAZEPAM 1 MG: 2 INJECTION INTRAMUSCULAR; INTRAVENOUS at 10:01

## 2023-07-08 NOTE — ED NOTES
Provider at bedside.      Chantelle Pinzon RN  07/08/23 2778 Full Thickness Lip Wedge Repair (Flap) Text: Given the location of the defect and the proximity to free margins a full thickness wedge repair was deemed most appropriate.  Using a sterile surgical marker, the appropriate repair was drawn incorporating the defect and placing the expected incisions perpendicular to the vermilion border.  The vermilion border was also meticulously outlined to ensure appropriate reapproximation during the repair.  The area thus outlined was incised through and through with a #15 scalpel blade.  The muscularis and dermis were reaproximated with deep sutures following hemostasis. Care was taken to realign the vermilion border before proceeding with the superficial closure.  Once the vermilion was realigned the superfical and mucosal closure was finished.
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
## 2023-07-08 NOTE — ED NOTES
Patient is resting comfortably. , call bell within reach     [facility], RN  07/08/23 6936
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
## 2023-07-08 NOTE — DISCHARGE INSTRUCTIONS
Your work-up in the emergency department was unremarkable. You have been encouraged to follow-up with your PCP on an outpatient basis for continuing management of your anxiety. Please continue taking your prescription medication as prescribed. Should you develop worsening chest pain, shortness of breath, lightheadedness, syncope, pain or fever uncontrolled with medication or any other symptoms that you find concerning please return to the emergency department immediately.

## 2023-07-10 LAB
ATRIAL RATE: 70 BPM
P AXIS: 50 DEGREES
PR INTERVAL: 180 MS
QRS AXIS: -18 DEGREES
QRSD INTERVAL: 100 MS
QT INTERVAL: 394 MS
QTC INTERVAL: 425 MS
T WAVE AXIS: 34 DEGREES
VENTRICULAR RATE: 70 BPM

## 2023-07-10 PROCEDURE — 93010 ELECTROCARDIOGRAM REPORT: CPT | Performed by: INTERNAL MEDICINE
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
## 2024-01-27 ENCOUNTER — HOSPITAL ENCOUNTER (OUTPATIENT)
Facility: HOSPITAL | Age: 59
Setting detail: OBSERVATION
Discharge: HOME | End: 2024-01-29
Attending: EMERGENCY MEDICINE | Admitting: INTERNAL MEDICINE
Payer: MEDICARE

## 2024-01-27 ENCOUNTER — APPOINTMENT (EMERGENCY)
Dept: RADIOLOGY | Facility: HOSPITAL | Age: 59
End: 2024-01-27
Payer: MEDICARE

## 2024-01-27 ENCOUNTER — APPOINTMENT (EMERGENCY)
Dept: CT IMAGING | Facility: HOSPITAL | Age: 59
End: 2024-01-27
Payer: MEDICARE

## 2024-01-27 DIAGNOSIS — E87.1 HYPONATREMIA: ICD-10-CM

## 2024-01-27 DIAGNOSIS — I10 HYPERTENSION: Primary | ICD-10-CM

## 2024-01-27 LAB
2HR DELTA HS TROPONIN: 1 NG/L
4HR DELTA HS TROPONIN: 1 NG/L
ANION GAP SERPL CALCULATED.3IONS-SCNC: 10 MMOL/L
APTT PPP: 25 SECONDS (ref 23–37)
ATRIAL RATE: 58 BPM
BASOPHILS # BLD AUTO: 0.01 THOUSANDS/ÂΜL (ref 0–0.1)
BASOPHILS NFR BLD AUTO: 0 % (ref 0–1)
BILIRUB UR QL STRIP: NEGATIVE
BUN SERPL-MCNC: 12 MG/DL (ref 5–25)
CALCIUM SERPL-MCNC: 8.7 MG/DL (ref 8.4–10.2)
CARDIAC TROPONIN I PNL SERPL HS: 2 NG/L
CARDIAC TROPONIN I PNL SERPL HS: 3 NG/L
CARDIAC TROPONIN I PNL SERPL HS: 3 NG/L
CHLORIDE SERPL-SCNC: 96 MMOL/L (ref 96–108)
CLARITY UR: CLEAR
CO2 SERPL-SCNC: 22 MMOL/L (ref 21–32)
COLOR UR: NORMAL
CREAT SERPL-MCNC: 0.59 MG/DL (ref 0.6–1.3)
EOSINOPHIL # BLD AUTO: 0.11 THOUSAND/ÂΜL (ref 0–0.61)
EOSINOPHIL NFR BLD AUTO: 3 % (ref 0–6)
ERYTHROCYTE [DISTWIDTH] IN BLOOD BY AUTOMATED COUNT: 12.6 % (ref 11.6–15.1)
GFR SERPL CREATININE-BSD FRML MDRD: 101 ML/MIN/1.73SQ M
GLUCOSE SERPL-MCNC: 95 MG/DL (ref 65–140)
GLUCOSE UR STRIP-MCNC: NEGATIVE MG/DL
HCT VFR BLD AUTO: 33.8 % (ref 34.8–46.1)
HGB BLD-MCNC: 11.5 G/DL (ref 11.5–15.4)
HGB UR QL STRIP.AUTO: NEGATIVE
IMM GRANULOCYTES # BLD AUTO: 0.01 THOUSAND/UL (ref 0–0.2)
IMM GRANULOCYTES NFR BLD AUTO: 0 % (ref 0–2)
INR PPP: 0.96 (ref 0.84–1.19)
KETONES UR STRIP-MCNC: NEGATIVE MG/DL
LEUKOCYTE ESTERASE UR QL STRIP: NEGATIVE
LYMPHOCYTES # BLD AUTO: 1.17 THOUSANDS/ÂΜL (ref 0.6–4.47)
LYMPHOCYTES NFR BLD AUTO: 35 % (ref 14–44)
MCH RBC QN AUTO: 29.9 PG (ref 26.8–34.3)
MCHC RBC AUTO-ENTMCNC: 34 G/DL (ref 31.4–37.4)
MCV RBC AUTO: 88 FL (ref 82–98)
MONOCYTES # BLD AUTO: 0.32 THOUSAND/ÂΜL (ref 0.17–1.22)
MONOCYTES NFR BLD AUTO: 10 % (ref 4–12)
NEUTROPHILS # BLD AUTO: 1.76 THOUSANDS/ÂΜL (ref 1.85–7.62)
NEUTS SEG NFR BLD AUTO: 52 % (ref 43–75)
NITRITE UR QL STRIP: NEGATIVE
NRBC BLD AUTO-RTO: 0 /100 WBCS
OSMOLALITY UR/SERPL-RTO: 282 MMOL/KG (ref 282–298)
OSMOLALITY UR: 435 MMOL/KG
P AXIS: 64 DEGREES
PH UR STRIP.AUTO: 7.5 [PH]
PLATELET # BLD AUTO: 234 THOUSANDS/UL (ref 149–390)
PMV BLD AUTO: 8.8 FL (ref 8.9–12.7)
POTASSIUM SERPL-SCNC: 5 MMOL/L (ref 3.5–5.3)
PR INTERVAL: 184 MS
PROT UR STRIP-MCNC: NEGATIVE MG/DL
PROTHROMBIN TIME: 13.3 SECONDS (ref 11.6–14.5)
QRS AXIS: -4 DEGREES
QRSD INTERVAL: 100 MS
QT INTERVAL: 426 MS
QTC INTERVAL: 418 MS
RBC # BLD AUTO: 3.84 MILLION/UL (ref 3.81–5.12)
SODIUM 24H UR-SCNC: 99 MOL/L
SODIUM SERPL-SCNC: 128 MMOL/L (ref 135–147)
SP GR UR STRIP.AUTO: 1.01 (ref 1–1.03)
T WAVE AXIS: 42 DEGREES
TSH SERPL DL<=0.05 MIU/L-ACNC: 1.23 UIU/ML (ref 0.45–4.5)
UROBILINOGEN UR STRIP-ACNC: <2 MG/DL
VENTRICULAR RATE: 58 BPM
WBC # BLD AUTO: 3.38 THOUSAND/UL (ref 4.31–10.16)

## 2024-01-27 PROCEDURE — 99223 1ST HOSP IP/OBS HIGH 75: CPT | Performed by: INTERNAL MEDICINE

## 2024-01-27 PROCEDURE — 70450 CT HEAD/BRAIN W/O DYE: CPT

## 2024-01-27 PROCEDURE — 85610 PROTHROMBIN TIME: CPT | Performed by: PHYSICIAN ASSISTANT

## 2024-01-27 PROCEDURE — 36415 COLL VENOUS BLD VENIPUNCTURE: CPT | Performed by: PHYSICIAN ASSISTANT

## 2024-01-27 PROCEDURE — 85025 COMPLETE CBC W/AUTO DIFF WBC: CPT | Performed by: PHYSICIAN ASSISTANT

## 2024-01-27 PROCEDURE — 99285 EMERGENCY DEPT VISIT HI MDM: CPT | Performed by: PHYSICIAN ASSISTANT

## 2024-01-27 PROCEDURE — 84300 ASSAY OF URINE SODIUM: CPT

## 2024-01-27 PROCEDURE — 80048 BASIC METABOLIC PNL TOTAL CA: CPT | Performed by: PHYSICIAN ASSISTANT

## 2024-01-27 PROCEDURE — 71045 X-RAY EXAM CHEST 1 VIEW: CPT

## 2024-01-27 PROCEDURE — 84484 ASSAY OF TROPONIN QUANT: CPT | Performed by: PHYSICIAN ASSISTANT

## 2024-01-27 PROCEDURE — 84443 ASSAY THYROID STIM HORMONE: CPT

## 2024-01-27 PROCEDURE — 81003 URINALYSIS AUTO W/O SCOPE: CPT | Performed by: PHYSICIAN ASSISTANT

## 2024-01-27 PROCEDURE — 85730 THROMBOPLASTIN TIME PARTIAL: CPT | Performed by: PHYSICIAN ASSISTANT

## 2024-01-27 PROCEDURE — 94660 CPAP INITIATION&MGMT: CPT

## 2024-01-27 PROCEDURE — 83930 ASSAY OF BLOOD OSMOLALITY: CPT

## 2024-01-27 PROCEDURE — 96360 HYDRATION IV INFUSION INIT: CPT

## 2024-01-27 PROCEDURE — 94760 N-INVAS EAR/PLS OXIMETRY 1: CPT

## 2024-01-27 PROCEDURE — 99284 EMERGENCY DEPT VISIT MOD MDM: CPT

## 2024-01-27 PROCEDURE — 93005 ELECTROCARDIOGRAM TRACING: CPT

## 2024-01-27 PROCEDURE — 83935 ASSAY OF URINE OSMOLALITY: CPT

## 2024-01-27 RX ORDER — PANTOPRAZOLE SODIUM 40 MG/1
40 TABLET, DELAYED RELEASE ORAL
Status: DISCONTINUED | OUTPATIENT
Start: 2024-01-28 | End: 2024-01-29 | Stop reason: HOSPADM

## 2024-01-27 RX ORDER — SACCHAROMYCES BOULARDII 250 MG
250 CAPSULE ORAL 2 TIMES DAILY
Status: DISCONTINUED | OUTPATIENT
Start: 2024-01-27 | End: 2024-01-29 | Stop reason: HOSPADM

## 2024-01-27 RX ORDER — ZIPRASIDONE HYDROCHLORIDE 20 MG/1
40 CAPSULE ORAL 2 TIMES DAILY WITH MEALS
Status: DISCONTINUED | OUTPATIENT
Start: 2024-01-27 | End: 2024-01-27 | Stop reason: SDUPTHER

## 2024-01-27 RX ORDER — AMOXICILLIN 250 MG
1 CAPSULE ORAL
Status: DISCONTINUED | OUTPATIENT
Start: 2024-01-27 | End: 2024-01-29 | Stop reason: HOSPADM

## 2024-01-27 RX ORDER — ACETAMINOPHEN 325 MG/1
650 TABLET ORAL EVERY 4 HOURS PRN
Status: DISCONTINUED | OUTPATIENT
Start: 2024-01-27 | End: 2024-01-29 | Stop reason: HOSPADM

## 2024-01-27 RX ORDER — GABAPENTIN 300 MG/1
300 CAPSULE ORAL EVERY EVENING
Status: DISCONTINUED | OUTPATIENT
Start: 2024-01-27 | End: 2024-01-29 | Stop reason: HOSPADM

## 2024-01-27 RX ORDER — METOPROLOL SUCCINATE 25 MG/1
25 TABLET, EXTENDED RELEASE ORAL DAILY
COMMUNITY

## 2024-01-27 RX ORDER — LORATADINE 10 MG/1
10 TABLET ORAL DAILY
Status: DISCONTINUED | OUTPATIENT
Start: 2024-01-27 | End: 2024-01-29 | Stop reason: HOSPADM

## 2024-01-27 RX ORDER — OXYBUTYNIN CHLORIDE 5 MG/1
5 TABLET, EXTENDED RELEASE ORAL DAILY
Status: DISCONTINUED | OUTPATIENT
Start: 2024-01-28 | End: 2024-01-29 | Stop reason: HOSPADM

## 2024-01-27 RX ORDER — BUSPIRONE HYDROCHLORIDE 5 MG/1
20 TABLET ORAL 2 TIMES DAILY
Status: DISCONTINUED | OUTPATIENT
Start: 2024-01-27 | End: 2024-01-29 | Stop reason: HOSPADM

## 2024-01-27 RX ORDER — ZIPRASIDONE HYDROCHLORIDE 20 MG/1
120 CAPSULE ORAL 2 TIMES DAILY WITH MEALS
Status: DISCONTINUED | OUTPATIENT
Start: 2024-01-27 | End: 2024-01-29 | Stop reason: HOSPADM

## 2024-01-27 RX ORDER — SODIUM CHLORIDE 9 MG/ML
50 INJECTION, SOLUTION INTRAVENOUS CONTINUOUS
Status: DISCONTINUED | OUTPATIENT
Start: 2024-01-27 | End: 2024-01-27

## 2024-01-27 RX ORDER — MIRABEGRON 25 MG/1
TABLET, FILM COATED, EXTENDED RELEASE ORAL
COMMUNITY

## 2024-01-27 RX ORDER — DULOXETIN HYDROCHLORIDE 60 MG/1
60 CAPSULE, DELAYED RELEASE ORAL DAILY
Status: DISCONTINUED | OUTPATIENT
Start: 2024-01-28 | End: 2024-01-29 | Stop reason: HOSPADM

## 2024-01-27 RX ORDER — ZIPRASIDONE HYDROCHLORIDE 20 MG/1
80 CAPSULE ORAL 2 TIMES DAILY WITH MEALS
Status: DISCONTINUED | OUTPATIENT
Start: 2024-01-27 | End: 2024-01-27 | Stop reason: SDUPTHER

## 2024-01-27 RX ORDER — LANOLIN ALCOHOL/MO/W.PET/CERES
3 CREAM (GRAM) TOPICAL
Status: DISCONTINUED | OUTPATIENT
Start: 2024-01-27 | End: 2024-01-29 | Stop reason: HOSPADM

## 2024-01-27 RX ORDER — CLONAZEPAM 0.5 MG/1
0.5 TABLET ORAL EVERY EVENING
Status: DISCONTINUED | OUTPATIENT
Start: 2024-01-27 | End: 2024-01-29 | Stop reason: HOSPADM

## 2024-01-27 RX ORDER — CLONAZEPAM 0.5 MG/1
0.5 TABLET ORAL EVERY EVENING
COMMUNITY

## 2024-01-27 RX ORDER — PRAVASTATIN SODIUM 20 MG
20 TABLET ORAL EVERY EVENING
Status: DISCONTINUED | OUTPATIENT
Start: 2024-01-27 | End: 2024-01-29 | Stop reason: HOSPADM

## 2024-01-27 RX ORDER — FENOFIBRATE 145 MG/1
145 TABLET, COATED ORAL DAILY
Status: DISCONTINUED | OUTPATIENT
Start: 2024-01-27 | End: 2024-01-29 | Stop reason: HOSPADM

## 2024-01-27 RX ORDER — LOSARTAN POTASSIUM 50 MG/1
100 TABLET ORAL EVERY EVENING
Status: DISCONTINUED | OUTPATIENT
Start: 2024-01-27 | End: 2024-01-29 | Stop reason: HOSPADM

## 2024-01-27 RX ORDER — AMLODIPINE BESYLATE 10 MG/1
10 TABLET ORAL DAILY
Status: DISCONTINUED | OUTPATIENT
Start: 2024-01-27 | End: 2024-01-29 | Stop reason: HOSPADM

## 2024-01-27 RX ORDER — CARBAMAZEPINE 100 MG/1
1200 TABLET, EXTENDED RELEASE ORAL
Status: DISCONTINUED | OUTPATIENT
Start: 2024-01-27 | End: 2024-01-29 | Stop reason: HOSPADM

## 2024-01-27 RX ORDER — AMOXICILLIN 250 MG
1 CAPSULE ORAL DAILY
COMMUNITY

## 2024-01-27 RX ORDER — FENOFIBRATE 145 MG/1
145 TABLET, COATED ORAL EVERY EVENING
COMMUNITY
End: 2024-01-27

## 2024-01-27 RX ORDER — AMLODIPINE BESYLATE 5 MG/1
5 TABLET ORAL DAILY
Status: DISCONTINUED | OUTPATIENT
Start: 2024-01-27 | End: 2024-01-27

## 2024-01-27 RX ORDER — NYSTATIN 100000 [USP'U]/G
1 POWDER TOPICAL
Status: DISCONTINUED | OUTPATIENT
Start: 2024-01-27 | End: 2024-01-29 | Stop reason: HOSPADM

## 2024-01-27 RX ORDER — METOPROLOL SUCCINATE 25 MG/1
25 TABLET, EXTENDED RELEASE ORAL DAILY
Status: DISCONTINUED | OUTPATIENT
Start: 2024-01-28 | End: 2024-01-29 | Stop reason: HOSPADM

## 2024-01-27 RX ORDER — HYDRALAZINE HYDROCHLORIDE 20 MG/ML
5 INJECTION INTRAMUSCULAR; INTRAVENOUS EVERY 6 HOURS PRN
Status: DISCONTINUED | OUTPATIENT
Start: 2024-01-27 | End: 2024-01-29 | Stop reason: HOSPADM

## 2024-01-27 RX ADMIN — LOSARTAN POTASSIUM 100 MG: 50 TABLET, FILM COATED ORAL at 17:34

## 2024-01-27 RX ADMIN — SODIUM CHLORIDE 150 ML/HR: 0.9 INJECTION, SOLUTION INTRAVENOUS at 12:19

## 2024-01-27 RX ADMIN — AMLODIPINE BESYLATE 10 MG: 10 TABLET ORAL at 15:58

## 2024-01-27 RX ADMIN — ZIPRASIDONE HYDROCHLORIDE 120 MG: 20 CAPSULE ORAL at 15:58

## 2024-01-27 RX ADMIN — HYDRALAZINE HYDROCHLORIDE 5 MG: 20 INJECTION, SOLUTION INTRAMUSCULAR; INTRAVENOUS at 17:56

## 2024-01-27 RX ADMIN — GABAPENTIN 300 MG: 300 CAPSULE ORAL at 17:29

## 2024-01-27 RX ADMIN — NYSTATIN 1 APPLICATION: 100000 POWDER TOPICAL at 22:51

## 2024-01-27 RX ADMIN — ACETAMINOPHEN 650 MG: 325 TABLET, FILM COATED ORAL at 16:00

## 2024-01-27 RX ADMIN — LORATADINE 10 MG: 10 TABLET ORAL at 15:58

## 2024-01-27 RX ADMIN — PRAVASTATIN SODIUM 20 MG: 20 TABLET ORAL at 17:29

## 2024-01-27 RX ADMIN — CARBAMAZEPINE 1200 MG: 100 TABLET, EXTENDED RELEASE ORAL at 21:52

## 2024-01-27 RX ADMIN — CLONAZEPAM 0.5 MG: 0.5 TABLET ORAL at 17:29

## 2024-01-27 RX ADMIN — Medication 3 MG: at 21:53

## 2024-01-27 RX ADMIN — Medication 250 MG: at 21:53

## 2024-01-27 RX ADMIN — APIXABAN 5 MG: 5 TABLET, FILM COATED ORAL at 17:29

## 2024-01-27 RX ADMIN — BUSPIRONE HYDROCHLORIDE 20 MG: 5 TABLET ORAL at 21:53

## 2024-01-27 RX ADMIN — SENNOSIDES AND DOCUSATE SODIUM 1 TABLET: 8.6; 5 TABLET ORAL at 21:53

## 2024-01-27 RX ADMIN — FENOFIBRATE 145 MG: 145 TABLET ORAL at 15:59

## 2024-01-27 NOTE — ASSESSMENT & PLAN NOTE
BP noted to by systolic >200 at group home  Home medication metoprolol and losartan.  Patient group home states she is received her metoprolol this morning.      Plan:  Monitor BP  Continue losartan in the evening.  Will Add amlodipine as blood pressure is remaining in 160's since admission

## 2024-01-27 NOTE — ED PROVIDER NOTES
History  Chief Complaint   Patient presents with    Hypertension     Per ems pt comes from group home c/o HA this morning. Staff took bp and systolic >200s       History provided by:  Patient  Hypertension  Severity:  Moderate  Onset quality:  Sudden  Duration:  1 hour  Timing:  Intermittent  Progression:  Improving  Chronicity:  Chronic  Time since last dose of antihypertensive:  3 hours  Notable PTA blood pressures:  200 systolic  Context: normal sodium, not caffeine, not drug abuse, not herbal remedies, not medication change, not noncompliance, not OTC medications used and not stress    Relieved by: [place].  Ineffective treatments:  Beta blockers  Associated symptoms: fatigue    Associated symptoms: no abdominal pain, no anxiety, no blurred vision, no chest pain, no confusion, no dizziness, no ear pain, no epistaxis, no fever, no headaches, no hematuria, no hypokalemia, no loss of consciousness, no nausea, no neck pain, no palpitations, no peripheral edema, no shortness of breath, no syncope, no tinnitus, not vomiting and no weakness    Risk factors: obesity    Risk factors: no alcohol use, no cardiac disease, no cocaine use, no decongestant use, no diabetes, no family history of hypertension, no kidney disease, no prior aneurysm, no prior stroke, no PVD and no tobacco use        Prior to Admission Medications   Prescriptions Last Dose Informant Patient Reported? Taking?   Cholecalciferol 25 MCG (1000 UT) tablet 1/27/2024  Yes Yes   Sig: Take 1,000 Units by mouth daily   DULoxetine (CYMBALTA) 60 mg delayed release capsule 1/27/2024  Yes Yes   Sig: Take 60 mg by mouth daily   MULTIPLE VITAMIN PO 1/27/2024  Yes Yes   Sig: Take 1 tablet by mouth in the morning   Mirabegron ER (Myrbetriq) 25 MG TB24 1/27/2024  Yes Yes   Sig: Take by mouth   Probiotic Product (PROBIOTIC PO) 1/27/2024  Yes Yes   Sig: Take 250 mg by mouth 2 (two) times a day   acetaminophen (TYLENOL) 325 mg tablet Unknown Outside Facility (Specify)  Yes No   Sig: Take 650 mg by mouth every 4 (four) hours as needed for mild pain   apixaban (ELIQUIS) 5 mg 1/27/2024  Yes Yes   Sig: Take 5 mg by mouth 2 (two) times a day   ascorbic Acid (VITAMIN C) 500 MG CPCR 1/27/2024  Yes Yes   Sig: Take 500 mg by mouth daily   bisacodyl (FLEET) 10 MG/30ML ENEM Unknown Outside Facility (Specify) Yes No   Sig: Insert 10 mg into the rectum once   bismuth subsalicylate (PEPTO BISMOL) 524 mg/30 mL oral suspension Unknown Outside Facility (Specify) Yes No   Sig: Take 30 mL by mouth every 6 (six) hours as needed for indigestion Take after 2nd watery stool in 24 hours   busPIRone (BUSPAR) 15 mg tablet 1/27/2024  Yes Yes   Sig: Take 20 mg by mouth 2 (two) times a day   carBAMazepine (TEGretol XR) 400 mg 12 hr tablet 1/26/2024  Yes Yes   Sig: Take 1,200 mg by mouth daily at bedtime   clonazePAM (KlonoPIN) 0.5 mg tablet 1/26/2024  Yes Yes   Sig: Take 0.5 mg by mouth every evening   fenofibrate (TRICOR) 145 mg tablet 1/27/2024  Yes Yes   Sig: Take 145 mg by mouth daily   gabapentin (NEURONTIN) 300 mg capsule 1/26/2024  Yes Yes   Sig: Take 300 mg by mouth every evening   guaiFENesin (ROBITUSSIN) 100 MG/5ML oral liquid Unknown  Yes No   Sig: Take 200 mg by mouth 3 (three) times a day as needed for cough   Patient not taking: Reported on 1/27/2024   ibuprofen (MOTRIN) 600 mg tablet Unknown  Yes No   Sig: Take 800 mg by mouth every 6 (six) hours as needed for mild pain   loratadine (CLARITIN) 10 mg tablet 1/27/2024  Yes Yes   Sig: Take 10 mg by mouth daily   losartan (COZAAR) 100 MG tablet 1/26/2024  Yes Yes   Sig: Take 100 mg by mouth every evening   magnesium hydroxide (MILK OF MAGNESIA) 400 mg/5 mL oral suspension Unknown Other (Specify) Yes No   Sig: Take 30 mL by mouth daily as needed for constipation   melatonin 3 mg 1/26/2024  Yes Yes   Sig: Take 1 tablet by mouth daily at bedtime   metoprolol succinate (TOPROL-XL) 25 mg 24 hr tablet 1/26/2024  Yes Yes   Sig: Take 25 mg by mouth daily    nystatin (MYCOSTATIN) powder 1/26/2024  Yes Yes   Sig: Apply 1 application. topically daily at bedtime Under the breasts   omeprazole (PriLOSEC) 40 MG capsule 1/27/2024  Yes Yes   Sig: Take 40 mg by mouth daily   pravastatin (PRAVACHOL) 20 mg tablet 1/27/2024  Yes Yes   Sig: Take 20 mg by mouth every evening   senna-docusate sodium (SENOKOT S) 8.6-50 mg per tablet 1/26/2024  Yes Yes   Sig: Take 1 tablet by mouth daily   ziprasidone (GEODON) 40 mg capsule 1/27/2024  Yes Yes   Sig: Take 40 mg by mouth 2 (two) times a day with meals   ziprasidone (GEODON) 80 mg capsule 1/27/2024  Yes Yes   Sig: Take 80 mg by mouth 2 (two) times a day with meals      Facility-Administered Medications: None       Past Medical History:   Diagnosis Date    Anxiety and depression     Chronic deep vein thrombosis (DVT) (HCC)     LLE    History of traumatic brain injury     Hyperlipidemia     Insomnia     Obesity     RUPAL (obstructive sleep apnea)     Schizoaffective disorder (HCC)        History reviewed. No pertinent surgical history.    History reviewed. No pertinent family history.  I have reviewed and agree with the history as documented.    E-Cigarette/Vaping    E-Cigarette Use Never User      E-Cigarette/Vaping Substances     Social History     Tobacco Use    Smoking status: Never    Smokeless tobacco: Never   Vaping Use    Vaping status: Never Used   Substance Use Topics    Alcohol use: Not Currently    Drug use: Never       Review of Systems   Constitutional:  Positive for activity change and fatigue. Negative for appetite change, chills, diaphoresis and fever.   HENT:  Negative for congestion, ear discharge, ear pain, mouth sores, nosebleeds, sore throat and tinnitus.    Eyes:  Negative for blurred vision, photophobia, pain, discharge, redness, itching and visual disturbance.   Respiratory:  Negative for cough, chest tightness and shortness of breath.    Cardiovascular:  Negative for chest pain, palpitations, leg swelling and  syncope.   Gastrointestinal:  Negative for abdominal pain, nausea and vomiting.   Genitourinary:  Negative for dysuria, frequency, hematuria and urgency.   Musculoskeletal:  Negative for neck pain and neck stiffness.   Skin:  Negative for color change, pallor, rash and wound.   Neurological:  Negative for dizziness, loss of consciousness, syncope, facial asymmetry, speech difficulty, weakness, light-headedness, numbness and headaches.   Psychiatric/Behavioral:  Negative for confusion. The patient is not nervous/anxious.    All other systems reviewed and are negative.      Physical Exam  Physical Exam  Vitals and nursing note reviewed.   Constitutional:       General: She is not in acute distress.     Appearance: Normal appearance. She is obese. She is not ill-appearing, toxic-appearing or diaphoretic.   HENT:      Head: Normocephalic and atraumatic.      Right Ear: External ear normal.      Left Ear: External ear normal.      Nose: No congestion or rhinorrhea.      Mouth/Throat:      Pharynx: No oropharyngeal exudate or posterior oropharyngeal erythema.   Eyes:      General:         Right eye: No discharge.         Left eye: No discharge.      Conjunctiva/sclera: Conjunctivae normal.   Neck:      Vascular: No carotid bruit.   Cardiovascular:      Rate and Rhythm: Normal rate and regular rhythm.      Heart sounds: Normal heart sounds.   Pulmonary:      Effort: Pulmonary effort is normal.      Breath sounds: Normal breath sounds.   Abdominal:      General: There is distension.      Tenderness: There is no abdominal tenderness. There is no guarding or rebound.   Musculoskeletal:      Cervical back: Neck supple. No rigidity or tenderness.      Right lower leg: No edema.      Left lower leg: No edema.      Comments: Left foot drop with MAFO   Lymphadenopathy:      Cervical: No cervical adenopathy.   Skin:     General: Skin is warm.   Neurological:      General: No focal deficit present.      Mental Status: She is alert  and oriented to person, place, and time.      Cranial Nerves: No cranial nerve deficit.      Gait: Gait abnormal.   Psychiatric:         Mood and Affect: Mood normal.         Behavior: Behavior normal.         Thought Content: Thought content normal.         Judgment: Judgment normal.         Vital Signs  ED Triage Vitals   Temperature Pulse Respirations Blood Pressure SpO2   01/27/24 1205 01/27/24 1205 01/27/24 1205 01/27/24 1205 01/27/24 1205   98.8 °F (37.1 °C) 58 18 169/76 98 %      Temp Source Heart Rate Source Patient Position - Orthostatic VS BP Location FiO2 (%)   01/27/24 1205 01/27/24 1205 01/27/24 1205 01/27/24 1205 --   Oral Monitor Sitting Left arm       Pain Score       01/27/24 1600       4           Vitals:    01/27/24 1526 01/27/24 1738 01/27/24 1838 01/27/24 1840   BP: 165/79 (!) 188/92 149/71 149/71   Pulse: 55 58     Patient Position - Orthostatic VS:  Lying Lying          Visual Acuity      ED Medications  Medications   acetaminophen (TYLENOL) tablet 650 mg (650 mg Oral Given 1/27/24 1600)   apixaban (ELIQUIS) tablet 5 mg (5 mg Oral Given 1/27/24 1729)   busPIRone (BUSPAR) tablet 20 mg (has no administration in time range)   carBAMazepine (TEGretol XR) 12 hr tablet 1,200 mg (has no administration in time range)   clonazePAM (KlonoPIN) tablet 0.5 mg (0.5 mg Oral Given 1/27/24 1729)   DULoxetine (CYMBALTA) delayed release capsule 60 mg (has no administration in time range)   fenofibrate (TRICOR) tablet 145 mg (145 mg Oral Given 1/27/24 1559)   gabapentin (NEURONTIN) capsule 300 mg (300 mg Oral Given 1/27/24 1729)   loratadine (CLARITIN) tablet 10 mg (10 mg Oral Given 1/27/24 1558)   losartan (COZAAR) tablet 100 mg (100 mg Oral Given 1/27/24 1734)   magnesium hydroxide (MILK OF MAGNESIA) oral suspension 30 mL (has no administration in time range)   melatonin tablet 3 mg (has no administration in time range)   metoprolol succinate (TOPROL-XL) 24 hr tablet 25 mg (has no administration in time range)  "  oxybutynin (DITROPAN-XL) 24 hr tablet 5 mg (has no administration in time range)   nystatin (MYCOSTATIN) powder 1 Application (has no administration in time range)   pantoprazole (PROTONIX) EC tablet 40 mg (has no administration in time range)   pravastatin (PRAVACHOL) tablet 20 mg (20 mg Oral Given 1/27/24 1729)   saccharomyces boulardii (FLORASTOR) capsule 250 mg (has no administration in time range)   senna-docusate sodium (SENOKOT S) 8.6-50 mg per tablet 1 tablet (has no administration in time range)   amLODIPine (NORVASC) tablet 10 mg (10 mg Oral Given 1/27/24 1558)   ziprasidone (GEODON) capsule 120 mg (120 mg Oral Given 1/27/24 1558)   hydrALAZINE (APRESOLINE) injection 5 mg (5 mg Intravenous Given 1/27/24 1756)       Diagnostic Studies  Results Reviewed       Procedure Component Value Units Date/Time    Osmolality-\"If this is regarding a toxic alcohol, STOP. Test is not routinely indicated. Please consult medical  on call for further guidance.\" [[id number]]  (Normal) Collected: 01/27/24 1647    Lab Status: Final result Specimen: Blood from Hand, Right Updated: 01/27/24 1928     Osmolality Serum 282 mmol/KG     HS Troponin I 4hr [[id number]]  (Normal) Collected: 01/27/24 1829    Lab Status: Final result Specimen: Blood from Arm, Right Updated: 01/27/24 1904     hs TnI 4hr 3 ng/L      Delta 4hr hsTnI 1 ng/L     HS Troponin I 2hr [[id number]]  (Normal) Collected: 01/27/24 1647    Lab Status: Final result Specimen: Blood from Hand, Right Updated: 01/27/24 1730     hs TnI 2hr 3 ng/L      Delta 2hr hsTnI 1 ng/L     Osmolality, urine [[id number]]  (Normal) Collected: 01/27/24 1247    Lab Status: Final result Specimen: Urine Updated: 01/27/24 1540     Osmolality, Ur 435 mmol/KG     TSH, 3rd generation with Free T4 reflex [[id number]]  (Normal) Collected: 01/27/24 1220    Lab Status: Final result Specimen: Blood from Arm, Right Updated: 01/27/24 1539     [facility] 1.234 uIU/mL     Sodium, urine, " random [[id number]] Collected: 01/27/24 1247    Lab Status: Final result Specimen: Urine Updated: 01/27/24 1520     Sodium, Ur 99    HS Troponin 0hr (reflex protocol) [[id number]]  (Normal) Collected: 01/27/24 1437    Lab Status: Final result Specimen: Blood from Arm, Right Updated: 01/27/24 1512     hs TnI 0hr 2 ng/L     Basic metabolic panel [[id number]]  (Abnormal) Collected: 01/27/24 1220    Lab Status: Final result Specimen: Blood from Arm, Right Updated: 01/27/24 1318     Sodium 128 mmol/L      Potassium 5.0 mmol/L      Chloride 96 mmol/L      CO2 22 mmol/L      ANION GAP 10 mmol/L      BUN 12 mg/dL      Creatinine 0.59 mg/dL      Glucose 95 mg/dL      Calcium 8.7 mg/dL      eGFR 101 ml/min/1.73sq m     Narrative:      [employer] guidelines for Chronic Kidney Disease (CKD):     Stage 1 with normal or high GFR (GFR > 90 mL/min/1.73 square meters)    Stage 2 Mild CKD (GFR = 60-89 mL/min/1.73 square meters)    Stage 3A Moderate CKD (GFR = 45-59 mL/min/1.73 square meters)    Stage 3B Moderate CKD (GFR = 30-44 mL/min/1.73 square meters)    Stage 4 Severe CKD (GFR = 15-29 mL/min/1.73 square meters)    Stage 5 End Stage CKD (GFR <15 mL/min/1.73 square meters)  Note: GFR calculation is accurate only with a steady state creatinine    Protime-INR [[id number]]  (Normal) Collected: 01/27/24 1249    Lab Status: Final result Specimen: Blood from Arm, Right Updated: 01/27/24 1306     Protime 13.3 seconds      INR 0.96    APTT [[id number]]  (Normal) Collected: 01/27/24 1249    Lab Status: Final result Specimen: Blood from Arm, Right Updated: 01/27/24 1306     PTT 25 seconds     UA w Reflex to Microscopic w Reflex to Culture [[id number]] Collected: 01/27/24 1247    Lab Status: Final result Specimen: Urine, Other Updated: 01/27/24 1303     Color, UA Light Yellow     Clarity, UA Clear     Specific Gravity, UA 1.012     pH, UA 7.5     Leukocytes, UA Negative     Nitrite, UA Negative     Protein, UA Negative  mg/dl      Glucose, UA Negative mg/dl      Ketones, UA Negative mg/dl      Urobilinogen, UA <2.0 mg/dl      Bilirubin, UA Negative     Occult Blood, UA Negative    CBC and differential [[id number]]  (Abnormal) Collected: 01/27/24 1220    Lab Status: Final result Specimen: Blood from Arm, Right Updated: 01/27/24 1251     WBC 3.38 Thousand/uL      RBC 3.84 Million/uL      Hemoglobin 11.5 g/dL      Hematocrit 33.8 %      MCV 88 fL      MCH 29.9 pg      MCHC 34.0 g/dL      RDW 12.6 %      MPV 8.8 fL      Platelets 234 Thousands/uL      nRBC 0 /100 WBCs      Neutrophils Relative 52 %      Immat GRANS % 0 %      Lymphocytes Relative 35 %      Monocytes Relative 10 %      Eosinophils Relative 3 %      Basophils Relative 0 %      Neutrophils Absolute 1.76 Thousands/µL      Immature Grans Absolute 0.01 Thousand/uL      Lymphocytes Absolute 1.17 Thousands/µL      Monocytes Absolute 0.32 Thousand/µL      Eosinophils Absolute 0.11 Thousand/µL      Basophils Absolute 0.01 Thousands/µL                    CT head without contrast   Final Result by Paul Fisher DO (01/27 1410)      No acute intracranial abnormality.                  Workstation performed: [id number]         XR chest 1 view portable   ED Interpretation by Julie Lynn Gutzweiler, PA-C (01/27 1321)   Borderline cardiomegaly, shoulder replacement.  No acute cardiopulmonary disease                 Procedures  ECG 12 Lead Documentation Only    Date/Time: 1/27/2024 12:52 PM    Performed by: Julie Lynn Gutzweiler, PA-C  Authorized by: Julie Lynn Gutzweiler, PA-C    Indications / Diagnosis:  HTN urgency  ECG reviewed by me, the ED Provider: yes    Patient location:  ED  Previous ECG:     Previous ECG:  Compared to current    Comparison ECG info:  7/8/23    Similarity:  Changes noted  Interpretation:     Interpretation: non-specific    Rate:     ECG rate:  58    ECG rate assessment: bradycardic    Rhythm:     Rhythm: sinus bradycardia    Ectopy:     Ectopy: none     QRS:     QRS axis:  Normal    QRS intervals:  Normal  Conduction:     Conduction: normal    ST segments:     ST segments:  Normal  T waves:     T waves: normal    Comments:      Use sinus bradycardia, no acute signs of ischemia,    Independently interpreted by me           ED Course                                             Medical Decision Making  Patient presents emergency room by squad from the group home.  She had her blood pressure taken because she was complaining of a headache and her systolic was over 200.  She is on losartan and took her dose this morning.  She denies any other symptoms.  She denies any lightheadedness or dizziness.  She denies any chest pain or shortness of breath.  She denies any abdominal pain, black tarry stools, bright red blood per rectum.  She complains of chronic urinary frequency and urgency for years.  She denies any dysuria, hematuria.  She complains of generalized weakness.    Past medical history is positive for sleep apnea, anxiety and depression, DVT left lower extremity, traumatic brain injury, hypertension, hyperlipidemia, insomnia, obesity, schizoaffective disorder    Physical exam this is a 58-year-old female is alert and oriented x 3.  She is in no acute distress.  She has a motto on her left ankle.  Her lungs are clear to auscultation.  Her heart is regular rate and rhythm without murmur.  Her abdomen is distended, soft non tender without mass or hepatosplenomegaly.    Differential diagnosis includes but is not limited to hypertensive urgency, malignant hypertension, asthenia, asthenia secondary to disease process, hyponatremia, hypomagnesemia, sepsis, acute coronary syndrome, acute intracranial abnormality, arrhythmia, bradycardia, heart block    Laboratory evaluation shows a sodium of 128.  The remainder of her labs are within normal limits with the exception of her white count is 3.35.  Her EKG shows sinus bradycardia at a rate of 58.  There is no evidence of  ischemia or evidence of heart block.  Her chest x-ray demonstrates borderline cardiomegaly but no acute cardiopulmonary disease.  She has a right shoulder replacement.  CT of her head was reviewed.  Awaiting formal radiology interpretation.  I do not see any acute process.  Formal CT results were negative for any acute process..      Impression  Hypertension  Hyponatremia  Asthenia    Plan-patient is going to be admitted to the hospital for an observation stay to evaluate her hyponatremia.    Amount and/or Complexity of Data Reviewed  Labs: ordered.     Details: Independently interpreted by me, sodium 128.  Glucose was normal range.  Chemistries troponin and urinalysis are within normal limits.  These were independently interpreted by me  Radiology: ordered and independent interpretation performed.     Details: Portable chest x-ray demonstrates a right shoulder replacement, borderline cardiomegaly, no acute cardiopulmonary disease.  A CT of the brain was negative for any acute process.  Interpreted by me as well as the CAT scan of the brain was sent interpreted by the radiologist.  ECG/medicine tests: ordered and independent interpretation performed.     Details: No signs of acute ischemia, sinus bradycardia rate 58.  No evidence of a heart block.    Risk  Prescription drug management.  Decision regarding hospitalization.             Disposition  Final diagnoses:   Hypertension   Hyponatremia     Time reflects when diagnosis was documented in both MDM as applicable and the Disposition within this note       Time User Action Codes Description Comment    1/27/2024  1:34 PM Gutzweiler, Julie Add [I10] Hypertension     1/27/2024  1:35 PM Gutzweiler, Julie Add [E87.1] Hyponatremia           ED Disposition       ED Disposition   Admit    Condition   Stable    Date/Time   Sat Jan 27, 2024  1:34 PM    Comment   Case was discussed with Dr Poon and the patient's admission status was agreed to be Admission Status:  observation status to the service of Dr. Poon .               Follow-up Information    None         Current Discharge Medication List        CONTINUE these medications which have NOT CHANGED    Details   apixaban (ELIQUIS) 5 mg Take 5 mg by mouth 2 (two) times a day      ascorbic Acid (VITAMIN C) 500 MG CPCR Take 500 mg by mouth daily      busPIRone (BUSPAR) 15 mg tablet Take 20 mg by mouth 2 (two) times a day      carBAMazepine (TEGretol XR) 400 mg 12 hr tablet Take 1,200 mg by mouth daily at bedtime      Cholecalciferol 25 MCG (1000 UT) tablet Take 1,000 Units by mouth daily      clonazePAM (KlonoPIN) 0.5 mg tablet Take 0.5 mg by mouth every evening      DULoxetine (CYMBALTA) 60 mg delayed release capsule Take 60 mg by mouth daily      fenofibrate (TRICOR) 145 mg tablet Take 145 mg by mouth daily      gabapentin (NEURONTIN) 300 mg capsule Take 300 mg by mouth every evening      loratadine (CLARITIN) 10 mg tablet Take 10 mg by mouth daily      losartan (COZAAR) 100 MG tablet Take 100 mg by mouth every evening      melatonin 3 mg Take 1 tablet by mouth daily at bedtime      metoprolol succinate (TOPROL-XL) 25 mg 24 hr tablet Take 25 mg by mouth daily      Mirabegron ER (Myrbetriq) 25 MG TB24 Take by mouth      MULTIPLE VITAMIN PO Take 1 tablet by mouth in the morning      nystatin (MYCOSTATIN) powder Apply 1 application. topically daily at bedtime Under the breasts      omeprazole (PriLOSEC) 40 MG capsule Take 40 mg by mouth daily      pravastatin (PRAVACHOL) 20 mg tablet Take 20 mg by mouth every evening      Probiotic Product (PROBIOTIC PO) Take 250 mg by mouth 2 (two) times a day      senna-docusate sodium (SENOKOT S) 8.6-50 mg per tablet Take 1 tablet by mouth daily      !! ziprasidone (GEODON) 40 mg capsule Take 40 mg by mouth 2 (two) times a day with meals      !! ziprasidone (GEODON) 80 mg capsule Take 80 mg by mouth 2 (two) times a day with meals      acetaminophen (TYLENOL) 325 mg tablet Take 650 mg by  mouth every 4 (four) hours as needed for mild pain      bisacodyl (FLEET) 10 MG/30ML ENEM Insert 10 mg into the rectum once      bismuth subsalicylate (PEPTO BISMOL) 524 mg/30 mL oral suspension Take 30 mL by mouth every 6 (six) hours as needed for indigestion Take after 2nd watery stool in 24 hours      guaiFENesin (ROBITUSSIN) 100 MG/5ML oral liquid Take 200 mg by mouth 3 (three) times a day as needed for cough      ibuprofen (MOTRIN) 600 mg tablet Take 800 mg by mouth every 6 (six) hours as needed for mild pain      magnesium hydroxide (MILK OF MAGNESIA) 400 mg/5 mL oral suspension Take 30 mL by mouth daily as needed for constipation       !! - Potential duplicate medications found. Please discuss with provider.          No discharge procedures on file.    PDMP Review       None            ED Provider  Electronically Signed by             Julie Lynn Gutzweiler, PA-C  01/27/24 9529

## 2024-01-27 NOTE — PLAN OF CARE
Problem: PAIN - ADULT  Goal: Verbalizes/displays adequate comfort level or baseline comfort level  Description: Interventions:  - Encourage patient to monitor pain and request assistance  - Assess pain using appropriate pain scale  - Administer analgesics based on type and severity of pain and evaluate response  - Implement non-pharmacological measures as appropriate and evaluate response  - Consider cultural and social influences on pain and pain management  - Notify physician/advanced practitioner if interventions unsuccessful or patient reports new pain  Outcome: Progressing     Problem: INFECTION - ADULT  Goal: Absence or prevention of progression during hospitalization  Description: INTERVENTIONS:  - Assess and monitor for signs and symptoms of infection  - Monitor lab/diagnostic results  - Monitor all insertion sites, i.e. indwelling lines, tubes, and drains  - Monitor endotracheal if appropriate and nasal secretions for changes in amount and color  - [place] appropriate cooling/warming therapies per order  - Administer medications as ordered  - Instruct and encourage patient and family to use good hand hygiene technique  - Identify and instruct in appropriate isolation precautions for identified infection/condition  Outcome: Progressing  Goal: Absence of fever/infection during neutropenic period  Description: INTERVENTIONS:  - Monitor WBC    Outcome: Progressing     Problem: SAFETY ADULT  Goal: Patient will remain free of falls  Description: INTERVENTIONS:  - Educate patient/family on patient safety including physical limitations  - Instruct patient to call for assistance with activity   - Consult OT/PT to assist with strengthening/mobility   - Keep Call bell within reach  - Keep bed low and locked with side rails adjusted as appropriate  - Keep care items and personal belongings within reach  - Initiate and maintain comfort rounds  - Make Fall Risk Sign visible to staff  - Offer Toileting every 2 Hours,  in advance of need  - Initiate/Maintain bed alarm  - Apply yellow socks and bracelet for high fall risk patients  - Consider moving patient to room near nurses station  Outcome: Progressing  Goal: Maintain or return to baseline ADL function  Description: INTERVENTIONS:  -  Assess patient's ability to carry out ADLs; assess patient's baseline for ADL function and identify physical deficits which impact ability to perform ADLs (bathing, care of mouth/teeth, toileting, grooming, dressing, etc.)  - Assess/evaluate cause of self-care deficits   - Assess range of motion  - Assess patient's mobility; develop plan if impaired  - Assess patient's need for assistive devices and provide as appropriate  - Encourage maximum independence but intervene and supervise when necessary  - Involve family in performance of ADLs  - Assess for home care needs following discharge   - Consider OT consult to assist with ADL evaluation and planning for discharge  - Provide patient education as appropriate  Outcome: Progressing  Goal: Maintains/Returns to pre admission functional level  Description: INTERVENTIONS:  - Perform AM-PAC 6 Click Basic Mobility/ Daily Activity assessment daily.  - Set and communicate daily mobility goal to care team and patient/family/caregiver.   - Collaborate with rehabilitation services on mobility goals if consulted  - Out of bed for toileting  - Record patient progress and toleration of activity level   Outcome: Progressing     Problem: DISCHARGE PLANNING  Goal: Discharge to home or other facility with appropriate resources  Description: INTERVENTIONS:  - Identify barriers to discharge w/patient and caregiver  - Arrange for needed discharge resources and transportation as appropriate  - Identify discharge learning needs (meds, wound care, etc.)  - Arrange for interpretive services to assist at discharge as needed  - Refer to Case Management Department for coordinating discharge planning if the patient needs  post-hospital services based on physician/advanced practitioner order or complex needs related to functional status, cognitive ability, or social support system  Outcome: Progressing     Problem: Knowledge Deficit  Goal: Patient/family/caregiver demonstrates understanding of disease process, treatment plan, medications, and discharge instructions  Description: Complete learning assessment and assess knowledge base.  Interventions:  - Provide teaching at level of understanding  - Provide teaching via preferred learning methods  Outcome: Progressing     Problem: CARDIOVASCULAR - ADULT  Goal: Maintains optimal cardiac output and hemodynamic stability  Description: INTERVENTIONS:  - Monitor I/O, vital signs and rhythm  - Monitor for S/S and trends of decreased cardiac output  - Administer and titrate ordered vasoactive medications to optimize hemodynamic stability  - Assess quality of pulses, skin color and temperature  - Assess for signs of decreased coronary artery perfusion  - Instruct patient to report change in severity of symptoms  Outcome: Progressing

## 2024-01-27 NOTE — ASSESSMENT & PLAN NOTE
Na 128 on admission, has had intermittent hyponatremia in the past year.  Likely secondary to multiple psych medications.  Patient is alert, awake and oriented.       Plan:  SIADH workup: urine osmolalaity, urine sodium, serum sodium  Check TSH,Am cortisol  Monitor BMP

## 2024-01-27 NOTE — H&P
[facility]  H&P  Name: Prema Shepard 58 y.o. female I MRN: [id number]  Unit/Bed#: S -01 I Date of Admission: 1/27/2024   Date of Service: 1/27/2024 I Hospital Day: 0      Assessment/Plan   Hyponatremia  Assessment & Plan  Na 128 on admission, has had intermittent hyponatremia in the past year.  Likely secondary to multiple psych medications.  Patient is alert, awake and oriented.       Plan:  SIADH workup: urine osmolalaity, urine sodium, serum sodium  Check TSH,Am cortisol  Monitor BMP    Hypertension  Assessment & Plan  BP noted to by systolic >200 at group home  Home medication metoprolol and losartan.  Patient group home states she is received her metoprolol this morning.      Plan:  Monitor BP  Continue losartan in the evening.  Will Add amlodipine as blood pressure is remaining in 160's since admission    Schizoaffective disorder (HCC)  Assessment & Plan  Continue home medications Geodon, Cymbalta, Buspirone    Hyperlipemia  Assessment & Plan  Continue fenofibrate and statin    History of DVT (deep vein thrombosis)  Assessment & Plan  Continue eliquis       VTE Pharmacologic Prophylaxis: VTE Score: 5 High Risk (Score >/= 5) - Pharmacological DVT Prophylaxis Ordered: apixaban (Eliquis). Sequential Compression Devices Ordered.  Code Status: Level 1 - Full Code Patient  Discussion with family:  Called and spoke to nurse at group home, attempted calling patient's mother but got no answer.     Anticipated Length of Stay: Patient will be admitted on an observation basis with an anticipated length of stay of less than 2 midnights secondary to Hyponatremia.        Chief Complaint: Headache and elevated blood pressure    History of Present Illness:  Prema Shepard is a 58 y.o. female with a PMH of Traumatic brain injury, HTN, DVT who presents with headache and elevated blood pressure. Patient has an episode of severe headache this morning at which time her BP was checked  and found to be systolic >200. Patient had a recent pcp visit during which she was started on metoprolol with possible addition of losartan recently as well. She had received her morning dose of metoprolol today, her BP on arrival was systolic 190's. CT head and chest x-ray were negative for any acute pathology. Patient's headache resolved spontaneously, she was noted to have hyponatremia on lab work. Patient will be admitted for further hyponatremia workup.    Review of Systems:  Review of Systems   Constitutional:  Negative for chills and fever.   HENT:  Negative for ear pain and sore throat.    Eyes:  Negative for pain and visual disturbance.   Respiratory:  Negative for chest tightness, shortness of breath and wheezing.    Cardiovascular:  Negative for chest pain and palpitations.   Gastrointestinal:  Negative for abdominal pain, blood in stool, diarrhea, nausea and vomiting.   Genitourinary:  Negative for dysuria and hematuria.   Musculoskeletal:  Negative for arthralgias, back pain and myalgias.   Skin:  Negative for color change and rash.   Neurological:  Positive for headaches. Negative for dizziness, seizures and syncope.   All other systems reviewed and are negative.      Past Medical and Surgical History:   Past Medical History:   Diagnosis Date    Anxiety and depression     Chronic deep vein thrombosis (DVT) ([facility])     LLE    History of traumatic brain injury     Hyperlipidemia     Insomnia     Obesity     RUPAL (obstructive sleep apnea)     Schizoaffective disorder ([facility])        History reviewed. No pertinent surgical history.    Meds/Allergies:  Prior to Admission medications    Medication Sig Start Date End Date Taking? Authorizing Provider   apixaban (ELIQUIS) 5 mg Take 5 mg by mouth 2 (two) times a day   Yes Historical Provider, MD   ascorbic Acid (VITAMIN C) 500 MG CPCR Take 500 mg by mouth daily   Yes Historical Provider, MD   busPIRone (BUSPAR) 15 mg tablet Take 20 mg by mouth 2 (two) times a day    Yes Historical Provider, MD   carBAMazepine (TEGretol XR) 400 mg 12 hr tablet Take 1,200 mg by mouth daily at bedtime   Yes Historical Provider, MD   Cholecalciferol 25 MCG (1000 UT) tablet Take 1,000 Units by mouth daily   Yes Historical Provider, MD   clonazePAM (KlonoPIN) 0.5 mg tablet Take 0.5 mg by mouth every evening   Yes Historical Provider, MD   DULoxetine (CYMBALTA) 60 mg delayed release capsule Take 60 mg by mouth daily   Yes Historical Provider, MD   fenofibrate (TRICOR) 145 mg tablet Take 145 mg by mouth daily   Yes Historical Provider, MD   gabapentin (NEURONTIN) 300 mg capsule Take 300 mg by mouth every evening   Yes Historical Provider, MD   loratadine (CLARITIN) 10 mg tablet Take 10 mg by mouth daily   Yes Historical Provider, MD   losartan (COZAAR) 100 MG tablet Take 100 mg by mouth every evening   Yes Historical Provider, MD   melatonin 3 mg Take 1 tablet by mouth daily at bedtime   Yes Historical Provider, MD   metoprolol succinate (TOPROL-XL) 25 mg 24 hr tablet Take 25 mg by mouth daily   Yes Historical Provider, MD   Mirabegron ER (Myrbetriq) 25 MG TB24 Take by mouth   Yes Historical Provider, MD   MULTIPLE VITAMIN PO Take 1 tablet by mouth in the morning   Yes Historical Provider, MD   nystatin (MYCOSTATIN) powder Apply 1 application. topically daily at bedtime Under the breasts   Yes Historical Provider, MD   omeprazole (PriLOSEC) 40 MG capsule Take 40 mg by mouth daily   Yes Historical Provider, MD   pravastatin (PRAVACHOL) 20 mg tablet Take 20 mg by mouth every evening   Yes Historical Provider, MD   Probiotic Product (PROBIOTIC PO) Take 250 mg by mouth 2 (two) times a day   Yes Historical Provider, MD   senna-docusate sodium (SENOKOT S) 8.6-50 mg per tablet Take 1 tablet by mouth daily   Yes Historical Provider, MD   ziprasidone (GEODON) 40 mg capsule Take 40 mg by mouth 2 (two) times a day with meals   Yes Historical Provider, MD   ziprasidone (GEODON) 80 mg capsule Take 80 mg by mouth 2  "(two) times a day with meals   Yes Historical Provider, MD   fenofibrate (TRICOR) 145 mg tablet Take 145 mg by mouth every evening  1/27/24 Yes Historical Provider, MD   acetaminophen (TYLENOL) 325 mg tablet Take 650 mg by mouth every 4 (four) hours as needed for mild pain    Historical Provider, MD   bisacodyl (FLEET) 10 MG/30ML ENEM Insert 10 mg into the rectum once    Historical Provider, MD   bismuth subsalicylate (PEPTO BISMOL) 524 mg/30 mL oral suspension Take 30 mL by mouth every 6 (six) hours as needed for indigestion Take after 2nd watery stool in 24 hours    Historical Provider, MD   guaiFENesin (ROBITUSSIN) 100 MG/5ML oral liquid Take 200 mg by mouth 3 (three) times a day as needed for cough  Patient not taking: Reported on 1/27/2024    Historical Provider, MD   ibuprofen (MOTRIN) 600 mg tablet Take 800 mg by mouth every 6 (six) hours as needed for mild pain    Historical Provider, MD   magnesium hydroxide (MILK OF MAGNESIA) 400 mg/5 mL oral suspension Take 30 mL by mouth daily as needed for constipation    Historical Provider, MD   Ascorbic Acid (Vitamin C Adult Gummies) 125 MG CHEW Chew  1/27/24  Historical Provider, MD   naproxen (NAPROSYN) 375 mg tablet Take 375 mg by mouth once The notes say 325 mg - not an option in the list  Patient not taking: Reported on 1/27/2024 1/27/24  Historical Provider, MD   neomycin-bacitracin-polymyxin b (NEOSPORIN) ointment Apply topically daily Daily PRN to cuts/scrapes and burns  Patient not taking: Reported on 1/27/2024 1/27/24  Historical Provider, MD   sodium phosphate (PEDIA-LAX) 3.5-9.5 g 59 mL enema Insert 1 enema into the rectum once  Patient not taking: Reported on 1/27/2024 1/27/24  Historical Provider, MD   traZODone (DESYREL) 50 mg tablet Take 50 mg by mouth daily at bedtime  Patient not taking: Reported on 1/27/2024 1/27/24  Historical Provider, MD     I reviewed medications with documentation provided by [facility] \"[facility]\". "     Allergies:   Allergies   Allergen Reactions    Amoxicillin Hives    Eql Apricot Scrub [Salicylic Acid] Hives       Social History:  Marital Status: Single   Occupation: None  Patient Pre-hospital Living Situation: Group home: [facility]  Patient Pre-hospital Level of Mobility: walks with walker  Patient Pre-hospital Diet Restrictions: None  Substance Use History:   Social History     Substance and Sexual Activity   Alcohol Use Not Currently     Social History     Tobacco Use   Smoking Status Never   Smokeless Tobacco Never     Social History     Substance and Sexual Activity   Drug Use Never       Family History:  History reviewed. No pertinent family history.    Physical Exam:     Vitals:   Blood Pressure: 168/81 (01/27/24 1430)  Pulse: 62 (01/27/24 1430)  Temperature: 98.8 °F (37.1 °C) (01/27/24 1205)  Temp Source: Oral (01/27/24 1205)  Respirations: 18 (01/27/24 1430)  Weight - Scale: 102 kg (224 lb 13.9 oz) (01/27/24 1205)  SpO2: 99 % (01/27/24 1430)    Physical Exam  Vitals and nursing note reviewed.   Constitutional:       General: She is not in acute distress.     Appearance: She is well-developed. She is obese. She is not ill-appearing.   HENT:      Head: Normocephalic and atraumatic.      Mouth/Throat:      Mouth: Mucous membranes are moist.      Pharynx: Oropharynx is clear.   Eyes:      General: No scleral icterus.     Conjunctiva/sclera: Conjunctivae normal.   Cardiovascular:      Rate and Rhythm: Normal rate and regular rhythm.      Pulses: Normal pulses.      Heart sounds: No murmur heard.  Pulmonary:      Effort: Pulmonary effort is normal. No respiratory distress.      Breath sounds: Normal breath sounds. No stridor. No wheezing.   Abdominal:      General: Bowel sounds are normal. There is no distension.      Palpations: Abdomen is soft. There is no mass.      Tenderness: There is no abdominal tenderness.      Hernia: No hernia is present.   Musculoskeletal:         General: No swelling  or tenderness.      Cervical back: Neck supple.      Right lower leg: No edema.      Left lower leg: No edema.   Skin:     General: Skin is warm and dry.      Capillary Refill: Capillary refill takes less than 2 seconds.      Findings: No lesion or rash.   Neurological:      Mental Status: She is alert and oriented to person, place, and time. Mental status is at baseline.   Psychiatric:         Mood and Affect: Mood normal.         Behavior: Behavior normal.         Thought Content: Thought content normal.         Judgment: Judgment normal.       Additional Data:     Lab Results:  Results from last 7 days   Lab Units 01/27/24  1220   WBC Thousand/uL 3.38*   HEMOGLOBIN g/dL 11.5   HEMATOCRIT % 33.8*   PLATELETS Thousands/uL 234   NEUTROS PCT % 52   LYMPHS PCT % 35   MONOS PCT % 10   EOS PCT % 3     Results from last 7 days   Lab Units 01/27/24  1220   SODIUM mmol/L 128*   POTASSIUM mmol/L 5.0   CHLORIDE mmol/L 96   CO2 mmol/L 22   BUN mg/dL 12   CREATININE mg/dL 0.59*   ANION GAP mmol/L 10   CALCIUM mg/dL 8.7   GLUCOSE RANDOM mg/dL 95     Results from last 7 days   Lab Units 01/27/24  1249   INR  0.96                   Lines/Drains:  Invasive Devices       Peripheral Intravenous Line  Duration             Peripheral IV 01/27/24 Right Forearm <1 day                        Imaging: Reviewed radiology reports from this admission including: chest xray and CT head  CT head without contrast   Final Result by Paul Fisher DO (01/27 1410)      No acute intracranial abnormality.                  Workstation performed: [id number]         XR chest 1 view portable   ED Interpretation by Julie Lynn Gutzweiler, PA-C (01/27 1321)   Borderline cardiomegaly, shoulder replacement.  No acute cardiopulmonary disease          EKG and Other Studies Reviewed on Admission:   EKG: Sinus Bradycardia. HR 58.    ** Please Note: This note has been constructed using a voice recognition system. **

## 2024-01-28 LAB
ANION GAP SERPL CALCULATED.3IONS-SCNC: 7 MMOL/L
BASOPHILS # BLD AUTO: 0.01 THOUSANDS/ÂΜL (ref 0–0.1)
BASOPHILS NFR BLD AUTO: 0 % (ref 0–1)
BUN SERPL-MCNC: 9 MG/DL (ref 5–25)
CALCIUM SERPL-MCNC: 9.3 MG/DL (ref 8.4–10.2)
CHLORIDE SERPL-SCNC: 104 MMOL/L (ref 96–108)
CO2 SERPL-SCNC: 26 MMOL/L (ref 21–32)
CORTIS AM PEAK SERPL-MCNC: 14.6 UG/DL (ref 6.7–22.6)
CREAT SERPL-MCNC: 0.67 MG/DL (ref 0.6–1.3)
EOSINOPHIL # BLD AUTO: 0.15 THOUSAND/ÂΜL (ref 0–0.61)
EOSINOPHIL NFR BLD AUTO: 4 % (ref 0–6)
ERYTHROCYTE [DISTWIDTH] IN BLOOD BY AUTOMATED COUNT: 12.9 % (ref 11.6–15.1)
GFR SERPL CREATININE-BSD FRML MDRD: 97 ML/MIN/1.73SQ M
GLUCOSE SERPL-MCNC: 96 MG/DL (ref 65–140)
HCT VFR BLD AUTO: 39.3 % (ref 34.8–46.1)
HGB BLD-MCNC: 12.7 G/DL (ref 11.5–15.4)
IMM GRANULOCYTES # BLD AUTO: 0.01 THOUSAND/UL (ref 0–0.2)
IMM GRANULOCYTES NFR BLD AUTO: 0 % (ref 0–2)
LYMPHOCYTES # BLD AUTO: 1.18 THOUSANDS/ÂΜL (ref 0.6–4.47)
LYMPHOCYTES NFR BLD AUTO: 32 % (ref 14–44)
MCH RBC QN AUTO: 30.2 PG (ref 26.8–34.3)
MCHC RBC AUTO-ENTMCNC: 32.3 G/DL (ref 31.4–37.4)
MCV RBC AUTO: 94 FL (ref 82–98)
MONOCYTES # BLD AUTO: 0.32 THOUSAND/ÂΜL (ref 0.17–1.22)
MONOCYTES NFR BLD AUTO: 9 % (ref 4–12)
NEUTROPHILS # BLD AUTO: 2.05 THOUSANDS/ÂΜL (ref 1.85–7.62)
NEUTS SEG NFR BLD AUTO: 55 % (ref 43–75)
NRBC BLD AUTO-RTO: 0 /100 WBCS
PLATELET # BLD AUTO: 239 THOUSANDS/UL (ref 149–390)
PMV BLD AUTO: 9 FL (ref 8.9–12.7)
POTASSIUM SERPL-SCNC: 4.3 MMOL/L (ref 3.5–5.3)
RBC # BLD AUTO: 4.2 MILLION/UL (ref 3.81–5.12)
SODIUM SERPL-SCNC: 137 MMOL/L (ref 135–147)
WBC # BLD AUTO: 3.72 THOUSAND/UL (ref 4.31–10.16)

## 2024-01-28 PROCEDURE — 99232 SBSQ HOSP IP/OBS MODERATE 35: CPT | Performed by: INTERNAL MEDICINE

## 2024-01-28 PROCEDURE — 85025 COMPLETE CBC W/AUTO DIFF WBC: CPT

## 2024-01-28 PROCEDURE — 94760 N-INVAS EAR/PLS OXIMETRY 1: CPT

## 2024-01-28 PROCEDURE — 80048 BASIC METABOLIC PNL TOTAL CA: CPT

## 2024-01-28 PROCEDURE — 94660 CPAP INITIATION&MGMT: CPT

## 2024-01-28 PROCEDURE — 82533 TOTAL CORTISOL: CPT

## 2024-01-28 RX ORDER — AMLODIPINE BESYLATE 10 MG/1
10 TABLET ORAL DAILY
Qty: 30 TABLET | Refills: 0 | Status: SHIPPED | OUTPATIENT
Start: 2024-01-29 | End: 2024-02-28

## 2024-01-28 RX ADMIN — OXYBUTYNIN CHLORIDE 5 MG: 5 TABLET, EXTENDED RELEASE ORAL at 10:36

## 2024-01-28 RX ADMIN — GABAPENTIN 300 MG: 300 CAPSULE ORAL at 20:29

## 2024-01-28 RX ADMIN — BUSPIRONE HYDROCHLORIDE 20 MG: 5 TABLET ORAL at 10:36

## 2024-01-28 RX ADMIN — Medication 3 MG: at 20:33

## 2024-01-28 RX ADMIN — NYSTATIN 1 APPLICATION: 100000 POWDER TOPICAL at 20:32

## 2024-01-28 RX ADMIN — CARBAMAZEPINE 1200 MG: 100 TABLET, EXTENDED RELEASE ORAL at 20:40

## 2024-01-28 RX ADMIN — ZIPRASIDONE HYDROCHLORIDE 120 MG: 20 CAPSULE ORAL at 20:34

## 2024-01-28 RX ADMIN — LORATADINE 10 MG: 10 TABLET ORAL at 10:37

## 2024-01-28 RX ADMIN — PRAVASTATIN SODIUM 20 MG: 20 TABLET ORAL at 17:21

## 2024-01-28 RX ADMIN — BUSPIRONE HYDROCHLORIDE 20 MG: 5 TABLET ORAL at 20:27

## 2024-01-28 RX ADMIN — ACETAMINOPHEN 650 MG: 325 TABLET, FILM COATED ORAL at 11:11

## 2024-01-28 RX ADMIN — AMLODIPINE BESYLATE 10 MG: 10 TABLET ORAL at 10:36

## 2024-01-28 RX ADMIN — Medication 250 MG: at 10:36

## 2024-01-28 RX ADMIN — CLONAZEPAM 0.5 MG: 0.5 TABLET ORAL at 20:30

## 2024-01-28 RX ADMIN — Medication 250 MG: at 20:28

## 2024-01-28 RX ADMIN — APIXABAN 5 MG: 5 TABLET, FILM COATED ORAL at 17:22

## 2024-01-28 RX ADMIN — ZIPRASIDONE HYDROCHLORIDE 120 MG: 20 CAPSULE ORAL at 10:35

## 2024-01-28 RX ADMIN — DULOXETINE 60 MG: 60 CAPSULE, DELAYED RELEASE ORAL at 10:37

## 2024-01-28 RX ADMIN — FENOFIBRATE 145 MG: 145 TABLET ORAL at 10:36

## 2024-01-28 RX ADMIN — LOSARTAN POTASSIUM 100 MG: 50 TABLET, FILM COATED ORAL at 17:22

## 2024-01-28 RX ADMIN — APIXABAN 5 MG: 5 TABLET, FILM COATED ORAL at 10:36

## 2024-01-28 RX ADMIN — METOPROLOL SUCCINATE 25 MG: 25 TABLET, EXTENDED RELEASE ORAL at 10:36

## 2024-01-28 RX ADMIN — PANTOPRAZOLE SODIUM 40 MG: 40 TABLET, DELAYED RELEASE ORAL at 05:41

## 2024-01-28 NOTE — PROGRESS NOTES
[facility]  Progress Note  Name: Prema Shepard I  MRN: [id number]  Unit/Bed#: S -01 I Date of Admission: 1/27/2024   Date of Service: 1/28/2024 I Hospital Day: 0    Assessment/Plan   * Hypertension  Assessment & Plan  BP noted to by systolic >200 at group home  Home medication metoprolol and losartan.  Patient group home states she is received her metoprolol this morning.      Plan:  Monitor BP  Added amlodipine, will likely continue on discharge    Hyponatremia  Assessment & Plan  Na 128 on admission, has had intermittent hyponatremia in the past year.  Likely secondary to multiple psych medications.  Patient is alert, awake and oriented.   Lab Results   Component Value Date    SODIUM 137 01/28/2024    SODIUM 128 (L) 01/27/2024    SODIUM 136 07/31/2023          Plan:  Resolved with minimal intervention  Monitor BMP    Hyperlipemia  Assessment & Plan  Continue fenofibrate and statin    Schizoaffective disorder (HCC)  Assessment & Plan  Continue home medications Geodon, Cymbalta, Buspirone    History of DVT (deep vein thrombosis)  Assessment & Plan  Continue eliquis               VTE Pharmacologic Prophylaxis: VTE Score: 5 High Risk (Score >/= 5) - Pharmacological DVT Prophylaxis Ordered: apixaban (Eliquis). Sequential Compression Devices Ordered.    Mobility:   Basic Mobility Inpatient Raw Score: 18  JH-HLM Goal: 6: Walk 10 steps or more  JH-HLM Achieved: 6: Walk 10 steps or more  HLM Goal achieved. Continue to encourage appropriate mobility.    Patient Centered Rounds: I performed bedside rounds with nursing staff today.  Discussions with Specialists or Other Care Team Provider: Attending physician    Education and Discussions with Family / Patient:  Attempted to reach out to patient's mother and group home, unable to reach both and unable to leave messages.  Will attempt tomorrow morning as patient is currently medically stable and is awaiting discharge.     Current Length  of Stay: 0 day(s)  Current Patient Status: Observation   Discharge Plan: Anticipate discharge tomorrow to [facility].    Code Status: Level 1 - Full Code    Subjective:   Evaluated patient at bedside this morning, patient is without any complaints this morning.  She was upset that she is now taking an extra medication but was understanding and that she needed to have her blood pressure controlled.  Stated that she would be discharged tomorrow as we were unable to contact group home.  Otherwise she is stable and without any complaints.    Objective:     Vitals:   Temp (24hrs), Av °F (36.7 °C), Min:98 °F (36.7 °C), Max:98.1 °F (36.7 °C)    Temp:  [98 °F (36.7 °C)-98.1 °F (36.7 °C)] 98.1 °F (36.7 °C)  HR:  [55-68] 68  Resp:  [16-20] 16  BP: (129-188)/() 129/69  SpO2:  [95 %-99 %] 96 %  Body mass index is 39.83 kg/m².     Input and Output Summary (last 24 hours):     Intake/Output Summary (Last 24 hours) at 2024 1419  Last data filed at 2024 1313  Gross per 24 hour   Intake 840 ml   Output 1650 ml   Net -810 ml       Physical Exam:   Physical Exam  Vitals and nursing note reviewed.   Constitutional:       General: She is not in acute distress.     Appearance: She is well-developed.   HENT:      Head: Normocephalic and atraumatic.   Eyes:      Conjunctiva/sclera: Conjunctivae normal.   Cardiovascular:      Rate and Rhythm: Normal rate and regular rhythm.      Heart sounds: No murmur heard.  Pulmonary:      Effort: Pulmonary effort is normal. No respiratory distress.      Breath sounds: Normal breath sounds.   Abdominal:      Palpations: Abdomen is soft.      Tenderness: There is no abdominal tenderness.   Musculoskeletal:         General: No swelling.   Skin:     General: Skin is warm and dry.   Neurological:      Mental Status: She is alert.   Psychiatric:         Mood and Affect: Mood normal.          Additional Data:     Labs:  Results from last 7 days   Lab Units 24  0610   WBC Thousand/uL  3.72*   HEMOGLOBIN g/dL 12.7   HEMATOCRIT % 39.3   PLATELETS Thousands/uL 239   NEUTROS PCT % 55   LYMPHS PCT % 32   MONOS PCT % 9   EOS PCT % 4     Results from last 7 days   Lab Units 01/28/24  0610   SODIUM mmol/L 137   POTASSIUM mmol/L 4.3   CHLORIDE mmol/L 104   CO2 mmol/L 26   BUN mg/dL 9   CREATININE mg/dL 0.67   ANION GAP mmol/L 7   CALCIUM mg/dL 9.3   GLUCOSE RANDOM mg/dL 96     Results from last 7 days   Lab Units 01/27/24  1249   INR  0.96                   Lines/Drains:  Invasive Devices       Peripheral Intravenous Line  Duration             Peripheral IV 01/27/24 Right Forearm 1 day                          Imaging: No pertinent imaging reviewed.    Recent Cultures (last 7 days):         Last 24 Hours Medication List:   Current Facility-Administered Medications   Medication Dose Route Frequency Provider Last Rate    acetaminophen  650 mg Oral Q4H PRN Fahad Dubon MD      amLODIPine  10 mg Oral Daily Fahad Dubon MD      apixaban  5 mg Oral BID Fahad Dubon MD      busPIRone  20 mg Oral BID Fahad Dubon MD      carBAMazepine  1,200 mg Oral HS Fahad Dubon MD      clonazePAM  0.5 mg Oral QPM Fahad Dubon MD      DULoxetine  60 mg Oral Daily Fahad Dubon MD      fenofibrate  145 mg Oral Daily Fahad Dubon MD      gabapentin  300 mg Oral QPM Fahad Dubon MD      hydrALAZINE  5 mg Intravenous Q6H PRN Brandon Poon MD      loratadine  10 mg Oral Daily Fahad Dubon MD      losartan  100 mg Oral QPM Fahad Dubon MD      magnesium hydroxide  30 mL Oral Daily PRN Fahad Dubon MD      melatonin  3 mg Oral HS Fahad Dubon MD      metoprolol succinate  25 mg Oral Daily Fahad Dubon MD      nystatin  1 Application Topical HS Fahad Dubon MD      oxybutynin  5 mg Oral Daily Fahad Dubon MD      pantoprazole  40 mg Oral Early Morning Fahad Dubon MD      pravastatin  20 mg Oral QPM Fahad Dubon MD      saccharomyces boulardii  250 mg Oral BID Fahad Dubon MD      senna-docusate  sodium  1 tablet Oral HS Fahad Dubon MD      ziprasidone  120 mg Oral BID With Meals Brandon Poon MD          Today, Patient Was Seen By: David Alamo MD    **Please Note: This note may have been constructed using a voice recognition system.**

## 2024-01-28 NOTE — ASSESSMENT & PLAN NOTE
BP noted to by systolic >200 at group home  Home medication metoprolol and losartan.  Patient group home states she is received her metoprolol this morning.      Plan:  Monitor BP  Added amlodipine, will likely continue on discharge

## 2024-01-28 NOTE — DISCHARGE INSTR - AVS FIRST PAGE
Dear Prema Shepard,     [facility]  It is our hope that we were always able to exceed the expected standards for your care during your stay.  You were hospitalized due to elevated blood pressure and headache.  You were cared for on the third floor by David Alamo MD under the service of Mayra Soler MD with the [facility] who covers for your primary care physician (PCP), Sotero Peterson MD, while you were hospitalized.  If you have any questions or concerns related to this hospitalization, you may contact us at .  For follow up as well as any medication refills, we recommend that you follow up with your primary care physician.  A registered nurse will reach out to you by phone within a few days after your discharge to answer any additional questions that you may have after going home.  However, at this time we provide for you here, the most important instructions / recommendations at discharge:     Notable Medication Adjustments -   Please note that you have been prescribed amlodipine (Norvasc) 10 mg tablet, it should be taken once every day  Testing Required after Discharge -   No testing is required after your discharge  ** Please contact your PCP to request testing orders for any of the testing recommended here **  Important follow up information -   Please be sure to follow-up with your primary care physician in 1 week to discuss this new medication change  Other Instructions -   Please be sure to take your medications as prescribed and attend all scheduled appointments  If you have recurrence of your symptoms please be sure to present to the emergency department for further evaluation  Please review this entire after visit summary as additional general instructions including medication list, appointments, activity, diet, any pertinent wound care, and other additional recommendations  from your care team that may be provided for you.      Sincerely,     David Alamo MD

## 2024-01-28 NOTE — PLAN OF CARE
Problem: PAIN - ADULT  Goal: Verbalizes/displays adequate comfort level or baseline comfort level  Description: Interventions:  - Encourage patient to monitor pain and request assistance  - Assess pain using appropriate pain scale  - Administer analgesics based on type and severity of pain and evaluate response  - Implement non-pharmacological measures as appropriate and evaluate response  - Consider cultural and social influences on pain and pain management  - Notify physician/advanced practitioner if interventions unsuccessful or patient reports new pain  Outcome: Progressing     Problem: INFECTION - ADULT  Goal: Absence or prevention of progression during hospitalization  Description: INTERVENTIONS:  - Assess and monitor for signs and symptoms of infection  - Monitor lab/diagnostic results  - Monitor all insertion sites, i.e. indwelling lines, tubes, and drains  - Monitor endotracheal if appropriate and nasal secretions for changes in amount and color  - [place] appropriate cooling/warming therapies per order  - Administer medications as ordered  - Instruct and encourage patient and family to use good hand hygiene technique  - Identify and instruct in appropriate isolation precautions for identified infection/condition  Outcome: Progressing  Goal: Absence of fever/infection during neutropenic period  Description: INTERVENTIONS:  - Monitor WBC    Outcome: Progressing     Problem: SAFETY ADULT  Goal: Patient will remain free of falls  Description: INTERVENTIONS:  - Educate patient/family on patient safety including physical limitations  - Instruct patient to call for assistance with activity   - Consult OT/PT to assist with strengthening/mobility   - Keep Call bell within reach  - Keep bed low and locked with side rails adjusted as appropriate  - Keep care items and personal belongings within reach  - Initiate and maintain comfort rounds  - Make Fall Risk Sign visible to staff  - Apply yellow socks and bracelet  for high fall risk patients  - Consider moving patient to room near nurses station  Outcome: Progressing  Goal: Maintain or return to baseline ADL function  Description: INTERVENTIONS:  -  Assess patient's ability to carry out ADLs; assess patient's baseline for ADL function and identify physical deficits which impact ability to perform ADLs (bathing, care of mouth/teeth, toileting, grooming, dressing, etc.)  - Assess/evaluate cause of self-care deficits   - Assess range of motion  - Assess patient's mobility; develop plan if impaired  - Assess patient's need for assistive devices and provide as appropriate  - Encourage maximum independence but intervene and supervise when necessary  - Involve family in performance of ADLs  - Assess for home care needs following discharge   - Consider OT consult to assist with ADL evaluation and planning for discharge  - Provide patient education as appropriate  Outcome: Progressing  Goal: Maintains/Returns to pre admission functional level  Description: INTERVENTIONS:  - Perform AM-PAC 6 Click Basic Mobility/ Daily Activity assessment daily.  - Set and communicate daily mobility goal to care team and patient/family/caregiver.   - Collaborate with rehabilitation services on mobility goals if consulted  - Out of bed for toileting  - Record patient progress and toleration of activity level   Outcome: Progressing     Problem: DISCHARGE PLANNING  Goal: Discharge to home or other facility with appropriate resources  Description: INTERVENTIONS:  - Identify barriers to discharge w/patient and caregiver  - Arrange for needed discharge resources and transportation as appropriate  - Identify discharge learning needs (meds, wound care, etc.)  - Arrange for interpretive services to assist at discharge as needed  - Refer to Case Management Department for coordinating discharge planning if the patient needs post-hospital services based on physician/advanced practitioner order or complex needs  related to functional status, cognitive ability, or social support system  Outcome: Progressing     Problem: Knowledge Deficit  Goal: Patient/family/caregiver demonstrates understanding of disease process, treatment plan, medications, and discharge instructions  Description: Complete learning assessment and assess knowledge base.  Interventions:  - Provide teaching at level of understanding  - Provide teaching via preferred learning methods  Outcome: Progressing     Problem: CARDIOVASCULAR - ADULT  Goal: Maintains optimal cardiac output and hemodynamic stability  Description: INTERVENTIONS:  - Monitor I/O, vital signs and rhythm  - Monitor for S/S and trends of decreased cardiac output  - Administer and titrate ordered vasoactive medications to optimize hemodynamic stability  - Assess quality of pulses, skin color and temperature  - Assess for signs of decreased coronary artery perfusion  - Instruct patient to report change in severity of symptoms  Outcome: Progressing     Problem: NEUROSENSORY - ADULT  Goal: Achieves stable or improved neurological status  Description: INTERVENTIONS  - Monitor and report changes in neurological status  - Monitor vital signs such as temperature, blood pressure, glucose, and any other labs ordered   - Initiate measures to prevent increased intracranial pressure  - Monitor for seizure activity and implement precautions if appropriate      Outcome: Progressing  Goal: Remains free of injury related to seizures activity  Description: INTERVENTIONS  - Maintain airway, patient safety  and administer oxygen as ordered  - Monitor patient for seizure activity, document and report duration and description of seizure to physician/advanced practitioner  - If seizure occurs,  ensure patient safety during seizure  - Reorient patient post seizure  - Seizure pads on all 4 side rails  - Instruct patient/family to notify RN of any seizure activity including if an aura is experienced  - Instruct  patient/family to call for assistance with activity based on nursing assessment  - Administer anti-seizure medications if ordered    Outcome: Progressing  Goal: Achieves maximal functionality and self care  Description: INTERVENTIONS  - Monitor swallowing and airway patency with patient fatigue and changes in neurological status  - Encourage and assist patient to increase activity and self care.   - Encourage visually impaired, hearing impaired and aphasic patients to use assistive/communication devices  Outcome: Progressing     Problem: RESPIRATORY - ADULT  Goal: Achieves optimal ventilation and oxygenation  Description: INTERVENTIONS:  - Assess for changes in respiratory status  - Assess for changes in mentation and behavior  - Position to facilitate oxygenation and minimize respiratory effort  - Oxygen administered by appropriate delivery if ordered  - Initiate smoking cessation education as indicated  - Encourage broncho-pulmonary hygiene including cough, deep breathe, Incentive Spirometry  - Assess the need for suctioning and aspirate as needed  - Assess and instruct to report SOB or any respiratory difficulty  - Respiratory Therapy support as indicated  Outcome: Progressing     Problem: GENITOURINARY - ADULT  Goal: Maintains or returns to baseline urinary function  Description: INTERVENTIONS:  - Assess urinary function  - Encourage oral fluids to ensure adequate hydration if ordered  - Administer IV fluids as ordered to ensure adequate hydration  - Administer ordered medications as needed  - Offer frequent toileting  - Follow urinary retention protocol if ordered  Outcome: Progressing  Goal: Absence of urinary retention  Description: INTERVENTIONS:  - Assess patient’s ability to void and empty bladder  - Monitor I/O  - Bladder scan as needed  - Discuss with physician/AP medications to alleviate retention as needed  - Discuss catheterization for long term situations as appropriate  Outcome: Progressing      Problem: METABOLIC, FLUID AND ELECTROLYTES - ADULT  Goal: Electrolytes maintained within normal limits  Description: INTERVENTIONS:  - Monitor labs and assess patient for signs and symptoms of electrolyte imbalances  - Administer electrolyte replacement as ordered  - Monitor response to electrolyte replacements, including repeat lab results as appropriate  - Instruct patient on fluid and nutrition as appropriate  Outcome: Progressing  Goal: Fluid balance maintained  Description: INTERVENTIONS:  - Monitor labs   - Monitor I/O and WT  - Instruct patient on fluid and nutrition as appropriate  - Assess for signs & symptoms of volume excess or deficit  Outcome: Progressing

## 2024-01-28 NOTE — ASSESSMENT & PLAN NOTE
Na 128 on admission, has had intermittent hyponatremia in the past year.  Likely secondary to multiple psych medications.  Patient is alert, awake and oriented.   Lab Results   Component Value Date    SODIUM 137 01/28/2024    SODIUM 128 (L) 01/27/2024    SODIUM 136 07/31/2023          Plan:  Resolved with minimal intervention  Monitor BMP

## 2024-01-28 NOTE — CASE MANAGEMENT
Case Management Progress Note    Patient name Prema Shepard  Location S /S -01 MRN [id number]  : 1965 Date 2024       LOS (days): 0  Geometric Mean LOS (GMLOS) (days):   Days to GMLOS:        OBJECTIVE:        Current admission status: Observation  Preferred Pharmacy:   UNKNOWN - FOLLOW UP PRIOR TO DISCHARGE TO E-PRESCRIBE  No address on file      [facility] [place] [address]  [facility] [address]  Phone: [phone] Fax: [phone]    Primary Care Provider: Sotero Peterson MD    Primary Insurance: MEDICARE  Secondary Insurance: [facility]    PROGRESS NOTE:    CM attempted to contact [facility] (ph: [phone]) re: pt return to group home, unable to leave   - office hours between [phone] M-F. CM to f/u with agency in AM.    CM also attempted to contact patient's mother Frannie via phone, unable to leave . CM to f/u with mother in AM.

## 2024-01-28 NOTE — PLAN OF CARE
Problem: NEUROSENSORY - ADULT  Goal: Achieves stable or improved neurological status  Description: INTERVENTIONS  - Monitor and report changes in neurological status  - Monitor vital signs such as temperature, blood pressure, glucose, and any other labs ordered   - Initiate measures to prevent increased intracranial pressure  - Monitor for seizure activity and implement precautions if appropriate      Outcome: Progressing     Problem: NEUROSENSORY - ADULT  Goal: Remains free of injury related to seizures activity  Description: INTERVENTIONS  - Maintain airway, patient safety  and administer oxygen as ordered  - Monitor patient for seizure activity, document and report duration and description of seizure to physician/advanced practitioner  - If seizure occurs,  ensure patient safety during seizure  - Reorient patient post seizure  - Seizure pads on all 4 side rails  - Instruct patient/family to notify RN of any seizure activity including if an aura is experienced  - Instruct patient/family to call for assistance with activity based on nursing assessment  - Administer anti-seizure medications if ordered    Outcome: Progressing     Problem: CARDIOVASCULAR - ADULT  Goal: Maintains optimal cardiac output and hemodynamic stability  Description: INTERVENTIONS:  - Monitor I/O, vital signs and rhythm  - Monitor for S/S and trends of decreased cardiac output  - Administer and titrate ordered vasoactive medications to optimize hemodynamic stability  - Assess quality of pulses, skin color and temperature  - Assess for signs of decreased coronary artery perfusion  - Instruct patient to report change in severity of symptoms  Outcome: Progressing     Problem: SAFETY ADULT  Goal: Patient will remain free of falls  Description: INTERVENTIONS:  - Educate patient/family on patient safety including physical limitations  - Instruct patient to call for assistance with activity   - Consult OT/PT to assist with strengthening/mobility   -  Keep Call bell within reach  - Keep bed low and locked with side rails adjusted as appropriate  - Keep care items and personal belongings within reach  - Initiate and maintain comfort rounds  - Make Fall Risk Sign visible to staff  - Offer Toileting every two hours, in advance of need  - Initiate/Maintain bed and chair alarm  - Obtain necessary fall risk management equipment:   - Apply yellow socks and bracelet for high fall risk patients  - Consider moving patient to room near nurses station  Outcome: Progressing     Problem: INFECTION - ADULT  Goal: Absence or prevention of progression during hospitalization  Description: INTERVENTIONS:  - Assess and monitor for signs and symptoms of infection  - Monitor lab/diagnostic results  - Monitor all insertion sites, i.e. indwelling lines, tubes, and drains  - Monitor endotracheal if appropriate and nasal secretions for changes in amount and color  - [place] appropriate cooling/warming therapies per order  - Administer medications as ordered  - Instruct and encourage patient and family to use good hand hygiene technique  - Identify and instruct in appropriate isolation precautions for identified infection/condition  Outcome: Progressing     Problem: METABOLIC, FLUID AND ELECTROLYTES - ADULT  Goal: Electrolytes maintained within normal limits  Description: INTERVENTIONS:  - Monitor labs and assess patient for signs and symptoms of electrolyte imbalances  - Administer electrolyte replacement as ordered  - Monitor response to electrolyte replacements, including repeat lab results as appropriate  - Instruct patient on fluid and nutrition as appropriate  Outcome: Progressing     Problem: METABOLIC, FLUID AND ELECTROLYTES - ADULT  Goal: Fluid balance maintained  Description: INTERVENTIONS:  - Monitor labs   - Monitor I/O and WT  - Instruct patient on fluid and nutrition as appropriate  - Assess for signs & symptoms of volume excess or deficit  Outcome: Progressing

## 2024-01-29 VITALS
RESPIRATION RATE: 19 BRPM | WEIGHT: 224.87 LBS | HEART RATE: 61 BPM | BODY MASS INDEX: 39.83 KG/M2 | SYSTOLIC BLOOD PRESSURE: 127 MMHG | OXYGEN SATURATION: 97 % | TEMPERATURE: 98.2 F | DIASTOLIC BLOOD PRESSURE: 69 MMHG

## 2024-01-29 LAB
ANION GAP SERPL CALCULATED.3IONS-SCNC: 14 MMOL/L
BASOPHILS # BLD AUTO: 0.02 THOUSANDS/ÂΜL (ref 0–0.1)
BASOPHILS NFR BLD AUTO: 1 % (ref 0–1)
BUN SERPL-MCNC: 14 MG/DL (ref 5–25)
CALCIUM SERPL-MCNC: 9.1 MG/DL (ref 8.4–10.2)
CHLORIDE SERPL-SCNC: 104 MMOL/L (ref 96–108)
CO2 SERPL-SCNC: 17 MMOL/L (ref 21–32)
CREAT SERPL-MCNC: 0.72 MG/DL (ref 0.6–1.3)
EOSINOPHIL # BLD AUTO: 0.11 THOUSAND/ÂΜL (ref 0–0.61)
EOSINOPHIL NFR BLD AUTO: 3 % (ref 0–6)
ERYTHROCYTE [DISTWIDTH] IN BLOOD BY AUTOMATED COUNT: 12.7 % (ref 11.6–15.1)
GFR SERPL CREATININE-BSD FRML MDRD: 92 ML/MIN/1.73SQ M
GLUCOSE SERPL-MCNC: 103 MG/DL (ref 65–140)
GLUCOSE SERPL-MCNC: 95 MG/DL (ref 65–140)
HCT VFR BLD AUTO: 42.8 % (ref 34.8–46.1)
HGB BLD-MCNC: 13.5 G/DL (ref 11.5–15.4)
IMM GRANULOCYTES # BLD AUTO: 0.01 THOUSAND/UL (ref 0–0.2)
IMM GRANULOCYTES NFR BLD AUTO: 0 % (ref 0–2)
LYMPHOCYTES # BLD AUTO: 1.11 THOUSANDS/ÂΜL (ref 0.6–4.47)
LYMPHOCYTES NFR BLD AUTO: 31 % (ref 14–44)
MCH RBC QN AUTO: 30.4 PG (ref 26.8–34.3)
MCHC RBC AUTO-ENTMCNC: 31.5 G/DL (ref 31.4–37.4)
MCV RBC AUTO: 96 FL (ref 82–98)
MONOCYTES # BLD AUTO: 0.25 THOUSAND/ÂΜL (ref 0.17–1.22)
MONOCYTES NFR BLD AUTO: 7 % (ref 4–12)
NEUTROPHILS # BLD AUTO: 2.13 THOUSANDS/ÂΜL (ref 1.85–7.62)
NEUTS SEG NFR BLD AUTO: 58 % (ref 43–75)
NRBC BLD AUTO-RTO: 0 /100 WBCS
PLATELET # BLD AUTO: 295 THOUSANDS/UL (ref 149–390)
PMV BLD AUTO: 8.8 FL (ref 8.9–12.7)
POTASSIUM SERPL-SCNC: 4.3 MMOL/L (ref 3.5–5.3)
RBC # BLD AUTO: 4.44 MILLION/UL (ref 3.81–5.12)
SODIUM SERPL-SCNC: 135 MMOL/L (ref 135–147)
WBC # BLD AUTO: 3.63 THOUSAND/UL (ref 4.31–10.16)

## 2024-01-29 PROCEDURE — 85025 COMPLETE CBC W/AUTO DIFF WBC: CPT | Performed by: STUDENT IN AN ORGANIZED HEALTH CARE EDUCATION/TRAINING PROGRAM

## 2024-01-29 PROCEDURE — 82948 REAGENT STRIP/BLOOD GLUCOSE: CPT

## 2024-01-29 PROCEDURE — 80048 BASIC METABOLIC PNL TOTAL CA: CPT | Performed by: STUDENT IN AN ORGANIZED HEALTH CARE EDUCATION/TRAINING PROGRAM

## 2024-01-29 PROCEDURE — 99239 HOSP IP/OBS DSCHRG MGMT >30: CPT | Performed by: INTERNAL MEDICINE

## 2024-01-29 RX ADMIN — APIXABAN 5 MG: 5 TABLET, FILM COATED ORAL at 08:46

## 2024-01-29 RX ADMIN — OXYBUTYNIN CHLORIDE 5 MG: 5 TABLET, EXTENDED RELEASE ORAL at 08:46

## 2024-01-29 RX ADMIN — Medication 250 MG: at 08:47

## 2024-01-29 RX ADMIN — METOPROLOL SUCCINATE 25 MG: 25 TABLET, EXTENDED RELEASE ORAL at 08:46

## 2024-01-29 RX ADMIN — ZIPRASIDONE HYDROCHLORIDE 120 MG: 20 CAPSULE ORAL at 08:30

## 2024-01-29 RX ADMIN — FENOFIBRATE 145 MG: 145 TABLET ORAL at 08:45

## 2024-01-29 RX ADMIN — LORATADINE 10 MG: 10 TABLET ORAL at 08:46

## 2024-01-29 RX ADMIN — AMLODIPINE BESYLATE 10 MG: 10 TABLET ORAL at 08:46

## 2024-01-29 RX ADMIN — PANTOPRAZOLE SODIUM 40 MG: 40 TABLET, DELAYED RELEASE ORAL at 05:29

## 2024-01-29 RX ADMIN — DULOXETINE 60 MG: 60 CAPSULE, DELAYED RELEASE ORAL at 08:46

## 2024-01-29 RX ADMIN — BUSPIRONE HYDROCHLORIDE 20 MG: 5 TABLET ORAL at 08:42

## 2024-01-29 NOTE — DISCHARGE SUMMARY
[facility]  Discharge- Prema Shepard 1965, 58 y.o. female MRN: [id number]  Unit/Bed#: S -[facility] Encounter: [id number]  Primary Care Provider: Sotero Peterson MD   Date and time admitted to hospital: 1/27/2024 12:02 PM    * Hypertension  Assessment & Plan  BP noted to by systolic >200 at group home  Home medication metoprolol and losartan.  Patient group home states she is received her metoprolol this morning.      Plan:  Started on Amlodipine   To follow with PCP    Hyponatremia  Assessment & Plan  Na 128 on admission, has had intermittent hyponatremia in the past year.  Likely secondary to multiple psych medications.  Patient is alert, awake and oriented.   Lab Results   Component Value Date    SODIUM 135 01/29/2024    SODIUM 137 01/28/2024    SODIUM 128 (L) 01/27/2024          Plan:  Resolved with minimal intervention    Hyperlipemia  Assessment & Plan  Continue fenofibrate and statin    Schizoaffective disorder (HCC)  Assessment & Plan  Continue home medications Geodon, Cymbalta, Buspirone    History of DVT (deep vein thrombosis)  Assessment & Plan  Continue eliquis        Medical Problems       Resolved Problems  Date Reviewed: 1/27/2024   None       Discharging Resident: David Alamo MD  Discharging Attending: No att. providers found  PCP: Sotero Peterson MD  Admission Date:   Admission Orders (From admission, onward)       Ordered        01/27/24 1336  Place in Observation  Once                          Discharge Date: 01/29/24    Consultations During Hospital Stay:  None    Procedures Performed:   None    Significant Findings / Test Results:   Mild Hyponatremia - 128  Elevated BP in ED    Incidental Findings:   None     Test Results Pending at Discharge (will require follow up):  None     Outpatient Tests Requested:  None    Complications:  None    Reason for Admission: Hypertensive     Hospital Course:   Prema Shepard is a 58 y.o. female patient who originally  presented to the hospital on 1/27/2024 due to Hypertension. Initially presented with elevated pressures in Hypertensive Urgency range. Resolved with administration of home medications and addition of Amlodipine. Patient also had incidental hyponatremia for which she was monitored overnight. Hyponatremia resolved without intervention and patient's BP remained stable and well controlled on new regimen.  Patient was stable for discharge but had to stay 1 more night due to group home not responding to calls.    Patient was discharged with new prescription for amlodipine and encouraged to follow-up with PCP in 1 week.      Please see above list of diagnoses and related plan for additional information.     Condition at Discharge: stable    Discharge Day Visit / Exam:   Subjective: Evaluated patient this morning, stated that she was doing well and was without any symptoms other than anxiety which she deals with chronically.  Was unhappy about going back to group home, but excepted this as she was stable for discharge.  Denied any headaches, blurry vision, lightheadedness, dizziness, palpitations, chest pain, shortness of breath, abdominal pain, dysuria.  Vitals: Blood Pressure: 127/69 (01/29/24 0713)  Pulse: 61 (01/29/24 0713)  Temperature: 98.2 °F (36.8 °C) (01/29/24 0713)  Temp Source: Oral (01/29/24 0713)  Respirations: 19 (01/29/24 0713)  Weight - Scale: 102 kg (224 lb 13.9 oz) (01/27/24 1205)  SpO2: 97 % (01/29/24 0713)  Exam:   Physical Exam  Vitals and nursing note reviewed.   Constitutional:       General: She is not in acute distress.     Appearance: She is well-developed.   HENT:      Head: Normocephalic and atraumatic.   Eyes:      Conjunctiva/sclera: Conjunctivae normal.   Cardiovascular:      Rate and Rhythm: Normal rate and regular rhythm.      Heart sounds: No murmur heard.  Pulmonary:      Effort: Pulmonary effort is normal. No respiratory distress.      Breath sounds: Normal breath sounds.   Abdominal:       Palpations: Abdomen is soft.      Tenderness: There is no abdominal tenderness.   Musculoskeletal:         General: No swelling.   Skin:     General: Skin is warm and dry.   Neurological:      Mental Status: She is alert.   Psychiatric:         Mood and Affect: Mood normal.          Discussion with Family: Attempted to update  (Mother) via phone. Unable to contact.    Discharge instructions/Information to patient and family:   See after visit summary for information provided to patient and family.      Provisions for Follow-Up Care:  See after visit summary for information related to follow-up care and any pertinent home health orders.      Mobility at time of Discharge:   Basic Mobility Inpatient Raw Score: 18  JH-HLM Goal: 6: Walk 10 steps or more  JH-HLM Achieved: 6: Walk 10 steps or more  HLM Goal achieved. Continue to encourage appropriate mobility.     Disposition:   Group Home / [facility] Care Home at [facility]    Planned Readmission: no    Discharge Medications:  See after visit summary for reconciled discharge medications provided to patient and/or family.      **Please Note: This note may have been constructed using a voice recognition system**

## 2024-01-29 NOTE — ASSESSMENT & PLAN NOTE
Na 128 on admission, has had intermittent hyponatremia in the past year.  Likely secondary to multiple psych medications.  Patient is alert, awake and oriented.   Lab Results   Component Value Date    SODIUM 135 01/29/2024    SODIUM 137 01/28/2024    SODIUM 128 (L) 01/27/2024          Plan:  Resolved with minimal intervention

## 2024-01-29 NOTE — ASSESSMENT & PLAN NOTE
BP noted to by systolic >200 at group home  Home medication metoprolol and losartan.  Patient group home states she is received her metoprolol this morning.      Plan:  Started on Amlodipine   To follow with PCP

## 2024-01-29 NOTE — PLAN OF CARE
Problem: PAIN - ADULT  Goal: Verbalizes/displays adequate comfort level or baseline comfort level  Description: Interventions:  - Encourage patient to monitor pain and request assistance  - Assess pain using appropriate pain scale  - Administer analgesics based on type and severity of pain and evaluate response  - Implement non-pharmacological measures as appropriate and evaluate response  - Consider cultural and social influences on pain and pain management  - Notify physician/advanced practitioner if interventions unsuccessful or patient reports new pain  Outcome: Completed     Problem: INFECTION - ADULT  Goal: Absence or prevention of progression during hospitalization  Description: INTERVENTIONS:  - Assess and monitor for signs and symptoms of infection  - Monitor lab/diagnostic results  - Monitor all insertion sites, i.e. indwelling lines, tubes, and drains  - Monitor endotracheal if appropriate and nasal secretions for changes in amount and color  - [place] appropriate cooling/warming therapies per order  - Administer medications as ordered  - Instruct and encourage patient and family to use good hand hygiene technique  - Identify and instruct in appropriate isolation precautions for identified infection/condition  Outcome: Completed  Goal: Absence of fever/infection during neutropenic period  Description: INTERVENTIONS:  - Monitor WBC    Outcome: Completed     Problem: SAFETY ADULT  Goal: Patient will remain free of falls  Description: INTERVENTIONS:  - Educate patient/family on patient safety including physical limitations  - Instruct patient to call for assistance with activity   - Consult OT/PT to assist with strengthening/mobility   - Keep Call bell within reach  - Keep bed low and locked with side rails adjusted as appropriate  - Keep care items and personal belongings within reach  - Initiate and maintain comfort rounds  - Make Fall Risk Sign visible to staff  - Offer Toileting every  Hours, in  advance of need  - Initiate/Maintain rm  - Obtain necessary fall risk management equipment:   - Apply yellow socks and bracelet for high fall risk patients  - Consider moving patient to room near nurses station  Outcome: Completed  Goal: Maintain or return to baseline ADL function  Description: INTERVENTIONS:  -  Assess patient's ability to carry out ADLs; assess patient's baseline for ADL function and identify physical deficits which impact ability to perform ADLs (bathing, care of mouth/teeth, toileting, grooming, dressing, etc.)  - Assess/evaluate cause of self-care deficits   - Assess range of motion  - Assess patient's mobility; develop plan if impaired  - Assess patient's need for assistive devices and provide as appropriate  - Encourage maximum independence but intervene and supervise when necessary  - Involve family in performance of ADLs  - Assess for home care needs following discharge   - Consider OT consult to assist with ADL evaluation and planning for discharge  - Provide patient education as appropriate  Outcome: Completed  Goal: Maintains/Returns to pre admission functional level  Description: INTERVENTIONS:  - Perform AM-PAC 6 Click Basic Mobility/ Daily Activity assessment daily.  - Set and communicate daily mobility goal to care team and patient/family/caregiver.   - Collaborate with rehabilitation services on mobility goals if consulted  - Perform Range of Motion  times a day.  - Reposition patient every hours.  - Dangle patient  times a day  - Stand patient  times a day  - Ambulate patient times a day  - Out of bed to chair  times a day   - Out of bed for meals times a day  - Out of bed for toileting  - Record patient progress and toleration of activity level   Outcome: Completed     Problem: DISCHARGE PLANNING  Goal: Discharge to home or other facility with appropriate resources  Description: INTERVENTIONS:  - Identify barriers to discharge w/patient and caregiver  - Arrange for needed  discharge resources and transportation as appropriate  - Identify discharge learning needs (meds, wound care, etc.)  - Arrange for interpretive services to assist at discharge as needed  - Refer to Case Management Department for coordinating discharge planning if the patient needs post-hospital services based on physician/advanced practitioner order or complex needs related to functional status, cognitive ability, or social support system  Outcome: Completed     Problem: Knowledge Deficit  Goal: Patient/family/caregiver demonstrates understanding of disease process, treatment plan, medications, and discharge instructions  Description: Complete learning assessment and assess knowledge base.  Interventions:  - Provide teaching at level of understanding  - Provide teaching via preferred learning methods  Outcome: Completed     Problem: CARDIOVASCULAR - ADULT  Goal: Maintains optimal cardiac output and hemodynamic stability  Description: INTERVENTIONS:  - Monitor I/O, vital signs and rhythm  - Monitor for S/S and trends of decreased cardiac output  - Administer and titrate ordered vasoactive medications to optimize hemodynamic stability  - Assess quality of pulses, skin color and temperature  - Assess for signs of decreased coronary artery perfusion  - Instruct patient to report change in severity of symptoms  Outcome: Completed     Problem: NEUROSENSORY - ADULT  Goal: Achieves stable or improved neurological status  Description: INTERVENTIONS  - Monitor and report changes in neurological status  - Monitor vital signs such as temperature, blood pressure, glucose, and any other labs ordered   - Initiate measures to prevent increased intracranial pressure  - Monitor for seizure activity and implement precautions if appropriate      Outcome: Completed  Goal: Remains free of injury related to seizures activity  Description: INTERVENTIONS  - Maintain airway, patient safety  and administer oxygen as ordered  - Monitor patient  for seizure activity, document and report duration and description of seizure to physician/advanced practitioner  - If seizure occurs,  ensure patient safety during seizure  - Reorient patient post seizure  - Seizure pads on all 4 side rails  - Instruct patient/family to notify RN of any seizure activity including if an aura is experienced  - Instruct patient/family to call for assistance with activity based on nursing assessment  - Administer anti-seizure medications if ordered    Outcome: Completed  Goal: Achieves maximal functionality and self care  Description: INTERVENTIONS  - Monitor swallowing and airway patency with patient fatigue and changes in neurological status  - Encourage and assist patient to increase activity and self care.   - Encourage visually impaired, hearing impaired and aphasic patients to use assistive/communication devices  Outcome: Completed     Problem: RESPIRATORY - ADULT  Goal: Achieves optimal ventilation and oxygenation  Description: INTERVENTIONS:  - Assess for changes in respiratory status  - Assess for changes in mentation and behavior  - Position to facilitate oxygenation and minimize respiratory effort  - Oxygen administered by appropriate delivery if ordered  - Initiate smoking cessation education as indicated  - Encourage broncho-pulmonary hygiene including cough, deep breathe, Incentive Spirometry  - Assess the need for suctioning and aspirate as needed  - Assess and instruct to report SOB or any respiratory difficulty  - Respiratory Therapy support as indicated  Outcome: Completed     Problem: GENITOURINARY - ADULT  Goal: Maintains or returns to baseline urinary function  Description: INTERVENTIONS:  - Assess urinary function  - Encourage oral fluids to ensure adequate hydration if ordered  - Administer IV fluids as ordered to ensure adequate hydration  - Administer ordered medications as needed  - Offer frequent toileting  - Follow urinary retention protocol if  ordered  Outcome: Completed  Goal: Absence of urinary retention  Description: INTERVENTIONS:  - Assess patient’s ability to void and empty bladder  - Monitor I/O  - Bladder scan as needed  - Discuss with physician/AP medications to alleviate retention as needed  - Discuss catheterization for long term situations as appropriate  Outcome: Completed     Problem: METABOLIC, FLUID AND ELECTROLYTES - ADULT  Goal: Electrolytes maintained within normal limits  Description: INTERVENTIONS:  - Monitor labs and assess patient for signs and symptoms of electrolyte imbalances  - Administer electrolyte replacement as ordered  - Monitor response to electrolyte replacements, including repeat lab results as appropriate  - Instruct patient on fluid and nutrition as appropriate  Outcome: Completed  Goal: Fluid balance maintained  Description: INTERVENTIONS:  - Monitor labs   - Monitor I/O and WT  - Instruct patient on fluid and nutrition as appropriate  - Assess for signs & symptoms of volume excess or deficit  Outcome: Completed

## 2024-01-29 NOTE — PLAN OF CARE
Problem: PAIN - ADULT  Goal: Verbalizes/displays adequate comfort level or baseline comfort level  Description: Interventions:  - Encourage patient to monitor pain and request assistance  - Assess pain using appropriate pain scale  - Administer analgesics based on type and severity of pain and evaluate response  - Implement non-pharmacological measures as appropriate and evaluate response  - Consider cultural and social influences on pain and pain management  - Notify physician/advanced practitioner if interventions unsuccessful or patient reports new pain  Outcome: Progressing     Problem: INFECTION - ADULT  Goal: Absence or prevention of progression during hospitalization  Description: INTERVENTIONS:  - Assess and monitor for signs and symptoms of infection  - Monitor lab/diagnostic results  - Monitor all insertion sites, i.e. indwelling lines, tubes, and drains  - Monitor endotracheal if appropriate and nasal secretions for changes in amount and color  - [place] appropriate cooling/warming therapies per order  - Administer medications as ordered  - Instruct and encourage patient and family to use good hand hygiene technique  - Identify and instruct in appropriate isolation precautions for identified infection/condition  Outcome: Progressing  Goal: Absence of fever/infection during neutropenic period  Description: INTERVENTIONS:  - Monitor WBC    Outcome: Progressing     Problem: SAFETY ADULT  Goal: Patient will remain free of falls  Description: INTERVENTIONS:  - Educate patient/family on patient safety including physical limitations  - Instruct patient to call for assistance with activity   - Consult OT/PT to assist with strengthening/mobility   - Keep Call bell within reach  - Keep bed low and locked with side rails adjusted as appropriate  - Keep care items and personal belongings within reach  - Initiate and maintain comfort rounds  - Make Fall Risk Sign visible to staff  - Offer Toileting every  Hours,  in advance of need  - Initiate/Maintain alarm  - Obtain necessary fall risk management equipment:   - Apply yellow socks and bracelet for high fall risk patients  - Consider moving patient to room near nurses station  Outcome: Progressing  Goal: Maintain or return to baseline ADL function  Description: INTERVENTIONS:  -  Assess patient's ability to carry out ADLs; assess patient's baseline for ADL function and identify physical deficits which impact ability to perform ADLs (bathing, care of mouth/teeth, toileting, grooming, dressing, etc.)  - Assess/evaluate cause of self-care deficits   - Assess range of motion  - Assess patient's mobility; develop plan if impaired  - Assess patient's need for assistive devices and provide as appropriate  - Encourage maximum independence but intervene and supervise when necessary  - Involve family in performance of ADLs  - Assess for home care needs following discharge   - Consider OT consult to assist with ADL evaluation and planning for discharge  - Provide patient education as appropriate  Outcome: Progressing  Goal: Maintains/Returns to pre admission functional level  Description: INTERVENTIONS:  - Perform AM-PAC 6 Click Basic Mobility/ Daily Activity assessment daily.  - Set and communicate daily mobility goal to care team and patient/family/caregiver.   - Collaborate with rehabilitation services on mobility goals if consulted  - Perform Range of Motion  times a day.  - Reposition patient every  hours.  - Dangle patient  times a day  - Stand patient  time a day  - Ambulate patient  times a day  - Out of bed to chair  times a day   - Out of bed for meals  times a day  - Out of bed for toileting  - Record patient progress and toleration of activity level   Outcome: Progressing

## 2024-01-29 NOTE — CASE MANAGEMENT
Case Management Discharge Planning Note    Patient name Prema Shepard  Location S /S -01 MRN [id number]  : 1965 Date 2024       Current Admission Date: 2024  Current Admission Diagnosis:Hypertension   Patient Active Problem List    Diagnosis Date Noted    Hypertension 2024    Stroke-like symptoms 2023    Hyponatremia 2023    TBI (traumatic brain injury) (HCC) 2023    History of DVT (deep vein thrombosis) 2023    Schizoaffective disorder (HCC) 2023    Hyperlipemia 2023      LOS (days): 0  Geometric Mean LOS (GMLOS) (days):   Days to GMLOS:     OBJECTIVE:            Current admission status: Observation   Preferred Pharmacy:   [facility] [place]  [address] [place] [address]  Phone: [phone] Fax: [phone]    Primary Care Provider: Sotero Peterson MD    Primary Insurance: MEDICARE  Secondary Insurance: [facility]    DISCHARGE DETAILS:             CM contacted family/caregiver?: Yes (attempted to call pt's mother for dcp update - busy signal received. left VM to pt's sister - provided dcp update)             Contacts  Patient Contacts: Nitza (sister)  Relationship to Patient:: Family  Contact Method: Phone  Phone Number: [phone]  Reason/Outcome: Emergency Contact, Discharge Planning, Continuity of Care              Other Referral/Resources/Interventions Provided:  Interventions: Transportation  Referral Comments: CM f/u w/ pt's group home nurse Josh (ph:[phone]) re: pt dcp for today. Josh confirmed able to accept pt home today and requested transport for return to home no later than 3pm. Transport referral placed to [facility], confirmed for 1300 via [employer] to home today. All parties notified of same.         Treatment Team Recommendation: Facility Return  Discharge Destination Plan:: Facility  Return  Transport at Discharge : Wheelchair van        Transported by (Company and Unit #): [employer]  ETA of Transport (Date): 01/29/24  ETA of Transport (Time): 1300

## 2024-02-11 ENCOUNTER — APPOINTMENT (EMERGENCY)
Dept: RADIOLOGY | Facility: HOSPITAL | Age: 59
End: 2024-02-11
Payer: MEDICARE

## 2024-02-11 ENCOUNTER — APPOINTMENT (EMERGENCY)
Dept: CT IMAGING | Facility: HOSPITAL | Age: 59
End: 2024-02-11
Payer: MEDICARE

## 2024-02-11 ENCOUNTER — HOSPITAL ENCOUNTER (EMERGENCY)
Facility: HOSPITAL | Age: 59
Discharge: HOME | End: 2024-02-11
Attending: EMERGENCY MEDICINE
Payer: MEDICARE

## 2024-02-11 VITALS
HEART RATE: 57 BPM | DIASTOLIC BLOOD PRESSURE: 90 MMHG | SYSTOLIC BLOOD PRESSURE: 159 MMHG | RESPIRATION RATE: 20 BRPM | TEMPERATURE: 98.2 F | OXYGEN SATURATION: 100 %

## 2024-02-11 DIAGNOSIS — I10 HIGH BLOOD PRESSURE: ICD-10-CM

## 2024-02-11 DIAGNOSIS — R40.4 TRANSIENT ALTERATION OF AWARENESS: Primary | ICD-10-CM

## 2024-02-11 LAB
ALBUMIN SERPL BCP-MCNC: 4.3 G/DL (ref 3.5–5)
ALP SERPL-CCNC: 40 U/L (ref 34–104)
ALT SERPL W P-5'-P-CCNC: 12 U/L (ref 7–52)
ANION GAP SERPL CALCULATED.3IONS-SCNC: 7 MMOL/L
AST SERPL W P-5'-P-CCNC: 21 U/L (ref 13–39)
BASOPHILS # BLD AUTO: 0.01 THOUSANDS/ÂΜL (ref 0–0.1)
BASOPHILS NFR BLD AUTO: 0 % (ref 0–1)
BILIRUB SERPL-MCNC: 0.29 MG/DL (ref 0.2–1)
BILIRUB UR QL STRIP: NEGATIVE
BUN SERPL-MCNC: 13 MG/DL (ref 5–25)
CALCIUM SERPL-MCNC: 9.2 MG/DL (ref 8.4–10.2)
CHLORIDE SERPL-SCNC: 103 MMOL/L (ref 96–108)
CLARITY UR: CLEAR
CO2 SERPL-SCNC: 27 MMOL/L (ref 21–32)
COLOR UR: COLORLESS
CREAT SERPL-MCNC: 0.7 MG/DL (ref 0.6–1.3)
EOSINOPHIL # BLD AUTO: 0.12 THOUSAND/ÂΜL (ref 0–0.61)
EOSINOPHIL NFR BLD AUTO: 3 % (ref 0–6)
ERYTHROCYTE [DISTWIDTH] IN BLOOD BY AUTOMATED COUNT: 12.7 % (ref 11.6–15.1)
GFR SERPL CREATININE-BSD FRML MDRD: 95 ML/MIN/1.73SQ M
GLUCOSE SERPL-MCNC: 117 MG/DL (ref 65–140)
GLUCOSE SERPL-MCNC: 124 MG/DL (ref 65–140)
GLUCOSE UR STRIP-MCNC: NEGATIVE MG/DL
HCT VFR BLD AUTO: 36.7 % (ref 34.8–46.1)
HGB BLD-MCNC: 12.3 G/DL (ref 11.5–15.4)
HGB UR QL STRIP.AUTO: NEGATIVE
IMM GRANULOCYTES # BLD AUTO: 0.01 THOUSAND/UL (ref 0–0.2)
IMM GRANULOCYTES NFR BLD AUTO: 0 % (ref 0–2)
KETONES UR STRIP-MCNC: NEGATIVE MG/DL
LEUKOCYTE ESTERASE UR QL STRIP: NEGATIVE
LYMPHOCYTES # BLD AUTO: 1.72 THOUSANDS/ÂΜL (ref 0.6–4.47)
LYMPHOCYTES NFR BLD AUTO: 37 % (ref 14–44)
MCH RBC QN AUTO: 30.1 PG (ref 26.8–34.3)
MCHC RBC AUTO-ENTMCNC: 33.5 G/DL (ref 31.4–37.4)
MCV RBC AUTO: 90 FL (ref 82–98)
MONOCYTES # BLD AUTO: 0.29 THOUSAND/ÂΜL (ref 0.17–1.22)
MONOCYTES NFR BLD AUTO: 6 % (ref 4–12)
NEUTROPHILS # BLD AUTO: 2.56 THOUSANDS/ÂΜL (ref 1.85–7.62)
NEUTS SEG NFR BLD AUTO: 54 % (ref 43–75)
NITRITE UR QL STRIP: NEGATIVE
NRBC BLD AUTO-RTO: 0 /100 WBCS
PH UR STRIP.AUTO: 6 [PH]
PLATELET # BLD AUTO: 312 THOUSANDS/UL (ref 149–390)
PMV BLD AUTO: 9.1 FL (ref 8.9–12.7)
POTASSIUM SERPL-SCNC: 3.8 MMOL/L (ref 3.5–5.3)
PROT SERPL-MCNC: 7.1 G/DL (ref 6.4–8.4)
PROT UR STRIP-MCNC: NEGATIVE MG/DL
RBC # BLD AUTO: 4.09 MILLION/UL (ref 3.81–5.12)
SODIUM SERPL-SCNC: 137 MMOL/L (ref 135–147)
SP GR UR STRIP.AUTO: 1.01 (ref 1–1.03)
UROBILINOGEN UR STRIP-ACNC: <2 MG/DL
WBC # BLD AUTO: 4.71 THOUSAND/UL (ref 4.31–10.16)

## 2024-02-11 PROCEDURE — 99285 EMERGENCY DEPT VISIT HI MDM: CPT | Performed by: EMERGENCY MEDICINE

## 2024-02-11 PROCEDURE — G1004 CDSM NDSC: HCPCS

## 2024-02-11 PROCEDURE — 82948 REAGENT STRIP/BLOOD GLUCOSE: CPT

## 2024-02-11 PROCEDURE — 80053 COMPREHEN METABOLIC PANEL: CPT | Performed by: EMERGENCY MEDICINE

## 2024-02-11 PROCEDURE — 99285 EMERGENCY DEPT VISIT HI MDM: CPT

## 2024-02-11 PROCEDURE — 70450 CT HEAD/BRAIN W/O DYE: CPT

## 2024-02-11 PROCEDURE — 71045 X-RAY EXAM CHEST 1 VIEW: CPT

## 2024-02-11 PROCEDURE — 85025 COMPLETE CBC W/AUTO DIFF WBC: CPT | Performed by: EMERGENCY MEDICINE

## 2024-02-11 PROCEDURE — 81003 URINALYSIS AUTO W/O SCOPE: CPT | Performed by: EMERGENCY MEDICINE

## 2024-02-11 PROCEDURE — 36415 COLL VENOUS BLD VENIPUNCTURE: CPT | Performed by: EMERGENCY MEDICINE

## 2024-02-11 RX ORDER — LANOLIN ALCOHOL/MO/W.PET/CERES
3 CREAM (GRAM) TOPICAL
Status: DISCONTINUED | OUTPATIENT
Start: 2024-02-11 | End: 2024-02-12 | Stop reason: HOSPADM

## 2024-02-11 RX ORDER — LOSARTAN POTASSIUM 50 MG/1
100 TABLET ORAL ONCE
Status: DISCONTINUED | OUTPATIENT
Start: 2024-02-11 | End: 2024-02-12 | Stop reason: HOSPADM

## 2024-02-11 RX ORDER — CLONAZEPAM 0.5 MG/1
0.5 TABLET ORAL ONCE
Status: DISCONTINUED | OUTPATIENT
Start: 2024-02-11 | End: 2024-02-12 | Stop reason: HOSPADM

## 2024-02-11 RX ORDER — CARBAMAZEPINE 200 MG/1
400 TABLET ORAL ONCE
Status: DISCONTINUED | OUTPATIENT
Start: 2024-02-11 | End: 2024-02-12 | Stop reason: HOSPADM

## 2024-02-12 NOTE — ED ATTENDING ATTESTATION
2/11/2024  I, Angelina Greenwood MD, saw and evaluated the patient. I have discussed the patient with the resident/non-physician practitioner and agree with the resident's/non-physician practitioner's findings, Plan of Care, and MDM as documented in the resident's/non-physician practitioner's note, except where noted. All available labs and Radiology studies were reviewed.  I was present for key portions of any procedure(s) performed by the resident/non-physician practitioner and I was immediately available to provide assistance.       At this point I agree with the current assessment done in the Emergency Department.  I have conducted an independent evaluation of this patient a history and physical is as follows:    59 yo female with h/o TBI since childhood presents from group home 2/2 concerns for elevated BP and slurred speech.  NO h/o facial asymmetry or unilateral weakness.  Per discussion with mom pt can get like this 2/2 psychiatric medications.  On exam pt well appearing, no complaints, at neurobaseline.  Work up and imaging reassuring.  No obvious acute infectious/ischemic process.  BP not markedly elevated.  Per follow up discussion with mom, pt remains at baseline.  Safe for dispo to home.  Initial presentation likely polypharm? Less c/w stroke or PRES.  RTER precautions discussed and documented on discharge paperwork, pt and family endorsed good understanding of reasons to return.         ED Course  ED Course as of 02/11/24 2347   Sun Feb 11, 2024 1944 POC Glucose: 124  wnl   2030 Josh from group home reached out regarding dispo - would like a call back at [phone] if pt is going to be discharged home.    2031 CBC and differential  No significant leukocytosis reassuring diff, normal H/H, normal platelets.      2040 UA w Reflex to Microscopic w Reflex to Culture  wnl   2108 Comprehensive metabolic panel  Reassuring, no end organ damage, no AG, normal bicarb.       2120 CT head without  Chief complaint:   Chief Complaint   Patient presents with   • Follow-up     CAD     Last Office Visit 5/25/2017    Vitals:  Visit Vitals  /80   Pulse 65   Ht 5' 11\" (1.803 m)   Wt 97.5 kg   SpO2 95%   BMI 29.99 kg/m²       HISTORY OF PRESENT ILLNESS     Michelle Pires is a 71 year old male who presents to clinic for a follow up visit. He is s/p CABG (SVG-aorta-PDA, SVG-aorta-NICKI/RCA) on 12/18/2016 with Dr. Lemus. History includes HTN, ALDO - using CPAP nightly, Lyme's disease, history WPW - resolved on own, and bladder cancer.     He presents for routine follow up visit. He had called the office in November with c/o CP and underwent a stress test which was a low risk study.      He has had recent episodes of chest tightness/pain. He has been under stress lately due his son getting  last weekend. When he had the CP in November he was stressed at that time. The symptoms occur even at rest. He has been trying to walk to deal with stress. He also has an alda on his phone to help him calm down. He states he becomes stressed with coming to the doctor's office. He has problems with anxiety.     EKG from office: NSR, incomplete RBBB, T wave abnormality in lateral leads, UNCHANGED.    Nuclear Stress Test 11/2017:  1.  Probably normal myocardial perfusion scans during exercise and at rest. A small inferobasal infarct cannot be excluded.  2.  Normal post-exercise left ventricular systolic function.  3. Cardiac Risk Assessment: Low.     Echo 2/2017:  Abnormal (paradoxical) septal motion consistent with postoperative status.  Normal left ventricular systolic function.  Left ventricular ejection fraction, 64 %.  No pericardial effusion.  Normal right ventricular size and systolic function.    Cardiac Cath 12/13/2016:  1. Anomalous circumflex origin off the proximal RCA with no angiographic stenosis of the circumflex distribution.  2. Severe distal RCA bifurcation stenosis with KAROLINE 3 flow into a large-caliber RPDA  and NICKI distribution. The RCA supplies apical and distal anterior wall flow.  3. Early terminating LAD with no significant angiographic stenosis.   4. Severe proximal first diagonal stenosis with subtotal occlusion. This diagonal shares a common origin with the first septal .      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      • Chronic kidney disease, stage III (moderate) 09/18/2014     Priority: Low   • Tubular adenoma of colon 06/25/2014     Priority: Low   • Allergic rhinitis, cause unspecified 09/27/2013     Priority: Low   • Hypertension      Priority: Low   • Diverticulosis of colon (without mention of hemorrhage) 05/08/2009     Priority: Low   • Gastroesophageal reflux disease 04/24/2009     Priority: Low   • Obstructive sleep apnea 01/01/2002     Priority: Low     CPAP at 14 cm         PAST MEDICAL, FAMILY AND SOCIAL HISTORY     Medications:  Current Outpatient Prescriptions   Medication Sig   • amLODIPine (NORVASC) 10 MG tablet Take 1 tablet by mouth daily.   • carvedilol (COREG) 12.5 MG tablet Take 1 tablet by mouth 2 times daily (with meals).   • eplerenone (INSPRA) 50 MG tablet TAKE 1 TABLET BY MOUTH DAILY   • VITAMIN D, ERGOCALCIFEROL, PO Take 10,000 Units by mouth daily.   • magnesium lactate (MAG-TAB SR) 84 MG (7MEQ) Tab CR Take 84 mg by mouth daily (with breakfast).   • Coenzyme Q10 (CO Q 10 PO)    • D-Ribose (RIBOSE, D,) Powder    • Boswellia Madelin  contrast  IMPRESSION:     No acute intracranial abnormality. If clinical concern for acute ischemia, consider more sensitive MRI brain for better evaluation.                 Workstation performed: [id number]           Specimen Collected: 02/11/24 21:14                 Critical Care Time  Procedures       (BOSWELLIA PO)    • Calcium Ascorbate (BUFFERED C PO)    • Saw Palmetto 1000 MG Cap    • Coconut Oil Oil    • Cod Liver Oil Cap    • olive oil external oil    • NON FORMULARY Indications: Magsoomthium  Transdermal Magnesium   • acetaminophen (TYLENOL) 325 MG tablet Take 2 tablets by mouth every 4 hours as needed for Pain.   • aspirin 81 MG chewable tablet Chew 1 tablet by mouth daily.   • GABAPENTIN PO Take 600 mg by mouth nightly.     No current facility-administered medications for this visit.      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   • Obstructive sleep apnea 2002    CPAP at 14 cm   • Peripheral neuropathy     Small fiber neuropathy   • Primary hyperaldosteronism (CMS/HCC)    • Renal calculus    • S/P CABG x 2 12/18/2016   • Tubular adenoma of colon 06/25/2014   • Tubular adenoma of colon 05/08/2009   • Vitamin D insufficiency 2009       Past Surgical History:   Procedure Laterality Date   • Appendectomy  1980's   • Cabg, vein, two  12/18/2016    Saphenous vein graft from aorta to posterior descending artery, saphenous vein graft from aorta to posterolateral branch of  the right coronary artery, Dr. Anil Lemus   • Carpal tunnel release Right 2007   • Cervical fusion  3/2007    C3,4 and 5   • Colonoscopy w biopsy  05/08/2009    tubular adenoma,f/u 5 yrs, Dr Walls   • Colonoscopy w biopsy  6/25/2014    Dr. Amadou Crews. 1 tubular adenoma, 1 hyperplastic polyp   • Coronary angiogram - cv  12/12/2016    Severe distal RCA bifurcation stenosis, severe proximal first diagonal stenosis with subtotal occlusion, Dr. Flako Vidal   • Esophagogastroduodenoscopy transoral flex w/bx single or mult  04/24/2009    GERD   • Removal of tonsils,<13 y/o  1952   • Remv kidney stone,staghorn     • Shoulder arthroscopy Right 7/2007   • Shoulder arthroscopy w/ labral repair Left 9/26/2014    Dr. Li   • Throat surgery  1999    for ALDO; 3 surgeries: Uvulectomy, septoplasty, oropharyngeal surgery       Family History:  Family History   Problem Relation Age of Onset   • Cancer Father         lung CA   • Heart disease Father         aortic aneurysm   • High blood pressure Father    • Aneurysm Father         heart   • Hypertension Father    • Arthritis Mother         rheumatoid   • High blood pressure Mother    • Hypertension Mother    • Other Brother         primary aldosteronism   • High blood pressure Brother    • Diabetes Brother    • Kidney disease Brother    • Heart disease Brother 69        4 v CABG   • Hypertension Brother    • Other Brother         primary aldosteronism   • High blood pressure Brother    • Hypertension Brother    • High blood pressure Son        Social History:  Social History   Substance Use Topics   • Smoking status: Never Smoker   • Smokeless tobacco: Never Used   • Alcohol use No       REVIEW OF SYSTEMS     Review of Systems   Constitutional: Positive for fatigue (improving).   Respiratory: Positive for chest tightness (occasional at rest). Negative for shortness of breath.    Cardiovascular: Positive for chest pain (occasional at rest). Negative for palpitations and leg  swelling.   Neurological: Negative for dizziness, syncope and light-headedness.   Psychiatric/Behavioral: The patient is nervous/anxious.    All other systems reviewed and are negative.      PHYSICAL EXAM     Physical Exam   Constitutional: He is oriented to person, place, and time. He appears well-developed and well-nourished. No distress.   HENT:   Head: Normocephalic and atraumatic.   Mouth/Throat: No oropharyngeal exudate.   Eyes: Conjunctivae and EOM are normal. No scleral icterus.   Neck: Neck supple. Normal carotid pulses and no JVD present. Carotid bruit is not present.   Cardiovascular: Normal rate, regular rhythm and normal heart sounds.    No murmur heard.  Sternum stable    Pulmonary/Chest: Effort normal and breath sounds normal. He has no wheezes. He has no rales.   Abdominal: Soft. Bowel sounds are normal. He exhibits no distension.   Musculoskeletal: Normal range of motion. He exhibits no edema or tenderness.   Neurological: He is alert and oriented to person, place, and time. He exhibits normal muscle tone. Coordination normal.   Skin: Skin is warm and dry.   Psychiatric: He has a normal mood and affect. His behavior is normal.   Nursing note and vitals reviewed.      ASSESSMENT:     · CAD s/p CABG (SVG-PDA, SVG-NICKI/RCA) 12/18/2016.   · ASCVD risk factors: Hyperlipidemia and HTN, both reasonably controlled.  · Atypical chest pain at rest. Low risk stress test 11/2017.     Recommendations:  Continue current medications.   Advised to walk daily and be active. Advised to continue to walk to help with stress. If CP occurs with exertion or worsens then may need further cardiac work-up.   Follow up in 1 year.     On 07/03/18, Judit RIVERA RN scribed the services personally performed by Flako Vidal MD    The documentation recorded by the scribe accurately and completely reflects the service(s) I personally performed and the decisions made by me.     Flako Vidal MD.

## 2024-02-12 NOTE — ED NOTES
"Group home member at bedside to transport patient back to facility. Per , \"do not give home medication. Patient will receive night time meds when she arrives back to group home\".      Randi Guzman RN  02/11/24 5732    "

## 2024-02-12 NOTE — ED PROVIDER NOTES
History  Chief Complaint   Patient presents with   • Fatigue   • Slurred Speech     Patient comes from group home by EMS. Group home originally called EMS for HTN. When EMS arrived BP was 130/60. Per EMS group home noticed new slurred speech. Unknown Last known well. Patient has hx of TBI when she was 14. GCS 15. Patient has no complaints besides general fatigue.      (Prema Shepard) Prema Shepard is a 58 y.o. female     They presented to the emergency department on February 11, 2024. Patient presents with:  Fatigue  Slurred Speech: Patient comes from group home by EMS. Group home originally called EMS for HTN. When EMS arrived BP was 130/60. Per EMS group home noticed new slurred speech. Unknown Last known well. Patient has hx of TBI when she was 14. GCS 15. Patient has no complaints besides general fatigue.       The patient states that she is not certain why she came to the emergency department.  Per EMS they were called to patient's group home for complaint of hypertension, as well as slurred speech.  Patient notes that she has a history of TBI when she was 14 years old and that is the reason she is in a group home. Patient denies falls, trauma, headache, visual changes, chest pain, difficulty breathing, back pain, abdominal pain, nausea, vomiting, change in bowel habits, change in urination, or any other complaint at this time.            Prior to Admission Medications   Prescriptions Last Dose Informant Patient Reported? Taking?   Cholecalciferol 25 MCG (1000 UT) tablet   Yes No   Sig: Take 1,000 Units by mouth daily   DULoxetine (CYMBALTA) 60 mg delayed release capsule   Yes No   Sig: Take 60 mg by mouth daily   MULTIPLE VITAMIN PO   Yes No   Sig: Take 1 tablet by mouth in the morning   Mirabegron ER (Myrbetriq) 25 MG TB24   Yes No   Sig: Take by mouth   Probiotic Product (PROBIOTIC PO)   Yes No   Sig: Take 250 mg by mouth 2 (two) times a day   acetaminophen (TYLENOL) 325 mg tablet  Outside  Facility (Specify) Yes No   Sig: Take 650 mg by mouth every 4 (four) hours as needed for mild pain   amLODIPine (NORVASC) 10 mg tablet   No No   Sig: Take 1 tablet (10 mg total) by mouth daily   apixaban (ELIQUIS) 5 mg   Yes No   Sig: Take 5 mg by mouth 2 (two) times a day   ascorbic Acid (VITAMIN C) 500 MG CPCR   Yes No   Sig: Take 500 mg by mouth daily   bisacodyl (FLEET) 10 MG/30ML ENEM  Outside Facility (Specify) Yes No   Sig: Insert 10 mg into the rectum once   bismuth subsalicylate (PEPTO BISMOL) 524 mg/30 mL oral suspension  Outside Facility (Specify) Yes No   Sig: Take 30 mL by mouth every 6 (six) hours as needed for indigestion Take after 2nd watery stool in 24 hours   busPIRone (BUSPAR) 15 mg tablet   Yes No   Sig: Take 20 mg by mouth 2 (two) times a day   carBAMazepine (TEGretol XR) 400 mg 12 hr tablet   Yes No   Sig: Take 1,200 mg by mouth daily at bedtime   clonazePAM (KlonoPIN) 0.5 mg tablet   Yes No   Sig: Take 0.5 mg by mouth every evening   fenofibrate (TRICOR) 145 mg tablet   Yes No   Sig: Take 145 mg by mouth daily   gabapentin (NEURONTIN) 300 mg capsule   Yes No   Sig: Take 300 mg by mouth every evening   guaiFENesin (ROBITUSSIN) 100 MG/5ML oral liquid   Yes No   Sig: Take 200 mg by mouth 3 (three) times a day as needed for cough   Patient not taking: Reported on 1/27/2024   ibuprofen (MOTRIN) 600 mg tablet   Yes No   Sig: Take 800 mg by mouth every 6 (six) hours as needed for mild pain   loratadine (CLARITIN) 10 mg tablet   Yes No   Sig: Take 10 mg by mouth daily   losartan (COZAAR) 100 MG tablet   Yes No   Sig: Take 100 mg by mouth every evening   magnesium hydroxide (MILK OF MAGNESIA) 400 mg/5 mL oral suspension  Other (Specify) Yes No   Sig: Take 30 mL by mouth daily as needed for constipation   melatonin 3 mg   Yes No   Sig: Take 1 tablet by mouth daily at bedtime   metoprolol succinate (TOPROL-XL) 25 mg 24 hr tablet   Yes No   Sig: Take 25 mg by mouth daily   nystatin (MYCOSTATIN) powder    Yes No   Sig: Apply 1 application. topically daily at bedtime Under the breasts   omeprazole (PriLOSEC) 40 MG capsule   Yes No   Sig: Take 40 mg by mouth daily   pravastatin (PRAVACHOL) 20 mg tablet   Yes No   Sig: Take 20 mg by mouth every evening   senna-docusate sodium (SENOKOT S) 8.6-50 mg per tablet   Yes No   Sig: Take 1 tablet by mouth daily   ziprasidone (GEODON) 80 mg capsule   Yes No   Sig: Take 80 mg by mouth 2 (two) times a day with meals      Facility-Administered Medications: None       Past Medical History:   Diagnosis Date   • Anxiety and depression    • Chronic deep vein thrombosis (DVT) (HCC)     LLE   • History of traumatic brain injury    • Hyperlipidemia    • Insomnia    • Obesity    • RUPAL (obstructive sleep apnea)    • Schizoaffective disorder (HCC)        History reviewed. No pertinent surgical history.    History reviewed. No pertinent family history.  I have reviewed and agree with the history as documented.    E-Cigarette/Vaping   • E-Cigarette Use Never User      E-Cigarette/Vaping Substances     Social History     Tobacco Use   • Smoking status: Never   • Smokeless tobacco: Never   Vaping Use   • Vaping status: Never Used   Substance Use Topics   • Alcohol use: Not Currently   • Drug use: Never        Review of Systems   Constitutional:  Negative for chills and fever.   HENT:  Negative for ear pain and sore throat.    Eyes:  Negative for pain and visual disturbance.   Respiratory:  Negative for cough and shortness of breath.    Cardiovascular:  Negative for chest pain and palpitations.   Gastrointestinal:  Negative for abdominal pain and vomiting.   Genitourinary:  Negative for dysuria and hematuria.   Musculoskeletal:  Negative for arthralgias and back pain.   Skin:  Negative for color change and rash.   Neurological:  Positive for speech difficulty. Negative for seizures and syncope.   All other systems reviewed and are negative.      Physical Exam  ED Triage Vitals [02/11/24 1912]    Temperature Pulse Respirations Blood Pressure SpO2   98.2 °F (36.8 °C) 58 20 161/76 96 %      Temp Source Heart Rate Source Patient Position - Orthostatic VS BP Location FiO2 (%)   Oral Monitor Sitting Left arm --      Pain Score       --             Orthostatic Vital Signs  Vitals:    02/11/24 1912 02/11/24 1915 02/11/24 2000 02/11/24 2153   BP: 161/76 161/76 144/67 159/90   Pulse: 58 59 55 57   Patient Position - Orthostatic VS: Sitting  Sitting Sitting       Physical Exam  Vitals and nursing note reviewed.   Constitutional:       General: She is not in acute distress.     Comments: Slow speech, however patient notes that this is her normal   HENT:      Head: Normocephalic and atraumatic.      Right Ear: External ear normal.      Left Ear: External ear normal.      Nose: Nose normal.      Mouth/Throat:      Mouth: Mucous membranes are moist.   Eyes:      Conjunctiva/sclera: Conjunctivae normal.   Cardiovascular:      Rate and Rhythm: Normal rate and regular rhythm.   Pulmonary:      Effort: Pulmonary effort is normal. No respiratory distress.      Breath sounds: Normal breath sounds.   Abdominal:      General: Abdomen is flat. Bowel sounds are normal.      Tenderness: There is no abdominal tenderness. There is no guarding or rebound.   Musculoskeletal:         General: Normal range of motion.      Cervical back: Normal range of motion.   Skin:     General: Skin is warm and dry.      Capillary Refill: Capillary refill takes less than 2 seconds.   Neurological:      Mental Status: She is alert. Mental status is at baseline.      Cranial Nerves: No cranial nerve deficit.      Sensory: No sensory deficit.      Motor: No weakness.   Psychiatric:         Mood and Affect: Mood normal.       ED Medications  Medications   melatonin tablet 3 mg (3 mg Oral Not Given 2/11/24 2234)   carBAMazepine (TEGretol) tablet 400 mg (400 mg Oral Not Given 2/11/24 2234)   clonazePAM (KlonoPIN) tablet 0.5 mg (0.5 mg Oral Not Given  2/11/24 2234)   losartan (COZAAR) tablet 100 mg (100 mg Oral Not Given 2/11/24 2235)       Diagnostic Studies  Results Reviewed       Procedure Component Value Units Date/Time    Comprehensive metabolic panel [[id number]] Collected: 02/11/24 1958    Lab Status: Final result Specimen: Blood from Arm, Left Updated: 02/11/24 2054     Sodium 137 mmol/L      Potassium 3.8 mmol/L      Chloride 103 mmol/L      CO2 27 mmol/L      ANION GAP 7 mmol/L      BUN 13 mg/dL      Creatinine 0.70 mg/dL      Glucose 117 mg/dL      Calcium 9.2 mg/dL      AST 21 U/L      ALT 12 U/L      Alkaline Phosphatase 40 U/L      Total Protein 7.1 g/dL      Albumin 4.3 g/dL      Total Bilirubin 0.29 mg/dL      eGFR 95 ml/min/1.73sq m     Narrative:      National Kid[employer] guidelines for Chronic Kidney Disease (CKD):   •  Stage 1 with normal or high GFR (GFR > 90 mL/min/1.73 square meters)  •  Stage 2 Mild CKD (GFR = 60-89 mL/min/1.73 square meters)  •  Stage 3A Moderate CKD (GFR = 45-59 mL/min/1.73 square meters)  •  Stage 3B Moderate CKD (GFR = 30-44 mL/min/1.73 square meters)  •  Stage 4 Severe CKD (GFR = 15-29 mL/min/1.73 square meters)  •  Stage 5 End Stage CKD (GFR <15 mL/min/1.73 square meters)  Note: GFR calculation is accurate only with a steady state creatinine    UA w Reflex to Microscopic w Reflex to Culture [[id number]] Collected: 02/11/24 1958    Lab Status: Final result Specimen: Urine, Other Updated: 02/11/24 2034     Color, UA Colorless     Clarity, UA Clear     Specific Gravity, UA 1.006     pH, UA 6.0     Leukocytes, UA Negative     Nitrite, UA Negative     Protein, UA Negative mg/dl      Glucose, UA Negative mg/dl      Ketones, UA Negative mg/dl      Urobilinogen, UA <2.0 mg/dl      Bilirubin, UA Negative     Occult Blood, UA Negative    CBC and differential [[id number]] Collected: 02/11/24 1958    Lab Status: Final result Specimen: Blood from Arm, Left Updated: 02/11/24 2029     WBC 4.71 Thousand/uL      RBC 4.09  Million/uL      Hemoglobin 12.3 g/dL      Hematocrit 36.7 %      MCV 90 fL      MCH 30.1 pg      MCHC 33.5 g/dL      RDW 12.7 %      MPV 9.1 fL      Platelets 312 Thousands/uL      nRBC 0 /100 WBCs      Neutrophils Relative 54 %      Immat GRANS % 0 %      Lymphocytes Relative 37 %      Monocytes Relative 6 %      Eosinophils Relative 3 %      Basophils Relative 0 %      Neutrophils Absolute 2.56 Thousands/µL      Immature Grans Absolute 0.01 Thousand/uL      Lymphocytes Absolute 1.72 Thousands/µL      Monocytes Absolute 0.29 Thousand/µL      Eosinophils Absolute 0.12 Thousand/µL      Basophils Absolute 0.01 Thousands/µL     Fingerstick Glucose (POCT) [[id number]]  (Normal) Collected: 02/11/24 1939    Lab Status: Final result Updated: 02/11/24 1939     POC Glucose 124 mg/dl                    XR chest 1 view portable   ED Interpretation by Alber Hines MD (02/11 2036)   Cardiomegaly, with no acute cardio-pulmonary disease.  Independently interpreted by myself.        CT head without contrast   Final Result by Andrea Vazquez MD (02/11 2118)      No acute intracranial abnormality. If clinical concern for acute ischemia, consider more sensitive MRI brain for better evaluation.                  Workstation performed: [id number]               Procedures  Procedures      ED Course  ED Course as of 02/11/24 2322   Sun Feb 11, 2024 1956 Discussed with mother on phone, nurse patient has similar episodes of appearing tired and questionable slurred speech when given psychiatric medication.  Mother expresses concern that patient group home does not provide psychiatric evaluation, and has been on these medications for a long time.  Mother states however that patient does seem to be drying out her words more, and is not 100% her baseline.  Will obtain lab work, cxr as well as CT of the head.   2159 Discussed with mother concerning patient's evaluation, notes that patient sounds much improved, and is acting normally,  expressing no concern.  Will discharge patient back to group home.   2200 Pending transport, will order patient's nighttime medications.                             SBIRT 20yo+      Flowsheet Row Most Recent Value   Initial Alcohol Screen: US AUDIT-C     1. How often do you have a drink containing alcohol? 0 Filed at: 02/11/2024 1912   2. How many drinks containing alcohol do you have on a typical day you are drinking?  0 Filed at: 02/11/2024 1912   3a. Male UNDER 65: How often do you have five or more drinks on one occasion? 0 Filed at: 02/11/2024 1912   3b. FEMALE Any Age, or MALE 65+: How often do you have 4 or more drinks on one occassion? 0 Filed at: 02/11/2024 1912   Audit-C Score 0 Filed at: 02/11/2024 1912   RAHEL: How many times in the past year have you...    Used an illegal drug or used a prescription medication for non-medical reasons? Never Filed at: 02/11/2024 1912                  Medical Decision Making  58-year-old female with past medical history of TBI presenting from group home for concern for slurred speech as well as elevated blood pressure.  Noted to have systolic blood pressure up to 160s, no current complaints.  Discussed with mother via telephone concerning patient's initial concerns or group home will proceed with workup at this time as differential diagnosis includes but is not limited to electrolyte abnormality, CVA, UTI, hypoglycemia.  Laboratory analysis without pertinent findings.  CT of the head showed no intracranial abnormalities.  Chest x-ray notable for cardiomegaly without other acute cardiopulmonary disease.    Discussed these results with patient's mother via telephone, states that she talked with her daughter and she believes that she is acting her baseline, notes that this has occurred in the past with administration of patient's psychiatric medications.    Patient appears well, nontoxic, agrees with plan of care at this time.  Answered all questions.  In light of this,  patient would benefit from outpatient follow-up.    Amount and/or Complexity of Data Reviewed  Labs: ordered.  Radiology: ordered and independent interpretation performed.    Risk  OTC drugs.  Prescription drug management.        Disposition  Final diagnoses:   High blood pressure   Transient alteration of awareness     Time reflects when diagnosis was documented in both MDM as applicable and the Disposition within this note       Time User Action Codes Description Comment    2/11/2024  9:58 PM de Alber Leiva Add [I10] High blood pressure     2/11/2024  9:59 PM de Alber Leiva M Add [R40.4] Transient alteration of awareness     2/11/2024  9:59 PM de Alber Leiva M Modify [I10] High blood pressure     2/11/2024  9:59 PM de Alber Leiva M Modify [R40.4] Transient alteration of awareness           ED Disposition       ED Disposition   Discharge    Condition   Stable    Date/Time   Sun Feb 11, 2024 2159    Comment   Prema Shepard discharge to home/self care.                   Follow-up Information       Follow up With Specialties Details Why Contact Info    Sotero Peterson MD    [address] [address]  [phone]              Discharge Medication List as of 2/11/2024  9:59 PM        CONTINUE these medications which have NOT CHANGED    Details   acetaminophen (TYLENOL) 325 mg tablet Take 650 mg by mouth every 4 (four) hours as needed for mild pain, Historical Med      amLODIPine (NORVASC) 10 mg tablet Take 1 tablet (10 mg total) by mouth daily, Starting Mon 1/29/2024, Until Wed 2/28/2024, Normal      apixaban (ELIQUIS) 5 mg Take 5 mg by mouth 2 (two) times a day, Historical Med      ascorbic Acid (VITAMIN C) 500 MG CPCR Take 500 mg by mouth daily, Historical Med      bisacodyl (FLEET) 10 MG/30ML ENEM Insert 10 mg into the rectum once, Historical Med      bismuth subsalicylate (PEPTO BISMOL) 524 mg/30 mL oral suspension Take 30 mL by mouth every 6 (six) hours as needed for  indigestion Take after 2nd watery stool in 24 hours, Historical Med      busPIRone (BUSPAR) 15 mg tablet Take 20 mg by mouth 2 (two) times a day, Historical Med      carBAMazepine (TEGretol XR) 400 mg 12 hr tablet Take 1,200 mg by mouth daily at bedtime, Historical Med      Cholecalciferol 25 MCG (1000 UT) tablet Take 1,000 Units by mouth daily, Historical Med      clonazePAM (KlonoPIN) 0.5 mg tablet Take 0.5 mg by mouth every evening, Historical Med      DULoxetine (CYMBALTA) 60 mg delayed release capsule Take 60 mg by mouth daily, Historical Med      fenofibrate (TRICOR) 145 mg tablet Take 145 mg by mouth daily, Historical Med      gabapentin (NEURONTIN) 300 mg capsule Take 300 mg by mouth every evening, Historical Med      guaiFENesin (ROBITUSSIN) 100 MG/5ML oral liquid Take 200 mg by mouth 3 (three) times a day as needed for cough, Historical Med      ibuprofen (MOTRIN) 600 mg tablet Take 800 mg by mouth every 6 (six) hours as needed for mild pain, Historical Med      loratadine (CLARITIN) 10 mg tablet Take 10 mg by mouth daily, Historical Med      losartan (COZAAR) 100 MG tablet Take 100 mg by mouth every evening, Historical Med      magnesium hydroxide (MILK OF MAGNESIA) 400 mg/5 mL oral suspension Take 30 mL by mouth daily as needed for constipation, Historical Med      melatonin 3 mg Take 1 tablet by mouth daily at bedtime, Historical Med      metoprolol succinate (TOPROL-XL) 25 mg 24 hr tablet Take 25 mg by mouth daily, Historical Med      Mirabegron ER (Myrbetriq) 25 MG TB24 Take by mouth, Historical Med      MULTIPLE VITAMIN PO Take 1 tablet by mouth in the morning, Historical Med      nystatin (MYCOSTATIN) powder Apply 1 application. topically daily at bedtime Under the breasts, Historical Med      omeprazole (PriLOSEC) 40 MG capsule Take 40 mg by mouth daily, Historical Med      pravastatin (PRAVACHOL) 20 mg tablet Take 20 mg by mouth every evening, Historical Med      Probiotic Product (PROBIOTIC PO)  Take 250 mg by mouth 2 (two) times a day, Historical Med      senna-docusate sodium (SENOKOT S) 8.6-50 mg per tablet Take 1 tablet by mouth daily, Historical Med      ziprasidone (GEODON) 80 mg capsule Take 80 mg by mouth 2 (two) times a day with meals, Historical Med           No discharge procedures on file.    PDMP Review       None             ED Provider  Attending physically available and evaluated Prema Shepard. I managed the patient along with the ED Attending.    Electronically Signed by           Alber Hines MD  02/11/24 6129

## 2024-02-14 LAB — GLUCOSE SERPL-MCNC: 138 MG/DL (ref 65–140)

## 2024-02-15 NOTE — ED NOTES
Received a call from patients facility asking when she was allergic to amoxicillin, which they noticed on the discharge paperwork. She was informed that the allergy was added in 4/2023 and the reaction was hives. She verbalized understanding.      Cheryl Ponce RN  02/15/24 6968

## 2024-02-16 ENCOUNTER — HOSPITAL ENCOUNTER (EMERGENCY)
Facility: HOSPITAL | Age: 59
Discharge: HOME | End: 2024-02-17
Attending: EMERGENCY MEDICINE
Payer: MEDICARE

## 2024-02-16 ENCOUNTER — APPOINTMENT (EMERGENCY)
Dept: CT IMAGING | Facility: HOSPITAL | Age: 59
End: 2024-02-16
Payer: MEDICARE

## 2024-02-16 ENCOUNTER — APPOINTMENT (EMERGENCY)
Dept: RADIOLOGY | Facility: HOSPITAL | Age: 59
End: 2024-02-16
Payer: MEDICARE

## 2024-02-16 VITALS
RESPIRATION RATE: 14 BRPM | DIASTOLIC BLOOD PRESSURE: 79 MMHG | OXYGEN SATURATION: 100 % | HEART RATE: 55 BPM | SYSTOLIC BLOOD PRESSURE: 139 MMHG | TEMPERATURE: 98.4 F

## 2024-02-16 DIAGNOSIS — R51.9 HEADACHE: Primary | ICD-10-CM

## 2024-02-16 DIAGNOSIS — I10 HYPERTENSION: ICD-10-CM

## 2024-02-16 PROCEDURE — 71045 X-RAY EXAM CHEST 1 VIEW: CPT

## 2024-02-16 PROCEDURE — 99284 EMERGENCY DEPT VISIT MOD MDM: CPT

## 2024-02-16 PROCEDURE — 99285 EMERGENCY DEPT VISIT HI MDM: CPT | Performed by: EMERGENCY MEDICINE

## 2024-02-16 PROCEDURE — 70450 CT HEAD/BRAIN W/O DYE: CPT

## 2024-02-16 PROCEDURE — G1004 CDSM NDSC: HCPCS

## 2024-02-16 RX ORDER — ACETAMINOPHEN 325 MG/1
975 TABLET ORAL ONCE
Status: DISCONTINUED | OUTPATIENT
Start: 2024-02-17 | End: 2024-02-17 | Stop reason: HOSPADM

## 2024-02-17 LAB
ALBUMIN SERPL BCP-MCNC: 4.4 G/DL (ref 3.5–5)
ALP SERPL-CCNC: 41 U/L (ref 34–104)
ALT SERPL W P-5'-P-CCNC: 14 U/L (ref 7–52)
ANION GAP SERPL CALCULATED.3IONS-SCNC: 7 MMOL/L
AST SERPL W P-5'-P-CCNC: 22 U/L (ref 13–39)
BASOPHILS # BLD AUTO: 0.01 THOUSANDS/ÂΜL (ref 0–0.1)
BASOPHILS NFR BLD AUTO: 0 % (ref 0–1)
BILIRUB SERPL-MCNC: 0.42 MG/DL (ref 0.2–1)
BUN SERPL-MCNC: 9 MG/DL (ref 5–25)
CALCIUM SERPL-MCNC: 9.3 MG/DL (ref 8.4–10.2)
CHLORIDE SERPL-SCNC: 102 MMOL/L (ref 96–108)
CO2 SERPL-SCNC: 27 MMOL/L (ref 21–32)
CREAT SERPL-MCNC: 0.59 MG/DL (ref 0.6–1.3)
EOSINOPHIL # BLD AUTO: 0.16 THOUSAND/ÂΜL (ref 0–0.61)
EOSINOPHIL NFR BLD AUTO: 3 % (ref 0–6)
ERYTHROCYTE [DISTWIDTH] IN BLOOD BY AUTOMATED COUNT: 12.7 % (ref 11.6–15.1)
GFR SERPL CREATININE-BSD FRML MDRD: 101 ML/MIN/1.73SQ M
GLUCOSE SERPL-MCNC: 98 MG/DL (ref 65–140)
HCT VFR BLD AUTO: 35.8 % (ref 34.8–46.1)
HGB BLD-MCNC: 12 G/DL (ref 11.5–15.4)
IMM GRANULOCYTES # BLD AUTO: 0.01 THOUSAND/UL (ref 0–0.2)
IMM GRANULOCYTES NFR BLD AUTO: 0 % (ref 0–2)
LYMPHOCYTES # BLD AUTO: 1.89 THOUSANDS/ÂΜL (ref 0.6–4.47)
LYMPHOCYTES NFR BLD AUTO: 34 % (ref 14–44)
MCH RBC QN AUTO: 29.8 PG (ref 26.8–34.3)
MCHC RBC AUTO-ENTMCNC: 33.5 G/DL (ref 31.4–37.4)
MCV RBC AUTO: 89 FL (ref 82–98)
MONOCYTES # BLD AUTO: 0.34 THOUSAND/ÂΜL (ref 0.17–1.22)
MONOCYTES NFR BLD AUTO: 6 % (ref 4–12)
NEUTROPHILS # BLD AUTO: 3.14 THOUSANDS/ÂΜL (ref 1.85–7.62)
NEUTS SEG NFR BLD AUTO: 57 % (ref 43–75)
NRBC BLD AUTO-RTO: 0 /100 WBCS
PLATELET # BLD AUTO: 240 THOUSANDS/UL (ref 149–390)
PMV BLD AUTO: 8.8 FL (ref 8.9–12.7)
POTASSIUM SERPL-SCNC: 3.9 MMOL/L (ref 3.5–5.3)
PROT SERPL-MCNC: 7.4 G/DL (ref 6.4–8.4)
RBC # BLD AUTO: 4.03 MILLION/UL (ref 3.81–5.12)
SODIUM SERPL-SCNC: 136 MMOL/L (ref 135–147)
WBC # BLD AUTO: 5.55 THOUSAND/UL (ref 4.31–10.16)

## 2024-02-17 PROCEDURE — 80053 COMPREHEN METABOLIC PANEL: CPT

## 2024-02-17 PROCEDURE — 93005 ELECTROCARDIOGRAM TRACING: CPT

## 2024-02-17 PROCEDURE — 85025 COMPLETE CBC W/AUTO DIFF WBC: CPT

## 2024-02-17 PROCEDURE — 36415 COLL VENOUS BLD VENIPUNCTURE: CPT

## 2024-02-17 NOTE — ED ATTENDING ATTESTATION
2/16/2024  I, Seferino Justice MD, saw and evaluated the patient. I have discussed the patient with the resident/non-physician practitioner and agree with the resident's/non-physician practitioner's findings, Plan of Care, and MDM as documented in the resident's/non-physician practitioner's note, except where noted. All available labs and Radiology studies were reviewed.  I was present for key portions of any procedure(s) performed by the resident/non-physician practitioner and I was immediately available to provide assistance.       At this point I agree with the current assessment done in the Emergency Department.  I have conducted an independent evaluation of this patient a history and physical is as follows: Patient is a 58 year old female who was sent from [facility] for elevated blood pressure. (+) headache. States she fell 2 weeks ago. Unsure if she hit her head or LOC. Patient is on eliquis. Patient has h/o TBI and cannot provide accurate hx. No chest pain or sob. No N/V. Was last seen in this ED on 2/11/24 for transient alteration of awareness. [facility] website checked on this patient and last Rx filled was on 1/22/24 for clonazepam for 30 day supply. Normocephalic. PERRL. Moist mucous membranes. Neck supple. Lungs clear. Bradycardia without murmur. Abdomen soft and nontender. Good bowel sounds. No edema. No rash noted. Differential diagnosis including but not limited to: hypertension, hypertensive urgency, hypertensive crisis, malignant hypertension, end organ damage, renal failure, metabolic abnormality, intracranial process; doubt ACS, MI; doubt pheochromocytoma. Will check EKG, CXR, labs and CT head.     ED Course         Critical Care Time  Procedures

## 2024-02-17 NOTE — ED PROVIDER NOTES
"History  Chief Complaint   Patient presents with    Hypertension     BIBA. Patient from group home w/ hx TBI. Patient  BP at group  home 200/90 and  187/81. [facility] sent her in for eval. Patient c/o OG that has been going on \"for a while\". Per staff, speech is slurred. Per family, patient  speech becomes slurred after PM meds. Aox4, GCS 15. - CP, - SOB.      58-year-old female with past medical history of TBI presents today from her group home for evaluation of hypertension.  Patient reports checking her blood pressure at home and found it to be elevated up to 200 systolic.  She was recently seen in our emergency department for similar concerns.  Currently in the ED, she has a slightly elevated blood pressure today 139/79.  She also complains of a tightening frontal headache, but is unable to determine how long this has been going on for.  Denies any numbness/weakness/vomiting.  Triage note mentions concern of slurred speech.  Patient reports this is her normal speech and denies any dysarthria or aphasia.  Patient reports feeling well aside from a mild headache that has been ongoing.  Denies other concerns.       Hypertension  Associated symptoms: headaches    Associated symptoms: no abdominal pain, no chest pain, no ear pain, no fever, no hematuria, no palpitations, no shortness of breath and not vomiting        Prior to Admission Medications   Prescriptions Last Dose Informant Patient Reported? Taking?   Cholecalciferol 25 MCG (1000 UT) tablet   Yes No   Sig: Take 1,000 Units by mouth daily   DULoxetine (CYMBALTA) 60 mg delayed release capsule   Yes No   Sig: Take 60 mg by mouth daily   MULTIPLE VITAMIN PO   Yes No   Sig: Take 1 tablet by mouth in the morning   Mirabegron ER (Myrbetriq) 25 MG TB24   Yes No   Sig: Take by mouth   Probiotic Product (PROBIOTIC PO)   Yes No   Sig: Take 250 mg by mouth 2 (two) times a day   acetaminophen (TYLENOL) 325 mg tablet  Outside Facility (Specify) Yes No   Sig: Take 650 mg " by mouth every 4 (four) hours as needed for mild pain   amLODIPine (NORVASC) 10 mg tablet   No No   Sig: Take 1 tablet (10 mg total) by mouth daily   apixaban (ELIQUIS) 5 mg   Yes No   Sig: Take 5 mg by mouth 2 (two) times a day   ascorbic Acid (VITAMIN C) 500 MG CPCR   Yes No   Sig: Take 500 mg by mouth daily   bisacodyl (FLEET) 10 MG/30ML ENEM  Outside Facility (Specify) Yes No   Sig: Insert 10 mg into the rectum once   bismuth subsalicylate (PEPTO BISMOL) 524 mg/30 mL oral suspension  Outside Facility (Specify) Yes No   Sig: Take 30 mL by mouth every 6 (six) hours as needed for indigestion Take after 2nd watery stool in 24 hours   busPIRone (BUSPAR) 15 mg tablet   Yes No   Sig: Take 20 mg by mouth 2 (two) times a day   carBAMazepine (TEGretol XR) 400 mg 12 hr tablet   Yes No   Sig: Take 1,200 mg by mouth daily at bedtime   clonazePAM (KlonoPIN) 0.5 mg tablet   Yes No   Sig: Take 0.5 mg by mouth every evening   fenofibrate (TRICOR) 145 mg tablet   Yes No   Sig: Take 145 mg by mouth daily   gabapentin (NEURONTIN) 300 mg capsule   Yes No   Sig: Take 300 mg by mouth every evening   guaiFENesin (ROBITUSSIN) 100 MG/5ML oral liquid   Yes No   Sig: Take 200 mg by mouth 3 (three) times a day as needed for cough   Patient not taking: Reported on 1/27/2024   ibuprofen (MOTRIN) 600 mg tablet   Yes No   Sig: Take 800 mg by mouth every 6 (six) hours as needed for mild pain   loratadine (CLARITIN) 10 mg tablet   Yes No   Sig: Take 10 mg by mouth daily   losartan (COZAAR) 100 MG tablet   Yes No   Sig: Take 100 mg by mouth every evening   magnesium hydroxide (MILK OF MAGNESIA) 400 mg/5 mL oral suspension  Other (Specify) Yes No   Sig: Take 30 mL by mouth daily as needed for constipation   melatonin 3 mg   Yes No   Sig: Take 1 tablet by mouth daily at bedtime   metoprolol succinate (TOPROL-XL) 25 mg 24 hr tablet   Yes No   Sig: Take 25 mg by mouth daily   nystatin (MYCOSTATIN) powder   Yes No   Sig: Apply 1 application. topically  daily at bedtime Under the breasts   omeprazole (PriLOSEC) 40 MG capsule   Yes No   Sig: Take 40 mg by mouth daily   pravastatin (PRAVACHOL) 20 mg tablet   Yes No   Sig: Take 20 mg by mouth every evening   senna-docusate sodium (SENOKOT S) 8.6-50 mg per tablet   Yes No   Sig: Take 1 tablet by mouth daily   ziprasidone (GEODON) 80 mg capsule   Yes No   Sig: Take 80 mg by mouth 2 (two) times a day with meals      Facility-Administered Medications: None       Past Medical History:   Diagnosis Date    Anxiety and depression     Chronic deep vein thrombosis (DVT) (HCC)     LLE    History of traumatic brain injury     Hyperlipidemia     Insomnia     Obesity     RUPAL (obstructive sleep apnea)     Schizoaffective disorder (HCC)        History reviewed. No pertinent surgical history.    History reviewed. No pertinent family history.  I have reviewed and agree with the history as documented.    E-Cigarette/Vaping    E-Cigarette Use Never User      E-Cigarette/Vaping Substances     Social History     Tobacco Use    Smoking status: Never    Smokeless tobacco: Never   Vaping Use    Vaping status: Never Used   Substance Use Topics    Alcohol use: Not Currently    Drug use: Never        Review of Systems   Constitutional:  Negative for chills and fever.   HENT:  Negative for ear pain and sore throat.    Eyes:  Negative for pain and visual disturbance.   Respiratory:  Negative for cough and shortness of breath.    Cardiovascular:  Negative for chest pain and palpitations.   Gastrointestinal:  Negative for abdominal pain and vomiting.   Genitourinary:  Negative for dysuria and hematuria.   Musculoskeletal:  Negative for arthralgias and back pain.   Skin:  Negative for color change and rash.   Neurological:  Positive for headaches. Negative for seizures and syncope.   All other systems reviewed and are negative.      Physical Exam  ED Triage Vitals [02/16/24 2213]   Temperature Pulse Respirations Blood Pressure SpO2   98.4 °F (36.9  °C) 55 14 139/79 100 %      Temp Source Heart Rate Source Patient Position - Orthostatic VS BP Location FiO2 (%)   Oral Monitor Sitting Right arm --      Pain Score       --             Orthostatic Vital Signs  Vitals:    02/16/24 2213   BP: 139/79   Pulse: 55   Patient Position - Orthostatic VS: Sitting       Physical Exam  Vitals and nursing note reviewed.   Constitutional:       General: She is not in acute distress (resting comfortably in bed).     Appearance: Normal appearance. She is well-developed. She is obese.   HENT:      Head: Normocephalic and atraumatic.      Mouth/Throat:      Mouth: Mucous membranes are moist.   Eyes:      Extraocular Movements: Extraocular movements intact.      Conjunctiva/sclera: Conjunctivae normal.      Pupils: Pupils are equal, round, and reactive to light.   Cardiovascular:      Rate and Rhythm: Normal rate and regular rhythm.      Heart sounds: No murmur heard.  Pulmonary:      Effort: Pulmonary effort is normal. No respiratory distress.      Breath sounds: Normal breath sounds.   Abdominal:      General: Abdomen is flat.      Palpations: Abdomen is soft.      Tenderness: There is no abdominal tenderness.   Musculoskeletal:         General: No swelling.      Cervical back: Neck supple.   Skin:     General: Skin is warm and dry.      Capillary Refill: Capillary refill takes less than 2 seconds.   Neurological:      General: No focal deficit present.      Mental Status: She is alert.      GCS: GCS eye subscore is 4. GCS verbal subscore is 5. GCS motor subscore is 6.      Cranial Nerves: Cranial nerves 2-12 are intact.      Sensory: Sensation is intact.      Motor: Motor function is intact.      Coordination: Coordination is intact.      Comments: Equal strength in distal extremities- patient removed her nightguard and there was no evidence of dysarthria or aphasia          ED Medications  Medications - No data to display    Diagnostic Studies  Results Reviewed       Procedure  Component Value Units Date/Time    Comprehensive metabolic panel [[id number]]  (Abnormal) Collected: 02/17/24 0007    Lab Status: Final result Specimen: Blood from Arm, Right Updated: 02/17/24 0035     Sodium 136 mmol/L      Potassium 3.9 mmol/L      Chloride 102 mmol/L      CO2 27 mmol/L      ANION GAP 7 mmol/L      BUN 9 mg/dL      Creatinine 0.59 mg/dL      Glucose 98 mg/dL      Calcium 9.3 mg/dL      AST 22 U/L      ALT 14 U/L      Alkaline Phosphatase 41 U/L      Total Protein 7.4 g/dL      Albumin 4.4 g/dL      Total Bilirubin 0.42 mg/dL      eGFR 101 ml/min/1.73sq m     Narrative:      National Kid[employer] Foundation guidelines for Chronic Kidney Disease (CKD):     Stage 1 with normal or high GFR (GFR > 90 mL/min/1.73 square meters)    Stage 2 Mild CKD (GFR = 60-89 mL/min/1.73 square meters)    Stage 3A Moderate CKD (GFR = 45-59 mL/min/1.73 square meters)    Stage 3B Moderate CKD (GFR = 30-44 mL/min/1.73 square meters)    Stage 4 Severe CKD (GFR = 15-29 mL/min/1.73 square meters)    Stage 5 End Stage CKD (GFR <15 mL/min/1.73 square meters)  Note: GFR calculation is accurate only with a steady state creatinine    CBC and differential [[id number]]  (Abnormal) Collected: 02/17/24 0007    Lab Status: Final result Specimen: Blood from Arm, Right Updated: 02/17/24 0015     WBC 5.55 Thousand/uL      RBC 4.03 Million/uL      Hemoglobin 12.0 g/dL      Hematocrit 35.8 %      MCV 89 fL      MCH 29.8 pg      MCHC 33.5 g/dL      RDW 12.7 %      MPV 8.8 fL      Platelets 240 Thousands/uL      nRBC 0 /100 WBCs      Neutrophils Relative 57 %      Immat GRANS % 0 %      Lymphocytes Relative 34 %      Monocytes Relative 6 %      Eosinophils Relative 3 %      Basophils Relative 0 %      Neutrophils Absolute 3.14 Thousands/µL      Immature Grans Absolute 0.01 Thousand/uL      Lymphocytes Absolute 1.89 Thousands/µL      Monocytes Absolute 0.34 Thousand/µL      Eosinophils Absolute 0.16 Thousand/µL      Basophils Absolute 0.01  Thousands/µL                    XR chest 1 view portable   ED Interpretation by Stephy Jacobs MD (02/16 8294)   No acute disease      CT head without contrast   Final Result by Daren Braga MD (02/16 2482)      No acute intracranial abnormality.      Mild chronic small vessel ischemic changes.            Workstation performed: [id number]               Procedures  ECG 12 Lead Documentation Only    Date/Time: 2/17/2024 12:03 AM    Performed by: Stephy Jacobs MD  Authorized by: Stephy Jacobs MD    Indications / Diagnosis:  HTN  ECG reviewed by me, the ED Provider: yes    Patient location:  ED  Previous ECG:     Previous ECG:  Compared to current  Interpretation:     Interpretation: normal    Rate:     ECG rate:  50    ECG rate assessment: bradycardic    Rhythm:     Rhythm: sinus bradycardia    Ectopy:     Ectopy: none    QRS:     QRS axis:  Normal    QRS intervals:  Normal  Conduction:     Conduction: normal    ST segments:     ST segments:  Normal  T waves:     T waves: inverted      Inverted:  III        ED Course                                       Medical Decision Making  58-year-old female with past medical history of TBI presents today from her group home for evaluation of hypertension.  Differential diagnosis including but not limited to: hypertension, hypertensive urgency, hypertensive crisis, end organ damage, renal dysfunction, metabolic abnormality, tension headache, migraine.  Patient normotensive while in the ED.  No evidence of end organ damage of labs.  CT head unremarkable without signs of intracranial hemorrhage or acute abnormalities.  Given that patient has been seen multiple times for similar concerns, recommended follow up with PCP for recheck.  Reviewed return precautions.  Stable for discharge.      Amount and/or Complexity of Data Reviewed  Labs: ordered.  Radiology: ordered and independent interpretation performed.          Disposition  Final diagnoses:   Headache   Hypertension  - resolved     Time reflects when diagnosis was documented in both MDM as applicable and the Disposition within this note       Time User Action Codes Description Comment    2/17/2024 12:40 AM Stephy Jacobs Add [R51.9] Headache     2/17/2024 12:41 AM Stephy Jacobs Add [I10] Encounter for follow-up for hypertension     2/17/2024 12:41 AM Stephy Jacobs Remove [I10] Encounter for follow-up for hypertension     2/17/2024 12:42 AM Stephy Jacobs Add [I10] Hypertension     2/17/2024 12:42 AM Stephy Jacobs Modify [I10] Hypertension resolved          ED Disposition       ED Disposition   Discharge    Condition   Stable    Date/Time   Sat Feb 17, 2024 12:40 AM    Comment   Prema Shepard discharge to home/self care.                   Follow-up Information       Follow up With Specialties Details Why Contact Info Additional Information    Sotero Peterson MD  Schedule an appointment as soon as possible for a visit   [address] [facility] [address]  [facility] [address]  [phone]       [facility] Emergency Medicine  As needed, If symptoms worsen [address] [place] [id number]  [phone] [facility], [address] [place] [address], [phone]            Discharge Medication List as of 2/17/2024 12:42 AM        CONTINUE these medications which have NOT CHANGED    Details   acetaminophen (TYLENOL) 325 mg tablet Take 650 mg by mouth every 4 (four) hours as needed for mild pain, Historical Med      amLODIPine (NORVASC) 10 mg tablet Take 1 tablet (10 mg total) by mouth daily, Starting Mon 1/29/2024, Until Wed 2/28/2024, Normal      apixaban (ELIQUIS) 5 mg Take 5 mg by mouth 2 (two) times a day, Historical Med      ascorbic Acid (VITAMIN C) 500 MG CPCR Take 500 mg by mouth daily, Historical Med      bisacodyl (FLEET) 10 MG/30ML ENEM Insert 10 mg into the rectum once, Historical Med      bismuth subsalicylate  (PEPTO BISMOL) 524 mg/30 mL oral suspension Take 30 mL by mouth every 6 (six) hours as needed for indigestion Take after 2nd watery stool in 24 hours, Historical Med      busPIRone (BUSPAR) 15 mg tablet Take 20 mg by mouth 2 (two) times a day, Historical Med      carBAMazepine (TEGretol XR) 400 mg 12 hr tablet Take 1,200 mg by mouth daily at bedtime, Historical Med      Cholecalciferol 25 MCG (1000 UT) tablet Take 1,000 Units by mouth daily, Historical Med      clonazePAM (KlonoPIN) 0.5 mg tablet Take 0.5 mg by mouth every evening, Historical Med      DULoxetine (CYMBALTA) 60 mg delayed release capsule Take 60 mg by mouth daily, Historical Med      fenofibrate (TRICOR) 145 mg tablet Take 145 mg by mouth daily, Historical Med      gabapentin (NEURONTIN) 300 mg capsule Take 300 mg by mouth every evening, Historical Med      guaiFENesin (ROBITUSSIN) 100 MG/5ML oral liquid Take 200 mg by mouth 3 (three) times a day as needed for cough, Historical Med      ibuprofen (MOTRIN) 600 mg tablet Take 800 mg by mouth every 6 (six) hours as needed for mild pain, Historical Med      loratadine (CLARITIN) 10 mg tablet Take 10 mg by mouth daily, Historical Med      losartan (COZAAR) 100 MG tablet Take 100 mg by mouth every evening, Historical Med      magnesium hydroxide (MILK OF MAGNESIA) 400 mg/5 mL oral suspension Take 30 mL by mouth daily as needed for constipation, Historical Med      melatonin 3 mg Take 1 tablet by mouth daily at bedtime, Historical Med      metoprolol succinate (TOPROL-XL) 25 mg 24 hr tablet Take 25 mg by mouth daily, Historical Med      Mirabegron ER (Myrbetriq) 25 MG TB24 Take by mouth, Historical Med      MULTIPLE VITAMIN PO Take 1 tablet by mouth in the morning, Historical Med      nystatin (MYCOSTATIN) powder Apply 1 application. topically daily at bedtime Under the breasts, Historical Med      omeprazole (PriLOSEC) 40 MG capsule Take 40 mg by mouth daily, Historical Med      pravastatin (PRAVACHOL) 20  mg tablet Take 20 mg by mouth every evening, Historical Med      Probiotic Product (PROBIOTIC PO) Take 250 mg by mouth 2 (two) times a day, Historical Med      senna-docusate sodium (SENOKOT S) 8.6-50 mg per tablet Take 1 tablet by mouth daily, Historical Med      ziprasidone (GEODON) 80 mg capsule Take 80 mg by mouth 2 (two) times a day with meals, Historical Med           No discharge procedures on file.    PDMP Review         Value Time User    PDMP Reviewed  Yes 2/16/2024 10:17 PM Seferino Justice MD             ED Provider  Attending physically available and evaluated Prema Shepard. I managed the patient along with the ED Attending.    Electronically Signed by           Stephy Jacobs MD  02/17/24 7613

## 2024-02-18 LAB
ATRIAL RATE: 50 BPM
P AXIS: 26 DEGREES
PR INTERVAL: 192 MS
QRS AXIS: -12 DEGREES
QRSD INTERVAL: 106 MS
QT INTERVAL: 472 MS
QTC INTERVAL: 430 MS
T WAVE AXIS: 10 DEGREES
VENTRICULAR RATE: 50 BPM

## 2024-02-18 PROCEDURE — 93010 ELECTROCARDIOGRAM REPORT: CPT | Performed by: INTERNAL MEDICINE

## 2024-02-24 ENCOUNTER — APPOINTMENT (EMERGENCY)
Dept: RADIOLOGY | Facility: HOSPITAL | Age: 59
End: 2024-02-24
Payer: MEDICARE

## 2024-02-24 ENCOUNTER — HOSPITAL ENCOUNTER (EMERGENCY)
Facility: HOSPITAL | Age: 59
Discharge: HOME | End: 2024-02-24
Attending: EMERGENCY MEDICINE | Admitting: EMERGENCY MEDICINE
Payer: MEDICARE

## 2024-02-24 VITALS
DIASTOLIC BLOOD PRESSURE: 75 MMHG | HEART RATE: 74 BPM | OXYGEN SATURATION: 98 % | WEIGHT: 213.85 LBS | SYSTOLIC BLOOD PRESSURE: 157 MMHG | RESPIRATION RATE: 18 BRPM | TEMPERATURE: 101 F | BODY MASS INDEX: 37.88 KG/M2

## 2024-02-24 DIAGNOSIS — J10.1 INFLUENZA A: Primary | ICD-10-CM

## 2024-02-24 LAB
FLUAV RNA RESP QL NAA+PROBE: POSITIVE
FLUBV RNA RESP QL NAA+PROBE: NEGATIVE
RSV RNA RESP QL NAA+PROBE: NEGATIVE
SARS-COV-2 RNA RESP QL NAA+PROBE: NEGATIVE

## 2024-02-24 PROCEDURE — 99284 EMERGENCY DEPT VISIT MOD MDM: CPT | Performed by: EMERGENCY MEDICINE

## 2024-02-24 PROCEDURE — 71045 X-RAY EXAM CHEST 1 VIEW: CPT

## 2024-02-24 PROCEDURE — 99284 EMERGENCY DEPT VISIT MOD MDM: CPT

## 2024-02-24 PROCEDURE — 0241U HB NFCT DS VIR RESP RNA 4 TRGT: CPT | Performed by: EMERGENCY MEDICINE

## 2024-02-24 RX ORDER — SENNOSIDES 8.6 MG
650 CAPSULE ORAL EVERY 8 HOURS PRN
Qty: 30 TABLET | Refills: 0 | Status: SHIPPED | OUTPATIENT
Start: 2024-02-24

## 2024-02-24 RX ORDER — ACETAMINOPHEN 325 MG/1
975 TABLET ORAL ONCE
Status: COMPLETED | OUTPATIENT
Start: 2024-02-24 | End: 2024-02-24

## 2024-02-24 RX ORDER — GUAIFENESIN 200 MG/10ML
100-200 LIQUID ORAL EVERY 4 HOURS PRN
Qty: 60 ML | Refills: 0 | Status: SHIPPED | OUTPATIENT
Start: 2024-02-24

## 2024-02-24 RX ORDER — GUAIFENESIN/DEXTROMETHORPHAN 100-10MG/5
10 SYRUP ORAL ONCE
Status: COMPLETED | OUTPATIENT
Start: 2024-02-24 | End: 2024-02-24

## 2024-02-24 RX ADMIN — GUAIFENESIN AND DEXTROMETHORPHAN 10 ML: 100; 10 SYRUP ORAL at 16:08

## 2024-02-24 RX ADMIN — ACETAMINOPHEN 975 MG: 325 TABLET, FILM COATED ORAL at 16:08

## 2024-02-24 NOTE — ED ATTENDING ATTESTATION
2/24/2024  I, Faustino Garcia MD, saw and evaluated the patient. I have discussed the patient with the resident/non-physician practitioner and agree with the resident's/non-physician practitioner's findings, Plan of Care, and MDM as documented in the resident's/non-physician practitioner's note, except where noted. All available labs and Radiology studies were reviewed.  I was present for key portions of any procedure(s) performed by the resident/non-physician practitioner and I was immediately available to provide assistance.       At this point I agree with the current assessment done in the Emergency Department.  I have conducted an independent evaluation of this patient a history and physical is as follows:    58-year-old female sent from [facility] for evaluation of nonproductive cough beginning today.  Noted to have fever on arrival.  No known sick contacts.  No chest pain or difficulty breathing.  Denies any other symptoms.    Chest x-ray with no acute infiltrate.    ED Course  ED Course as of 02/24/24 1645   Sat Feb 24, 2024   1641 INFLU A PCR(!): Positive     Plan discharge home.  As needed Robitussin DM.    Critical Care Time  Procedures

## 2024-02-24 NOTE — ED PROVIDER NOTES
History  Chief Complaint   Patient presents with    Cough     Pt arrives via ems from group home, c/o cough onset this morning; as per ems group home couldn't get cough medicine this morning so patient came in     (Prema Shepard) Prema Shepard is a 58 y.o. female     They presented to the emergency department on February 24, 2024. Patient presents with:  Cough: Pt arrives via ems from group home, c/o cough onset this morning; as per ems group home couldn't get cough medicine this morning so patient came in.    The patient states that she is unsure when exactly her cough began, however she was sent in by group home earlier today because they were unable to acquire cough medication.  Patient denies any difficulty breathing or productive cough. Patient denies headache, chills, chest pain, back pain, abdominal pain, nausea, vomiting, change in bowel habits, change in urination, or any other complaint at this time.        Prior to Admission Medications   Prescriptions Last Dose Informant Patient Reported? Taking?   Cholecalciferol 25 MCG (1000 UT) tablet   Yes No   Sig: Take 1,000 Units by mouth daily   DULoxetine (CYMBALTA) 60 mg delayed release capsule   Yes No   Sig: Take 60 mg by mouth daily   MULTIPLE VITAMIN PO   Yes No   Sig: Take 1 tablet by mouth in the morning   Mirabegron ER (Myrbetriq) 25 MG TB24   Yes No   Sig: Take by mouth   Probiotic Product (PROBIOTIC PO)   Yes No   Sig: Take 250 mg by mouth 2 (two) times a day   acetaminophen (TYLENOL) 325 mg tablet  Outside Facility (Specify) Yes No   Sig: Take 650 mg by mouth every 4 (four) hours as needed for mild pain   amLODIPine (NORVASC) 10 mg tablet   No No   Sig: Take 1 tablet (10 mg total) by mouth daily   apixaban (ELIQUIS) 5 mg   Yes No   Sig: Take 5 mg by mouth 2 (two) times a day   ascorbic Acid (VITAMIN C) 500 MG CPCR   Yes No   Sig: Take 500 mg by mouth daily   bisacodyl (FLEET) 10 MG/30ML ENEM  Outside Facility (Specify) Yes No   Sig:  Insert 10 mg into the rectum once   bismuth subsalicylate (PEPTO BISMOL) 524 mg/30 mL oral suspension  Outside Facility (Specify) Yes No   Sig: Take 30 mL by mouth every 6 (six) hours as needed for indigestion Take after 2nd watery stool in 24 hours   busPIRone (BUSPAR) 15 mg tablet   Yes No   Sig: Take 20 mg by mouth 2 (two) times a day   carBAMazepine (TEGretol XR) 400 mg 12 hr tablet   Yes No   Sig: Take 1,200 mg by mouth daily at bedtime   clonazePAM (KlonoPIN) 0.5 mg tablet   Yes No   Sig: Take 0.5 mg by mouth every evening   fenofibrate (TRICOR) 145 mg tablet   Yes No   Sig: Take 145 mg by mouth daily   gabapentin (NEURONTIN) 300 mg capsule   Yes No   Sig: Take 300 mg by mouth every evening   guaiFENesin (ROBITUSSIN) 100 MG/5ML oral liquid   Yes No   Sig: Take 200 mg by mouth 3 (three) times a day as needed for cough   Patient not taking: Reported on 1/27/2024   ibuprofen (MOTRIN) 600 mg tablet   Yes No   Sig: Take 800 mg by mouth every 6 (six) hours as needed for mild pain   loratadine (CLARITIN) 10 mg tablet   Yes No   Sig: Take 10 mg by mouth daily   losartan (COZAAR) 100 MG tablet   Yes No   Sig: Take 100 mg by mouth every evening   magnesium hydroxide (MILK OF MAGNESIA) 400 mg/5 mL oral suspension  Other (Specify) Yes No   Sig: Take 30 mL by mouth daily as needed for constipation   melatonin 3 mg   Yes No   Sig: Take 1 tablet by mouth daily at bedtime   metoprolol succinate (TOPROL-XL) 25 mg 24 hr tablet   Yes No   Sig: Take 25 mg by mouth daily   nystatin (MYCOSTATIN) powder   Yes No   Sig: Apply 1 application. topically daily at bedtime Under the breasts   omeprazole (PriLOSEC) 40 MG capsule   Yes No   Sig: Take 40 mg by mouth daily   pravastatin (PRAVACHOL) 20 mg tablet   Yes No   Sig: Take 20 mg by mouth every evening   senna-docusate sodium (SENOKOT S) 8.6-50 mg per tablet   Yes No   Sig: Take 1 tablet by mouth daily   ziprasidone (GEODON) 80 mg capsule   Yes No   Sig: Take 80 mg by mouth 2 (two)  times a day with meals      Facility-Administered Medications: None       Past Medical History:   Diagnosis Date    Anxiety and depression     Chronic deep vein thrombosis (DVT) (HCC)     LLE    History of traumatic brain injury     Hyperlipidemia     Insomnia     Obesity     RUPAL (obstructive sleep apnea)     Schizoaffective disorder (HCC)        History reviewed. No pertinent surgical history.    History reviewed. No pertinent family history.  I have reviewed and agree with the history as documented.    E-Cigarette/Vaping    E-Cigarette Use Never User      E-Cigarette/Vaping Substances     Social History     Tobacco Use    Smoking status: Never    Smokeless tobacco: Never   Vaping Use    Vaping status: Never Used   Substance Use Topics    Alcohol use: Not Currently    Drug use: Never        Review of Systems   Constitutional:  Positive for fever. Negative for chills.   HENT:  Negative for ear pain and sore throat.    Eyes:  Negative for pain and visual disturbance.   Respiratory:  Positive for cough. Negative for shortness of breath.    Cardiovascular:  Negative for chest pain and palpitations.   Gastrointestinal:  Negative for abdominal pain and vomiting.   Genitourinary:  Negative for dysuria and hematuria.   Musculoskeletal:  Negative for arthralgias and back pain.   Skin:  Negative for color change and rash.   Neurological:  Negative for seizures and syncope.   All other systems reviewed and are negative.      Physical Exam  ED Triage Vitals   Temperature Pulse Respirations Blood Pressure SpO2   02/24/24 1545 02/24/24 1545 02/24/24 1545 02/24/24 1545 02/24/24 1545   (!) 101 °F (38.3 °C) 74 18 157/75 98 %      Temp Source Heart Rate Source Patient Position - Orthostatic VS BP Location FiO2 (%)   02/24/24 1545 02/24/24 1545 02/24/24 1545 -- --   Oral Monitor Sitting        Pain Score       02/24/24 1608       Med Not Given for Pain - for MAR use only             Orthostatic Vital Signs  Vitals:    02/24/24 1545    BP: 157/75   Pulse: 74   Patient Position - Orthostatic VS: Sitting       Physical Exam  Vitals and nursing note reviewed.   Constitutional:       General: She is not in acute distress.     Appearance: Normal appearance.   HENT:      Head: Normocephalic and atraumatic.      Right Ear: External ear normal.      Left Ear: External ear normal.      Nose: Nose normal.      Mouth/Throat:      Mouth: Mucous membranes are moist.   Eyes:      Conjunctiva/sclera: Conjunctivae normal.   Cardiovascular:      Rate and Rhythm: Normal rate and regular rhythm.   Pulmonary:      Effort: Pulmonary effort is normal. No respiratory distress.      Breath sounds: Normal breath sounds. No stridor. No wheezing, rhonchi or rales.   Chest:      Chest wall: No tenderness.   Abdominal:      General: Abdomen is flat. Bowel sounds are normal.      Tenderness: There is no abdominal tenderness. There is no guarding or rebound.   Musculoskeletal:         General: Normal range of motion.      Cervical back: Normal range of motion.   Skin:     General: Skin is warm and dry.      Capillary Refill: Capillary refill takes less than 2 seconds.   Neurological:      Mental Status: She is alert. Mental status is at baseline.   Psychiatric:         Mood and Affect: Mood normal.         ED Medications  Medications   acetaminophen (TYLENOL) tablet 975 mg (975 mg Oral Given 2/24/24 1608)   dextromethorphan-guaiFENesin (ROBITUSSIN DM) oral syrup 10 mL (10 mL Oral Given 2/24/24 1608)       Diagnostic Studies  Results Reviewed       Procedure Component Value Units Date/Time    FLU/RSV/COVID - if FLU/RSV clinically relevant [[id number]]  (Abnormal) Collected: 02/24/24 1548    Lab Status: Final result Specimen: Nares from Nose Updated: 02/24/24 1638     SARS-CoV-2 Negative     INFLUENZA A PCR Positive     INFLUENZA B PCR Negative     RSV PCR Negative    Narrative:      FOR PEDIATRIC PATIENTS - copy/paste COVID Guidelines URL to browser:  [email or web address]    SARS-CoV-2 assay is a Nucleic Acid Amplification assay intended for the  qualitative detection of nucleic acid from SARS-CoV-2 in nasopharyngeal  swabs. Results are for the presumptive identification of SARS-CoV-2 RNA.    Positive results are indicative of infection with SARS-CoV-2, the virus  causing COVID-19, but do not rule out bacterial infection or co-infection  with other viruses. Laboratories within the [place] and its  territories are required to report all positive results to the appropriate  public health authorities. Negative results do not preclude SARS-CoV-2  infection and should not be used as the sole basis for treatment or other  patient management decisions. Negative results must be combined with  clinical observations, patient history, and epidemiological information.  This test has not been FDA cleared or approved.    This test has been authorized by FDA under an Emergency Use Authorization  (EUA). This test is only authorized for the duration of time the  declaration that circumstances exist justifying the authorization of the  emergency use of an in vitro diagnostic tests for detection of SARS-CoV-2  virus and/or diagnosis of COVID-19 infection under section 564(b)(1) of  the Act, 21 U.S.C. 360bbb-3(b)(1), unless the authorization is terminated  or revoked sooner. The test has been validated but independent review by FDA  and CLIA is pending.    Test performed using [employer] GeneXpert: This RT-PCR assay targets N2,  a region unique to SARS-CoV-2. A conserved region in the E-gene was chosen  for pan-Sarbecovirus detection which includes SARS-CoV-2.    According to CMS-2020-01-R, this platform meets the definition of high-throughput technology.                   XR chest 1 view portable   ED Interpretation by Alber Hines MD (02/24 7300)   No acute cardio-pulmonary disease.  Independently interpreted by  myself.              Procedures  Procedures      ED Course                                       Medical Decision Making  58-year-old female presents emergency department with chief complaint of cough.  Differential diagnosis includes but is not limited to viral URI, influenza, COVID-19, RSV, pneumonia.  Will obtain chest x-ray as well as viral testing at this time.  Patient febrile provided with Tylenol.  Viral panel noted to be positive for influenza A.  Rx as below. Patient appears well, nontoxic, agrees with plan of care at this time.  Answered all questions.  In light of this, patient would benefit from outpatient follow-up.    Amount and/or Complexity of Data Reviewed  Radiology: ordered.    Risk  OTC drugs.          Disposition  Final diagnoses:   Influenza A     Time reflects when diagnosis was documented in both MDM as applicable and the Disposition within this note       Time User Action Codes Description Comment    2/24/2024  4:41 PM Alber Hines Add [J10.1] Influenza A           ED Disposition       ED Disposition   Discharge    Condition   Stable    Date/Time   Sat Feb 24, 2024  4:41 PM    Comment   Prema Shepard discharge to home/self care.                   Follow-up Information       Follow up With Specialties Details Why Contact Info    Sotero Peterson MD    [address] [place] [id number]  [phone]              Discharge Medication List as of 2/24/2024  4:43 PM        START taking these medications    Details   acetaminophen (TYLENOL) 650 mg CR tablet Take 1 tablet (650 mg total) by mouth every 8 (eight) hours as needed (fever), Starting Sat 2/24/2024, Normal      !! guaiFENesin (ROBITUSSIN) 100 MG/5ML oral liquid Take 5-10 mL (100-200 mg total) by mouth every 4 (four) hours as needed for cough, Starting Sat 2/24/2024, Normal       !! - Potential duplicate medications found. Please discuss with provider.        CONTINUE these medications which have NOT CHANGED     Details   acetaminophen (TYLENOL) 325 mg tablet Take 650 mg by mouth every 4 (four) hours as needed for mild pain, Historical Med      amLODIPine (NORVASC) 10 mg tablet Take 1 tablet (10 mg total) by mouth daily, Starting Mon 1/29/2024, Until Wed 2/28/2024, Normal      apixaban (ELIQUIS) 5 mg Take 5 mg by mouth 2 (two) times a day, Historical Med      ascorbic Acid (VITAMIN C) 500 MG CPCR Take 500 mg by mouth daily, Historical Med      bisacodyl (FLEET) 10 MG/30ML ENEM Insert 10 mg into the rectum once, Historical Med      bismuth subsalicylate (PEPTO BISMOL) 524 mg/30 mL oral suspension Take 30 mL by mouth every 6 (six) hours as needed for indigestion Take after 2nd watery stool in 24 hours, Historical Med      busPIRone (BUSPAR) 15 mg tablet Take 20 mg by mouth 2 (two) times a day, Historical Med      carBAMazepine (TEGretol XR) 400 mg 12 hr tablet Take 1,200 mg by mouth daily at bedtime, Historical Med      Cholecalciferol 25 MCG (1000 UT) tablet Take 1,000 Units by mouth daily, Historical Med      clonazePAM (KlonoPIN) 0.5 mg tablet Take 0.5 mg by mouth every evening, Historical Med      DULoxetine (CYMBALTA) 60 mg delayed release capsule Take 60 mg by mouth daily, Historical Med      fenofibrate (TRICOR) 145 mg tablet Take 145 mg by mouth daily, Historical Med      gabapentin (NEURONTIN) 300 mg capsule Take 300 mg by mouth every evening, Historical Med      !! guaiFENesin (ROBITUSSIN) 100 MG/5ML oral liquid Take 200 mg by mouth 3 (three) times a day as needed for cough, Historical Med      ibuprofen (MOTRIN) 600 mg tablet Take 800 mg by mouth every 6 (six) hours as needed for mild pain, Historical Med      loratadine (CLARITIN) 10 mg tablet Take 10 mg by mouth daily, Historical Med      losartan (COZAAR) 100 MG tablet Take 100 mg by mouth every evening, Historical Med      magnesium hydroxide (MILK OF MAGNESIA) 400 mg/5 mL oral suspension Take 30 mL by mouth daily as needed for constipation, Historical Med       melatonin 3 mg Take 1 tablet by mouth daily at bedtime, Historical Med      metoprolol succinate (TOPROL-XL) 25 mg 24 hr tablet Take 25 mg by mouth daily, Historical Med      Mirabegron ER (Myrbetriq) 25 MG TB24 Take by mouth, Historical Med      MULTIPLE VITAMIN PO Take 1 tablet by mouth in the morning, Historical Med      nystatin (MYCOSTATIN) powder Apply 1 application. topically daily at bedtime Under the breasts, Historical Med      omeprazole (PriLOSEC) 40 MG capsule Take 40 mg by mouth daily, Historical Med      pravastatin (PRAVACHOL) 20 mg tablet Take 20 mg by mouth every evening, Historical Med      Probiotic Product (PROBIOTIC PO) Take 250 mg by mouth 2 (two) times a day, Historical Med      senna-docusate sodium (SENOKOT S) 8.6-50 mg per tablet Take 1 tablet by mouth daily, Historical Med      ziprasidone (GEODON) 80 mg capsule Take 80 mg by mouth 2 (two) times a day with meals, Historical Med       !! - Potential duplicate medications found. Please discuss with provider.        No discharge procedures on file.    PDMP Review         Value Time User    PDMP Reviewed  Yes 2/16/2024 10:17 PM Seferino Justice MD             ED Provider  Attending physically available and evaluated Prema Shepard. I managed the patient along with the ED Attending.    Electronically Signed by           Alber Hines MD  02/24/24 3143

## 2024-02-24 NOTE — DISCHARGE INSTRUCTIONS
Please follow up with your primary care provider concerning your visit today.  Please return to the Emergency Department if you develop any difficulty breathing, chest pain, vomiting, or for any other concerns.

## 2024-03-02 ENCOUNTER — APPOINTMENT (EMERGENCY)
Dept: RADIOLOGY | Facility: HOSPITAL | Age: 59
End: 2024-03-02
Payer: MEDICARE

## 2024-03-02 ENCOUNTER — HOSPITAL ENCOUNTER (EMERGENCY)
Facility: HOSPITAL | Age: 59
Discharge: HOME | End: 2024-03-02
Attending: EMERGENCY MEDICINE | Admitting: EMERGENCY MEDICINE
Payer: MEDICARE

## 2024-03-02 VITALS
OXYGEN SATURATION: 98 % | BODY MASS INDEX: 37.89 KG/M2 | HEART RATE: 57 BPM | RESPIRATION RATE: 20 BRPM | TEMPERATURE: 97.7 F | HEIGHT: 63 IN | DIASTOLIC BLOOD PRESSURE: 87 MMHG | WEIGHT: 213.85 LBS | SYSTOLIC BLOOD PRESSURE: 170 MMHG

## 2024-03-02 DIAGNOSIS — D72.819 LEUKOPENIA: ICD-10-CM

## 2024-03-02 DIAGNOSIS — R03.0 ELEVATED BLOOD PRESSURE READING: ICD-10-CM

## 2024-03-02 DIAGNOSIS — R53.1 GENERALIZED WEAKNESS: Primary | ICD-10-CM

## 2024-03-02 LAB
ALBUMIN SERPL BCP-MCNC: 4.2 G/DL (ref 3.5–5)
ALP SERPL-CCNC: 44 U/L (ref 34–104)
ALT SERPL W P-5'-P-CCNC: 20 U/L (ref 7–52)
ANION GAP SERPL CALCULATED.3IONS-SCNC: 11 MMOL/L
ANISOCYTOSIS BLD QL SMEAR: PRESENT
AST SERPL W P-5'-P-CCNC: 27 U/L (ref 13–39)
BASOPHILS # BLD MANUAL: 0 THOUSAND/UL (ref 0–0.1)
BASOPHILS NFR MAR MANUAL: 0 % (ref 0–1)
BILIRUB SERPL-MCNC: 0.31 MG/DL (ref 0.2–1)
BILIRUB UR QL STRIP: NEGATIVE
BUN SERPL-MCNC: 6 MG/DL (ref 5–25)
CALCIUM SERPL-MCNC: 8.9 MG/DL (ref 8.4–10.2)
CARDIAC TROPONIN I PNL SERPL HS: 3 NG/L
CHLORIDE SERPL-SCNC: 103 MMOL/L (ref 96–108)
CLARITY UR: CLEAR
CO2 SERPL-SCNC: 22 MMOL/L (ref 21–32)
COLOR UR: NORMAL
CREAT SERPL-MCNC: 0.53 MG/DL (ref 0.6–1.3)
EOSINOPHIL # BLD MANUAL: 0.09 THOUSAND/UL (ref 0–0.4)
EOSINOPHIL NFR BLD MANUAL: 3 % (ref 0–6)
ERYTHROCYTE [DISTWIDTH] IN BLOOD BY AUTOMATED COUNT: 12.7 % (ref 11.6–15.1)
GFR SERPL CREATININE-BSD FRML MDRD: 104 ML/MIN/1.73SQ M
GLUCOSE SERPL-MCNC: 83 MG/DL (ref 65–140)
GLUCOSE SERPL-MCNC: 89 MG/DL (ref 65–140)
GLUCOSE UR STRIP-MCNC: NEGATIVE MG/DL
HCG SERPL QL: NEGATIVE
HCT VFR BLD AUTO: 36.9 % (ref 34.8–46.1)
HGB BLD-MCNC: 12.3 G/DL (ref 11.5–15.4)
HGB UR QL STRIP.AUTO: NEGATIVE
KETONES UR STRIP-MCNC: NEGATIVE MG/DL
LEUKOCYTE ESTERASE UR QL STRIP: NEGATIVE
LG PLATELETS BLD QL SMEAR: PRESENT
LYMPHOCYTES # BLD AUTO: 2.02 THOUSAND/UL (ref 0.6–4.47)
LYMPHOCYTES # BLD AUTO: 66 % (ref 14–44)
MCH RBC QN AUTO: 29.4 PG (ref 26.8–34.3)
MCHC RBC AUTO-ENTMCNC: 33.3 G/DL (ref 31.4–37.4)
MCV RBC AUTO: 88 FL (ref 82–98)
MONOCYTES # BLD AUTO: 0.18 THOUSAND/UL (ref 0–1.22)
MONOCYTES NFR BLD: 6 % (ref 4–12)
MYELOCYTES NFR BLD MANUAL: 1 % (ref 0–1)
NEUTROPHILS # BLD MANUAL: 0.73 THOUSAND/UL (ref 1.85–7.62)
NEUTS SEG NFR BLD AUTO: 24 % (ref 43–75)
NITRITE UR QL STRIP: NEGATIVE
PH UR STRIP.AUTO: 7 [PH]
PLATELET # BLD AUTO: 248 THOUSANDS/UL (ref 149–390)
PLATELET BLD QL SMEAR: ADEQUATE
PMV BLD AUTO: 9.1 FL (ref 8.9–12.7)
POTASSIUM SERPL-SCNC: 3.7 MMOL/L (ref 3.5–5.3)
PROT SERPL-MCNC: 7.4 G/DL (ref 6.4–8.4)
PROT UR STRIP-MCNC: NEGATIVE MG/DL
RBC # BLD AUTO: 4.18 MILLION/UL (ref 3.81–5.12)
RBC MORPH BLD: PRESENT
SODIUM SERPL-SCNC: 136 MMOL/L (ref 135–147)
SP GR UR STRIP.AUTO: 1 (ref 1–1.03)
UROBILINOGEN UR STRIP-ACNC: <2 MG/DL
WBC # BLD AUTO: 3.06 THOUSAND/UL (ref 4.31–10.16)

## 2024-03-02 PROCEDURE — 80053 COMPREHEN METABOLIC PANEL: CPT | Performed by: EMERGENCY MEDICINE

## 2024-03-02 PROCEDURE — 81003 URINALYSIS AUTO W/O SCOPE: CPT | Performed by: EMERGENCY MEDICINE

## 2024-03-02 PROCEDURE — 84484 ASSAY OF TROPONIN QUANT: CPT | Performed by: EMERGENCY MEDICINE

## 2024-03-02 PROCEDURE — 71045 X-RAY EXAM CHEST 1 VIEW: CPT

## 2024-03-02 PROCEDURE — 85007 BL SMEAR W/DIFF WBC COUNT: CPT | Performed by: EMERGENCY MEDICINE

## 2024-03-02 PROCEDURE — 85027 COMPLETE CBC AUTOMATED: CPT | Performed by: EMERGENCY MEDICINE

## 2024-03-02 PROCEDURE — 99285 EMERGENCY DEPT VISIT HI MDM: CPT

## 2024-03-02 PROCEDURE — 36415 COLL VENOUS BLD VENIPUNCTURE: CPT | Performed by: EMERGENCY MEDICINE

## 2024-03-02 PROCEDURE — 84703 CHORIONIC GONADOTROPIN ASSAY: CPT | Performed by: EMERGENCY MEDICINE

## 2024-03-02 PROCEDURE — 93005 ELECTROCARDIOGRAM TRACING: CPT

## 2024-03-02 PROCEDURE — 82948 REAGENT STRIP/BLOOD GLUCOSE: CPT

## 2024-03-02 NOTE — ED PROVIDER NOTES
"History  Chief Complaint   Patient presents with    Weakness - Generalized     Arrives via EMS from [facility] for staff report of AMS from baseline. Hx TBI, but staff report patient \"acting abnormal\" with generalized weakness. Hx Flu last week. Alert and answering questions in triage.      Patient is a 58-year-old female, with a history significant for TBI, schizoaffective disorder, recent diagnosis of influenza on 2/24/2024 per my review of the medical record, who presents to the ED today, via EMS from her group home [facility], for evaluation of generalized weakness.  History provided by EMS report also relayed that that there was some concern that patient was leaning as well as altered mental status.  Currently, patient is alert and oriented x 4/4 and without complaints except for left lower extremity pain which she states is chronic secondary to a DVT for which she is anticoagulated on Eliquis.  Patient otherwise denies headache, fever, chest pain, dyspnea, decreased urination, vision change, urinary symptoms, weakness, numbness.  After patient arrival initial evaluation, Reshma, a staff member from patient's group home, arrived and stated that, over a period of weeks, patient has muttered intermittently and this is what staff were concerned about regarding patient's altered mental status.  She also states that patient does seem to have been leaning to the left side while ambulating.  She also provided a picture saying there was a concern about patient's blood pressure: It was 156/81.  No clear exacerbating or remitting factors.  Patient and staff member are without other concerns at this time.        Prior to Admission Medications   Prescriptions Last Dose Informant Patient Reported? Taking?   Cholecalciferol 25 MCG (1000 UT) tablet   Yes No   Sig: Take 1,000 Units by mouth daily   DULoxetine (CYMBALTA) 60 mg delayed release capsule   Yes No   Sig: Take 60 mg by mouth daily   MULTIPLE VITAMIN PO   Yes No "   Sig: Take 1 tablet by mouth in the morning   Mirabegron ER (Myrbetriq) 25 MG TB24   Yes No   Sig: Take by mouth   Probiotic Product (PROBIOTIC PO)   Yes No   Sig: Take 250 mg by mouth 2 (two) times a day   acetaminophen (TYLENOL) 325 mg tablet  Outside Facility (Specify) Yes No   Sig: Take 650 mg by mouth every 4 (four) hours as needed for mild pain   acetaminophen (TYLENOL) 650 mg CR tablet   No No   Sig: Take 1 tablet (650 mg total) by mouth every 8 (eight) hours as needed (fever)   amLODIPine (NORVASC) 10 mg tablet   No No   Sig: Take 1 tablet (10 mg total) by mouth daily   apixaban (ELIQUIS) 5 mg   Yes No   Sig: Take 5 mg by mouth 2 (two) times a day   ascorbic Acid (VITAMIN C) 500 MG CPCR   Yes No   Sig: Take 500 mg by mouth daily   bisacodyl (FLEET) 10 MG/30ML ENEM  Outside Facility (Specify) Yes No   Sig: Insert 10 mg into the rectum once   bismuth subsalicylate (PEPTO BISMOL) 524 mg/30 mL oral suspension  Outside Facility (Specify) Yes No   Sig: Take 30 mL by mouth every 6 (six) hours as needed for indigestion Take after 2nd watery stool in 24 hours   busPIRone (BUSPAR) 15 mg tablet   Yes No   Sig: Take 20 mg by mouth 2 (two) times a day   carBAMazepine (TEGretol XR) 400 mg 12 hr tablet   Yes No   Sig: Take 1,200 mg by mouth daily at bedtime   clonazePAM (KlonoPIN) 0.5 mg tablet   Yes No   Sig: Take 0.5 mg by mouth every evening   fenofibrate (TRICOR) 145 mg tablet   Yes No   Sig: Take 145 mg by mouth daily   gabapentin (NEURONTIN) 300 mg capsule   Yes No   Sig: Take 300 mg by mouth every evening   guaiFENesin (ROBITUSSIN) 100 MG/5ML oral liquid   Yes No   Sig: Take 200 mg by mouth 3 (three) times a day as needed for cough   Patient not taking: Reported on 1/27/2024   guaiFENesin (ROBITUSSIN) 100 MG/5ML oral liquid   No No   Sig: Take 5-10 mL (100-200 mg total) by mouth every 4 (four) hours as needed for cough   ibuprofen (MOTRIN) 600 mg tablet   Yes No   Sig: Take 800 mg by mouth every 6 (six) hours as  needed for mild pain   loratadine (CLARITIN) 10 mg tablet   Yes No   Sig: Take 10 mg by mouth daily   losartan (COZAAR) 100 MG tablet   Yes No   Sig: Take 100 mg by mouth every evening   magnesium hydroxide (MILK OF MAGNESIA) 400 mg/5 mL oral suspension  Other (Specify) Yes No   Sig: Take 30 mL by mouth daily as needed for constipation   melatonin 3 mg   Yes No   Sig: Take 1 tablet by mouth daily at bedtime   metoprolol succinate (TOPROL-XL) 25 mg 24 hr tablet   Yes No   Sig: Take 25 mg by mouth daily   nystatin (MYCOSTATIN) powder   Yes No   Sig: Apply 1 application. topically daily at bedtime Under the breasts   omeprazole (PriLOSEC) 40 MG capsule   Yes No   Sig: Take 40 mg by mouth daily   pravastatin (PRAVACHOL) 20 mg tablet   Yes No   Sig: Take 20 mg by mouth every evening   senna-docusate sodium (SENOKOT S) 8.6-50 mg per tablet   Yes No   Sig: Take 1 tablet by mouth daily   ziprasidone (GEODON) 80 mg capsule   Yes No   Sig: Take 80 mg by mouth 2 (two) times a day with meals      Facility-Administered Medications: None       Past Medical History:   Diagnosis Date    Anxiety and depression     Chronic deep vein thrombosis (DVT) (HCC)     LLE    History of traumatic brain injury     Hyperlipidemia     Insomnia     Obesity     RUPAL (obstructive sleep apnea)     Schizoaffective disorder (HCC)        History reviewed. No pertinent surgical history.    History reviewed. No pertinent family history.  I have reviewed and agree with the history as documented.    E-Cigarette/Vaping    E-Cigarette Use Never User      E-Cigarette/Vaping Substances     Social History     Tobacco Use    Smoking status: Never    Smokeless tobacco: Never   Vaping Use    Vaping status: Never Used   Substance Use Topics    Alcohol use: Not Currently    Drug use: Never       Review of Systems   Constitutional:  Negative for fever.   HENT:  Negative for trouble swallowing.    Eyes:  Negative for visual disturbance.   Respiratory:  Negative for  shortness of breath.    Cardiovascular:  Negative for chest pain.   Gastrointestinal:  Negative for abdominal pain.   Endocrine: Negative for polyuria.   Genitourinary:  Negative for dysuria.   Musculoskeletal:  Positive for myalgias. Negative for gait problem.   Skin:  Negative for rash.   Allergic/Immunologic: Positive for environmental allergies.   Neurological:  Positive for weakness (Reported by group home staff). Negative for numbness and headaches.   Hematological:  Negative for adenopathy.   Psychiatric/Behavioral:  Negative for confusion.    All other systems reviewed and are negative.      Physical Exam  Physical Exam  Vitals and nursing note reviewed.   Constitutional:       General: She is not in acute distress.     Appearance: She is ill-appearing (Chronic appearing). She is not toxic-appearing or diaphoretic.      Comments: Patient appears comfortable during my evaluation    HENT:      Head: Normocephalic and atraumatic.      Right Ear: External ear normal.      Left Ear: External ear normal.      Nose: Nose normal. No rhinorrhea.      Mouth/Throat:      Mouth: Mucous membranes are moist.      Pharynx: Oropharynx is clear. No oropharyngeal exudate or posterior oropharyngeal erythema.      Comments: Uvula midline. No oropharyngeal or submandibular mass/swelling  Eyes:      General: No scleral icterus.        Right eye: No discharge.         Left eye: No discharge.      Conjunctiva/sclera: Conjunctivae normal.      Pupils: Pupils are equal, round, and reactive to light.   Cardiovascular:      Rate and Rhythm: Normal rate and regular rhythm.      Pulses: Normal pulses.      Heart sounds: Normal heart sounds. No murmur heard.     No friction rub. No gallop.      Comments: 2+ Radial  Pulmonary:      Effort: Pulmonary effort is normal. No respiratory distress.      Breath sounds: Normal breath sounds. No stridor. No wheezing, rhonchi or rales.   Abdominal:      General: Abdomen is flat. There is no  distension.      Palpations: Abdomen is soft.      Tenderness: There is no abdominal tenderness. There is no right CVA tenderness, left CVA tenderness, guarding or rebound.   Musculoskeletal:         General: Tenderness (LLE) present. No deformity.      Cervical back: Normal range of motion and neck supple. No rigidity or tenderness. No muscular tenderness.   Lymphadenopathy:      Cervical: No cervical adenopathy.   Skin:     General: Skin is warm and dry.      Capillary Refill: Capillary refill takes less than 2 seconds.   Neurological:      Mental Status: She is alert.      Comments: AAOx4/4. Cranial nerves 2-12 intact.  5/5 strength in all four extremities including finger extension against resistance.  Sensation to light touch subjectively intact/equal in all four extremities and the face.  Patient able to ambulate without difficulty unassisted using a walker which is baseline for her.    No drift of the upper or lower extremities bilaterally.    Patient is speaking clearly in complete sentences.  Patient is answering appropriately and able to follow commands.    Psychiatric:         Mood and Affect: Mood normal.         Behavior: Behavior normal.         Vital Signs  ED Triage Vitals   Temperature Pulse Respirations Blood Pressure SpO2   03/02/24 1815 03/02/24 1812 03/02/24 1812 03/02/24 1812 03/02/24 1812   97.7 °F (36.5 °C) 58 20 165/93 92 %      Temp Source Heart Rate Source Patient Position - Orthostatic VS BP Location FiO2 (%)   03/02/24 1815 03/02/24 1812 03/02/24 1812 03/02/24 1812 --   Oral Monitor Sitting Left arm       Pain Score       03/02/24 1812       4           Vitals:    03/02/24 1815 03/02/24 1830 03/02/24 1900 03/02/24 1915   BP: 165/93 146/71 142/70 170/87   Pulse: 57 58 57 57   Patient Position - Orthostatic VS: Sitting Sitting Sitting Sitting         Visual Acuity  Visual Acuity      Flowsheet Row Most Recent Value   L Pupil Size (mm) 2    R Pupil Size (mm) 3             ED  Medications  Medications - No data to display    Diagnostic Studies  Results Reviewed       Procedure Component Value Units Date/Time    CBC and differential [[id number]]  (Abnormal) Collected: 03/02/24 1815    Lab Status: Final result Specimen: Blood from Arm, Left Updated: 03/02/24 1930     WBC 3.06 Thousand/uL      RBC 4.18 Million/uL      Hemoglobin 12.3 g/dL      Hematocrit 36.9 %      MCV 88 fL      MCH 29.4 pg      MCHC 33.3 g/dL      RDW 12.7 %      MPV 9.1 fL      Platelets 248 Thousands/uL     Narrative:      This is an appended report.  These results have been appended to a previously verified report.    Manual Differential(PHLEBS Do Not Order) [[id number]]  (Abnormal) Collected: 03/02/24 1815    Lab Status: Final result Specimen: Blood from Arm, Left Updated: 03/02/24 1930     Segmented % 24 %      Lymphocytes % 66 %      Monocytes % 6 %      Eosinophils, % 3 %      Basophils % 0 %      Myelocytes % 1 %      Absolute Neutrophils 0.73 Thousand/uL      Lymphocytes Absolute 2.02 Thousand/uL      Monocytes Absolute 0.18 Thousand/uL      Eosinophils Absolute 0.09 Thousand/uL      Basophils Absolute 0.00 Thousand/uL      Total Counted --     RBC Morphology Present     Platelet Estimate Adequate     Large Platelet Present     Anisocytosis Present    RBC Morphology Reflex Test [[id number]] Collected: 03/02/24 1815    Lab Status: In process Specimen: Blood from Arm, Left Updated: 03/02/24 1930    HS Troponin 0hr (reflex protocol) [[id number]]  (Normal) Collected: 03/02/24 1815    Lab Status: Final result Specimen: Blood from Arm, Left Updated: 03/02/24 1927     hs TnI 0hr 3 ng/L     hCG, qualitative pregnancy [[id number]]  (Normal) Collected: 03/02/24 1815    Lab Status: Final result Specimen: Blood from Arm, Left Updated: 03/02/24 1927     Preg, Serum Negative    Comprehensive metabolic panel [[id number]]  (Abnormal) Collected: 03/02/24 1815    Lab Status: Final result Specimen: Blood from Arm, Left Updated: 03/02/24  1920     Sodium 136 mmol/L      Potassium 3.7 mmol/L      Chloride 103 mmol/L      CO2 22 mmol/L      ANION GAP 11 mmol/L      BUN 6 mg/dL      Creatinine 0.53 mg/dL      Glucose 89 mg/dL      Calcium 8.9 mg/dL      AST 27 U/L      ALT 20 U/L      Alkaline Phosphatase 44 U/L      Total Protein 7.4 g/dL      Albumin 4.2 g/dL      Total Bilirubin 0.31 mg/dL      eGFR 104 ml/min/1.73sq m     Narrative:      National Kidney Disease Foundation guidelines for Chronic Kidney Disease (CKD):     Stage 1 with normal or high GFR (GFR > 90 mL/min/1.73 square meters)    Stage 2 Mild CKD (GFR = 60-89 mL/min/1.73 square meters)    Stage 3A Moderate CKD (GFR = 45-59 mL/min/1.73 square meters)    Stage 3B Moderate CKD (GFR = 30-44 mL/min/1.73 square meters)    Stage 4 Severe CKD (GFR = 15-29 mL/min/1.73 square meters)    Stage 5 End Stage CKD (GFR <15 mL/min/1.73 square meters)  Note: GFR calculation is accurate only with a steady state creatinine    UA w Reflex to Microscopic w Reflex to Culture [[id number]] Collected: 03/02/24 1904    Lab Status: Final result Specimen: Urine, Clean Catch Updated: 03/02/24 1913     Color, UA Light Yellow     Clarity, UA Clear     Specific Gravity, UA 1.005     pH, UA 7.0     Leukocytes, UA Negative     Nitrite, UA Negative     Protein, UA Negative mg/dl      Glucose, UA Negative mg/dl      Ketones, UA Negative mg/dl      Urobilinogen, UA <2.0 mg/dl      Bilirubin, UA Negative     Occult Blood, UA Negative    Fingerstick Glucose (POCT) [[id number]]  (Normal) Collected: 03/02/24 1816    Lab Status: Final result Updated: 03/02/24 1817     POC Glucose 83 mg/dl                    XR chest 1 view portable   ED Interpretation by Roby Ca MD (03/02 1919)   Per my independent interpretation: no acute cardiopulmonary process                   Procedures  Procedures         ED Course  ED Course as of 03/02/24 1939   Sat Mar 02, 2024   1821 POC Glucose: 83  WNL   1918 Leukocytes, UA: Negative  WNL    1918 Nitrite, UA: Negative  WNL   1918 WBC(!): 3.06  Low   1925 Creatinine(!): 0.53  WNL   1925 Potassium: 3.7  WNL   1925 Sodium: 136  WNL   1928 hs TnI 0hr: 3  WNL -given duration of symptoms over a period of weeks, and no need for delta   1930 Patient's mother and primary contact contacted with patient's permission; she was aware that patient was in the emergency department.  Results to this point reviewed with her and she stated that she was not totally sure why patient was brought to the emergency department.  She was happy to receive the update regarding patient's workup and had some questions regarding long-term medications and was instructed to discuss this further with her primary doctor.   1938 Results reviewed with patient and group home staff.  Questions answered to their satisfaction.  Patient continues to feel overall well and she is tolerating p.o. in the ED.   1939 Patient has neither questions nor concerns after receiving discharge instructions and return precautions                                SBIRT 22yo+      Flowsheet Row Most Recent Value   Initial Alcohol Screen: US AUDIT-C     1. How often do you have a drink containing alcohol? 0 Filed at: 03/02/2024 1814   2. How many drinks containing alcohol do you have on a typical day you are drinking?  0 Filed at: 03/02/2024 1814   3a. Male UNDER 65: How often do you have five or more drinks on one occasion? 0 Filed at: 03/02/2024 1814   3b. FEMALE Any Age, or MALE 65+: How often do you have 4 or more drinks on one occassion? 0 Filed at: 03/02/2024 1814   Audit-C Score 0 Filed at: 03/02/2024 1814   RAHEL: How many times in the past year have you...    Used an illegal drug or used a prescription medication for non-medical reasons? Never Filed at: 03/02/2024 1814                      Medical Decision Making  Patient is a 58-year-old female, with a history significant for TBI, schizoaffective disorder, recent diagnosis of influenza on 2/24/2024 per my  review of the medical record, who presents to the ED today, via EMS from her group home [facility] for evaluation of generalized weakness.  History provided by EMS report also relayed that that there was some concern that patient was leaning as well as altered mental status.  Currently, patient is alert and oriented x 4/4 and without complaints except for left lower extremity pain which she states is chronic secondary to a DVT for which she is anticoagulated on Eliquis.  Patient otherwise denies headache, fever, chest pain, dyspnea, decreased urination, vision change, urinary symptoms, weakness, numbness.  After patient arrival initial evaluation, Reshma, a staff member from patient's group home, arrived and stated that, over a period of weeks, patient has muttered intermittently and this is what staff were concerned about regarding patient's altered mental status.  She also states that patient does seem to have been leaning to the left side while ambulating.  She also provided a picture saying there was a concern about patient's blood pressure: It was 156/81.  No clear exacerbating or remitting factors.  Patient is currently afebrile and hemodynamically stable.  Her physical exam is notable for chronic ill appearance but overall comfortable appearance, clear heart lungs auscultation, soft nontender abdomen, no focal neurodeficit, ability to ambulate in a straight line using a walker which is baseline for the patient, left lower extremity discomfort with calf squeeze.  This presentation is concerning for: Worried well.  I also considered electrolyte abnormality, ACS, anemia.  Patient at risk for pneumonia/UTI.  No reason to suspect meningitis/encephalitis/mass lesion/ischemic stroke/ICH/delirium/hypertensive emergency at this time based on history physical exam.  Will investigate with cardiac workup, UA.  Will manage based upon workup.    Amount and/or Complexity of Data Reviewed  Labs: ordered. Decision-making  details documented in ED Course.  Radiology: ordered and independent interpretation performed.             Disposition  Final diagnoses:   Generalized weakness   Elevated blood pressure reading   Leukopenia     Time reflects when diagnosis was documented in both MDM as applicable and the Disposition within this note       Time User Action Codes Description Comment    3/2/2024  7:26 PM Roby Ca Add [R53.1] Generalized weakness     3/2/2024  7:26 PM Roby Ca Add [R03.0] Elevated blood pressure reading     3/2/2024  7:26 PM Roby Ca Add [D72.819] Leukopenia           ED Disposition       ED Disposition   Discharge    Condition   Stable    Date/Time   Sat Mar 2, 2024 1935    Comment   Prema Shepard discharge to home/self care.                   Follow-up Information       Follow up With Specialties Details Why Contact Info    Sotero Peterson MD  Schedule an appointment as soon as possible for a visit   [address] [address]  [phone]              Current Discharge Medication List        CONTINUE these medications which have NOT CHANGED    Details   acetaminophen (TYLENOL) 325 mg tablet Take 650 mg by mouth every 4 (four) hours as needed for mild pain      acetaminophen (TYLENOL) 650 mg CR tablet Take 1 tablet (650 mg total) by mouth every 8 (eight) hours as needed (fever)  Qty: 30 tablet, Refills: 0    Associated Diagnoses: Influenza A      amLODIPine (NORVASC) 10 mg tablet Take 1 tablet (10 mg total) by mouth daily  Qty: 30 tablet, Refills: 0    Associated Diagnoses: Hypertension      apixaban (ELIQUIS) 5 mg Take 5 mg by mouth 2 (two) times a day      ascorbic Acid (VITAMIN C) 500 MG CPCR Take 500 mg by mouth daily      bisacodyl (FLEET) 10 MG/30ML ENEM Insert 10 mg into the rectum once      bismuth subsalicylate (PEPTO BISMOL) 524 mg/30 mL oral suspension Take 30 mL by mouth every 6 (six) hours as needed for indigestion Take after 2nd watery  stool in 24 hours      busPIRone (BUSPAR) 15 mg tablet Take 20 mg by mouth 2 (two) times a day      carBAMazepine (TEGretol XR) 400 mg 12 hr tablet Take 1,200 mg by mouth daily at bedtime      Cholecalciferol 25 MCG (1000 UT) tablet Take 1,000 Units by mouth daily      clonazePAM (KlonoPIN) 0.5 mg tablet Take 0.5 mg by mouth every evening      DULoxetine (CYMBALTA) 60 mg delayed release capsule Take 60 mg by mouth daily      fenofibrate (TRICOR) 145 mg tablet Take 145 mg by mouth daily      gabapentin (NEURONTIN) 300 mg capsule Take 300 mg by mouth every evening      !! guaiFENesin (ROBITUSSIN) 100 MG/5ML oral liquid Take 200 mg by mouth 3 (three) times a day as needed for cough      !! guaiFENesin (ROBITUSSIN) 100 MG/5ML oral liquid Take 5-10 mL (100-200 mg total) by mouth every 4 (four) hours as needed for cough  Qty: 60 mL, Refills: 0    Associated Diagnoses: Influenza A      ibuprofen (MOTRIN) 600 mg tablet Take 800 mg by mouth every 6 (six) hours as needed for mild pain      loratadine (CLARITIN) 10 mg tablet Take 10 mg by mouth daily      losartan (COZAAR) 100 MG tablet Take 100 mg by mouth every evening      magnesium hydroxide (MILK OF MAGNESIA) 400 mg/5 mL oral suspension Take 30 mL by mouth daily as needed for constipation      melatonin 3 mg Take 1 tablet by mouth daily at bedtime      metoprolol succinate (TOPROL-XL) 25 mg 24 hr tablet Take 25 mg by mouth daily      Mirabegron ER (Myrbetriq) 25 MG TB24 Take by mouth      MULTIPLE VITAMIN PO Take 1 tablet by mouth in the morning      nystatin (MYCOSTATIN) powder Apply 1 application. topically daily at bedtime Under the breasts      omeprazole (PriLOSEC) 40 MG capsule Take 40 mg by mouth daily      pravastatin (PRAVACHOL) 20 mg tablet Take 20 mg by mouth every evening      Probiotic Product (PROBIOTIC PO) Take 250 mg by mouth 2 (two) times a day      senna-docusate sodium (SENOKOT S) 8.6-50 mg per tablet Take 1 tablet by mouth daily      ziprasidone  (GEODON) 80 mg capsule Take 80 mg by mouth 2 (two) times a day with meals       !! - Potential duplicate medications found. Please discuss with provider.          No discharge procedures on file.    PDMP Review         Value Time User    PDMP Reviewed  Yes 2/16/2024 10:17 PM Seferino Justice MD            ED Provider  Electronically Signed by             Roby Ca MD  03/02/24 1302

## 2024-03-03 LAB
ATRIAL RATE: 55 BPM
P AXIS: 23 DEGREES
PR INTERVAL: 174 MS
QRS AXIS: -20 DEGREES
QRSD INTERVAL: 106 MS
QT INTERVAL: 470 MS
QTC INTERVAL: 449 MS
T WAVE AXIS: 1 DEGREES
VENTRICULAR RATE: 55 BPM

## 2024-03-03 PROCEDURE — 93010 ELECTROCARDIOGRAM REPORT: CPT | Performed by: INTERNAL MEDICINE

## 2024-03-03 NOTE — DISCHARGE INSTRUCTIONS
You were evaluated in the emergency department for: weakness and mumbling . You can access your current and pending results through [facility]'s [facility]. A radiologist will take a second look at your X-Rays, if you had any, and you will be contacted with any new findings.     You should follow-up with your primary care provider, as soon as possible, for re-evaluation.  If you do not have a primary care provider, I have referred you to [facility]. You will be contacted about scheduling an appointment. Their phone number is also included on this paperwork.     Your workup revealed no emergent features at this time; however, many disease processes are dynamic:    Please, return to the emergency department if you experience new or worsening symptoms, fever, chest pain, shortness of breath, difficulty breathing, dizziness, abdominal pain, persistent nausea/vomiting, syncope or passing out, blood in your urine or stool, coughing up blood, leg swelling/pain, urinary retention, bowel or bladder incontinence, numbness between your legs.    Additionally, your blood pressure was measured to be high. This is something that you should discuss with your primary care provider and have re-checked within one week.

## 2024-04-05 ENCOUNTER — HOSPITAL ENCOUNTER (EMERGENCY)
Facility: HOSPITAL | Age: 59
Discharge: HOME | End: 2024-04-05
Attending: EMERGENCY MEDICINE
Payer: MEDICARE

## 2024-04-05 ENCOUNTER — APPOINTMENT (EMERGENCY)
Dept: RADIOLOGY | Facility: HOSPITAL | Age: 59
End: 2024-04-05
Payer: MEDICARE

## 2024-04-05 VITALS
WEIGHT: 206 LBS | TEMPERATURE: 97.4 F | DIASTOLIC BLOOD PRESSURE: 81 MMHG | HEIGHT: 64 IN | BODY MASS INDEX: 35.17 KG/M2 | OXYGEN SATURATION: 100 % | RESPIRATION RATE: 16 BRPM | HEART RATE: 61 BPM | SYSTOLIC BLOOD PRESSURE: 134 MMHG

## 2024-04-05 DIAGNOSIS — R78.89 ELEVATED TEGRETOL LEVEL: ICD-10-CM

## 2024-04-05 DIAGNOSIS — S09.90XA HEAD INJURY, INITIAL ENCOUNTER: ICD-10-CM
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
## 2024-04-05 LAB
ALBUMIN SERPL-MCNC: 4.1 G/DL (ref 3.5–5.7)
ALP SERPL-CCNC: 36 IU/L (ref 34–125)
ALT SERPL-CCNC: 10 IU/L (ref 7–52)
ANION GAP SERPL CALC-SCNC: 7 MEQ/L (ref 3–15)
APAP SERPL-MCNC: 1 UG/ML (ref 10–30)
AST SERPL-CCNC: 18 IU/L (ref 13–39)
ATRIAL RATE: 51
ATRIAL RATE: 58
BASOPHILS # BLD: 0.01 K/UL (ref 0.01–0.1)
BASOPHILS NFR BLD: 0.2 %
BILIRUB SERPL-MCNC: 0.2 MG/DL (ref 0.3–1.2)
BUN SERPL-MCNC: 13 MG/DL (ref 7–25)
CALCIUM SERPL-MCNC: 9.2 MG/DL (ref 8.6–10.3)
CARBAMAZEPINE SERPL-MCNC: 16.2 UG/ML (ref 4–12)
CARBAMAZEPINE SERPL-MCNC: 18.2 UG/ML (ref 4–12)
CHLORIDE SERPL-SCNC: 98 MEQ/L (ref 98–107)
CK SERPL-CCNC: 86 U/L (ref 30–223)
CO2 SERPL-SCNC: 25 MEQ/L (ref 21–31)
CREAT SERPL-MCNC: 0.6 MG/DL (ref 0.6–1.2)
DIFFERENTIAL METHOD BLD: ABNORMAL
EGFRCR SERPLBLD CKD-EPI 2021: >60 ML/MIN/1.73M*2
EOSINOPHIL # BLD: 0.11 K/UL (ref 0.04–0.36)
EOSINOPHIL NFR BLD: 2.4 %
ERYTHROCYTE [DISTWIDTH] IN BLOOD BY AUTOMATED COUNT: 12.9 % (ref 11.7–14.4)
ETHANOL SERPL-MCNC: <10 MG/DL
GLUCOSE SERPL-MCNC: 104 MG/DL (ref 70–99)
HCT VFR BLD AUTO: 35.9 % (ref 35–45)
HGB BLD-MCNC: 12.4 G/DL (ref 11.8–15.7)
IMM GRANULOCYTES # BLD AUTO: 0.01 K/UL (ref 0–0.08)
IMM GRANULOCYTES NFR BLD AUTO: 0.2 %
LYMPHOCYTES # BLD: 1.87 K/UL (ref 1.2–3.5)
LYMPHOCYTES NFR BLD: 41 %
MCH RBC QN AUTO: 30.3 PG (ref 28–33.2)
MCHC RBC AUTO-ENTMCNC: 34.5 G/DL (ref 32.2–35.5)
MCV RBC AUTO: 87.8 FL (ref 83–98)
MONOCYTES # BLD: 0.33 K/UL (ref 0.28–0.8)
MONOCYTES NFR BLD: 7.2 %
NEUTROPHILS # BLD: 2.23 K/UL (ref 1.7–7)
NEUTS SEG NFR BLD: 49 %
NRBC BLD-RTO: 0 %
PDW BLD AUTO: 8.5 FL (ref 9.4–12.3)
PLATELET # BLD AUTO: 316 K/UL (ref 150–369)
POTASSIUM SERPL-SCNC: 4 MEQ/L (ref 3.5–5.1)
PR INTERVAL: 200
PROT SERPL-MCNC: 6.8 G/DL (ref 6–8.2)
QRS DURATION: 100
QRS DURATION: 92
QT INTERVAL: 430
QT INTERVAL: 442
QTC CALCULATION(BAZETT): 407
QTC CALCULATION(BAZETT): 425
R AXIS: -26
R AXIS: 204
RBC # BLD AUTO: 4.09 M/UL (ref 3.93–5.22)
SALICYLATES SERPL-MCNC: <1.5 MG/DL
SODIUM SERPL-SCNC: 130 MEQ/L (ref 136–145)
T WAVE AXIS: 13
T WAVE AXIS: 178
VENTRICULAR RATE: 51
VENTRICULAR RATE: 59
WBC # BLD AUTO: 4.56 K/UL (ref 3.8–10.5)

## 2024-04-05 PROCEDURE — 80053 COMPREHEN METABOLIC PANEL: CPT | Performed by: PHYSICIAN ASSISTANT

## 2024-04-05 PROCEDURE — 85025 COMPLETE CBC W/AUTO DIFF WBC: CPT | Performed by: PHYSICIAN ASSISTANT

## 2024-04-05 PROCEDURE — 82550 ASSAY OF CK (CPK): CPT | Performed by: EMERGENCY MEDICINE

## 2024-04-05 PROCEDURE — 96361 HYDRATE IV INFUSION ADD-ON: CPT

## 2024-04-05 PROCEDURE — 70450 CT HEAD/BRAIN W/O DYE: CPT

## 2024-04-05 PROCEDURE — 36415 COLL VENOUS BLD VENIPUNCTURE: CPT | Performed by: PHYSICIAN ASSISTANT

## 2024-04-05 PROCEDURE — 80156 ASSAY CARBAMAZEPINE TOTAL: CPT | Performed by: EMERGENCY MEDICINE

## 2024-04-05 PROCEDURE — 96360 HYDRATION IV INFUSION INIT: CPT

## 2024-04-05 PROCEDURE — 99284 EMERGENCY DEPT VISIT MOD MDM: CPT | Mod: 25

## 2024-04-05 PROCEDURE — 3E0337Z INTRODUCTION OF ELECTROLYTIC AND WATER BALANCE SUBSTANCE INTO PERIPHERAL VEIN, PERCUTANEOUS APPROACH: ICD-10-PCS | Performed by: EMERGENCY MEDICINE

## 2024-04-05 PROCEDURE — 25800000 HC PHARMACY IV SOLUTIONS: Performed by: EMERGENCY MEDICINE

## 2024-04-05 PROCEDURE — 63700000 HC SELF-ADMINISTRABLE DRUG: Performed by: PHYSICIAN ASSISTANT

## 2024-04-05 PROCEDURE — G0480 DRUG TEST DEF 1-7 CLASSES: HCPCS | Performed by: PHYSICIAN ASSISTANT

## 2024-04-05 PROCEDURE — 93005 ELECTROCARDIOGRAM TRACING: CPT | Performed by: PHYSICIAN ASSISTANT

## 2024-04-05 PROCEDURE — 93005 ELECTROCARDIOGRAM TRACING: CPT | Performed by: EMERGENCY MEDICINE

## 2024-04-05 PROCEDURE — 72125 CT NECK SPINE W/O DYE: CPT

## 2024-04-05 RX ORDER — ACETAMINOPHEN 325 MG/1
650 TABLET ORAL ONCE
Status: COMPLETED | OUTPATIENT
Start: 2024-04-05 | End: 2024-04-05

## 2024-04-05 RX ADMIN — ACETAMINOPHEN 650 MG: 325 TABLET, FILM COATED ORAL at 05:56

## 2024-04-05 RX ADMIN — SODIUM CHLORIDE 1000 ML: 9 INJECTION, SOLUTION INTRAVENOUS at 03:16

## 2024-04-05 NOTE — ED ATTESTATION NOTE
I have personally seen and examined Tarah Holley.  I was involved in the care and medical decision making for this patient.    I reviewed and agree with physician assistant / nurse practitioner’s assessment and plan of care; any exceptions are documented below.      My focused history, examination, assessment and plan of care of Tarah Holley is as follows:    Brief History:  HPI  58-year-old female with history of bipolar disorder, prior traumatic brain injury, asthma, diabetes, hypertension, sleep apnea , DVT on eliquis presents to the emergency department for evaluation of recent ground-level fall.  The patient reports she was in the bathroom when she lost her balance and fell, striking her head. No LOC. Reports mild headache now. No nausea/ vomiting. No CP or SOB.        Focused Physical Exam:  Physical Exam  Vitals and nursing note reviewed.   Constitutional:       General: She is in acute distress.      Appearance: Normal appearance. She is normal weight. She is not toxic-appearing.   HENT:      Head: Normocephalic and atraumatic.      Comments: GCS=15     Nose: Nose normal.      Mouth/Throat:      Mouth: Mucous membranes are moist.      Comments: Mildly dysarthric though mouthguard in place  Eyes:      Extraocular Movements: Extraocular movements intact.      Pupils: Pupils are equal, round, and reactive to light.   Cardiovascular:      Rate and Rhythm: Normal rate and regular rhythm.      Pulses: Normal pulses.      Heart sounds: Normal heart sounds.   Pulmonary:      Effort: Pulmonary effort is normal.      Breath sounds: Normal breath sounds.   Abdominal:      Palpations: Abdomen is soft.      Tenderness: There is no abdominal tenderness. There is no guarding or rebound.   Musculoskeletal:         General: No deformity. Normal range of motion.      Cervical back: Normal range of motion and neck supple. No tenderness.   Skin:     General: Skin is warm and dry.      Capillary Refill:  Capillary refill takes less than 2 seconds.   Neurological:      Mental Status: She is alert. Mental status is at baseline.      Sensory: No sensory deficit.      Motor: No weakness.   Psychiatric:         Mood and Affect: Mood normal.         Behavior: Behavior normal.             Assessment / Plan / MDM:  Medical Decision Making  57 yo F with hx of prior TBI, HTN, MILO, DVT on eliquis p/w recent ground level fall. GCS=15, ABCs intact. Both patient and family (RE salvador spoke with mom by phone) confirm hx of dysarthria at baseline. No syncope or chest pain. Low susp for intracranial injury/ CVA, fracture, arrhythmia. She is on multiple possibly sedating meds though is not lethargic at present time. Plan head/c-spine, labs including tegretol level, EKG, and will reassess.     Amount and/or Complexity of Data Reviewed  Labs: ordered. Decision-making details documented in ED Course.  Radiology: ordered and independent interpretation performed.  ECG/medicine tests: ordered.      Risk  OTC drugs.              I was physically present for the key/critical portions of the following procedures:  Procedures          Estrada Collier MD  04/05/24 [id number]       Estrada Collier MD  04/05/24 [id number]
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
## 2024-04-05 NOTE — DISCHARGE INSTRUCTIONS
You were evaluated for a fall and head injury.  Your CAT scans are unremarkable.  Please hold your normal Tegretol/carbamazepine dosing today.  Please follow-up your primary care provider.  Please return to the ED for new or concerning symptoms

## 2024-04-22 ENCOUNTER — HOSPITAL ENCOUNTER (EMERGENCY)
Facility: HOSPITAL | Age: 59
Discharge: HOME | End: 2024-04-23
Attending: EMERGENCY MEDICINE
Payer: MEDICARE

## 2024-04-22 VITALS
OXYGEN SATURATION: 99 % | HEART RATE: 44 BPM | DIASTOLIC BLOOD PRESSURE: 58 MMHG | TEMPERATURE: 97.8 F | RESPIRATION RATE: 18 BRPM | SYSTOLIC BLOOD PRESSURE: 117 MMHG

## 2024-04-22 DIAGNOSIS — R00.1 BRADYCARDIA: Primary | ICD-10-CM

## 2024-04-22 DIAGNOSIS — E87.1 HYPONATREMIA: ICD-10-CM

## 2024-04-22 LAB
ALBUMIN SERPL BCP-MCNC: 4.5 G/DL (ref 3.5–5)
ALP SERPL-CCNC: 38 U/L (ref 34–104)
ALT SERPL W P-5'-P-CCNC: 16 U/L (ref 7–52)
ANION GAP SERPL CALCULATED.3IONS-SCNC: 6 MMOL/L (ref 4–13)
AST SERPL W P-5'-P-CCNC: 21 U/L (ref 13–39)
BASOPHILS # BLD AUTO: 0.01 THOUSANDS/ÂΜL (ref 0–0.1)
BASOPHILS NFR BLD AUTO: 0 % (ref 0–1)
BILIRUB SERPL-MCNC: 0.29 MG/DL (ref 0.2–1)
BUN SERPL-MCNC: 7 MG/DL (ref 5–25)
CALCIUM SERPL-MCNC: 9.2 MG/DL (ref 8.4–10.2)
CHLORIDE SERPL-SCNC: 93 MMOL/L (ref 96–108)
CO2 SERPL-SCNC: 29 MMOL/L (ref 21–32)
CREAT SERPL-MCNC: 0.61 MG/DL (ref 0.6–1.3)
EOSINOPHIL # BLD AUTO: 0.16 THOUSAND/ÂΜL (ref 0–0.61)
EOSINOPHIL NFR BLD AUTO: 3 % (ref 0–6)
ERYTHROCYTE [DISTWIDTH] IN BLOOD BY AUTOMATED COUNT: 12.6 % (ref 11.6–15.1)
GFR SERPL CREATININE-BSD FRML MDRD: 100 ML/MIN/1.73SQ M
GLUCOSE SERPL-MCNC: 121 MG/DL (ref 65–140)
GLUCOSE SERPL-MCNC: 127 MG/DL (ref 65–140)
HCT VFR BLD AUTO: 36.5 % (ref 34.8–46.1)
HGB BLD-MCNC: 12.5 G/DL (ref 11.5–15.4)
IMM GRANULOCYTES # BLD AUTO: 0.01 THOUSAND/UL (ref 0–0.2)
IMM GRANULOCYTES NFR BLD AUTO: 0 % (ref 0–2)
LYMPHOCYTES # BLD AUTO: 2.21 THOUSANDS/ÂΜL (ref 0.6–4.47)
LYMPHOCYTES NFR BLD AUTO: 46 % (ref 14–44)
MCH RBC QN AUTO: 30 PG (ref 26.8–34.3)
MCHC RBC AUTO-ENTMCNC: 34.2 G/DL (ref 31.4–37.4)
MCV RBC AUTO: 88 FL (ref 82–98)
MONOCYTES # BLD AUTO: 0.38 THOUSAND/ÂΜL (ref 0.17–1.22)
MONOCYTES NFR BLD AUTO: 8 % (ref 4–12)
NEUTROPHILS # BLD AUTO: 2.13 THOUSANDS/ÂΜL (ref 1.85–7.62)
NEUTS SEG NFR BLD AUTO: 43 % (ref 43–75)
NRBC BLD AUTO-RTO: 0 /100 WBCS
PLATELET # BLD AUTO: 271 THOUSANDS/UL (ref 149–390)
PMV BLD AUTO: 8.3 FL (ref 8.9–12.7)
POTASSIUM SERPL-SCNC: 3.6 MMOL/L (ref 3.5–5.3)
PROT SERPL-MCNC: 7.2 G/DL (ref 6.4–8.4)
RBC # BLD AUTO: 4.16 MILLION/UL (ref 3.81–5.12)
SODIUM SERPL-SCNC: 128 MMOL/L (ref 135–147)
WBC # BLD AUTO: 4.9 THOUSAND/UL (ref 4.31–10.16)

## 2024-04-22 PROCEDURE — 80053 COMPREHEN METABOLIC PANEL: CPT

## 2024-04-22 PROCEDURE — 99285 EMERGENCY DEPT VISIT HI MDM: CPT

## 2024-04-22 PROCEDURE — 85025 COMPLETE CBC W/AUTO DIFF WBC: CPT

## 2024-04-22 PROCEDURE — 99284 EMERGENCY DEPT VISIT MOD MDM: CPT | Performed by: EMERGENCY MEDICINE

## 2024-04-22 PROCEDURE — 36415 COLL VENOUS BLD VENIPUNCTURE: CPT

## 2024-04-22 PROCEDURE — 93005 ELECTROCARDIOGRAM TRACING: CPT

## 2024-04-22 PROCEDURE — 82948 REAGENT STRIP/BLOOD GLUCOSE: CPT

## 2024-04-23 LAB
ATRIAL RATE: 46 BPM
HOLD SPECIMEN: NORMAL
P AXIS: 54 DEGREES
PR INTERVAL: 208 MS
QRS AXIS: -19 DEGREES
QRSD INTERVAL: 112 MS
QT INTERVAL: 482 MS
QTC INTERVAL: 421 MS
T WAVE AXIS: 23 DEGREES
VENTRICULAR RATE: 46 BPM

## 2024-04-23 PROCEDURE — 93010 ELECTROCARDIOGRAM REPORT: CPT | Performed by: INTERNAL MEDICINE

## 2024-04-23 NOTE — ED PROVIDER NOTES
History  Chief Complaint   Patient presents with    Slurred Speech     Pt arrived from group home via EMS. Pt presents with slurred speech, and slight facial droop that started approx 30 minutes ago now.     Altered Mental Status     58 year old Female with PMH of HTN, HLD, schizoaffective disorder,anxiety, and depression who presents to the ED via EMS from a group home for her elevated blood pressure. EMS states that she was brought in for her somnolence and slurring of her words however, after speaking to Amanda from the group home, she said it was due to the elevated BP. Patient is significantly somnolent at bedside and history is limited.         Prior to Admission Medications   Prescriptions Last Dose Informant Patient Reported? Taking?   Cholecalciferol 25 MCG (1000 UT) tablet   Yes No   Sig: Take 1,000 Units by mouth daily   DULoxetine (CYMBALTA) 60 mg delayed release capsule   Yes No   Sig: Take 60 mg by mouth daily   MULTIPLE VITAMIN PO   Yes No   Sig: Take 1 tablet by mouth in the morning   Mirabegron ER (Myrbetriq) 25 MG TB24   Yes No   Sig: Take by mouth   Probiotic Product (PROBIOTIC PO)   Yes No   Sig: Take 250 mg by mouth 2 (two) times a day   acetaminophen (TYLENOL) 325 mg tablet  Outside Facility (Specify) Yes No   Sig: Take 650 mg by mouth every 4 (four) hours as needed for mild pain   acetaminophen (TYLENOL) 650 mg CR tablet   No No   Sig: Take 1 tablet (650 mg total) by mouth every 8 (eight) hours as needed (fever)   amLODIPine (NORVASC) 10 mg tablet   No No   Sig: Take 1 tablet (10 mg total) by mouth daily   apixaban (ELIQUIS) 5 mg   Yes No   Sig: Take 5 mg by mouth 2 (two) times a day   ascorbic Acid (VITAMIN C) 500 MG CPCR   Yes No   Sig: Take 500 mg by mouth daily   bisacodyl (FLEET) 10 MG/30ML ENEM  Outside Facility (Specify) Yes No   Sig: Insert 10 mg into the rectum once   bismuth subsalicylate (PEPTO BISMOL) 524 mg/30 mL oral suspension  Outside Facility (Specify) Yes No   Sig: Take 30 mL  by mouth every 6 (six) hours as needed for indigestion Take after 2nd watery stool in 24 hours   busPIRone (BUSPAR) 15 mg tablet   Yes No   Sig: Take 20 mg by mouth 2 (two) times a day   carBAMazepine (TEGretol XR) 400 mg 12 hr tablet   Yes No   Sig: Take 1,200 mg by mouth daily at bedtime   clonazePAM (KlonoPIN) 0.5 mg tablet   Yes No   Sig: Take 0.5 mg by mouth every evening   fenofibrate (TRICOR) 145 mg tablet   Yes No   Sig: Take 145 mg by mouth daily   gabapentin (NEURONTIN) 300 mg capsule   Yes No   Sig: Take 300 mg by mouth every evening   guaiFENesin (ROBITUSSIN) 100 MG/5ML oral liquid   Yes No   Sig: Take 200 mg by mouth 3 (three) times a day as needed for cough   Patient not taking: Reported on 1/27/2024   guaiFENesin (ROBITUSSIN) 100 MG/5ML oral liquid   No No   Sig: Take 5-10 mL (100-200 mg total) by mouth every 4 (four) hours as needed for cough   ibuprofen (MOTRIN) 600 mg tablet   Yes No   Sig: Take 800 mg by mouth every 6 (six) hours as needed for mild pain   loratadine (CLARITIN) 10 mg tablet   Yes No   Sig: Take 10 mg by mouth daily   losartan (COZAAR) 100 MG tablet   Yes No   Sig: Take 100 mg by mouth every evening   magnesium hydroxide (MILK OF MAGNESIA) 400 mg/5 mL oral suspension  Other (Specify) Yes No   Sig: Take 30 mL by mouth daily as needed for constipation   melatonin 3 mg   Yes No   Sig: Take 1 tablet by mouth daily at bedtime   metoprolol succinate (TOPROL-XL) 25 mg 24 hr tablet   Yes No   Sig: Take 25 mg by mouth daily   nystatin (MYCOSTATIN) powder   Yes No   Sig: Apply 1 application. topically daily at bedtime Under the breasts   omeprazole (PriLOSEC) 40 MG capsule   Yes No   Sig: Take 40 mg by mouth daily   pravastatin (PRAVACHOL) 20 mg tablet   Yes No   Sig: Take 20 mg by mouth every evening   senna-docusate sodium (SENOKOT S) 8.6-50 mg per tablet   Yes No   Sig: Take 1 tablet by mouth daily   ziprasidone (GEODON) 80 mg capsule   Yes No   Sig: Take 80 mg by mouth 2 (two) times a day  with meals      Facility-Administered Medications: None       Past Medical History:   Diagnosis Date    Anxiety and depression     Chronic deep vein thrombosis (DVT) (HCC)     LLE    History of traumatic brain injury     Hyperlipidemia     Insomnia     Obesity     RUPAL (obstructive sleep apnea)     Schizoaffective disorder (HCC)        History reviewed. No pertinent surgical history.    History reviewed. No pertinent family history.  I have reviewed and agree with the history as documented.    E-Cigarette/Vaping    E-Cigarette Use Never User      E-Cigarette/Vaping Substances     Social History     Tobacco Use    Smoking status: Never    Smokeless tobacco: Never   Vaping Use    Vaping status: Never Used   Substance Use Topics    Alcohol use: Not Currently    Drug use: Never        Review of Systems   Unable to perform ROS: Patient unresponsive (Patient is somnolent)       Physical Exam  ED Triage Vitals   Temperature Pulse Respirations Blood Pressure SpO2   04/22/24 2335 04/22/24 2200 04/22/24 2200 04/22/24 2200 04/22/24 2200   97.8 °F (36.6 °C) (!) 47 20 168/87 99 %      Temp Source Heart Rate Source Patient Position - Orthostatic VS BP Location FiO2 (%)   04/22/24 2335 04/22/24 2245 -- -- --   Oral Monitor         Pain Score       --                    Orthostatic Vital Signs  Vitals:    04/22/24 2200 04/22/24 2215 04/22/24 2245   BP: 168/87 136/66 117/58   Pulse: (!) 47 (!) 46 (!) 44       Physical Exam  Vitals and nursing note reviewed.   Constitutional:       Appearance: Normal appearance. She is normal weight.   HENT:      Head: Normocephalic and atraumatic.      Right Ear: External ear normal.      Left Ear: External ear normal.      Nose: Nose normal.      Mouth/Throat:      Pharynx: Oropharynx is clear.   Eyes:      Extraocular Movements: Extraocular movements intact.      Conjunctiva/sclera: Conjunctivae normal.      Pupils: Pupils are equal, round, and reactive to light.      Comments: Pupils noted to be 1  lux HAYS. EOMI.   Cardiovascular:      Rate and Rhythm: Normal rate and regular rhythm.      Pulses: Normal pulses.      Heart sounds: Normal heart sounds.      Comments: RRR with +S1 and S2, no murmurs appreciated on exam. Peripheral pulses intact.    Pulmonary:      Effort: Pulmonary effort is normal. No respiratory distress.      Breath sounds: Normal breath sounds. No wheezing, rhonchi or rales.      Comments: CTABL with no abnormal lung sounds such as wheezes or rales appreciated on exam.     Abdominal:      General: Abdomen is flat. Bowel sounds are normal. There is no distension.      Palpations: Abdomen is soft.      Tenderness: There is no abdominal tenderness.      Comments: Soft, non tender, normo-active bowel sounds. Without rigidity, guarding, or distension.     Musculoskeletal:         General: Normal range of motion.      Cervical back: Normal range of motion.      Right lower leg: No edema.      Left lower leg: No edema.   Skin:     General: Skin is warm and dry.   Neurological:      General: No focal deficit present.      Mental Status: She is oriented to person, place, and time. Mental status is at baseline.      Comments: CN grossly intact on visualization. No focal neurologic deficits noted on exam.  5/5 strength in all extremities. Neurovascularly intact with normal sensation and motor function.             ED Medications  Medications - No data to display    Diagnostic Studies  Results Reviewed       Procedure Component Value Units Date/Time    [place] draw [[id number]] Collected: 04/22/24 2232    Lab Status: Final result Specimen: Blood from Arm, Right Updated: 04/23/24 0001    Narrative:      The following orders were created for panel order [place] draw.  Procedure                               Abnormality         Status                     ---------                               -----------         ------                     Light Blue Top on hold[[id number]]                           Final  result               Gold top on hold[[id number]]                                 Final result               Green / Black tube on hold[[id number]]                       Final result                 Please view results for these tests on the individual orders.    Comprehensive metabolic panel [[id number]]  (Abnormal) Collected: 04/22/24 2229    Lab Status: Final result Specimen: Blood from Arm, Right Updated: 04/22/24 2258     Sodium 128 mmol/L      Potassium 3.6 mmol/L      Chloride 93 mmol/L      CO2 29 mmol/L      ANION GAP 6 mmol/L      BUN 7 mg/dL      Creatinine 0.61 mg/dL      Glucose 121 mg/dL      Calcium 9.2 mg/dL      AST 21 U/L      ALT 16 U/L      Alkaline Phosphatase 38 U/L      Total Protein 7.2 g/dL      Albumin 4.5 g/dL      Total Bilirubin 0.29 mg/dL      eGFR 100 ml/min/1.73sq m     Narrative:      National Kid[employer] Foundation guidelines for Chronic Kidney Disease (CKD):     Stage 1 with normal or high GFR (GFR > 90 mL/min/1.73 square meters)    Stage 2 Mild CKD (GFR = 60-89 mL/min/1.73 square meters)    Stage 3A Moderate CKD (GFR = 45-59 mL/min/1.73 square meters)    Stage 3B Moderate CKD (GFR = 30-44 mL/min/1.73 square meters)    Stage 4 Severe CKD (GFR = 15-29 mL/min/1.73 square meters)    Stage 5 End Stage CKD (GFR <15 mL/min/1.73 square meters)  Note: GFR calculation is accurate only with a steady state creatinine    CBC and differential [[id number]]  (Abnormal) Collected: 04/22/24 2229    Lab Status: Final result Specimen: Blood from Arm, Right Updated: 04/22/24 2242     WBC 4.90 Thousand/uL      RBC 4.16 Million/uL      Hemoglobin 12.5 g/dL      Hematocrit 36.5 %      MCV 88 fL      MCH 30.0 pg      MCHC 34.2 g/dL      RDW 12.6 %      MPV 8.3 fL      Platelets 271 Thousands/uL      nRBC 0 /100 WBCs      Segmented % 43 %      Immature Grans % 0 %      Lymphocytes % 46 %      Monocytes % 8 %      Eosinophils Relative 3 %      Basophils Relative 0 %      Absolute Neutrophils 2.13  Thousands/µL      Absolute Immature Grans 0.01 Thousand/uL      Absolute Lymphocytes 2.21 Thousands/µL      Absolute Monocytes 0.38 Thousand/µL      Eosinophils Absolute 0.16 Thousand/µL      Basophils Absolute 0.01 Thousands/µL     Fingerstick Glucose (POCT) [[id number]]  (Normal) Collected: 04/22/24 2205    Lab Status: Final result Specimen: Blood Updated: 04/22/24 2206     POC Glucose 127 mg/dl                    No orders to display         Procedures  Procedures      ED Course  ED Course as of 04/23/24 0316   Mon Apr 22, 2024 2208 POC Glucose: 127   2218 Spoke to Amanda on the phone who states that the patient had an episode of anxiety that caused elevated BP (210s sys) so she was given her PRN 0.5 Klonopin. Then again around 8:30, the patient was agitated on the phone with her mother which caused her BP to increase again. Given routine meds at 8:30 which Amanda states she normally gets very somnolent and slurs her words. They sent her in due to elevated BP.   2323 Sodium(!): 128  Noted to be hyponatremic for the past few days.   2323 Patient re-evaluated at bedside and is sleeping without any concerns at this time. BP was 110/58                                       Medical Decision Making  58-year-old female with PMH of HTN, HLD, schizoaffective disorder, anxiety, and depression who presented to the ED via EMS from group home for elevated blood pressure.  Patient's labs were obtained and reviewed by the ED provider.  See ED course for details about patient's ED stay.  Patient's labs showed no acute concerns at this time however, hyponatremia was noted with a sodium of 128. Patient was able to be woken up following an ambulation trial.  While in the emergency department, patient had stable vital signs with no acute concerns at this time.  Patient was planned for discharge back to her group home.  Patient was advised to follow-up outpatient with her PCP for the hyponatremia.  Patient was also instructed return  to the ED if her symptoms worsen including but not limited to persistently elevated blood pressure, fevers, chills, nausea vomiting, lightheadedness or dizziness, shortness of breath, chest pain, or changes in her behavior.    Amount and/or Complexity of Data Reviewed  Labs: ordered. Decision-making details documented in ED Course.          Disposition  Final diagnoses:   Hyponatremia   Bradycardia     Time reflects when diagnosis was documented in both MDM as applicable and the Disposition within this note       Time User Action Codes Description Comment    4/22/2024 11:40 PM [place], Brad Add [E87.1] Hyponatremia     4/22/2024 11:40 PM [place], Brad Add [R00.1] Bradycardia     4/22/2024 11:41 PM [place], Brad Modify [E87.1] Hyponatremia     4/22/2024 11:41 PM [place], Brad Modify [R00.1] Bradycardia           ED Disposition       ED Disposition   Discharge    Condition   Stable    Date/Time   Mon Apr 22, 2024 11:40 PM    Comment   Prema Shepard discharge to home/self care.                   Follow-up Information       Follow up With Specialties Details Why Contact Info    Sotero Peterson MD  Call  As needed. [address] [facility] [address]  [facility] [address]  [phone]              Discharge Medication List as of 4/22/2024 11:41 PM        CONTINUE these medications which have NOT CHANGED    Details   acetaminophen (TYLENOL) 325 mg tablet Take 650 mg by mouth every 4 (four) hours as needed for mild pain, Historical Med      acetaminophen (TYLENOL) 650 mg CR tablet Take 1 tablet (650 mg total) by mouth every 8 (eight) hours as needed (fever), Starting Sat 2/24/2024, Normal      amLODIPine (NORVASC) 10 mg tablet Take 1 tablet (10 mg total) by mouth daily, Starting Mon 1/29/2024, Until Wed 2/28/2024, Normal      apixaban (ELIQUIS) 5 mg Take 5 mg by mouth 2 (two) times a day, Historical Med      ascorbic Acid (VITAMIN C) 500 MG CPCR Take 500 mg by mouth daily, Historical Med      bisacodyl (FLEET) 10 MG/30ML ENEM Insert  10 mg into the rectum once, Historical Med      bismuth subsalicylate (PEPTO BISMOL) 524 mg/30 mL oral suspension Take 30 mL by mouth every 6 (six) hours as needed for indigestion Take after 2nd watery stool in 24 hours, Historical Med      busPIRone (BUSPAR) 15 mg tablet Take 20 mg by mouth 2 (two) times a day, Historical Med      carBAMazepine (TEGretol XR) 400 mg 12 hr tablet Take 1,200 mg by mouth daily at bedtime, Historical Med      Cholecalciferol 25 MCG (1000 UT) tablet Take 1,000 Units by mouth daily, Historical Med      clonazePAM (KlonoPIN) 0.5 mg tablet Take 0.5 mg by mouth every evening, Historical Med      DULoxetine (CYMBALTA) 60 mg delayed release capsule Take 60 mg by mouth daily, Historical Med      fenofibrate (TRICOR) 145 mg tablet Take 145 mg by mouth daily, Historical Med      gabapentin (NEURONTIN) 300 mg capsule Take 300 mg by mouth every evening, Historical Med      !! guaiFENesin (ROBITUSSIN) 100 MG/5ML oral liquid Take 200 mg by mouth 3 (three) times a day as needed for cough, Historical Med      !! guaiFENesin (ROBITUSSIN) 100 MG/5ML oral liquid Take 5-10 mL (100-200 mg total) by mouth every 4 (four) hours as needed for cough, Starting Sat 2/24/2024, Normal      ibuprofen (MOTRIN) 600 mg tablet Take 800 mg by mouth every 6 (six) hours as needed for mild pain, Historical Med      loratadine (CLARITIN) 10 mg tablet Take 10 mg by mouth daily, Historical Med      losartan (COZAAR) 100 MG tablet Take 100 mg by mouth every evening, Historical Med      magnesium hydroxide (MILK OF MAGNESIA) 400 mg/5 mL oral suspension Take 30 mL by mouth daily as needed for constipation, Historical Med      melatonin 3 mg Take 1 tablet by mouth daily at bedtime, Historical Med      metoprolol succinate (TOPROL-XL) 25 mg 24 hr tablet Take 25 mg by mouth daily, Historical Med      Mirabegron ER (Myrbetriq) 25 MG TB24 Take by mouth, Historical Med      MULTIPLE VITAMIN PO Take 1 tablet by mouth in the morning,  Historical Med      nystatin (MYCOSTATIN) powder Apply 1 application. topically daily at bedtime Under the breasts, Historical Med      omeprazole (PriLOSEC) 40 MG capsule Take 40 mg by mouth daily, Historical Med      pravastatin (PRAVACHOL) 20 mg tablet Take 20 mg by mouth every evening, Historical Med      Probiotic Product (PROBIOTIC PO) Take 250 mg by mouth 2 (two) times a day, Historical Med      senna-docusate sodium (SENOKOT S) 8.6-50 mg per tablet Take 1 tablet by mouth daily, Historical Med      ziprasidone (GEODON) 80 mg capsule Take 80 mg by mouth 2 (two) times a day with meals, Historical Med       !! - Potential duplicate medications found. Please discuss with provider.        No discharge procedures on file.    PDMP Review         Value Time User    PDMP Reviewed  Yes 2/16/2024 10:17 PM Seferino Justice MD             ED Provider  Attending physically available and evaluated Prema Shepard. I managed the patient along with the ED Attending.    Electronically Signed by           Brad Garner MD  04/23/24 3475
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
## 2024-05-08 ENCOUNTER — APPOINTMENT (EMERGENCY)
Dept: CT IMAGING | Facility: HOSPITAL | Age: 59
End: 2024-05-08
Payer: MEDICARE

## 2024-05-08 ENCOUNTER — HOSPITAL ENCOUNTER (EMERGENCY)
Facility: HOSPITAL | Age: 59
Discharge: HOME | End: 2024-05-08
Attending: SURGERY
Payer: MEDICARE

## 2024-05-08 ENCOUNTER — APPOINTMENT (EMERGENCY)
Dept: RADIOLOGY | Facility: HOSPITAL | Age: 59
End: 2024-05-08
Payer: MEDICARE

## 2024-05-08 VITALS
HEART RATE: 62 BPM | TEMPERATURE: 98.4 F | OXYGEN SATURATION: 98 % | RESPIRATION RATE: 18 BRPM | WEIGHT: 209.66 LBS | DIASTOLIC BLOOD PRESSURE: 78 MMHG | SYSTOLIC BLOOD PRESSURE: 162 MMHG

## 2024-05-08 DIAGNOSIS — T14.8XXA ABRASION: ICD-10-CM
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
## 2024-05-08 PROBLEM — S09.90XA CLOSED HEAD INJURY: Status: ACTIVE | Noted: 2024-05-08

## 2024-05-08 LAB
BASE EXCESS BLDA CALC-SCNC: 0 MMOL/L (ref -2–3)
BASE EXCESS BLDA CALC-SCNC: 0 MMOL/L (ref -2–3)
BILIRUB UR QL STRIP: NEGATIVE
BILIRUB UR QL STRIP: NEGATIVE
CA-I BLD-SCNC: 1.17 MMOL/L (ref 1.12–1.32)
CA-I BLD-SCNC: 1.17 MMOL/L (ref 1.12–1.32)
CLARITY UR: CLEAR
CLARITY UR: CLEAR
COLOR UR: YELLOW
COLOR UR: YELLOW
GLUCOSE SERPL-MCNC: 97 MG/DL (ref 65–140)
GLUCOSE SERPL-MCNC: 97 MG/DL (ref 65–140)
GLUCOSE UR STRIP-MCNC: NEGATIVE MG/DL
GLUCOSE UR STRIP-MCNC: NEGATIVE MG/DL
HCO3 BLDA-SCNC: 24.4 MMOL/L (ref 24–30)
HCO3 BLDA-SCNC: 24.4 MMOL/L (ref 24–30)
HCT VFR BLD CALC: 33 % (ref 34.8–46.1)
HCT VFR BLD CALC: 33 % (ref 34.8–46.1)
HGB BLDA-MCNC: 11.2 G/DL (ref 11.5–15.4)
HGB BLDA-MCNC: 11.2 G/DL (ref 11.5–15.4)
HGB UR QL STRIP.AUTO: NEGATIVE
HGB UR QL STRIP.AUTO: NEGATIVE
KETONES UR STRIP-MCNC: NEGATIVE MG/DL
KETONES UR STRIP-MCNC: NEGATIVE MG/DL
LEUKOCYTE ESTERASE UR QL STRIP: NEGATIVE
LEUKOCYTE ESTERASE UR QL STRIP: NEGATIVE
NITRITE UR QL STRIP: NEGATIVE
NITRITE UR QL STRIP: NEGATIVE
PCO2 BLD: 25 MMOL/L (ref 21–32)
PCO2 BLD: 25 MMOL/L (ref 21–32)
PCO2 BLD: 36.7 MM HG (ref 42–50)
PCO2 BLD: 36.7 MM HG (ref 42–50)
PH BLD: 7.43 [PH] (ref 7.3–7.4)
PH BLD: 7.43 [PH] (ref 7.3–7.4)
PH UR STRIP.AUTO: 6.5 [PH]
PH UR STRIP.AUTO: 6.5 [PH]
PO2 BLD: 57 MM HG (ref 35–45)
PO2 BLD: 57 MM HG (ref 35–45)
POTASSIUM BLD-SCNC: 4.2 MMOL/L (ref 3.5–5.3)
POTASSIUM BLD-SCNC: 4.2 MMOL/L (ref 3.5–5.3)
PROT UR STRIP-MCNC: NEGATIVE MG/DL
PROT UR STRIP-MCNC: NEGATIVE MG/DL
SAO2 % BLD FROM PO2: 90 % (ref 60–85)
SAO2 % BLD FROM PO2: 90 % (ref 60–85)
SODIUM BLD-SCNC: 129 MMOL/L (ref 136–145)
SODIUM BLD-SCNC: 129 MMOL/L (ref 136–145)
SP GR UR STRIP.AUTO: 1.03 (ref 1–1.03)
SP GR UR STRIP.AUTO: 1.03 (ref 1–1.03)
SPECIMEN SOURCE: ABNORMAL
SPECIMEN SOURCE: ABNORMAL
UROBILINOGEN UR STRIP-ACNC: <2 MG/DL
UROBILINOGEN UR STRIP-ACNC: <2 MG/DL

## 2024-05-08 PROCEDURE — 99284 EMERGENCY DEPT VISIT MOD MDM: CPT | Performed by: SURGERY

## 2024-05-08 PROCEDURE — 93308 TTE F-UP OR LMTD: CPT | Performed by: SURGERY

## 2024-05-08 PROCEDURE — 76705 ECHO EXAM OF ABDOMEN: CPT | Performed by: SURGERY

## 2024-05-08 PROCEDURE — 99284 EMERGENCY DEPT VISIT MOD MDM: CPT

## 2024-05-08 PROCEDURE — 84295 ASSAY OF SERUM SODIUM: CPT

## 2024-05-08 PROCEDURE — 84132 ASSAY OF SERUM POTASSIUM: CPT

## 2024-05-08 PROCEDURE — 82803 BLOOD GASES ANY COMBINATION: CPT

## 2024-05-08 PROCEDURE — 82947 ASSAY GLUCOSE BLOOD QUANT: CPT

## 2024-05-08 PROCEDURE — 85014 HEMATOCRIT: CPT

## 2024-05-08 PROCEDURE — 82330 ASSAY OF CALCIUM: CPT

## 2024-05-08 PROCEDURE — 81003 URINALYSIS AUTO W/O SCOPE: CPT | Performed by: PHYSICIAN ASSISTANT

## 2024-05-08 PROCEDURE — 90715 TDAP VACCINE 7 YRS/> IM: CPT | Performed by: PHYSICIAN ASSISTANT

## 2024-05-08 PROCEDURE — 96365 THER/PROPH/DIAG IV INF INIT: CPT

## 2024-05-08 PROCEDURE — 70450 CT HEAD/BRAIN W/O DYE: CPT

## 2024-05-08 PROCEDURE — EDAIR PR ED AIR: Performed by: EMERGENCY MEDICINE

## 2024-05-08 PROCEDURE — 71045 X-RAY EXAM CHEST 1 VIEW: CPT

## 2024-05-08 PROCEDURE — 90471 IMMUNIZATION ADMIN: CPT

## 2024-05-08 PROCEDURE — 72125 CT NECK SPINE W/O DYE: CPT

## 2024-05-08 PROCEDURE — NC001 PR NO CHARGE: Performed by: SURGERY

## 2024-05-08 RX ORDER — METHOCARBAMOL 500 MG/1
500 TABLET, FILM COATED ORAL EVERY 8 HOURS SCHEDULED
Status: DISCONTINUED | OUTPATIENT
Start: 2024-05-08 | End: 2024-05-08 | Stop reason: HOSPADM

## 2024-05-08 RX ORDER — LIDOCAINE 50 MG/G
1 PATCH TOPICAL DAILY
Status: DISCONTINUED | OUTPATIENT
Start: 2024-05-08 | End: 2024-05-08 | Stop reason: HOSPADM

## 2024-05-08 RX ORDER — ACETAMINOPHEN 10 MG/ML
1000 INJECTION, SOLUTION INTRAVENOUS ONCE
Status: COMPLETED | OUTPATIENT
Start: 2024-05-08 | End: 2024-05-08

## 2024-05-08 RX ADMIN — ACETAMINOPHEN 1000 MG: 10 INJECTION INTRAVENOUS at 09:27

## 2024-05-08 RX ADMIN — METHOCARBAMOL 500 MG: 500 TABLET ORAL at 09:25

## 2024-05-08 RX ADMIN — LIDOCAINE 1 PATCH: 50 PATCH CUTANEOUS at 09:25

## 2024-05-08 RX ADMIN — TETANUS TOXOID, REDUCED DIPHTHERIA TOXOID AND ACELLULAR PERTUSSIS VACCINE, ADSORBED 0.5 ML: 5; 2.5; 8; 8; 2.5 SUSPENSION INTRAMUSCULAR at 07:29

## 2024-05-08 NOTE — TRAUMA DOCUMENTATION
"Pt ambulated to restroom with walker. Steady gait. Pt c/o neck pain. Also reports left thigh pain that has been ongoing \"for awhile\"  "

## 2024-05-08 NOTE — H&P
H&P - Trauma   Prema Shepard 58 y.o. female MRN: [id number]  Unit/Bed#: ED-03 Encounter: [id number]    Trauma Alert: Level B   Model of Arrival: Ambulance    Trauma Team: Attending Lela and WENDY Najera  Consultants:     None     Assessment/Plan   Active Problems / Assessment:   - Lives at a group home, history of TBI  - Head strike no LOC, takes Eliquis  - Mechanical fall when patient was not using her RW  - Small abrasion to upper lip  - GCS 15, non focal  - Reports headache, dizziness, neck pain     Plan:   - FAST negative  - CXR reviewed  - CT Head, C-spine: NAD  - Dispo: Discharge as appropriate    History of Present Illness     Chief Complaint: Head strike  Mechanism:Fall     HPI:    Prema Shepard is a 58 y.o. female who presents s/p mechanical fall without a walker. + head strike, she takes Eliquis. She reports a headache, dizziness, neck pain. Moving all extremities    Review of Systems   Constitutional: Negative.    HENT: Negative.     Eyes: Negative.    Respiratory: Negative.     Cardiovascular: Negative.    Gastrointestinal: Negative.    Endocrine: Negative.    Genitourinary: Negative.    Musculoskeletal:  Positive for neck pain. Negative for back pain.   Skin:  Positive for wound.   Neurological:  Positive for dizziness and headaches.   Hematological: Negative.      12-point, complete review of systems was reviewed and negative except as stated above.     Historical Information     Past Medical History:   Diagnosis Date    Anxiety and depression     DVT (deep venous thrombosis) ([facility])     RUPAL (obstructive sleep apnea)     Schizoaffective disorder (HCC)     TBI (traumatic brain injury) ([facility])      History reviewed. No pertinent surgical history.   Unable to obtain history due to Unreliable historian    Social History     Tobacco Use    Smoking status: Never    Smokeless tobacco: Never   Substance Use Topics    Alcohol use: Not Currently    Drug use: Never       Last Tetanus: updated in  trauma bay  Family History: Non-contributory     Meds/Allergies   all current active meds have been reviewed  Allergies: latex, amoxicillin, apricot flavor, salicylic acid, pollen extract, medical tape, haldol    Objective   Initial Vitals:   Temperature: 98.4 °F (36.9 °C) (05/08/24 0650)  Pulse: 62 (05/08/24 0650)  Respirations: 18 (05/08/24 0650)  Blood Pressure: 140/70 (05/08/24 0650)    Primary Survey:   Airway:        Status: patent;        Pre-hospital Interventions: none        Hospital Interventions: none  Breathing:        Pre-hospital Interventions: none       Effort: normal       Right breath sounds: normal       Left breath sounds: normal  Circulation:        Rhythm: regular       Rate: regular   Right Pulses Left Pulses    R radial: 2+  R femoral: 2+  R pedal: 2+  R carotid: 2+  R popliteal: 2+ L radial: 2+  L femoral: 2+  L pedal: 2+  L carotid: 2+  L popliteal: 2+   Disability:        GCS: Eye: 4; Verbal: 5 Motor: 6 Total: 15       Right Pupil: round;  reactive         Left Pupil:  round;  reactive      R Motor Strength L Motor Strength    R : 5/5  R dorsiflex: 5/5  R plantarflex: 5/5 L : 5/5  L dorsiflex: 5/5  L plantarflex: 5/5        Sensory:  No sensory deficit  Exposure:       Completed: Yes      Secondary Survey:  Physical Exam  Constitutional:       General: She is not in acute distress.     Appearance: Normal appearance.   HENT:      Head: Normocephalic.      Comments: Small abrasion to upper lip     Nose: Nose normal.      Mouth/Throat:      Mouth: Mucous membranes are moist.   Eyes:      Pupils: Pupils are equal, round, and reactive to light.   Neck:      Comments: + cervical spine in place  Cardiovascular:      Rate and Rhythm: Normal rate and regular rhythm.      Pulses: Normal pulses.      Heart sounds: Normal heart sounds.   Pulmonary:      Effort: Pulmonary effort is normal.      Breath sounds: Normal breath sounds.   Abdominal:      General: Abdomen is flat. There is no  distension.      Palpations: Abdomen is soft.      Tenderness: There is no abdominal tenderness. There is no guarding.   Musculoskeletal:         General: No swelling, tenderness or deformity. Normal range of motion.      Cervical back: Normal range of motion and neck supple.   Skin:     General: Skin is warm and dry.      Capillary Refill: Capillary refill takes less than 2 seconds.      Comments: + small abrasion above lip   Neurological:      General: No focal deficit present.      Mental Status: She is alert and oriented to person, place, and time. Mental status is at baseline.      Sensory: No sensory deficit.      Motor: No weakness.   Psychiatric:         Behavior: Behavior normal.         Invasive Devices       Peripheral Intravenous Line  Duration             Peripheral IV 05/08/24 Proximal;Right;Ventral (anterior) Forearm <1 day                  Lab Results: I have personally reviewed all pertinent laboratory/test results 05/08/24 and in the preceding 24 hours.  Recent Labs     05/08/24  0703   HGB 11.2*   HCT 33*   CO2 25   CAIONIZED 1.17       Imaging Results: I have personally reviewed pertinent images saved in PACS. CT scan findings (and other pertinent positive findings on images) were discussed with radiology. My interpretation of the images/reports are as follows:  No acute findings  Other Studies: none    Code Status: No Order  Advance Directive and Living Will:      Power of :    POLST:
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
## 2024-05-08 NOTE — TRAUMA DOCUMENTATION
Transport requested through [facility]. Per facility, if pt leaving prior to 4, pt is to go to [facility]. It patient's transport time is after, pt will need to go to [facility] where staff will meet her

## 2024-05-08 NOTE — PROCEDURES
POC FAST US    Date/Time: 5/8/2024 6:57 AM    Performed by: Nando Browne DO  Authorized by: Nando Browne DO    Patient location:  ED  Procedure details:     Exam Type:  Diagnostic    Indications comment:  Fall, blood thinners    Assess for:  Intra-abdominal fluid and pericardial effusion    Technique: FAST      Views obtained:  Heart - Pericardial sac, LUQ - Splenorenal space, Suprapubic - Pouch of Pb and RUQ - Colunga's Pouch    Image quality: diagnostic      Image availability:  Images available in PACS and video obtained  FAST Findings:     RUQ (Hepatorenal) free fluid: absent      LUQ (Splenorenal) free fluid: absent      Suprapubic free fluid: absent      Cardiac wall motion: identified      Pericardial effusion: absent    Interpretation:     Impressions: negative

## 2024-05-08 NOTE — DISCHARGE INSTRUCTIONS
Take tylenol at home for pain every 6 hours, avoid strenuous activity - please return to the ED as needed for new/worsening symptoms including: severe headache, confusion, focal weakness, fevers, etc.
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
## 2024-05-31 ENCOUNTER — HOSPITAL ENCOUNTER (EMERGENCY)
Facility: HOSPITAL | Age: 59
DRG: 885 | End: 2024-05-31
Attending: EMERGENCY MEDICINE
Payer: MEDICARE

## 2024-05-31 ENCOUNTER — HOSPITAL ENCOUNTER (INPATIENT)
Facility: HOSPITAL | Age: 59
LOS: 6 days | Discharge: HOME | DRG: 885 | End: 2024-06-06
Attending: STUDENT IN AN ORGANIZED HEALTH CARE EDUCATION/TRAINING PROGRAM | Admitting: STUDENT IN AN ORGANIZED HEALTH CARE EDUCATION/TRAINING PROGRAM
Payer: MEDICARE

## 2024-05-31 VITALS
SYSTOLIC BLOOD PRESSURE: 137 MMHG | TEMPERATURE: 98.7 F | HEART RATE: 63 BPM | DIASTOLIC BLOOD PRESSURE: 62 MMHG | RESPIRATION RATE: 18 BRPM | OXYGEN SATURATION: 98 %

## 2024-05-31 DIAGNOSIS — Z86.718 HISTORY OF DVT (DEEP VEIN THROMBOSIS): ICD-10-CM

## 2024-05-31 DIAGNOSIS — R29.90 STROKE-LIKE SYMPTOMS: ICD-10-CM

## 2024-05-31 DIAGNOSIS — S06.9XAA TBI (TRAUMATIC BRAIN INJURY) (HCC): ICD-10-CM

## 2024-05-31 DIAGNOSIS — E87.1 HYPONATREMIA: ICD-10-CM

## 2024-05-31 DIAGNOSIS — E78.5 HYPERLIPEMIA: ICD-10-CM

## 2024-05-31 DIAGNOSIS — I10 PRIMARY HYPERTENSION: ICD-10-CM

## 2024-05-31 DIAGNOSIS — Z00.8 MEDICAL CLEARANCE FOR PSYCHIATRIC ADMISSION: Primary | ICD-10-CM

## 2024-05-31 DIAGNOSIS — R45.851 SUICIDAL IDEATIONS: ICD-10-CM

## 2024-05-31 DIAGNOSIS — Z00.8 MEDICAL CLEARANCE FOR PSYCHIATRIC ADMISSION: ICD-10-CM

## 2024-05-31 DIAGNOSIS — F25.0 SCHIZOAFFECTIVE DISORDER, BIPOLAR TYPE (HCC): Primary | ICD-10-CM

## 2024-05-31 LAB
25(OH)D3 SERPL-MCNC: 22.5 NG/ML (ref 30–100)
ALBUMIN SERPL BCG-MCNC: 4.7 G/DL (ref 3.5–5)
ALP SERPL-CCNC: 41 U/L (ref 34–104)
ALT SERPL W P-5'-P-CCNC: 13 U/L (ref 7–52)
AMPHETAMINES SERPL QL SCN: NEGATIVE
ANION GAP SERPL CALCULATED.3IONS-SCNC: 9 MMOL/L (ref 4–13)
AST SERPL W P-5'-P-CCNC: 19 U/L (ref 13–39)
BACTERIA UR QL AUTO: ABNORMAL /HPF
BARBITURATES UR QL: NEGATIVE
BASOPHILS # BLD AUTO: 0.01 THOUSANDS/ÂΜL (ref 0–0.1)
BASOPHILS NFR BLD AUTO: 0 % (ref 0–1)
BENZODIAZ UR QL: NEGATIVE
BILIRUB SERPL-MCNC: 0.37 MG/DL (ref 0.2–1)
BILIRUB UR QL STRIP: NEGATIVE
BUN SERPL-MCNC: 8 MG/DL (ref 5–25)
CALCIUM SERPL-MCNC: 9.6 MG/DL (ref 8.4–10.2)
CHLORIDE SERPL-SCNC: 99 MMOL/L (ref 96–108)
CLARITY UR: CLEAR
CO2 SERPL-SCNC: 25 MMOL/L (ref 21–32)
COCAINE UR QL: NEGATIVE
COLOR UR: ABNORMAL
CREAT SERPL-MCNC: 0.59 MG/DL (ref 0.6–1.3)
EOSINOPHIL # BLD AUTO: 0.05 THOUSAND/ÂΜL (ref 0–0.61)
EOSINOPHIL NFR BLD AUTO: 1 % (ref 0–6)
ERYTHROCYTE [DISTWIDTH] IN BLOOD BY AUTOMATED COUNT: 12.7 % (ref 11.6–15.1)
ETHANOL EXG-MCNC: 0 MG/DL
FENTANYL UR QL SCN: NEGATIVE
FOLATE SERPL-MCNC: >22.3 NG/ML
GFR SERPL CREATININE-BSD FRML MDRD: 101 ML/MIN/1.73SQ M
GLUCOSE SERPL-MCNC: 128 MG/DL (ref 65–140)
GLUCOSE UR STRIP-MCNC: NEGATIVE MG/DL
HCT VFR BLD AUTO: 34.9 % (ref 34.8–46.1)
HGB BLD-MCNC: 12.2 G/DL (ref 11.5–15.4)
HGB UR QL STRIP.AUTO: NEGATIVE
HYDROCODONE UR QL SCN: NEGATIVE
IMM GRANULOCYTES # BLD AUTO: 0.01 THOUSAND/UL (ref 0–0.2)
IMM GRANULOCYTES NFR BLD AUTO: 0 % (ref 0–2)
KETONES UR STRIP-MCNC: NEGATIVE MG/DL
LEUKOCYTE ESTERASE UR QL STRIP: ABNORMAL
LYMPHOCYTES # BLD AUTO: 1.56 THOUSANDS/ÂΜL (ref 0.6–4.47)
LYMPHOCYTES NFR BLD AUTO: 26 % (ref 14–44)
MCH RBC QN AUTO: 30.3 PG (ref 26.8–34.3)
MCHC RBC AUTO-ENTMCNC: 35 G/DL (ref 31.4–37.4)
MCV RBC AUTO: 87 FL (ref 82–98)
METHADONE UR QL: NEGATIVE
MONOCYTES # BLD AUTO: 0.45 THOUSAND/ÂΜL (ref 0.17–1.22)
MONOCYTES NFR BLD AUTO: 7 % (ref 4–12)
MUCOUS THREADS UR QL AUTO: ABNORMAL
NEUTROPHILS # BLD AUTO: 4.04 THOUSANDS/ÂΜL (ref 1.85–7.62)
NEUTS SEG NFR BLD AUTO: 66 % (ref 43–75)
NITRITE UR QL STRIP: NEGATIVE
NON-SQ EPI CELLS URNS QL MICRO: ABNORMAL /HPF
NRBC BLD AUTO-RTO: 0 /100 WBCS
OPIATES UR QL SCN: NEGATIVE
OXYCODONE+OXYMORPHONE UR QL SCN: NEGATIVE
PCP UR QL: NEGATIVE
PH UR STRIP.AUTO: 6 [PH]
PLATELET # BLD AUTO: 320 THOUSANDS/UL (ref 149–390)
PMV BLD AUTO: 8.4 FL (ref 8.9–12.7)
POTASSIUM SERPL-SCNC: 3.7 MMOL/L (ref 3.5–5.3)
PROT SERPL-MCNC: 7.6 G/DL (ref 6.4–8.4)
PROT UR STRIP-MCNC: NEGATIVE MG/DL
RBC # BLD AUTO: 4.03 MILLION/UL (ref 3.81–5.12)
RBC #/AREA URNS AUTO: ABNORMAL /HPF
SODIUM SERPL-SCNC: 133 MMOL/L (ref 135–147)
SP GR UR STRIP.AUTO: 1.01 (ref 1–1.03)
THC UR QL: NEGATIVE
TSH SERPL DL<=0.05 MIU/L-ACNC: 0.99 UIU/ML (ref 0.45–4.5)
TSH SERPL DL<=0.05 MIU/L-ACNC: 1 UIU/ML (ref 0.45–4.5)
UROBILINOGEN UR STRIP-ACNC: <2 MG/DL
VIT B12 SERPL-MCNC: 295 PG/ML (ref 180–914)
WBC # BLD AUTO: 6.12 THOUSAND/UL (ref 4.31–10.16)
WBC #/AREA URNS AUTO: ABNORMAL /HPF

## 2024-05-31 PROCEDURE — 99283 EMERGENCY DEPT VISIT LOW MDM: CPT

## 2024-05-31 PROCEDURE — 82306 VITAMIN D 25 HYDROXY: CPT

## 2024-05-31 PROCEDURE — 87081 CULTURE SCREEN ONLY: CPT | Performed by: STUDENT IN AN ORGANIZED HEALTH CARE EDUCATION/TRAINING PROGRAM

## 2024-05-31 PROCEDURE — 94760 N-INVAS EAR/PLS OXIMETRY 1: CPT

## 2024-05-31 PROCEDURE — 83036 HEMOGLOBIN GLYCOSYLATED A1C: CPT

## 2024-05-31 PROCEDURE — 82075 ASSAY OF BREATH ETHANOL: CPT

## 2024-05-31 PROCEDURE — 85025 COMPLETE CBC W/AUTO DIFF WBC: CPT

## 2024-05-31 PROCEDURE — 82607 VITAMIN B-12: CPT

## 2024-05-31 PROCEDURE — 81001 URINALYSIS AUTO W/SCOPE: CPT

## 2024-05-31 PROCEDURE — 84443 ASSAY THYROID STIM HORMONE: CPT

## 2024-05-31 PROCEDURE — 87147 CULTURE TYPE IMMUNOLOGIC: CPT | Performed by: STUDENT IN AN ORGANIZED HEALTH CARE EDUCATION/TRAINING PROGRAM

## 2024-05-31 PROCEDURE — 80307 DRUG TEST PRSMV CHEM ANLYZR: CPT

## 2024-05-31 PROCEDURE — 80053 COMPREHEN METABOLIC PANEL: CPT

## 2024-05-31 PROCEDURE — 36415 COLL VENOUS BLD VENIPUNCTURE: CPT

## 2024-05-31 PROCEDURE — 94002 VENT MGMT INPAT INIT DAY: CPT

## 2024-05-31 PROCEDURE — 82746 ASSAY OF FOLIC ACID SERUM: CPT

## 2024-05-31 PROCEDURE — 99285 EMERGENCY DEPT VISIT HI MDM: CPT | Performed by: EMERGENCY MEDICINE

## 2024-05-31 PROCEDURE — 80061 LIPID PANEL: CPT

## 2024-05-31 PROCEDURE — 96374 THER/PROPH/DIAG INJ IV PUSH: CPT

## 2024-05-31 RX ORDER — DIPHENOXYLATE HYDROCHLORIDE AND ATROPINE SULFATE 2.5; .025 MG/1; MG/1
1 TABLET ORAL DAILY
Status: ON HOLD | COMMUNITY
End: 2024-05-31 | Stop reason: SDUPTHER

## 2024-05-31 RX ORDER — OLANZAPINE 2.5 MG/1
2.5 TABLET, FILM COATED ORAL
Status: CANCELLED | OUTPATIENT
Start: 2024-05-31

## 2024-05-31 RX ORDER — PROPRANOLOL HYDROCHLORIDE 10 MG/1
5 TABLET ORAL EVERY 8 HOURS PRN
Status: DISCONTINUED | OUTPATIENT
Start: 2024-05-31 | End: 2024-06-06 | Stop reason: HOSPADM

## 2024-05-31 RX ORDER — BENZTROPINE MESYLATE 0.5 MG/1
0.5 TABLET ORAL
Status: DISCONTINUED | OUTPATIENT
Start: 2024-05-31 | End: 2024-06-06 | Stop reason: HOSPADM

## 2024-05-31 RX ORDER — ACETAMINOPHEN 325 MG/1
650 TABLET ORAL EVERY 4 HOURS PRN
Status: DISCONTINUED | OUTPATIENT
Start: 2024-05-31 | End: 2024-06-06 | Stop reason: HOSPADM

## 2024-05-31 RX ORDER — OLANZAPINE 2.5 MG/1
5 TABLET, FILM COATED ORAL
Status: CANCELLED | OUTPATIENT
Start: 2024-05-31

## 2024-05-31 RX ORDER — TRAZODONE HYDROCHLORIDE 50 MG/1
50 TABLET ORAL
Status: DISCONTINUED | OUTPATIENT
Start: 2024-05-31 | End: 2024-06-06 | Stop reason: HOSPADM

## 2024-05-31 RX ORDER — AMOXICILLIN 250 MG
1 CAPSULE ORAL DAILY PRN
Status: CANCELLED | OUTPATIENT
Start: 2024-05-31

## 2024-05-31 RX ORDER — HYDROXYZINE 50 MG/1
50 TABLET, FILM COATED ORAL
Status: DISCONTINUED | OUTPATIENT
Start: 2024-05-31 | End: 2024-06-06 | Stop reason: HOSPADM

## 2024-05-31 RX ORDER — ACETAMINOPHEN 325 MG/1
975 TABLET ORAL ONCE
Status: COMPLETED | OUTPATIENT
Start: 2024-05-31 | End: 2024-05-31

## 2024-05-31 RX ORDER — HYDROXYZINE HYDROCHLORIDE 25 MG/1
50 TABLET, FILM COATED ORAL
Status: CANCELLED | OUTPATIENT
Start: 2024-05-31

## 2024-05-31 RX ORDER — LORAZEPAM 2 MG/ML
1 INJECTION INTRAMUSCULAR
Status: DISCONTINUED | OUTPATIENT
Start: 2024-05-31 | End: 2024-06-06 | Stop reason: HOSPADM

## 2024-05-31 RX ORDER — ACETAMINOPHEN 325 MG/1
975 TABLET ORAL EVERY 6 HOURS PRN
Status: CANCELLED | OUTPATIENT
Start: 2024-05-31

## 2024-05-31 RX ORDER — ACETAMINOPHEN 325 MG/1
650 TABLET ORAL EVERY 4 HOURS PRN
Status: CANCELLED | OUTPATIENT
Start: 2024-05-31

## 2024-05-31 RX ORDER — MAGNESIUM HYDROXIDE/ALUMINUM HYDROXICE/SIMETHICONE 120; 1200; 1200 MG/30ML; MG/30ML; MG/30ML
30 SUSPENSION ORAL EVERY 4 HOURS PRN
Status: DISCONTINUED | OUTPATIENT
Start: 2024-05-31 | End: 2024-06-06 | Stop reason: HOSPADM

## 2024-05-31 RX ORDER — PROPRANOLOL HYDROCHLORIDE 10 MG/1
5 TABLET ORAL EVERY 8 HOURS PRN
Status: CANCELLED | OUTPATIENT
Start: 2024-05-31

## 2024-05-31 RX ORDER — OLANZAPINE 10 MG/2ML
5 INJECTION, POWDER, FOR SOLUTION INTRAMUSCULAR
Status: DISCONTINUED | OUTPATIENT
Start: 2024-05-31 | End: 2024-06-06 | Stop reason: HOSPADM

## 2024-05-31 RX ORDER — OLANZAPINE 2.5 MG/1
2.5 TABLET, FILM COATED ORAL
Status: DISCONTINUED | OUTPATIENT
Start: 2024-05-31 | End: 2024-06-06 | Stop reason: HOSPADM

## 2024-05-31 RX ORDER — ACETAMINOPHEN 325 MG/1
975 TABLET ORAL EVERY 6 HOURS PRN
Status: DISCONTINUED | OUTPATIENT
Start: 2024-05-31 | End: 2024-06-06 | Stop reason: HOSPADM

## 2024-05-31 RX ORDER — POLYETHYLENE GLYCOL 3350 17 G/17G
17 POWDER, FOR SOLUTION ORAL DAILY PRN
Status: DISCONTINUED | OUTPATIENT
Start: 2024-05-31 | End: 2024-06-01

## 2024-05-31 RX ORDER — OLANZAPINE 5 MG/1
5 TABLET ORAL
Status: DISCONTINUED | OUTPATIENT
Start: 2024-05-31 | End: 2024-06-06 | Stop reason: HOSPADM

## 2024-05-31 RX ORDER — MIDAZOLAM HYDROCHLORIDE 2 MG/2ML
2 INJECTION, SOLUTION INTRAMUSCULAR; INTRAVENOUS ONCE
Status: COMPLETED | OUTPATIENT
Start: 2024-05-31 | End: 2024-05-31

## 2024-05-31 RX ORDER — GABAPENTIN 300 MG/1
300 CAPSULE ORAL EVERY EVENING
Status: ON HOLD | COMMUNITY
End: 2024-05-31 | Stop reason: SDUPTHER

## 2024-05-31 RX ORDER — OMEPRAZOLE 40 MG/1
40 CAPSULE, DELAYED RELEASE ORAL DAILY
COMMUNITY

## 2024-05-31 RX ORDER — OLANZAPINE 10 MG/2ML
5 INJECTION, POWDER, FOR SOLUTION INTRAMUSCULAR
Status: CANCELLED | OUTPATIENT
Start: 2024-05-31

## 2024-05-31 RX ORDER — LORAZEPAM 1 MG/1
1 TABLET ORAL
Status: CANCELLED | OUTPATIENT
Start: 2024-05-31

## 2024-05-31 RX ORDER — AMOXICILLIN 250 MG
1 CAPSULE ORAL DAILY PRN
Status: DISCONTINUED | OUTPATIENT
Start: 2024-05-31 | End: 2024-06-06 | Stop reason: HOSPADM

## 2024-05-31 RX ORDER — LORAZEPAM 1 MG/1
1 TABLET ORAL
Status: DISCONTINUED | OUTPATIENT
Start: 2024-05-31 | End: 2024-06-06 | Stop reason: HOSPADM

## 2024-05-31 RX ORDER — FENOFIBRATE 145 MG/1
145 TABLET, COATED ORAL EVERY EVENING
COMMUNITY

## 2024-05-31 RX ORDER — HYDROXYZINE HYDROCHLORIDE 25 MG/1
25 TABLET, FILM COATED ORAL
Status: CANCELLED | OUTPATIENT
Start: 2024-05-31

## 2024-05-31 RX ORDER — MAGNESIUM HYDROXIDE/ALUMINUM HYDROXICE/SIMETHICONE 120; 1200; 1200 MG/30ML; MG/30ML; MG/30ML
30 SUSPENSION ORAL EVERY 4 HOURS PRN
Status: CANCELLED | OUTPATIENT
Start: 2024-05-31

## 2024-05-31 RX ORDER — BISACODYL 10 MG
10 SUPPOSITORY, RECTAL RECTAL DAILY PRN
Status: CANCELLED | OUTPATIENT
Start: 2024-05-31

## 2024-05-31 RX ORDER — VALSARTAN AND HYDROCHLOROTHIAZIDE 320; 12.5 MG/1; MG/1
1 TABLET, FILM COATED ORAL DAILY
COMMUNITY
End: 2024-06-06

## 2024-05-31 RX ORDER — LORAZEPAM 2 MG/ML
1 INJECTION INTRAMUSCULAR
Status: CANCELLED | OUTPATIENT
Start: 2024-05-31

## 2024-05-31 RX ORDER — HYDROXYZINE HYDROCHLORIDE 25 MG/1
25 TABLET, FILM COATED ORAL
Status: DISCONTINUED | OUTPATIENT
Start: 2024-05-31 | End: 2024-06-06 | Stop reason: HOSPADM

## 2024-05-31 RX ORDER — BENZTROPINE MESYLATE 1 MG/ML
1 INJECTION INTRAMUSCULAR; INTRAVENOUS
Status: CANCELLED | OUTPATIENT
Start: 2024-05-31

## 2024-05-31 RX ORDER — TRAZODONE HYDROCHLORIDE 50 MG/1
50 TABLET ORAL
Status: CANCELLED | OUTPATIENT
Start: 2024-05-31

## 2024-05-31 RX ORDER — BENZTROPINE MESYLATE 1 MG/ML
1 INJECTION INTRAMUSCULAR; INTRAVENOUS
Status: DISCONTINUED | OUTPATIENT
Start: 2024-05-31 | End: 2024-06-06 | Stop reason: HOSPADM

## 2024-05-31 RX ORDER — POLYETHYLENE GLYCOL 3350 17 G/17G
17 POWDER, FOR SOLUTION ORAL DAILY PRN
Status: CANCELLED | OUTPATIENT
Start: 2024-05-31

## 2024-05-31 RX ORDER — BENZTROPINE MESYLATE 1 MG/1
0.5 TABLET ORAL
Status: CANCELLED | OUTPATIENT
Start: 2024-05-31

## 2024-05-31 RX ORDER — BISACODYL 10 MG
10 SUPPOSITORY, RECTAL RECTAL DAILY PRN
Status: DISCONTINUED | OUTPATIENT
Start: 2024-05-31 | End: 2024-06-01

## 2024-05-31 RX ADMIN — MIDAZOLAM 2 MG: 1 INJECTION INTRAMUSCULAR; INTRAVENOUS at 14:39

## 2024-05-31 RX ADMIN — ACETAMINOPHEN 975 MG: 325 TABLET ORAL at 20:48

## 2024-05-31 RX ADMIN — ACETAMINOPHEN 975 MG: 325 TABLET, FILM COATED ORAL at 14:29

## 2024-05-31 RX ADMIN — OLANZAPINE 5 MG: 5 TABLET, FILM COATED ORAL at 20:48

## 2024-05-31 NOTE — ED NOTES
Insurance Authorization for admission:     Medicare A&B are primary. No precert required. Secondary is [facility]

## 2024-05-31 NOTE — ED NOTES
Patient is accepted at [facility]  Patient is accepted by Dr. Joseph Monae     Transportation is arranged with [facility].     Waiting for transport time  Patient may go to the floor after 1715       Nurse report is to be called to [phone] prior to patient transfer.

## 2024-05-31 NOTE — LETTER
[facility] EMERGENCY DEPARTMENT  [address] beds at the [facility] [address]  Dept: [phone]      EMTALA TRANSFER CONSENT    NAME Prema Shepard                                         1965                              MRN [id number]    I have been informed of my rights regarding examination, treatment, and transfer   by Dr. Fernandez Ortiz DO    Benefits: Specialized equipment and/or services available at the receiving facility (Include comment)________________________    Risks: Potential for delay in receiving treatment      Consent for Transfer:  I acknowledge that my medical condition has been evaluated and explained to me by the emergency department physician or other qualified medical person and/or my attending physician, who has recommended that I be transferred to the service of  Accepting Physician: Tierra at Accepting Facility Name, City & State : [place]. The above potential benefits of such transfer, the potential risks associated with such transfer, and the probable risks of not being transferred have been explained to me, and I fully understand them.  The doctor has explained that, in my case, the benefits of transfer outweigh the risks.  I agree to be transferred.    I authorize the performance of emergency medical procedures and treatments upon me in both transit and upon arrival at the receiving facility.  Additionally, I authorize the release of any and all medical records to the receiving facility and request they be transported with me, if possible.  I understand that the safest mode of transportation during a medical emergency is an ambulance and that the Hospital advocates the use of this mode of transport. Risks of traveling to the receiving facility by car, including absence of medical control, life sustaining equipment, such as oxygen, and medical personnel has been explained to me and I fully understand them.    (NAVI CORRECT BOX BELOW)  [x  ]  I  consent to the stated transfer and to be transported by ambulance/helicopter.  [  ]  I consent to the stated transfer, but refuse transportation by ambulance and accept full responsibility for my transportation by car.  I understand the risks of non-ambulance transfers and I exonerate the Hospital and its staff from any deterioration in my condition that results from this refusal.    X___________________________________________    DATE  24  TIME________  Signature of patient or legally responsible individual signing on patient behalf           RELATIONSHIP TO PATIENT_________________________          Provider Certification    NAME Prema Shepard                                         1965                              MRN [id number]    A medical screening exam was performed on the above named patient.  Based on the examination:    Condition Necessitating Transfer The primary encounter diagnosis was Medical clearance for psychiatric admission. Diagnoses of Primary hypertension, TBI (traumatic brain injury) (HCC), Stroke-like symptoms, Hyperlipemia, History of DVT (deep vein thrombosis), Hyponatremia, and Suicidal ideations were also pertinent to this visit.    Patient Condition: The patient has been stabilized such that within reasonable medical probability, no material deterioration of the patient condition or the condition of the unborn child(katelin) is likely to result from the transfer    Reason for Transfer: Level of Care needed not available at this facility    Transfer Requirements: [facility]   Space available and qualified personnel available for treatment as acknowledged by    Agreed to accept transfer and to provide appropriate medical treatment as acknowledged by       [facility]  Appropriate medical records of the examination and treatment of the patient are provided at the time of transfer   STAFF INITIAL WHEN COMPLETED _______  Transfer will be performed by qualified  personnel from [facility]  and appropriate transfer equipment as required, including the use of necessary and appropriate life support measures.    Provider Certification: I have examined the patient and explained the following risks and benefits of being transferred/refusing transfer to the patient/family:  General risk, such as traffic hazards, adverse weather conditions, rough terrain or turbulence, possible failure of equipment (including vehicle or aircraft), or consequences of actions of persons outside the control of the transport personnel      Based on these reasonable risks and benefits to the patient and/or the unborn child(katelin), and based upon the information available at the time of the patient’s examination, I certify that the medical benefits reasonably to be expected from the provision of appropriate medical treatments at another medical facility outweigh the increasing risks, if any, to the individual’s medical condition, and in the case of labor to the unborn child, from effecting the transfer.    X____________________________________________ DATE 05/31/24        TIME_______      ORIGINAL - SEND TO MEDICAL RECORDS   COPY - SEND WITH PATIENT DURING TRANSFER
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
## 2024-05-31 NOTE — QUICK NOTE
Patients information sent for review for inpatient psychiatric treatment. Vitals, labs, diagnosis, orders, and ED notes reviewed.  Discharge Readmit orders placed for admission to [facility] Espit[facility]
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
## 2024-05-31 NOTE — ED NOTES
Pt resting comfortably with legs up on wall. Meal tray at bedside.      Randi Burnett RN  05/31/24 8522
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
## 2024-05-31 NOTE — EMTALA/ACUTE CARE TRANSFER
[facility] EMERGENCY DEPARTMENT  [address] [address]  Dept: [phone]      EMTALA TRANSFER CONSENT    NAME Prema Shepard                                         1965                              MRN [id number]    I have been informed of my rights regarding examination, treatment, and transfer   by Dr. Fernandez Ortiz DO    Benefits: Specialized equipment and/or services available at the receiving facility (Include comment)________________________    Risks: Potential for delay in receiving treatment      Transfer Request   I acknowledge that my medical condition has been evaluated and explained to me by the emergency department physician or other qualified medical person and/or my attending physician who has recommended and offered to me further medical examination and treatment. I understand the Hospital's obligation with respect to the treatment and stabilization of my emergency medical condition. I nevertheless request to be transferred. I release the Hospital, the doctor, and any other persons caring for me from all responsibility or liability for any injury or ill effects that may result from my transfer and agree to accept all responsibility for the consequences of my choice to transfer, rather than receive stabilizing treatment at the Hospital. I understand that because the transfer is my request, my insurance may not provide reimbursement for the services.  The Hospital will assist and direct me and my family in how to make arrangements for transfer, but the hospital is not liable for any fees charged by the transport service.  In spite of this understanding, I refuse to consent to further medical examination and treatment which has been offered to me, and request transfer to Accepting Facility Name, City & State : [place]. I authorize the performance of emergency medical procedures and treatments upon me in both transit and upon arrival at the  receiving facility.  Additionally, I authorize the release of any and all medical records to the receiving facility and request they be transported with me, if possible.    I authorize the performance of emergency medical procedures and treatments upon me in both transit and upon arrival at the receiving facility.  Additionally, I authorize the release of any and all medical records to the receiving facility and request they be transported with me, if possible.  I understand that the safest mode of transportation during a medical emergency is an ambulance and that the Hospital advocates the use of this mode of transport. Risks of traveling to the receiving facility by car, including absence of medical control, life sustaining equipment, such as oxygen, and medical personnel has been explained to me and I fully understand them.    (NAVI CORRECT BOX BELOW)  [  ]  I consent to the stated transfer and to be transported by ambulance/helicopter.  [  ]  I consent to the stated transfer, but refuse transportation by ambulance and accept full responsibility for my transportation by car.  I understand the risks of non-ambulance transfers and I exonerate the Hospital and its staff from any deterioration in my condition that results from this refusal.    X___________________________________________    DATE  24  TIME________  Signature of patient or legally responsible individual signing on patient behalf           RELATIONSHIP TO PATIENT_________________________          Provider Certification    NAME Prema Shepard                                         1965                              MRN [id number]    A medical screening exam was performed on the above named patient.  Based on the examination:    Condition Necessitating Transfer The primary encounter diagnosis was Medical clearance for psychiatric admission. Diagnoses of Primary hypertension, TBI (traumatic brain injury) (HCC), Stroke-like symptoms,  Hyperlipemia, History of DVT (deep vein thrombosis), and Hyponatremia were also pertinent to this visit.    Patient Condition: The patient has been stabilized such that within reasonable medical probability, no material deterioration of the patient condition or the condition of the unborn child(katelin) is likely to result from the transfer    Reason for Transfer: Level of Care needed not available at this facility    Transfer Requirements: Facility [facility]   Space available and qualified personnel available for treatment as acknowledged by    Agreed to accept transfer and to provide appropriate medical treatment as acknowledged by       [facility]  Appropriate medical records of the examination and treatment of the patient are provided at the time of transfer   STAFF INITIAL WHEN COMPLETED _______  Transfer will be performed by qualified personnel from [facility]  and appropriate transfer equipment as required, including the use of necessary and appropriate life support measures.    Provider Certification: I have examined the patient and explained the following risks and benefits of being transferred/refusing transfer to the patient/family:  General risk, such as traffic hazards, adverse weather conditions, rough terrain or turbulence, possible failure of equipment (including vehicle or aircraft), or consequences of actions of persons outside the control of the transport personnel      Based on these reasonable risks and benefits to the patient and/or the unborn child(katelin), and based upon the information available at the time of the patient’s examination, I certify that the medical benefits reasonably to be expected from the provision of appropriate medical treatments at another medical facility outweigh the increasing risks, if any, to the individual’s medical condition, and in the case of labor to the unborn child, from effecting the transfer.    X____________________________________________ DATE 05/31/24         TIME_______      ORIGINAL - SEND TO MEDICAL RECORDS   COPY - SEND WITH PATIENT DURING TRANSFER

## 2024-06-01 PROBLEM — E66.9 OBESITY: Status: ACTIVE | Noted: 2024-06-01
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
## 2024-06-01 PROBLEM — Z00.8 MEDICAL CLEARANCE FOR PSYCHIATRIC ADMISSION: Status: ACTIVE | Noted: 2024-06-01

## 2024-06-01 PROBLEM — R29.90 STROKE-LIKE SYMPTOMS: Status: RESOLVED | Noted: 2023-04-28 | Resolved: 2024-06-01

## 2024-06-01 PROBLEM — E55.9 VITAMIN D DEFICIENCY: Status: ACTIVE | Noted: 2024-06-01

## 2024-06-01 PROBLEM — E53.8 VITAMIN B12 DEFICIENCY: Status: ACTIVE | Noted: 2024-06-01

## 2024-06-01 LAB
ALBUMIN SERPL BCG-MCNC: 4.1 G/DL (ref 3.5–5)
ALP SERPL-CCNC: 35 U/L (ref 34–104)
ALT SERPL W P-5'-P-CCNC: 10 U/L (ref 7–52)
ANION GAP SERPL CALCULATED.3IONS-SCNC: 8 MMOL/L (ref 4–13)
AST SERPL W P-5'-P-CCNC: 17 U/L (ref 13–39)
ATRIAL RATE: 51 BPM
ATRIAL RATE: 51 BPM
ATRIAL RATE: 52 BPM
ATRIAL RATE: 53 BPM
BASOPHILS # BLD AUTO: 0.01 THOUSANDS/ÂΜL (ref 0–0.1)
BASOPHILS NFR BLD AUTO: 0 % (ref 0–1)
BILIRUB SERPL-MCNC: 0.33 MG/DL (ref 0.2–1)
BUN SERPL-MCNC: 14 MG/DL (ref 5–25)
CALCIUM SERPL-MCNC: 9.7 MG/DL (ref 8.4–10.2)
CHLORIDE SERPL-SCNC: 102 MMOL/L (ref 96–108)
CHOLEST SERPL-MCNC: 203 MG/DL
CO2 SERPL-SCNC: 27 MMOL/L (ref 21–32)
CREAT SERPL-MCNC: 0.59 MG/DL (ref 0.6–1.3)
EOSINOPHIL # BLD AUTO: 0.1 THOUSAND/ÂΜL (ref 0–0.61)
EOSINOPHIL NFR BLD AUTO: 2 % (ref 0–6)
ERYTHROCYTE [DISTWIDTH] IN BLOOD BY AUTOMATED COUNT: 12.8 % (ref 11.6–15.1)
EST. AVERAGE GLUCOSE BLD GHB EST-MCNC: 120 MG/DL
GFR SERPL CREATININE-BSD FRML MDRD: 101 ML/MIN/1.73SQ M
GLUCOSE P FAST SERPL-MCNC: 93 MG/DL (ref 65–99)
GLUCOSE SERPL-MCNC: 93 MG/DL (ref 65–140)
HBA1C MFR BLD: 5.8 %
HCT VFR BLD AUTO: 35.3 % (ref 34.8–46.1)
HDLC SERPL-MCNC: 76 MG/DL
HGB BLD-MCNC: 12.3 G/DL (ref 11.5–15.4)
IMM GRANULOCYTES # BLD AUTO: 0.01 THOUSAND/UL (ref 0–0.2)
IMM GRANULOCYTES NFR BLD AUTO: 0 % (ref 0–2)
LDLC SERPL CALC-MCNC: 114 MG/DL (ref 0–100)
LYMPHOCYTES # BLD AUTO: 1.63 THOUSANDS/ÂΜL (ref 0.6–4.47)
LYMPHOCYTES NFR BLD AUTO: 38 % (ref 14–44)
MAGNESIUM SERPL-MCNC: 1.9 MG/DL (ref 1.9–2.7)
MCH RBC QN AUTO: 31 PG (ref 26.8–34.3)
MCHC RBC AUTO-ENTMCNC: 34.8 G/DL (ref 31.4–37.4)
MCV RBC AUTO: 89 FL (ref 82–98)
MONOCYTES # BLD AUTO: 0.44 THOUSAND/ÂΜL (ref 0.17–1.22)
MONOCYTES NFR BLD AUTO: 10 % (ref 4–12)
NEUTROPHILS # BLD AUTO: 2.12 THOUSANDS/ÂΜL (ref 1.85–7.62)
NEUTS SEG NFR BLD AUTO: 50 % (ref 43–75)
NONHDLC SERPL-MCNC: 127 MG/DL
NRBC BLD AUTO-RTO: 0 /100 WBCS
P AXIS: 37 DEGREES
P AXIS: 37 DEGREES
P AXIS: 38 DEGREES
P AXIS: 40 DEGREES
PHOSPHATE SERPL-MCNC: 4.2 MG/DL (ref 2.7–4.5)
PLATELET # BLD AUTO: 288 THOUSANDS/UL (ref 149–390)
PMV BLD AUTO: 8.7 FL (ref 8.9–12.7)
POTASSIUM SERPL-SCNC: 4.2 MMOL/L (ref 3.5–5.3)
PR INTERVAL: 164 MS
PR INTERVAL: 178 MS
PR INTERVAL: 182 MS
PR INTERVAL: 182 MS
PROT SERPL-MCNC: 6.9 G/DL (ref 6.4–8.4)
QRS AXIS: -18 DEGREES
QRS AXIS: -19 DEGREES
QRS AXIS: -21 DEGREES
QRS AXIS: -21 DEGREES
QRSD INTERVAL: 100 MS
QRSD INTERVAL: 102 MS
QRSD INTERVAL: 96 MS
QRSD INTERVAL: 96 MS
QT INTERVAL: 440 MS
QT INTERVAL: 440 MS
QT INTERVAL: 442 MS
QT INTERVAL: 454 MS
QTC INTERVAL: 405 MS
QTC INTERVAL: 405 MS
QTC INTERVAL: 414 MS
QTC INTERVAL: 422 MS
RBC # BLD AUTO: 3.97 MILLION/UL (ref 3.81–5.12)
SODIUM SERPL-SCNC: 137 MMOL/L (ref 135–147)
T WAVE AXIS: -4 DEGREES
T WAVE AXIS: -4 DEGREES
T WAVE AXIS: 1 DEGREES
T WAVE AXIS: 15 DEGREES
TRIGL SERPL-MCNC: 63 MG/DL
VENTRICULAR RATE: 51 BPM
VENTRICULAR RATE: 51 BPM
VENTRICULAR RATE: 52 BPM
VENTRICULAR RATE: 53 BPM
WBC # BLD AUTO: 4.31 THOUSAND/UL (ref 4.31–10.16)

## 2024-06-01 PROCEDURE — 99223 1ST HOSP IP/OBS HIGH 75: CPT | Performed by: PSYCHIATRY & NEUROLOGY

## 2024-06-01 PROCEDURE — 94660 CPAP INITIATION&MGMT: CPT

## 2024-06-01 PROCEDURE — 87086 URINE CULTURE/COLONY COUNT: CPT | Performed by: NURSE PRACTITIONER

## 2024-06-01 PROCEDURE — 83735 ASSAY OF MAGNESIUM: CPT

## 2024-06-01 PROCEDURE — 84100 ASSAY OF PHOSPHORUS: CPT

## 2024-06-01 PROCEDURE — 93010 ELECTROCARDIOGRAM REPORT: CPT | Performed by: INTERNAL MEDICINE

## 2024-06-01 PROCEDURE — 94760 N-INVAS EAR/PLS OXIMETRY 1: CPT

## 2024-06-01 PROCEDURE — 85025 COMPLETE CBC W/AUTO DIFF WBC: CPT

## 2024-06-01 PROCEDURE — 87077 CULTURE AEROBIC IDENTIFY: CPT | Performed by: NURSE PRACTITIONER

## 2024-06-01 PROCEDURE — 80053 COMPREHEN METABOLIC PANEL: CPT

## 2024-06-01 PROCEDURE — 99222 1ST HOSP IP/OBS MODERATE 55: CPT | Performed by: NURSE PRACTITIONER

## 2024-06-01 PROCEDURE — 93005 ELECTROCARDIOGRAM TRACING: CPT

## 2024-06-01 RX ORDER — LOSARTAN POTASSIUM 50 MG/1
100 TABLET ORAL DAILY
Status: DISCONTINUED | OUTPATIENT
Start: 2024-06-01 | End: 2024-06-06 | Stop reason: HOSPADM

## 2024-06-01 RX ORDER — ZIPRASIDONE HYDROCHLORIDE 40 MG/1
80 CAPSULE ORAL 2 TIMES DAILY WITH MEALS
Status: DISCONTINUED | OUTPATIENT
Start: 2024-06-01 | End: 2024-06-06 | Stop reason: HOSPADM

## 2024-06-01 RX ORDER — NYSTATIN 100000 [USP'U]/G
POWDER TOPICAL 2 TIMES DAILY
Status: DISCONTINUED | OUTPATIENT
Start: 2024-06-01 | End: 2024-06-06 | Stop reason: HOSPADM

## 2024-06-01 RX ORDER — CARBAMAZEPINE 200 MG/1
1000 TABLET ORAL
Status: DISCONTINUED | OUTPATIENT
Start: 2024-06-01 | End: 2024-06-06 | Stop reason: HOSPADM

## 2024-06-01 RX ORDER — DULOXETIN HYDROCHLORIDE 60 MG/1
60 CAPSULE, DELAYED RELEASE ORAL DAILY
Status: DISCONTINUED | OUTPATIENT
Start: 2024-06-01 | End: 2024-06-03

## 2024-06-01 RX ORDER — PANTOPRAZOLE SODIUM 40 MG/1
40 TABLET, DELAYED RELEASE ORAL
Status: DISCONTINUED | OUTPATIENT
Start: 2024-06-01 | End: 2024-06-06 | Stop reason: HOSPADM

## 2024-06-01 RX ORDER — BUSPIRONE HYDROCHLORIDE 10 MG/1
20 TABLET ORAL 2 TIMES DAILY
Status: DISCONTINUED | OUTPATIENT
Start: 2024-06-01 | End: 2024-06-06 | Stop reason: HOSPADM

## 2024-06-01 RX ORDER — ONDANSETRON 4 MG/1
4 TABLET, ORALLY DISINTEGRATING ORAL EVERY 8 HOURS PRN
Status: DISCONTINUED | OUTPATIENT
Start: 2024-06-01 | End: 2024-06-06 | Stop reason: HOSPADM

## 2024-06-01 RX ORDER — BISACODYL 10 MG
10 SUPPOSITORY, RECTAL RECTAL DAILY PRN
Status: DISCONTINUED | OUTPATIENT
Start: 2024-06-01 | End: 2024-06-06 | Stop reason: HOSPADM

## 2024-06-01 RX ORDER — PRAVASTATIN SODIUM 20 MG
20 TABLET ORAL
Status: DISCONTINUED | OUTPATIENT
Start: 2024-06-01 | End: 2024-06-06 | Stop reason: HOSPADM

## 2024-06-01 RX ORDER — POLYETHYLENE GLYCOL 3350 17 G/17G
17 POWDER, FOR SOLUTION ORAL DAILY PRN
Status: DISCONTINUED | OUTPATIENT
Start: 2024-06-01 | End: 2024-06-06 | Stop reason: HOSPADM

## 2024-06-01 RX ORDER — FENOFIBRATE 145 MG/1
145 TABLET, COATED ORAL DAILY
Status: DISCONTINUED | OUTPATIENT
Start: 2024-06-01 | End: 2024-06-06 | Stop reason: HOSPADM

## 2024-06-01 RX ORDER — CLONAZEPAM 0.5 MG/1
0.5 TABLET ORAL
Status: DISCONTINUED | OUTPATIENT
Start: 2024-06-01 | End: 2024-06-06 | Stop reason: HOSPADM

## 2024-06-01 RX ORDER — GABAPENTIN 300 MG/1
300 CAPSULE ORAL
Status: DISCONTINUED | OUTPATIENT
Start: 2024-06-01 | End: 2024-06-06 | Stop reason: HOSPADM

## 2024-06-01 RX ADMIN — PRAVASTATIN SODIUM 20 MG: 20 TABLET ORAL at 16:26

## 2024-06-01 RX ADMIN — ACETAMINOPHEN 975 MG: 325 TABLET ORAL at 05:10

## 2024-06-01 RX ADMIN — GABAPENTIN 300 MG: 300 CAPSULE ORAL at 21:13

## 2024-06-01 RX ADMIN — ZIPRASIDONE HYDROCHLORIDE 80 MG: 40 CAPSULE ORAL at 16:26

## 2024-06-01 RX ADMIN — CYANOCOBALAMIN TAB 1000 MCG 1000 MCG: 1000 TAB at 09:50

## 2024-06-01 RX ADMIN — APIXABAN 5 MG: 5 TABLET, FILM COATED ORAL at 17:08

## 2024-06-01 RX ADMIN — BUSPIRONE HYDROCHLORIDE 20 MG: 10 TABLET ORAL at 17:08

## 2024-06-01 RX ADMIN — LOSARTAN POTASSIUM 100 MG: 50 TABLET, FILM COATED ORAL at 09:50

## 2024-06-01 RX ADMIN — APIXABAN 5 MG: 5 TABLET, FILM COATED ORAL at 09:50

## 2024-06-01 RX ADMIN — Medication 2000 UNITS: at 09:50

## 2024-06-01 RX ADMIN — DULOXETINE HYDROCHLORIDE 60 MG: 60 CAPSULE, DELAYED RELEASE ORAL at 16:26

## 2024-06-01 RX ADMIN — PANTOPRAZOLE SODIUM 40 MG: 40 TABLET, DELAYED RELEASE ORAL at 09:51

## 2024-06-01 RX ADMIN — FENOFIBRATE 145 MG: 145 TABLET, FILM COATED ORAL at 09:50

## 2024-06-01 RX ADMIN — CLONAZEPAM 0.5 MG: 0.5 TABLET ORAL at 21:13

## 2024-06-01 RX ADMIN — CARBAMAZEPINE 1000 MG: 200 TABLET ORAL at 21:13

## 2024-06-01 NOTE — CMS CERTIFICATION NOTE
Certification: Based upon physical, mental and social evaluations, I certify that inpatient psychiatric services continue to be medically necessary for this patient for a duration of greater than 2 midnights for the treatment of  Schizoaffective disorder (HCC) I further attest that an established written individualized plan of care has been updated and is outlined in the patient's medical records.    Jordan Christopher Holter, DO
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
## 2024-06-01 NOTE — NURSING NOTE
"Patient anxious. Patient states \" I'm scared being here,I feel so lonely\" Patient reassured. Patient denies SI/HI/AVH. Patient on continuous 1:1 for safety. Patient requires frequent redirection. Patient medication and meal compliant. Patient visible in the milieu interacting with peers and staff. Patient able and encouraged to notify staff of any needs.   "

## 2024-06-01 NOTE — NURSING NOTE
"Pt reporting 8/10 right molar tooth pain. On visualization, poor dentition noted. Pt states she does not brush her teeth because \"it hurts!\" Pt agitated with this writer when this writer provided education on ADLs. Pt verbally redirectable. PRN Tylenol 975 mg administered at 0510.  "

## 2024-06-01 NOTE — ASSESSMENT & PLAN NOTE
Admission labs: CBC, CMP, lipid panel, TSH acceptable  B12 and Vitamin D 25 hydroxy low, will replete  Vitals stable   UA with leuks and WBCs, will check urine culture    UDS negative   EKG pending   Patient is medically cleared for admission to [facility] and treatment of underlying psychiatric illness based on available results  Please contact [facility] with any questions or concerns

## 2024-06-01 NOTE — NURSING NOTE
Pt currently resting in bed with even, unlabored respirations. FLACC score 0. PRN Tylenol 975 mg appears effective.

## 2024-06-01 NOTE — NURSING NOTE
Patient with reported improvement of pain and agitation. Patient calmer and able to follow instructions.
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
## 2024-06-01 NOTE — PLAN OF CARE
Problem: Alteration in Thoughts and Perception  Goal: Treatment Goal: Gain control of psychotic behaviors/thinking, reduce/eliminate presenting symptoms and demonstrate improved reality functioning upon discharge  Outcome: Progressing     Problem: Ineffective Coping  Goal: Cooperates with admission process  Description: Interventions:   - Complete admission process  Outcome: Completed  Goal: Identifies ineffective coping skills  Outcome: Progressing  Goal: Identifies healthy coping skills  Outcome: Progressing  Goal: Demonstrates healthy coping skills  Outcome: Progressing  Goal: Participates in unit activities  Description: Interventions:  - Provide therapeutic environment   - Provide required programming   - Redirect inappropriate behaviors   Outcome: Progressing  Goal: Patient/Family participate in treatment and DC plans  Description: Interventions:  - Provide therapeutic environment  Outcome: Progressing  Goal: Patient/Family verbalizes awareness of resources  Outcome: Progressing  Goal: Understands least restrictive measures  Description: Interventions:  - Utilize least restrictive behavior  Outcome: Progressing  Goal: Free from restraint events  Description: - Utilize least restrictive measures   - Provide behavioral interventions   - Redirect inappropriate behaviors   Outcome: Progressing     Problem: Risk for Self Injury/Neglect  Goal: Treatment Goal: Remain safe during length of stay, learn and adopt new coping skills, and be free of self-injurious ideation, impulses and acts at the time of discharge  Outcome: Progressing  Goal: Verbalize thoughts and feelings  Description: Interventions:  - Assess and re-assess patient's lethality and potential for self-injury  - Engage patient in 1:1 interactions, daily, for a minimum of 15 minutes  - Encourage patient to express feelings, fears, frustrations, hopes  - Establish rapport/trust with patient   Outcome: Progressing  Goal: Refrain from harming  self  Description: Interventions:  - Monitor patient closely, per order  - Develop a trusting relationship  - Supervise medication ingestion, monitor effects and side effects   Outcome: Progressing  Goal: Attend and participate in unit activities, including therapeutic, recreational, and educational groups  Description: Interventions:  - Provide therapeutic and educational activities daily, encourage attendance and participation, and document same in the medical record  - Obtain collateral information, encourage visitation and family involvement in care   Outcome: Progressing  Goal: Recognize maladaptive responses and adopt new coping mechanisms  Outcome: Progressing  Goal: Complete daily ADLs, including personal hygiene independently, as able  Description: Interventions:  - Observe, teach, and assist patient with ADLS  - Monitor and promote a balance of rest/activity, with adequate nutrition and elimination  Outcome: Progressing     Problem: Depression  Goal: Treatment Goal: Demonstrate behavioral control of depressive symptoms, verbalize feelings of improved mood/affect, and adopt new coping skills prior to discharge  Outcome: Progressing     Problem: Anxiety  Goal: Anxiety is at manageable level  Description: Interventions:  - Assess and monitor patient's anxiety level.   - Monitor for signs and symptoms (heart palpitations, chest pain, shortness of breath, headaches, nausea, feeling jumpy, restlessness, irritable, apprehensive).   - Collaborate with interdisciplinary team and initiate plan and interventions as ordered.  - [place] patient to unit/surroundings  - Explain treatment plan  - Encourage participation in care  - Encourage verbalization of concerns/fears  - Identify coping mechanisms  - Assist in developing anxiety-reducing skills  - Administer/offer alternative therapies  - Limit or eliminate stimulants  Outcome: Progressing     Problem: PAIN - ADULT  Goal: Verbalizes/displays adequate comfort level or  baseline comfort level  Description: Interventions:  - Encourage patient to monitor pain and request assistance  - Assess pain using appropriate pain scale  - Administer analgesics based on type and severity of pain and evaluate response  - Implement non-pharmacological measures as appropriate and evaluate response  - Consider cultural and social influences on pain and pain management  - Notify physician/advanced practitioner if interventions unsuccessful or patient reports new pain  Outcome: Progressing
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
## 2024-06-01 NOTE — ASSESSMENT & PLAN NOTE
BP stable  Continue Losartan 100 mg daily  Previously on Norvasc  Trend BP and titrate medications accordingly

## 2024-06-02 LAB
MRSA NOSE QL CULT: ABNORMAL
MRSA NOSE QL CULT: ABNORMAL

## 2024-06-02 PROCEDURE — 99232 SBSQ HOSP IP/OBS MODERATE 35: CPT

## 2024-06-02 PROCEDURE — 94660 CPAP INITIATION&MGMT: CPT

## 2024-06-02 RX ADMIN — CYANOCOBALAMIN TAB 1000 MCG 1000 MCG: 1000 TAB at 08:35

## 2024-06-02 RX ADMIN — BUSPIRONE HYDROCHLORIDE 20 MG: 10 TABLET ORAL at 08:36

## 2024-06-02 RX ADMIN — DULOXETINE HYDROCHLORIDE 60 MG: 60 CAPSULE, DELAYED RELEASE ORAL at 08:35

## 2024-06-02 RX ADMIN — ZIPRASIDONE HYDROCHLORIDE 80 MG: 40 CAPSULE ORAL at 17:07

## 2024-06-02 RX ADMIN — ZIPRASIDONE HYDROCHLORIDE 80 MG: 40 CAPSULE ORAL at 08:35

## 2024-06-02 RX ADMIN — GABAPENTIN 300 MG: 300 CAPSULE ORAL at 21:37

## 2024-06-02 RX ADMIN — FENOFIBRATE 145 MG: 145 TABLET, FILM COATED ORAL at 08:35

## 2024-06-02 RX ADMIN — PRAVASTATIN SODIUM 20 MG: 20 TABLET ORAL at 17:07

## 2024-06-02 RX ADMIN — TRAZODONE HYDROCHLORIDE 50 MG: 50 TABLET ORAL at 23:18

## 2024-06-02 RX ADMIN — APIXABAN 5 MG: 5 TABLET, FILM COATED ORAL at 08:35

## 2024-06-02 RX ADMIN — CARBAMAZEPINE 1000 MG: 200 TABLET ORAL at 21:37

## 2024-06-02 RX ADMIN — LOSARTAN POTASSIUM 100 MG: 50 TABLET, FILM COATED ORAL at 08:35

## 2024-06-02 RX ADMIN — BUSPIRONE HYDROCHLORIDE 20 MG: 10 TABLET ORAL at 17:07

## 2024-06-02 RX ADMIN — PANTOPRAZOLE SODIUM 40 MG: 40 TABLET, DELAYED RELEASE ORAL at 06:11

## 2024-06-02 RX ADMIN — CLONAZEPAM 0.5 MG: 0.5 TABLET ORAL at 21:37

## 2024-06-02 RX ADMIN — Medication 2000 UNITS: at 08:35

## 2024-06-02 RX ADMIN — APIXABAN 5 MG: 5 TABLET, FILM COATED ORAL at 17:07

## 2024-06-02 NOTE — NURSING NOTE
Pt requested CPAP at  and only wore it for 10 minutes. Pt stated that she didn't wear CPAP last night and won't today after taking off the mask.

## 2024-06-02 NOTE — NURSING NOTE
Patient pleasant on approach. Patient medication and meal compliant. Patient endorses anxiety. Patient denies SI/HI AVH. Patient remains on continuous 1:1 for safety. LBM today 6/2/24. Patient visible on the milieu interacting with peers and staff. Patient able and encouraged to notify staff of any needs.

## 2024-06-02 NOTE — PLAN OF CARE
Problem: Alteration in Thoughts and Perception  Goal: Treatment Goal: Gain control of psychotic behaviors/thinking, reduce/eliminate presenting symptoms and demonstrate improved reality functioning upon discharge  Outcome: Progressing     Problem: Risk for Self Injury/Neglect  Goal: Treatment Goal: Remain safe during length of stay, learn and adopt new coping skills, and be free of self-injurious ideation, impulses and acts at the time of discharge  Outcome: Progressing  Goal: Verbalize thoughts and feelings  Description: Interventions:  - Assess and re-assess patient's lethality and potential for self-injury  - Engage patient in 1:1 interactions, daily, for a minimum of 15 minutes  - Encourage patient to express feelings, fears, frustrations, hopes  - Establish rapport/trust with patient   Outcome: Progressing  Goal: Refrain from harming self  Description: Interventions:  - Monitor patient closely, per order  - Develop a trusting relationship  - Supervise medication ingestion, monitor effects and side effects   Outcome: Progressing  Goal: Attend and participate in unit activities, including therapeutic, recreational, and educational groups  Description: Interventions:  - Provide therapeutic and educational activities daily, encourage attendance and participation, and document same in the medical record  - Obtain collateral information, encourage visitation and family involvement in care   Outcome: Progressing  Goal: Recognize maladaptive responses and adopt new coping mechanisms  Outcome: Progressing  Goal: Complete daily ADLs, including personal hygiene independently, as able  Description: Interventions:  - Observe, teach, and assist patient with ADLS  - Monitor and promote a balance of rest/activity, with adequate nutrition and elimination  Outcome: Progressing     Problem: Depression  Goal: Treatment Goal: Demonstrate behavioral control of depressive symptoms, verbalize feelings of improved mood/affect, and  adopt new coping skills prior to discharge  Outcome: Progressing     Problem: Anxiety  Goal: Anxiety is at manageable level  Description: Interventions:  - Assess and monitor patient's anxiety level.   - Monitor for signs and symptoms (heart palpitations, chest pain, shortness of breath, headaches, nausea, feeling jumpy, restlessness, irritable, apprehensive).   - Collaborate with interdisciplinary team and initiate plan and interventions as ordered.  - [place] patient to unit/surroundings  - Explain treatment plan  - Encourage participation in care  - Encourage verbalization of concerns/fears  - Identify coping mechanisms  - Assist in developing anxiety-reducing skills  - Administer/offer alternative therapies  - Limit or eliminate stimulants  Outcome: Progressing     Problem: SAFETY ADULT  Goal: Patient will remain free of falls  Description: INTERVENTIONS:  - Educate patient/family on patient safety including physical limitations  - Instruct patient to call for assistance with activity   - Consult OT/PT to assist with strengthening/mobility   - Keep Call bell within reach  - Keep bed low and locked with side rails adjusted as appropriate  - Keep care items and personal belongings within reach  - Initiate and maintain comfort rounds  - Make Fall Risk Sign visible to staff  - Offer Toileting every 2 Hours while awake, in advance of need  - Initiate/Maintain 1:1 for safety  - Obtain necessary fall risk management equipment: Walker provided   - Apply yellow socks and bracelet for high fall risk patients  - Consider moving patient to room near nurses station  Outcome: Progressing  Goal: Maintain or return to baseline ADL function  Description: INTERVENTIONS:  -  Assess patient's ability to carry out ADLs; assess patient's baseline for ADL function and identify physical deficits which impact ability to perform ADLs (bathing, care of mouth/teeth, toileting, grooming, dressing, etc.)  - Assess/evaluate cause of self-care  deficits   - Assess range of motion  - Assess patient's mobility; develop plan if impaired  - Assess patient's need for assistive devices and provide as appropriate  - Encourage maximum independence but intervene and supervise when necessary  - Involve family in performance of ADLs  - Assess for home care needs following discharge   - Consider OT consult to assist with ADL evaluation and planning for discharge  - Provide patient education as appropriate  Outcome: Progressing  Goal: Maintains/Returns to pre admission functional level  Description: INTERVENTIONS:  - Perform AM-PAC 6 Click Basic Mobility/ Daily Activity assessment daily.  - Set and communicate daily mobility goal to care team and patient/family/caregiver.   - Collaborate with rehabilitation services on mobility goals if consulted  - - Out of bed for toileting  - Record patient progress and toleration of activity level   Outcome: Progressing  Problem: Knowledge Deficit  Goal: Patient/family/caregiver demonstrates understanding of disease process, treatment plan, medications, and discharge instructions  Description: Complete learning assessment and assess knowledge base.  Interventions:  - Provide teaching at level of understanding  - Provide teaching via preferred learning methods  Outcome: Progressing

## 2024-06-03 LAB
BACTERIA UR CULT: ABNORMAL
BACTERIA UR CULT: ABNORMAL

## 2024-06-03 PROCEDURE — 94660 CPAP INITIATION&MGMT: CPT

## 2024-06-03 PROCEDURE — 99232 SBSQ HOSP IP/OBS MODERATE 35: CPT

## 2024-06-03 RX ADMIN — NYSTATIN: 100000 POWDER TOPICAL at 08:51

## 2024-06-03 RX ADMIN — Medication 2000 UNITS: at 08:49

## 2024-06-03 RX ADMIN — APIXABAN 5 MG: 5 TABLET, FILM COATED ORAL at 08:50

## 2024-06-03 RX ADMIN — MUPIROCIN: 20 OINTMENT TOPICAL at 11:42

## 2024-06-03 RX ADMIN — PRAVASTATIN SODIUM 20 MG: 20 TABLET ORAL at 17:06

## 2024-06-03 RX ADMIN — MUPIROCIN 1 APPLICATION: 20 OINTMENT TOPICAL at 21:42

## 2024-06-03 RX ADMIN — BUSPIRONE HYDROCHLORIDE 20 MG: 10 TABLET ORAL at 17:06

## 2024-06-03 RX ADMIN — ZIPRASIDONE HYDROCHLORIDE 80 MG: 40 CAPSULE ORAL at 07:49

## 2024-06-03 RX ADMIN — NYSTATIN: 100000 POWDER TOPICAL at 17:09

## 2024-06-03 RX ADMIN — BUSPIRONE HYDROCHLORIDE 20 MG: 10 TABLET ORAL at 08:50

## 2024-06-03 RX ADMIN — DULOXETINE HYDROCHLORIDE 60 MG: 60 CAPSULE, DELAYED RELEASE ORAL at 08:49

## 2024-06-03 RX ADMIN — ZIPRASIDONE HYDROCHLORIDE 80 MG: 40 CAPSULE ORAL at 17:06

## 2024-06-03 RX ADMIN — CLONAZEPAM 0.5 MG: 0.5 TABLET ORAL at 21:41

## 2024-06-03 RX ADMIN — LOSARTAN POTASSIUM 100 MG: 50 TABLET, FILM COATED ORAL at 08:49

## 2024-06-03 RX ADMIN — PANTOPRAZOLE SODIUM 40 MG: 40 TABLET, DELAYED RELEASE ORAL at 06:03

## 2024-06-03 RX ADMIN — FENOFIBRATE 145 MG: 145 TABLET, FILM COATED ORAL at 08:49

## 2024-06-03 RX ADMIN — APIXABAN 5 MG: 5 TABLET, FILM COATED ORAL at 17:06

## 2024-06-03 RX ADMIN — GABAPENTIN 300 MG: 300 CAPSULE ORAL at 21:40

## 2024-06-03 RX ADMIN — CYANOCOBALAMIN TAB 1000 MCG 1000 MCG: 1000 TAB at 08:50

## 2024-06-03 RX ADMIN — CARBAMAZEPINE 1000 MG: 200 TABLET ORAL at 21:40
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
## 2024-06-03 NOTE — PROGRESS NOTES
06/03/24 1427   Referral Data   Referral Reason Psych   County Information   County of Residence [place]   Readmission Root Cause   30 Day Readmission No   Patient Information   Mental Status Alert   Primary Caregiver Family   Support System Immediate family   [employer]/Cultural Requests Yarsanism   Legal Information   Tx Plan Signed Yes   Current Status: 201   Legal Issues denies   Advance Directives Power of  for health care;Power of  for finance   Health Care Proxy Appointed Yes - See Health Care Proxy section   Activities of Daily Living Prior to Admission   Functional Status Moderate assistance   Assistive Device Walker   Living Arrangement Apartment;Other (Comment)  (Lives with 2 housemates)   Ambulation Moderate assistance   Access to Firearms   Access to Firearms No   Income Information   Income Source SSI/SSD   Means of Transportation   Means of Transport to Appts: Family transport

## 2024-06-03 NOTE — PLAN OF CARE
Problem: Alteration in Thoughts and Perception  Goal: Treatment Goal: Gain control of psychotic behaviors/thinking, reduce/eliminate presenting symptoms and demonstrate improved reality functioning upon discharge  Outcome: Progressing     Problem: Risk for Self Injury/Neglect  Goal: Treatment Goal: Remain safe during length of stay, learn and adopt new coping skills, and be free of self-injurious ideation, impulses and acts at the time of discharge  Outcome: Progressing  Goal: Verbalize thoughts and feelings  Description: Interventions:  - Assess and re-assess patient's lethality and potential for self-injury  - Engage patient in 1:1 interactions, daily, for a minimum of 15 minutes  - Encourage patient to express feelings, fears, frustrations, hopes  - Establish rapport/trust with patient   Outcome: Progressing  Goal: Refrain from harming self  Description: Interventions:  - Monitor patient closely, per order  - Develop a trusting relationship  - Supervise medication ingestion, monitor effects and side effects   Outcome: Progressing  Goal: Attend and participate in unit activities, including therapeutic, recreational, and educational groups  Description: Interventions:  - Provide therapeutic and educational activities daily, encourage attendance and participation, and document same in the medical record  - Obtain collateral information, encourage visitation and family involvement in care   Outcome: Progressing  Goal: Recognize maladaptive responses and adopt new coping mechanisms  Outcome: Progressing  Goal: Complete daily ADLs, including personal hygiene independently, as able  Description: Interventions:  - Observe, teach, and assist patient with ADLS  - Monitor and promote a balance of rest/activity, with adequate nutrition and elimination  Outcome: Progressing     Problem: Anxiety  Goal: Anxiety is at manageable level  Description: Interventions:  - Assess and monitor patient's anxiety level.   - Monitor for  signs and symptoms (heart palpitations, chest pain, shortness of breath, headaches, nausea, feeling jumpy, restlessness, irritable, apprehensive).   - Collaborate with interdisciplinary team and initiate plan and interventions as ordered.  - [place] patient to unit/surroundings  - Explain treatment plan  - Encourage participation in care  - Encourage verbalization of concerns/fears  - Identify coping mechanisms  Problem: SAFETY ADULT  Goal: Patient will remain free of falls  Description: INTERVENTIONS:  - Educate patient/family on patient safety including physical limitations  - Instruct patient to call for assistance with activity   - Consult OT/PT to assist with strengthening/mobility   - Keep Call bell within reach  - Keep bed low and locked with side rails adjusted as appropriate  - Keep care items and personal belongings within reach  - Initiate and maintain comfort rounds  - Make Fall Risk Sign visible to staff  - Offer Toileting every 2 Hours while awake and in advance of need  - Initiate/Maintain bed alarm  - Obtain necessary fall risk management equipment: footwear/L leg brace   - Apply yellow socks and bracelet for high fall risk patients  - Consider moving patient to room near nurses station  Outcome: Progressing  Goal: Maintain or return to baseline ADL function  Description: INTERVENTIONS:  -  Assess patient's ability to carry out ADLs; assess patient's baseline for ADL function and identify physical deficits which impact ability to perform ADLs (bathing, care of mouth/teeth, toileting, grooming, dressing, etc.)  - Assess/evaluate cause of self-care deficits   - Assess range of motion  - Assess patient's mobility; develop plan if impaired  - Assess patient's need for assistive devices and provide as appropriate  - Encourage maximum independence but intervene and supervise when necessary  - Involve family in performance of ADLs  - Assess for home care needs following discharge   - Consider OT consult to  assist with ADL evaluation and planning for discharge  - Provide patient education as appropriate  Outcome: Progressing  Goal: Maintains/Returns to pre admission functional level  Description: INTERVENTIONS:  - Perform AM-PAC 6 Click Basic Mobility/ Daily Activity assessment daily.  - Set and communicate daily mobility goal to care team and patient/family/caregiver.   - Collaborate with rehabilitation services on mobility goals if consulted  - Out of bed for toileting  - Record patient progress and toleration of activity level   Outcome: Progressing     - Assist in developing anxiety-reducing skills  - Administer/offer alternative therapies  - Limit or eliminate stimulants  Outcome: Progressing  Problem: Knowledge Deficit  Goal: Patient/family/caregiver demonstrates understanding of disease process, treatment plan, medications, and discharge instructions  Description: Complete learning assessment and assess knowledge base.  Interventions:  - Provide teaching at level of understanding  - Provide teaching via preferred learning methods  Outcome: Progressing        Problem: Depression  Goal: Treatment Goal: Demonstrate behavioral control of depressive symptoms, verbalize feelings of improved mood/affect, and adopt new coping skills prior to discharge  Outcome: Progressing

## 2024-06-03 NOTE — NURSING NOTE
Trazodone 50mg given prn for longer sleep latency. Will f/u for effectiveness.  Pt has been fidgeting with CPAP since it was placed. Pt has taken off CPAP twice and do not want to continue with CPAP tonight.. CPAP taken off at this time.

## 2024-06-03 NOTE — PROGRESS NOTES
"Pt attended 1 AM group.  Pt able to self reflect and impulsive comments.  Pt indicated when she is stressed she \"feels like killing self\".  Advised provider.   Reviewed fight or flight threat system (pt was familiar with terminology) and anxiety symptoms (Physical, cognitive, behavioral and emotional.)  Provided further education of expressive ways to verbalize stress needs and management.  Pt needed redirection for comments to peers.      06/03/24 1000   Activity/Group Checklist   Group Community meeting   Attendance Attended   Attendance Duration (min) 31-45   Interactions Interacted appropriately   Affect/Mood Appropriate   Goals Achieved Identified feelings;Identified triggers;Identified relapse prevention strategies;Discussed coping strategies;Able to reflect/comment on own behavior;Able to manage/cope with feelings;Able to listen to others;Able to engage in interactions;Verbalized increased hopefulness;Able to self-disclose         "

## 2024-06-03 NOTE — PROGRESS NOTES
06/03/24 1453   Team Meeting   Meeting Type Tx Team Meeting   Initial Conference Date 06/03/24   Next Conference Date 07/01/24   Team Members Present   Team Members Present Physician;Nurse;   Physician Team Member Dr Holter   Nursing Team Member Bill   Care Management Team Member Keely   Patient/Family Present   Patient Present Yes   Patient's Family Present No   OTHER   Team Meeting - Additional Comments Met with the patient to go over her treatment plan. Goals discussed were to have a decrease in depressive symptoms, decrease in suicidal thoughts, mood stabilization, increase in group attendance, increase in socialization with peers on the unit, acceptance of need for psychiatric treatment, and an acceptance of psychiatric medications. She can attain this through medication management and group/milieu therapy. Prema states she does not agree with her treatment plan, she states it is too much for her to do and she is NOT signing anything.

## 2024-06-03 NOTE — NURSING NOTE
Continuous !:! Discontinued. Pt irritable and labile. Pt intrusive requiring frequent redirection. Pt denies SI, HI and A/VH. Pt cooperative with care and medication compliant.

## 2024-06-03 NOTE — PROGRESS NOTES
"Progress Note - Behavioral Health   Prema Shepard 58 y.o. female MRN: [id number]  Unit/Bed#: [facility] 656-01 Encounter: [id number]  Assessment & Plan   Principal Problem:    Schizoaffective disorder (HCC)  Active Problems:    TBI (traumatic brain injury) (HCC)    History of DVT (deep vein thrombosis)    Hyperlipemia    Hypertension    Medical clearance for psychiatric admission    Vitamin D deficiency    Vitamin B12 deficiency    Obesity      Subjective:  Patient was seen today for continuation of care, records reviewed and patient was discussed with the morning case review team.  Per staff, she has been doing well on the unit.  She is attending groups and is compliant with medications.      Prema was seen today for psychiatric follow-up.  On assessment today, Prema was seen in the group room.  Prema states \"Im fine I don't even know why I am here\".  When asked about her statement in group today (she stated when she is stressed she has thoughts of killing herself), she states \"well I don't want to kill myself I just think God should take my life\".  When asked what her stressors are she states her TBI has been a stressor, she can no longer be independent and frequently falls.  She does enjoy reading and has Adventism beliefs, she enjoys reading the bible.  When at her group home she reads and watches TV however she feels isolated at times.  She also reports that she cannot cry because of her TBI so she becomes easily frustrated when she is stressed as she feels she cannot get out her emotions.  She was very reluctant to changes medications but encouraged to increase Cymbalta for ongoing depression symptoms.     Prema denies acute suicidal/self-harm ideation/intent/plan upon direct inquiry at this time.  Prema remains behaviorally controlled with no agitation or aggression noted on exam or in report.  Prema also denies HI/AH/VH, and does not appear overtly manic.  No overt delusions or " paranoia are verbalized.  Prema remains adherent to her current psychotropic medication regimen and denies any side effects from medications, as well as none noted on exam.    Recommended Treatment: Treatment plan and medication changes discussed and per the attending physician the plan is:    1.Continue with group therapy, milieu therapy and occupational therapy  2.Behavioral Health checks every 7 minutes  3.Continue frequent safety checks and vitals per unit protocol  4.Continue with SLIM medical management as indicated  5.Increase Cymbalta to 90mg PO daily for continued depression symptoms. Continue all other medications. Discontinue 1:1 and utilize chair alarms for fall risk.  6.Will review labs in the a.m.  7.Disposition Planning: Discharge planning and efforts remain ongoing, plan for discharge Thursday if stable.     Vitals:  Vitals:    06/03/24 0748   BP: 131/68   Pulse: 64   Resp: 17   Temp: 97.9 °F (36.6 °C)   SpO2: 95%       Laboratory Results:  I have personally reviewed all pertinent laboratory/tests results.  Most Recent Labs:   Lab Results   Component Value Date    WBC 4.31 06/01/2024    RBC 3.97 06/01/2024    HGB 12.3 06/01/2024    HCT 35.3 06/01/2024     06/01/2024    RDW 12.8 06/01/2024    TOTANEUTABS 0.73 (L) 03/02/2024    NEUTROABS 2.12 06/01/2024    SODIUM 137 06/01/2024    K 4.2 06/01/2024     06/01/2024    CO2 27 06/01/2024    BUN 14 06/01/2024    CREATININE 0.59 (L) 06/01/2024    GLUC 93 06/01/2024    GLUF 93 06/01/2024    CALCIUM 9.7 06/01/2024    AST 17 06/01/2024    ALT 10 06/01/2024    ALKPHOS 35 06/01/2024    TP 6.9 06/01/2024    ALB 4.1 06/01/2024    TBILI 0.33 06/01/2024    CHOLESTEROL 203 (H) 05/31/2024    HDL 76 05/31/2024    TRIG 63 05/31/2024    LDLCALC 114 (H) 05/31/2024    NONHDLC 127 05/31/2024    AMMONIA 63 (H) 04/28/2023    BUJ8AGFSYJQL 0.991 05/31/2024    BHK1FIIRAPQH 0.998 05/31/2024    PREGSERUM Negative 03/02/2024    HGBA1C 5.8 (H) 05/31/2024      05/31/2024       Psychiatric Review of Systems:  Behavior over the last 24 hours:  some improvement.   Sleep: adequate  Appetite: adequate  Medication side effects: denies  ROS: no complaints, denies shortness of breath or chest pain and all other systems are negative for acute changes    Mental Status Evaluation:    Appearance:  adequate grooming, wearing hospital clothes, in wheelchair   Behavior:  pleasant, cooperative   Speech:  Varied volume, child like at times   Mood:  euthymic   Affect:  constricted   Thought Process:  linear   Associations: concrete associations   Thought Content:  no overt delusions, negative thoughts, ruminating thoughts   Perceptual Disturbances: denies auditory or visual hallucinations when asked, does not appear responding to internal stimuli   Risk Potential: Suicidal ideation - Yes, passive death wish, contracts for safety on the unit, would talk to staff if not feeling safe on the unit  Homicidal ideation - None  Potential for aggression - No   Sensorium:  oriented to person, place, and situation   Memory:  recent memory intact   Consciousness:  alert and awake   Attention/Concentration: attention span and concentration appear shorter than expected for age   Insight:  limited   Judgment: limited   Gait/Station: in wheelchair   Motor Activity: no abnormal movements     Progress Toward Goals:   Prema is progressing towards goals of inpatient psychiatric treatment by continued medication compliance and is attending therapeutic modalities on the milieu. However, the patient continues to require inpatient psychiatric hospitalization for continued medication management and titration to optimize symptom reduction, improve sleep hygiene, and demonstrate adequate self-care.    Risk of Harm to Self:   Nursing Suicide Risk Assessment Last 24 hours: C-SSRS Risk (Since Last Contact)  Calculated C-SSRS Risk Score (Since Last Contact): No Risk Indicated  Current Specific Risk Factors include:  mental illness diagnosis  Protective Factors: no current suicidal ideation, ability to communicate with staff on the unit, able to contract for safety on the unit, improved mood, responds to redirection, compliant with medications, connected to community, stable housing, cultural beliefs discouraging suicide  Based on today's assessment, Prema presents the following risk of harm to self: low    Risk of Harm to Others:  Nursing Homicide Risk Assessment: Violence Risk to Others: Denies within past 6 months  Current Specific Risk Factors include: social difficulties  Protective Factors: no current homicidal ideation  Based on today's assessment, Prema presents the following risk of harm to others: minimal    The following interventions are recommended: behavioral checks every 7 minutes, continued hospitalization on locked unit        Behavioral Health Medications: all current active meds have been reviewed and planned medication changes: increasing Cymbalta to 90mg PO daily .  Current Facility-Administered Medications   Medication Dose Route Frequency Provider Last Rate    acetaminophen  650 mg Oral Q4H PRN BERNABE Chandra      acetaminophen  650 mg Oral Q4H PRN BERNABE Chandra      acetaminophen  975 mg Oral Q6H PRN BERNABE Chandra      aluminum-magnesium hydroxide-simethicone  30 mL Oral Q4H PRN BERNABE Chandra      apixaban  5 mg Oral BID BERNABE Mireles      benztropine  1 mg Intramuscular Q4H PRN Max 6/day BERNABE Chandra      benztropine  0.5 mg Oral Q4H PRN Max 6/day BERNABE Chandra      bisacodyl  10 mg Rectal Daily PRN BERNABE Chandra      busPIRone  20 mg Oral BID Jordan C Holter, DO      carBAMazepine  1,000 mg Oral HS Jordan C Holter, DO      Cholecalciferol  2,000 Units Oral Daily BERNABE Mireles      clonazePAM  0.5 mg Oral HS Jordan C Holter, DO      cyanocobalamin  1,000 mcg Oral Daily BERNABE Mireles      [START ON 6/4/2024] DULoxetine   90 mg Oral Daily Kayla Miriamamairani Gilliland, CRNP      fenofibrate  145 mg Oral Daily Mae Cervantesadelina, CRNP      gabapentin  300 mg Oral HS Jordan C Holter, DO      hydrOXYzine HCL  25 mg Oral Q6H PRN Max 4/day Nasima Violetta, CRNP      hydrOXYzine HCL  50 mg Oral Q6H PRN Max 4/day Nasima Violetta, CRNP      LORazepam  1 mg Intramuscular Q6H PRN Max 3/day Nasima Violetta, CRNP      LORazepam  1 mg Oral Q6H PRN Max 3/day Nasima Violetta, CRNP      losartan  100 mg Oral Daily Mae Andrade, CRNP      mupirocin   Nasal Q12H PRASAD Mae Andrade, CRNP      nystatin   Topical BID Mae Andrade, CRNP      OLANZapine  5 mg Intramuscular Q3H PRN Max 3/day Nasima Violetta, CRNP      OLANZapine  2.5 mg Oral Q4H PRN Max 6/day Nasima Violetta, CRNP      OLANZapine  5 mg Oral Q4H PRN Max 3/day Nasima Violetta, CRNP      OLANZapine  5 mg Oral Q3H PRN Max 3/day Nasima Violetta, CRNP      ondansetron  4 mg Oral Q8H PRN Mae Andrade, CRNP      pantoprazole  40 mg Oral Early Morning Mae Andrade, CRNP      polyethylene glycol  17 g Oral Daily PRN Nasima Quachsharona, CRNP      pravastatin  20 mg Oral Daily With Dinner Mae Isidrojanene, CRNP      propranolol  5 mg Oral Q8H PRN Nasima Violetta, JAZLYNNP      senna-docusate sodium  1 tablet Oral Daily PRN Nasima Quachsharona, CRNP      traZODone  50 mg Oral HS PRN Nasima Violetta, CRNP      ziprasidone  80 mg Oral BID With Meals Jordan C Holter, DO         Risks / Benefits of Treatment:  Risks, benefits, and possible side effects of medications explained to patient and patient verbalizes understanding and agreement for treatment.    Counseling / Coordination of Care:  Patient's progress reviewed with nursing staff.  Medications, treatment progress and treatment plan reviewed with patient.  Supportive counseling provided to the patient.    Total floor/unit time spent today 25 minutes. Greater than 50% of total time was spent with the patient and / or family counseling and / or  coordination of care. A description of the counseling / coordination of care: medication education, treatment plan, supportive therapy.

## 2024-06-03 NOTE — PROGRESS NOTES
06/03/24 1429   Admission   Release of Information Signed Yes  ([facility]; Frannie Shepard (mother) [phone]; Nitzajayla Galvan (sister) [phone]; Christian Shepard (brother) [phone])
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
## 2024-06-04 PROCEDURE — 99232 SBSQ HOSP IP/OBS MODERATE 35: CPT

## 2024-06-04 PROCEDURE — 94660 CPAP INITIATION&MGMT: CPT

## 2024-06-04 RX ORDER — SULFAMETHOXAZOLE AND TRIMETHOPRIM 800; 160 MG/1; MG/1
1 TABLET ORAL EVERY 12 HOURS SCHEDULED
Status: DISCONTINUED | OUTPATIENT
Start: 2024-06-04 | End: 2024-06-06 | Stop reason: HOSPADM

## 2024-06-04 RX ADMIN — MUPIROCIN: 20 OINTMENT TOPICAL at 08:12

## 2024-06-04 RX ADMIN — MUPIROCIN: 20 OINTMENT TOPICAL at 21:33

## 2024-06-04 RX ADMIN — SULFAMETHOXAZOLE AND TRIMETHOPRIM 1 TABLET: 800; 160 TABLET ORAL at 21:36

## 2024-06-04 RX ADMIN — NYSTATIN: 100000 POWDER TOPICAL at 08:12

## 2024-06-04 RX ADMIN — NYSTATIN: 100000 POWDER TOPICAL at 19:23

## 2024-06-04 RX ADMIN — ZIPRASIDONE HYDROCHLORIDE 80 MG: 40 CAPSULE ORAL at 08:12

## 2024-06-04 RX ADMIN — ZIPRASIDONE HYDROCHLORIDE 80 MG: 40 CAPSULE ORAL at 15:36

## 2024-06-04 RX ADMIN — SULFAMETHOXAZOLE AND TRIMETHOPRIM 1 TABLET: 800; 160 TABLET ORAL at 14:15

## 2024-06-04 RX ADMIN — APIXABAN 5 MG: 5 TABLET, FILM COATED ORAL at 18:25

## 2024-06-04 RX ADMIN — DULOXETINE HYDROCHLORIDE 90 MG: 30 CAPSULE, DELAYED RELEASE ORAL at 08:12

## 2024-06-04 RX ADMIN — LOSARTAN POTASSIUM 100 MG: 50 TABLET, FILM COATED ORAL at 08:12

## 2024-06-04 RX ADMIN — CLONAZEPAM 0.5 MG: 0.5 TABLET ORAL at 21:34

## 2024-06-04 RX ADMIN — Medication 2000 UNITS: at 08:12

## 2024-06-04 RX ADMIN — CYANOCOBALAMIN TAB 1000 MCG 1000 MCG: 1000 TAB at 08:12

## 2024-06-04 RX ADMIN — PANTOPRAZOLE SODIUM 40 MG: 40 TABLET, DELAYED RELEASE ORAL at 06:06

## 2024-06-04 RX ADMIN — BUSPIRONE HYDROCHLORIDE 20 MG: 10 TABLET ORAL at 18:25

## 2024-06-04 RX ADMIN — CARBAMAZEPINE 1000 MG: 200 TABLET ORAL at 21:34

## 2024-06-04 RX ADMIN — PRAVASTATIN SODIUM 20 MG: 20 TABLET ORAL at 15:31

## 2024-06-04 RX ADMIN — BUSPIRONE HYDROCHLORIDE 20 MG: 10 TABLET ORAL at 08:11

## 2024-06-04 RX ADMIN — GABAPENTIN 300 MG: 300 CAPSULE ORAL at 21:35

## 2024-06-04 RX ADMIN — APIXABAN 5 MG: 5 TABLET, FILM COATED ORAL at 08:12

## 2024-06-04 RX ADMIN — FENOFIBRATE 145 MG: 145 TABLET, FILM COATED ORAL at 08:12

## 2024-06-04 NOTE — NURSING NOTE
Pt is visible and social in the milieu. She can be irritable at times. She is med/meal compliant this morning, denies psych symptoms. Pt cooperative with care and is currently denying any unmet needs.

## 2024-06-04 NOTE — PROGRESS NOTES
"Progress Note - Behavioral Health   Prema Shepard 58 y.o. female MRN: [id number]  Unit/Bed#: [facility] 656-01 Encounter: [id number]      Assessment & Plan   Principal Problem:    Schizoaffective disorder (HCC)  Active Problems:    TBI (traumatic brain injury) (HCC)    History of DVT (deep vein thrombosis)    Hyperlipemia    Hypertension    Medical clearance for psychiatric admission    Vitamin D deficiency    Vitamin B12 deficiency    Obesity      Subjective:  Patient was seen today for continuation of care, records reviewed and patient was discussed with the morning case review team.  Per staff, patient has been visible and cooperative with care.  She can be irritable at times but redirectable.  She is compliant with meals and medication.     Prema was seen today for psychiatric follow-up.  On assessment today, Prema was seen resting in bed in her room.  She is child like at times.  She states she would like to go home.  We discussed her suicidal statements when she is frustrated.  She states today \"I don't want to kill myself, I just get mad\".  We discussed alternative phrases to use when frustrated and she appeared to be receptive to education.  She is adamantly denying any suicidal or homicidal ideation today.  She is future thinking and would like to return to her group home.      Prema remains behaviorally controlled, irritable at times with no agitation or aggression noted on exam or in report.  Prema also denies HI/AH/VH, and does not appear overtly manic nor does she appear to be internally preoccupied.  No overt delusions or paranoia are verbalized. Prema remains adherent to her current psychotropic medication regimen and denies any side effects from medications, as well as none noted on exam.    Recommended Treatment: Treatment plan and medication changes discussed and per the attending physician the plan is:    1.Continue with group therapy, milieu therapy and occupational " therapy  2.Behavioral Health checks every 7 minutes  3.Continue frequent safety checks and vitals per unit protocol  4.Continue with SLIM medical management as indicated  5.Continue with current medication regimen:  Buspar 20mg PO BID for anxiety, Klonopin 0.5mg PO at HS for anxiety, Cymbalta 90mg PO daily for mood, Gabapentin 300mg PO TID for anxiety, Geodon 80mg PO BID and Tegretol 1000mg PO at HS.   6.Will review labs in the a.m.  7.Disposition Planning: Discharge planning and efforts remain ongoing    Vitals:  Vitals:    06/04/24 0804   BP: 145/71   Pulse: 65   Resp: 18   Temp: 98 °F (36.7 °C)   SpO2: 96%       Laboratory Results:  I have personally reviewed all pertinent laboratory/tests results.  Most Recent Labs:   Lab Results   Component Value Date    WBC 4.31 06/01/2024    RBC 3.97 06/01/2024    HGB 12.3 06/01/2024    HCT 35.3 06/01/2024     06/01/2024    RDW 12.8 06/01/2024    TOTANEUTABS 0.73 (L) 03/02/2024    NEUTROABS 2.12 06/01/2024    SODIUM 137 06/01/2024    K 4.2 06/01/2024     06/01/2024    CO2 27 06/01/2024    BUN 14 06/01/2024    CREATININE 0.59 (L) 06/01/2024    GLUC 93 06/01/2024    GLUF 93 06/01/2024    CALCIUM 9.7 06/01/2024    AST 17 06/01/2024    ALT 10 06/01/2024    ALKPHOS 35 06/01/2024    TP 6.9 06/01/2024    ALB 4.1 06/01/2024    TBILI 0.33 06/01/2024    CHOLESTEROL 203 (H) 05/31/2024    HDL 76 05/31/2024    TRIG 63 05/31/2024    LDLCALC 114 (H) 05/31/2024    NONHDLC 127 05/31/2024    AMMONIA 63 (H) 04/28/2023    ZLC8EKMDIMDL 0.991 05/31/2024    IAR2AZRBPOTM 0.998 05/31/2024    PREGSERUM Negative 03/02/2024    HGBA1C 5.8 (H) 05/31/2024     05/31/2024       Psychiatric Review of Systems:  Behavior over the last 24 hours:  improved.   Sleep: adequate  Appetite: adequate  Medication side effects: denies  ROS: no complaints, denies shortness of breath or chest pain and all other systems are negative for acute changes    Mental Status Evaluation:    Appearance:  adequate  grooming, dressed in hospital attire   Behavior:  cooperative   Speech:  loud   Mood:  slightly irritable   Affect:  constricted   Thought Process:  goal directed, linear   Associations: intact associations   Thought Content:  no overt delusions, negative thoughts   Perceptual Disturbances: denies auditory or visual hallucinations when asked, does not appear responding to internal stimuli   Risk Potential: Suicidal ideation - None at present  Homicidal ideation - None  Potential for aggression - No   Sensorium:  oriented to person, place, and situation   Memory:  recent memory intact   Consciousness:  alert and awake   Attention/Concentration: attention span and concentration appear shorter than expected for age   Insight:  limited   Judgment: limited   Gait/Station: in bed   Motor Activity: no abnormal movements     Progress Toward Goals:   Prema is progressing towards goals of inpatient psychiatric treatment by continued medication compliance and is attending therapeutic modalities on the milieu. However, the patient continues to require inpatient psychiatric hospitalization for continued medication management and titration to optimize symptom reduction, improve sleep hygiene, and demonstrate adequate self-care.    Risk of Harm to Self:   Nursing Suicide Risk Assessment Last 24 hours: C-SSRS Risk (Since Last Contact)  Calculated C-SSRS Risk Score (Since Last Contact): No Risk Indicated  Current Specific Risk Factors include: mental illness diagnosis  Protective Factors: no current suicidal ideation, ability to communicate with staff on the unit, able to contract for safety on the unit, improved mood  Based on today's assessment, Prema presents the following risk of harm to self: low    Risk of Harm to Others:  Nursing Homicide Risk Assessment: Violence Risk to Others: Denies within past 6 months  Current Specific Risk Factors include: diagnosis of mood disorder  Protective Factors: no current homicidal  ideation, able to communicate with staff on the unit, compliant with medications on the unit as ordered, compliant with unit milieu, follows staff redirection  Based on today's assessment, Prema presents the following risk of harm to others: low    The following interventions are recommended: behavioral checks every 7 minutes, continued hospitalization on locked unit        Behavioral Health Medications: all current active meds have been reviewed and continue current psychiatric medications.  Current Facility-Administered Medications   Medication Dose Route Frequency Provider Last Rate    acetaminophen  650 mg Oral Q4H PRN BERNABE Chandra      acetaminophen  650 mg Oral Q4H PRN BERNABE Chandra      acetaminophen  975 mg Oral Q6H PRN BERNABE Chandra      aluminum-magnesium hydroxide-simethicone  30 mL Oral Q4H PRN BERNABE Chandra      apixaban  5 mg Oral BID BERNABE Mireles      benztropine  1 mg Intramuscular Q4H PRN Max 6/day BERNABE Chandra      benztropine  0.5 mg Oral Q4H PRN Max 6/day BERNABE Chandra      bisacodyl  10 mg Rectal Daily PRN BERNABE Chandra      busPIRone  20 mg Oral BID Jordan C Holter, DO      carBAMazepine  1,000 mg Oral HS Jordan C Holter, DO      Cholecalciferol  2,000 Units Oral Daily BERNABE Mireles      clonazePAM  0.5 mg Oral HS Jordan C Holter, DO      cyanocobalamin  1,000 mcg Oral Daily BERNABE Mireles      DULoxetine  90 mg Oral Daily BERNABE Garcia      fenofibrate  145 mg Oral Daily BERNABE Mireles      gabapentin  300 mg Oral HS Jordan C Holter,       hydrOXYzine HCL  25 mg Oral Q6H PRN Max 4/day BERNABE Chandra      hydrOXYzine HCL  50 mg Oral Q6H PRN Max 4/day BERNABE Chandra      LORazepam  1 mg Intramuscular Q6H PRN Max 3/day BERNABE Chandra      LORazepam  1 mg Oral Q6H PRN Max 3/day BERNABE Chandra      losartan  100 mg Oral Daily BERNABE Mireles      mupirocin    Nasal Q12H PRASAD Mae Hoffman Lupe, CRNP      nystatin   Topical BID Mae Hoffman Lupe, CRNP      OLANZapine  5 mg Intramuscular Q3H PRN Max 3/day Nasima Shipley, BERNABE      OLANZapine  2.5 mg Oral Q4H PRN Max 6/day Nasima Shipley, CRNP      OLANZapine  5 mg Oral Q4H PRN Max 3/day Nasima Shipley, CRNP      OLANZapine  5 mg Oral Q3H PRN Max 3/day Nasima Shipley, JAZLYNNP      ondansetron  4 mg Oral Q8H PRN Mae Isidrojanene, CRNP      pantoprazole  40 mg Oral Early Morning Mae Hoffman Lupe, CRNP      polyethylene glycol  17 g Oral Daily PRN Nasima Shipley, JAZLYNNP      pravastatin  20 mg Oral Daily With Dinner Mae Hoffman Lupe, JAZLYNNP      propranolol  5 mg Oral Q8H PRN Nasima Shipley, JAZLYNNP      senna-docusate sodium  1 tablet Oral Daily PRN Nasimalacey Shipley, BERNABE      traZODone  50 mg Oral HS PRN Nasima Shipley, BERNABE      ziprasidone  80 mg Oral BID With Meals Jordan C Holter, DO         Risks / Benefits of Treatment:  Risks, benefits, and possible side effects of medications explained to patient and patient verbalizes understanding and agreement for treatment.    Counseling / Coordination of Care:  Patient's progress reviewed with nursing staff.  Medications, treatment progress and treatment plan reviewed with patient.  Supportive counseling provided to the patient.    Total floor/unit time spent today 25 minutes. Greater than 50% of total time was spent with the patient and / or family counseling and / or coordination of care. A description of the counseling / coordination of care: medication education, treatment plan, supportive therapy.

## 2024-06-04 NOTE — PROGRESS NOTES
06/04/24 1000   Activity/Group Checklist   Group Other (Comment)  (Group Art Therapy/Psychodynamic, Open Choice followed by Process Discussion)   Attendance Other (Comment);Attended  (Observed only)   Attendance Duration (min) 31-45   Interactions Did not interact  (Required prompting)   Affect/Mood Other (Comment)  (Easily agitated)

## 2024-06-04 NOTE — PROGRESS NOTES
06/04/24 0756   Team Meeting   Meeting Type Daily Rounds   Initial Conference Date 06/04/24   Next Conference Date 06/05/24   Team Members Present   Team Members Present Physician;Nurse;;   Physician Team Member Dr Ruiz, ISELA Good T. Gonzalez   Nursing Team Member Kendra Mc   Care Management Team Member Keely   Social Work Team Member Taina   Patient/Family Present   Patient Present No   Patient's Family Present No     High fall risk, bed chair alarms, using walker, discharge for Thursday.

## 2024-06-04 NOTE — TREATMENT TEAM
Pt completed  relapse prevention plan with assistance.  Pt signed and copy in chart.  Crisis, wamline and 988 numbers provided.   Pt unaware of pharmacy.  Pt indicated she has [facility] as providers.   Pt attends groups and hopeful of d/c.                                 06/04/24 1415   Activity/Group Checklist   Group Admission/Discharge   Attendance Attended   Attendance Duration (min) 16-30   Interactions Interacted appropriately   Affect/Mood Appropriate   Goals Achieved Identified feelings;Identified triggers;Identified relapse prevention strategies;Discussed safety plan;Discussed coping strategies;Discussed discharge plans;Able to listen to others;Able to engage in interactions;Increased hopefulness;Displayed empathy;Identified distorted thoughts/beliefs;Identified resources and support systems;Able to reflect/comment on own behavior;Able to manage/cope with feelings;Verbalized increased hopefulness;Able to self-disclose;Able to recieve feedback

## 2024-06-04 NOTE — PLAN OF CARE
Problem: Alteration in Thoughts and Perception  Goal: Treatment Goal: Gain control of psychotic behaviors/thinking, reduce/eliminate presenting symptoms and demonstrate improved reality functioning upon discharge  Outcome: Progressing     Problem: Risk for Self Injury/Neglect  Goal: Treatment Goal: Remain safe during length of stay, learn and adopt new coping skills, and be free of self-injurious ideation, impulses and acts at the time of discharge  Outcome: Progressing  Goal: Verbalize thoughts and feelings  Description: Interventions:  - Assess and re-assess patient's lethality and potential for self-injury  - Engage patient in 1:1 interactions, daily, for a minimum of 15 minutes  - Encourage patient to express feelings, fears, frustrations, hopes  - Establish rapport/trust with patient   Outcome: Progressing  Goal: Refrain from harming self  Description: Interventions:  - Monitor patient closely, per order  - Develop a trusting relationship  - Supervise medication ingestion, monitor effects and side effects   Outcome: Progressing  Goal: Attend and participate in unit activities, including therapeutic, recreational, and educational groups  Description: Interventions:  - Provide therapeutic and educational activities daily, encourage attendance and participation, and document same in the medical record  - Obtain collateral information, encourage visitation and family involvement in care   Outcome: Progressing  Goal: Recognize maladaptive responses and adopt new coping mechanisms  Outcome: Progressing  Goal: Complete daily ADLs, including personal hygiene independently, as able  Description: Interventions:  - Observe, teach, and assist patient with ADLS  - Monitor and promote a balance of rest/activity, with adequate nutrition and elimination  Outcome: Progressing     Problem: Anxiety  Goal: Anxiety is at manageable level  Description: Interventions:  - Assess and monitor patient's anxiety level.   - Monitor for  signs and symptoms (heart palpitations, chest pain, shortness of breath, headaches, nausea, feeling jumpy, restlessness, irritable, apprehensive).   - Collaborate with interdisciplinary team and initiate plan and interventions as ordered.  - [place] patient to unit/surroundings  - Explain treatment plan  - Encourage participation in care  - Encourage verbalization of concerns/fears  - Identify coping mechanisms  - Assist in developing anxiety-reducing skills  Problem: SAFETY ADULT  Goal: Patient will remain free of falls  Description: INTERVENTIONS:  - Educate patient/family on patient safety including physical limitations  - Instruct patient to call for assistance with activity   - Consult OT/PT to assist with strengthening/mobility   - Keep Call bell within reach  - Keep bed low and locked with side rails adjusted as appropriate  - Keep care items and personal belongings within reach  - Initiate and maintain comfort rounds  - Make Fall Risk Sign visible to staff  - Offer Toileting every 2 Hours, in advance of need  - Initiate/Maintain bed/chair alarm  - Obtain necessary fall risk management equipment: Leg brace/Walker/footwear   - Apply yellow socks and bracelet for high fall risk patients  - Consider moving patient to room near nurses station  Outcome: Progressing  Goal: Maintain or return to baseline ADL function  Description: INTERVENTIONS:  -  Assess patient's ability to carry out ADLs; assess patient's baseline for ADL function and identify physical deficits which impact ability to perform ADLs (bathing, care of mouth/teeth, toileting, grooming, dressing, etc.)  - Assess/evaluate cause of self-care deficits   - Assess range of motion  - Assess patient's mobility; develop plan if impaired  - Assess patient's need for assistive devices and provide as appropriate  - Encourage maximum independence but intervene and supervise when necessary  - Involve family in performance of ADLs  - Assess for home care needs  following discharge   - Consider OT consult to assist with ADL evaluation and planning for discharge  - Provide patient education as appropriate  Outcome: Progressing  Goal: Maintains/Returns to pre admission functional level  Description: INTERVENTIONS:  - Perform AM-PAC 6 Click Basic Mobility/ Daily Activity assessment daily.  - Set and communicate daily mobility goal to care team and patient/family/caregiver.   - Collaborate with rehabilitation services on mobility goals if consulted  - Out of bed for toileting  - Record patient progress and toleration of activity level   Outcome: Progressing     - Administer/offer alternative therapies  - Limit or eliminate stimulants  Outcome: Progressing

## 2024-06-04 NOTE — NURSING NOTE
Addended by: SUSHMA AU on: 6/30/2023 08:40 AM     Modules accepted: Orders     Chair alarm in place at this time, pt is a high fall risk.

## 2024-06-04 NOTE — NURSING NOTE
Patient is awake and visible in the dayroom. Patient was irritable due to having to wear her foot brace but that aside she is pleasant and cooperative with care. Patient denies all psych s/s. Medication compliant. Safety precautions maintained.

## 2024-06-04 NOTE — NURSING NOTE
Pt compliant with CPAP throughout the night. It appears pt preferred a CPAP with mask over the nares that mouth. Respiratory therapist made aware of pt's mask preference at HS last night after further assessment on pt's CPAP use.  Same was provided.

## 2024-06-05 PROCEDURE — 99232 SBSQ HOSP IP/OBS MODERATE 35: CPT | Performed by: PSYCHIATRY & NEUROLOGY

## 2024-06-05 PROCEDURE — 94660 CPAP INITIATION&MGMT: CPT

## 2024-06-05 PROCEDURE — 94760 N-INVAS EAR/PLS OXIMETRY 1: CPT

## 2024-06-05 RX ADMIN — GABAPENTIN 300 MG: 300 CAPSULE ORAL at 21:55

## 2024-06-05 RX ADMIN — NYSTATIN: 100000 POWDER TOPICAL at 08:01

## 2024-06-05 RX ADMIN — PANTOPRAZOLE SODIUM 40 MG: 40 TABLET, DELAYED RELEASE ORAL at 06:06

## 2024-06-05 RX ADMIN — PRAVASTATIN SODIUM 20 MG: 20 TABLET ORAL at 17:13

## 2024-06-05 RX ADMIN — FENOFIBRATE 145 MG: 145 TABLET, FILM COATED ORAL at 08:00

## 2024-06-05 RX ADMIN — SULFAMETHOXAZOLE AND TRIMETHOPRIM 1 TABLET: 800; 160 TABLET ORAL at 21:55

## 2024-06-05 RX ADMIN — CYANOCOBALAMIN TAB 1000 MCG 1000 MCG: 1000 TAB at 08:00

## 2024-06-05 RX ADMIN — BUSPIRONE HYDROCHLORIDE 20 MG: 10 TABLET ORAL at 08:00

## 2024-06-05 RX ADMIN — CARBAMAZEPINE 1000 MG: 200 TABLET ORAL at 21:55

## 2024-06-05 RX ADMIN — BUSPIRONE HYDROCHLORIDE 20 MG: 10 TABLET ORAL at 17:13

## 2024-06-05 RX ADMIN — LOSARTAN POTASSIUM 100 MG: 50 TABLET, FILM COATED ORAL at 08:00

## 2024-06-05 RX ADMIN — MUPIROCIN 1 APPLICATION: 20 OINTMENT TOPICAL at 21:55

## 2024-06-05 RX ADMIN — APIXABAN 5 MG: 5 TABLET, FILM COATED ORAL at 17:13

## 2024-06-05 RX ADMIN — MUPIROCIN: 20 OINTMENT TOPICAL at 08:01

## 2024-06-05 RX ADMIN — ZIPRASIDONE HYDROCHLORIDE 80 MG: 40 CAPSULE ORAL at 08:00

## 2024-06-05 RX ADMIN — APIXABAN 5 MG: 5 TABLET, FILM COATED ORAL at 08:00

## 2024-06-05 RX ADMIN — CLONAZEPAM 0.5 MG: 0.5 TABLET ORAL at 21:55

## 2024-06-05 RX ADMIN — DULOXETINE HYDROCHLORIDE 90 MG: 30 CAPSULE, DELAYED RELEASE ORAL at 08:00

## 2024-06-05 RX ADMIN — SULFAMETHOXAZOLE AND TRIMETHOPRIM 1 TABLET: 800; 160 TABLET ORAL at 08:00

## 2024-06-05 RX ADMIN — Medication 2000 UNITS: at 08:00

## 2024-06-05 RX ADMIN — ZIPRASIDONE HYDROCHLORIDE 80 MG: 40 CAPSULE ORAL at 17:13

## 2024-06-05 RX ADMIN — ACETAMINOPHEN 975 MG: 325 TABLET ORAL at 04:10

## 2024-06-05 NOTE — CASE MANAGEMENT
Case Management Discharge Planning Note    Patient name Prema Shepard  Location [facility] 656/-01 MRN [id number]  : 1965 Date 2024       Current Admission Date: 2024  Current Admission Diagnosis:Schizoaffective disorder (HCC)   Patient Active Problem List    Diagnosis Date Noted Date Diagnosed    Medical clearance for psychiatric admission 2024     Vitamin D deficiency 2024     Vitamin B12 deficiency 2024     Obesity 2024     Closed head injury 2024     Hypertension 2024     Hyponatremia 2023     TBI (traumatic brain injury) (HCC) 2023     History of DVT (deep vein thrombosis) 2023     Schizoaffective disorder (HCC) 2023     Hyperlipemia 2023       LOS (days): 5  Geometric Mean LOS (GMLOS) (days):   Days to GMLOS:     OBJECTIVE:  Risk of Unplanned Readmission Score: 25.55         Current admission status: Inpatient Psych   Preferred Pharmacy:   [facility] [place] [place] [facility] & [address]  [facility] [address]  Phone: [phone] Fax: [phone]    Primary Care Provider: Sotero Peterson MD    Primary Insurance: MEDICARE  Secondary Insurance: [facility]    DISCHARGE DETAILS:    [facility] [phone] and Patient  Freedom of Choice: Yes                   Contacts  Patient Contacts: [facility] Amanda Rivers [phone] and mom Frannie Shepard (mother) [phone]  Relationship to Patient:: Family  Contact Method: Phone  Phone Number: [phone]  Reason/Outcome: Emergency Contact, Discharge Planning, Continuity of Care              Other Referral/Resources/Interventions Provided:  Referrals Provided:: Crisis Hotline, Psychiatrist    Would you like to participate in our [facility] service program?  : No - Declined          Transport at  Discharge : Automobile (Group home transported)  Dispatcher Contacted: No              Transport Service Arrived: No  Transfer Mode: Walker        IMM Given (Date):: 06/05/24 (declined to sign)  IMM Given to:: Patient          CM met with PT for PT check in. PT was pleasant in conversation, reviewed discharge plan along with follow up care and supports, PT in agreement with all. PT denies si/hi/ah/vh and depression. She does report anxiety related to discharge and not sure if she is ready. PT expressed gratitude. Reviewed IMM, She declined to sign anything. She said she was not sure she was ready to go, but then through the conversation realized that she is just anxious about leaving and not knowing who will help her get ready today. Reassured her and she felt much better.     Aftercare:  [facility]: 6/20/24 @ 3 pm with Dr Flores

## 2024-06-05 NOTE — PLAN OF CARE
:Pt attends group and visible.     Problem: Ineffective Coping  Goal: Participates in unit activities  Description: Interventions:  - Provide therapeutic environment   - Provide required programming   - Redirect inappropriate behaviors   Outcome: Progressing

## 2024-06-05 NOTE — PROGRESS NOTES
Pt attended all groups.  Pt was very sleepy in am.  Pt less impulsive and pleasant. Pt able to share her interests and find commonality with peers.      06/05/24 1330   Activity/Group Checklist   Group Other (Comment)  (Reminiscing and communication)   Attendance Attended   Attendance Duration (min) 46-60   Interactions Interacted appropriately   Affect/Mood Appropriate   Goals Achieved Identified feelings;Able to listen to others;Able to engage in interactions;Able to reflect/comment on own behavior;Able to manage/cope with feelings;Verbalized increased hopefulness;Able to self-disclose;Able to recieve feedback;Discussed coping strategies

## 2024-06-05 NOTE — DISCHARGE INSTR - OTHER ORDERS
You are being discharged to [address] [place] [address].     Triggers you have identified during your hospitalization that led to your admission include suicidal ideation, and distressed mood. If you are unable to deal with your distressed mood alone please contact your psychiatrist at [facility], or your therapist, Christina. If that is not effective and you continue to have suicidal ideation, a distressed mood, are feeling overwhelmed, and/or in crisis please contact [facility] at [phone], dial 911 or go to the nearest emergency center.     [facility] Warm Line: [phone] - 24 hours a day, 7 days a week    A warm line is a phone service for people who are looking for support, engagement, and hope during non-emergency mental health struggles or distress. A warm line is staffed by peers who have personal or lived experience with mental health disorders and who can listen, share, and offer a sense of recovery. A warm line is different from a crisis line or hotline, which are intended for emergency situations    National Suicide Hotline: 616.137.9912    Crisis Text Line: 924579      Anita, or Norma, our Behavioral Health Nurse Navigators, will be calling you after your discharge, on the phone number that you provided.  They will be available as an additional support, if needed.     If you wish to speak with one of them, you may contact Anita at [phone] or Norma at [phone].

## 2024-06-05 NOTE — CASE MANAGEMENT
Spoke to patient's mother Frannie Shepard ([phone]), to advise of discharge.     Called and spoke with Amanda Rivers [phone], the nurse at [facility] to advise of Prema's discharge tomorrow. She is asking that a psychiatrist appointment be set up because they are unable to get her medications under control.     Patient signed a release for [facility], scheduled her an appointment on 6/20/24 @ 3 pm with Dr Flores.

## 2024-06-05 NOTE — NURSING NOTE
"Pt can have a slightly irritable edge at times. This morning she stated that she didn't want to meet with the \"stupid woman\" and she wants to meet with an \"actual psychiatrist\". Other than this, she was cooperative with care. Wireless chair alarm in place for fall risk. Pt ambulates appropriately with walker. She is visible and social in the milieu. Pt denying any unmet needs.   "

## 2024-06-05 NOTE — PROGRESS NOTES
"Progress Note - Behavioral Health   Prema Shepard 58 y.o. female MRN: [id number]  Unit/Bed#: [facility] 656-01 Encounter: [id number]      Assessment & Plan   Principal Problem:    Schizoaffective disorder (HCC)  Active Problems:    TBI (traumatic brain injury) (HCC)    History of DVT (deep vein thrombosis)    Hyperlipemia    Hypertension    Medical clearance for psychiatric admission    Vitamin D deficiency    Vitamin B12 deficiency    Obesity      Subjective:  Patient was seen today for continuation of care, records reviewed and patient was discussed with the morning case review team.  Per staff, Prema continues to be attention seeking at times and intermittently irritable.  She is denying SI/HI.  She is compliant with meals and medications.     Prema was seen today for psychiatric follow-up.  On assessment today, Prema was seen in the group room away from peers.  She reports she did not want to speak with this writer because \"you are not even a real psychiatrist\".  Patient reassured about this writers credentials.  She states that she does not belong here and asked why her medications have not been adjusted.  When advised her Cymbalta was increased, she questioned why.  We discussed initially when admitted her mood was depressed.  She reports her mood has improved and she does not have SI.  She reports she would not \"hurt herself\".  Protective factors include family (mom and sister) as well as her Yarsanism.   Prema was somewhat irritable during the interview but redirectable and generally cooperative.     Prema remains behaviorally controlled, she continues to be irritable at times with no agitation or aggression noted on exam or in report.  Prema also denies HI/AH/VH, and does not appear overtly manic nor does she appear to be internally preoccupied.  No overt delusions or paranoia are verbalized. Prema remains adherent to her current psychotropic medication regimen and denies any " side effects from medications, as well as none noted on exam.    Recommended Treatment: Treatment plan and medication changes discussed and per the attending physician the plan is:    1.Continue with group therapy, milieu therapy and occupational therapy  2.Behavioral Health checks every 7 minutes  3.Continue frequent safety checks and vitals per unit protocol  4.Continue with SLIM medical management as indicated  5.Continue with current medication regimen:  Buspar 20mg PO BID for anxiety, Klonopin 0.5mg PO at HS for anxiety, Cymbalta 90mg PO daily for mood, Gabapentin 300mg PO TID for anxiety, Geodon 80mg PO BID and Tegretol 1000mg PO at HS.   6.Will review labs in the a.m.  7.Disposition Planning: Discharge planning and efforts remain ongoing, plan for discharge back to group home tomorrow.    Vitals:  Vitals:    06/05/24 0746   BP: 134/81   Pulse: 70   Resp: 18   Temp: (!) 97.3 °F (36.3 °C)   SpO2: 93%       Laboratory Results:  I have personally reviewed all pertinent laboratory/tests results.  Most Recent Labs:   Lab Results   Component Value Date    WBC 4.31 06/01/2024    RBC 3.97 06/01/2024    HGB 12.3 06/01/2024    HCT 35.3 06/01/2024     06/01/2024    RDW 12.8 06/01/2024    TOTANEUTABS 0.73 (L) 03/02/2024    NEUTROABS 2.12 06/01/2024    SODIUM 137 06/01/2024    K 4.2 06/01/2024     06/01/2024    CO2 27 06/01/2024    BUN 14 06/01/2024    CREATININE 0.59 (L) 06/01/2024    GLUC 93 06/01/2024    GLUF 93 06/01/2024    CALCIUM 9.7 06/01/2024    AST 17 06/01/2024    ALT 10 06/01/2024    ALKPHOS 35 06/01/2024    TP 6.9 06/01/2024    ALB 4.1 06/01/2024    TBILI 0.33 06/01/2024    CHOLESTEROL 203 (H) 05/31/2024    HDL 76 05/31/2024    TRIG 63 05/31/2024    LDLCALC 114 (H) 05/31/2024    NONHDLC 127 05/31/2024    AMMONIA 63 (H) 04/28/2023    DII7RKAUZLQE 0.991 05/31/2024    EZD6YUCOVIXN 0.998 05/31/2024    PREGSERUM Negative 03/02/2024    HGBA1C 5.8 (H) 05/31/2024     05/31/2024       Psychiatric  Review of Systems:  Behavior over the last 24 hours:  improved.   Sleep: adequate  Appetite: adequate  Medication side effects: denies  ROS: no complaints, denies shortness of breath or chest pain and all other systems are negative for acute changes    Mental Status Evaluation:    Appearance:  adequate grooming, dressed in hospital attire   Behavior:  cooperative, irritable   Speech:  loud   Mood:  slightly irritable, redirectable   Affect:  constricted   Thought Process:  goal directed, linear   Associations: intact associations   Thought Content:  no overt delusions, negative thoughts   Perceptual Disturbances: denies auditory or visual hallucinations when asked, does not appear responding to internal stimuli   Risk Potential: Suicidal ideation - None at present  Homicidal ideation - None  Potential for aggression - No   Sensorium:  oriented to person, place, and situation   Memory:  recent memory intact   Consciousness:  alert and awake   Attention/Concentration: attention span and concentration appear shorter than expected for age   Insight:  limited   Judgment: limited   Gait/Station: in bed   Motor Activity: no abnormal movements     Progress Toward Goals:   Prema is progressing towards goals of inpatient psychiatric treatment by continued medication compliance and is attending therapeutic modalities on the milieu. However, the patient continues to require inpatient psychiatric hospitalization for continued medication management and titration to optimize symptom reduction, improve sleep hygiene, and demonstrate adequate self-care.    Risk of Harm to Self:   Nursing Suicide Risk Assessment Last 24 hours: C-SSRS Risk (Since Last Contact)  Calculated C-SSRS Risk Score (Since Last Contact): No Risk Indicated  Current Specific Risk Factors include: mental illness diagnosis  Protective Factors: no current suicidal ideation, ability to communicate with staff on the unit, able to contract for safety on the unit,  improved mood  Based on today's assessment, Prema presents the following risk of harm to self: low    Risk of Harm to Others:  Nursing Homicide Risk Assessment: Violence Risk to Others: Denies within past 6 months  Current Specific Risk Factors include: diagnosis of mood disorder  Protective Factors: no current homicidal ideation, able to communicate with staff on the unit, compliant with medications on the unit as ordered, compliant with unit milieu, follows staff redirection  Based on today's assessment, Prema presents the following risk of harm to others: low    The following interventions are recommended: behavioral checks every 7 minutes, continued hospitalization on locked unit        Behavioral Health Medications: all current active meds have been reviewed and continue current psychiatric medications.  Current Facility-Administered Medications   Medication Dose Route Frequency Provider Last Rate    acetaminophen  650 mg Oral Q4H PRN BERNABE Chandra      acetaminophen  650 mg Oral Q4H PRN BERNABE Chandra      acetaminophen  975 mg Oral Q6H PRN BERNABE Chandra      aluminum-magnesium hydroxide-simethicone  30 mL Oral Q4H PRN BERNABE Chandra      apixaban  5 mg Oral BID BERNABE Mireles      benztropine  1 mg Intramuscular Q4H PRN Max 6/day BERNABE Chandra      benztropine  0.5 mg Oral Q4H PRN Max 6/day BERNABE Chandra      bisacodyl  10 mg Rectal Daily PRN BERNABE Chandra      busPIRone  20 mg Oral BID Jordan C Holter,       carBAMazepine  1,000 mg Oral HS Jordan C Holter, DO      Cholecalciferol  2,000 Units Oral Daily BERNABE Mireles      clonazePAM  0.5 mg Oral HS Jordan C Holter, DO      cyanocobalamin  1,000 mcg Oral Daily BERNABE Mireles      DULoxetine  90 mg Oral Daily BERNABE Garcia      fenofibrate  145 mg Oral Daily BERNABE Mireles      gabapentin  300 mg Oral HS Jordan C Holter,       hydrOXYzine HCL  25  mg Oral Q6H PRN Max 4/day Nasima Shipley, CRNP      hydrOXYzine HCL  50 mg Oral Q6H PRN Max 4/day Nasima Shipley, CRNP      LORazepam  1 mg Intramuscular Q6H PRN Max 3/day Nasima Violetta, CRNP      LORazepam  1 mg Oral Q6H PRN Max 3/day Nasima Shipley, CRNP      losartan  100 mg Oral Daily Mae Cervantesadelina, CRNP      mupirocin   Nasal Q12H PRASAD Mae Cervantesadelina, CRNP      nystatin   Topical BID Mae Cervantesadelina, CRNP      OLANZapine  5 mg Intramuscular Q3H PRN Max 3/day Nasima Violetta, CRNP      OLANZapine  2.5 mg Oral Q4H PRN Max 6/day Nasima Violetta, CRNP      OLANZapine  5 mg Oral Q4H PRN Max 3/day Nasima Violetta, CRNP      OLANZapine  5 mg Oral Q3H PRN Max 3/day Nasima Violetta, CRNP      ondansetron  4 mg Oral Q8H PRN Mae Cervantesadelina, CRNP      pantoprazole  40 mg Oral Early Morning Mae Isidrojanene, CRNP      polyethylene glycol  17 g Oral Daily PRN Nasima Quachsharona, CRNP      pravastatin  20 mg Oral Daily With Dinner Mae Isidrojanene, CRNP      propranolol  5 mg Oral Q8H PRN Nasima Violetta, CRNP      senna-docusate sodium  1 tablet Oral Daily PRN Nasima Quachsharona, CRNP      sulfamethoxazole-trimethoprim  1 tablet Oral Q12H [facility] Mae Isidrojanene, CRNP      traZODone  50 mg Oral HS PRN Nasima Violetta, CRNP      ziprasidone  80 mg Oral BID With Meals Jordan C Holter, DO         Risks / Benefits of Treatment:  Risks, benefits, and possible side effects of medications explained to patient and patient verbalizes understanding and agreement for treatment.    Counseling / Coordination of Care:  Patient's progress reviewed with nursing staff.  Medications, treatment progress and treatment plan reviewed with patient.  Supportive counseling provided to the patient.    Total floor/unit time spent today 25 minutes. Greater than 50% of total time was spent with the patient and / or family counseling and / or coordination of care. A description of the counseling / coordination of care: medication education,  treatment plan, supportive therapy.

## 2024-06-05 NOTE — PROGRESS NOTES
06/05/24 0809   Team Meeting   Meeting Type Daily Rounds   Initial Conference Date 06/05/24   Next Conference Date 06/06/24   Team Members Present   Team Members Present Physician;Nurse;;   Physician Team Member Dr Ruiz, Dr Loyola, JENNIFER Aguilar M. Noonan   Nursing Team Member Kendra Mc   Care Management Team Member Keely   Social Work Team Member Taina   Patient/Family Present   Patient Present No   Patient's Family Present No     Discharge Thursday, needy, doing well off the 1:1.

## 2024-06-05 NOTE — NURSING NOTE
Patient c/o 8/10 generalized pain and requested pain medication. PRN Tylenol 975 mg administered at 0410.

## 2024-06-05 NOTE — CASE MANAGEMENT
Amanda Rivers [phone], the nurse at [facility] called and advised that they will be here to pick her up tomorrow at 11 am.

## 2024-06-05 NOTE — CASE MANAGEMENT
Prema asked to see this writer, she is stating that she does not want to go home tomorrow, she is not sure why but she just is not sleeping well yet.     Went back to talk to the patient, she reported that she is just anxious because she does not know who is going to help her get ready,how she will shower and how she will get her things. Reassured the patient that we are taking care of all of that for her.

## 2024-06-05 NOTE — PROGRESS NOTES
06/05/24 1836   Discharge Planning   Living Arrangements Other (Comment)  (Group Home)   Support Systems Parent;Self;Home care staff;Family members;Psychiatrist;Therapist   Type of Current Residence Group home   Current Care Facility Name [facility]   Current Home Care Services No   Other Referral/Resources/Interventions Provided:   Referrals Provided: Crisis Hotline;Psychiatrist   Discharge Communications   Discharge planning discussed with: [facility] [phone] and Patient   Freedom of Choice Yes   IMM Given (Date): 06/05/24  (declined to sign)   IMM Given to: Patient   Transportation at Discharge? No   Transport at Discharge  Automobile  (Group home transported)   Dispatcher Contacted No   ETA of Transport 1100   Transport Service Arrived No   Transfer Mode Walker   Contacts   [facility] [phone] and mom Frannie Shepard (mother) [phone]   Relationship to Patient: Family   Contact Method Phone   Phone Number [phone]   Reason/Outcome Emergency Contact;Discharge Planning;Continuity of Care   [facility]   Would you like to participate in our [facility] service program?   No - Declined

## 2024-06-05 NOTE — PLAN OF CARE
Problem: Alteration in Thoughts and Perception  Goal: Treatment Goal: Gain control of psychotic behaviors/thinking, reduce/eliminate presenting symptoms and demonstrate improved reality functioning upon discharge  Outcome: Progressing     Problem: Ineffective Coping  Goal: Identifies ineffective coping skills  Outcome: Progressing  Goal: Identifies healthy coping skills  Outcome: Progressing  Goal: Demonstrates healthy coping skills  Outcome: Progressing  Goal: Participates in unit activities  Description: Interventions:  - Provide therapeutic environment   - Provide required programming   - Redirect inappropriate behaviors   Outcome: Progressing  Goal: Patient/Family participate in treatment and DC plans  Description: Interventions:  - Provide therapeutic environment  Outcome: Progressing  Goal: Patient/Family verbalizes awareness of resources  Outcome: Progressing  Goal: Understands least restrictive measures  Description: Interventions:  - Utilize least restrictive behavior  Outcome: Progressing  Goal: Free from restraint events  Description: - Utilize least restrictive measures   - Provide behavioral interventions   - Redirect inappropriate behaviors   Outcome: Progressing

## 2024-06-06 VITALS
WEIGHT: 200.7 LBS | SYSTOLIC BLOOD PRESSURE: 172 MMHG | BODY MASS INDEX: 39.4 KG/M2 | HEART RATE: 76 BPM | HEIGHT: 60 IN | DIASTOLIC BLOOD PRESSURE: 92 MMHG | RESPIRATION RATE: 19 BRPM | TEMPERATURE: 97.6 F | OXYGEN SATURATION: 96 %

## 2024-06-06 PROCEDURE — 94760 N-INVAS EAR/PLS OXIMETRY 1: CPT

## 2024-06-06 PROCEDURE — 99239 HOSP IP/OBS DSCHRG MGMT >30: CPT | Performed by: PSYCHIATRY & NEUROLOGY

## 2024-06-06 PROCEDURE — 94660 CPAP INITIATION&MGMT: CPT

## 2024-06-06 RX ORDER — SULFAMETHOXAZOLE AND TRIMETHOPRIM 800; 160 MG/1; MG/1
1 TABLET ORAL EVERY 12 HOURS SCHEDULED
Qty: 2 TABLET | Refills: 0 | Status: SHIPPED | OUTPATIENT
Start: 2024-06-06 | End: 2024-06-07

## 2024-06-06 RX ORDER — CARBAMAZEPINE 200 MG/1
1000 TABLET ORAL
Qty: 150 TABLET | Refills: 0 | Status: SHIPPED | OUTPATIENT
Start: 2024-06-06 | End: 2024-07-06

## 2024-06-06 RX ORDER — BUSPIRONE HYDROCHLORIDE 10 MG/1
20 TABLET ORAL 2 TIMES DAILY
Qty: 120 TABLET | Refills: 0 | Status: SHIPPED | OUTPATIENT
Start: 2024-06-06 | End: 2024-07-06

## 2024-06-06 RX ORDER — CLONAZEPAM 0.5 MG/1
0.5 TABLET ORAL
Qty: 10 TABLET | Refills: 0 | Status: SHIPPED | OUTPATIENT
Start: 2024-06-06 | End: 2024-06-16

## 2024-06-06 RX ORDER — DULOXETIN HYDROCHLORIDE 30 MG/1
90 CAPSULE, DELAYED RELEASE ORAL DAILY
Qty: 90 CAPSULE | Refills: 0 | Status: SHIPPED | OUTPATIENT
Start: 2024-06-06 | End: 2024-07-06

## 2024-06-06 RX ORDER — ZIPRASIDONE HYDROCHLORIDE 80 MG/1
80 CAPSULE ORAL 2 TIMES DAILY WITH MEALS
Qty: 60 CAPSULE | Refills: 0 | Status: SHIPPED | OUTPATIENT
Start: 2024-06-06 | End: 2024-07-06

## 2024-06-06 RX ORDER — GABAPENTIN 300 MG/1
300 CAPSULE ORAL
Qty: 30 CAPSULE | Refills: 0 | Status: SHIPPED | OUTPATIENT
Start: 2024-06-06 | End: 2024-07-06

## 2024-06-06 RX ADMIN — APIXABAN 5 MG: 5 TABLET, FILM COATED ORAL at 10:15

## 2024-06-06 RX ADMIN — PANTOPRAZOLE SODIUM 40 MG: 40 TABLET, DELAYED RELEASE ORAL at 05:17

## 2024-06-06 RX ADMIN — BUSPIRONE HYDROCHLORIDE 20 MG: 10 TABLET ORAL at 10:15

## 2024-06-06 RX ADMIN — SULFAMETHOXAZOLE AND TRIMETHOPRIM 1 TABLET: 800; 160 TABLET ORAL at 10:15

## 2024-06-06 RX ADMIN — FENOFIBRATE 145 MG: 145 TABLET, FILM COATED ORAL at 10:15

## 2024-06-06 RX ADMIN — LOSARTAN POTASSIUM 100 MG: 50 TABLET, FILM COATED ORAL at 10:15

## 2024-06-06 RX ADMIN — ACETAMINOPHEN 975 MG: 325 TABLET ORAL at 02:23

## 2024-06-06 RX ADMIN — Medication 2000 UNITS: at 10:15

## 2024-06-06 RX ADMIN — CYANOCOBALAMIN TAB 1000 MCG 1000 MCG: 1000 TAB at 10:15

## 2024-06-06 RX ADMIN — ZIPRASIDONE HYDROCHLORIDE 80 MG: 40 CAPSULE ORAL at 08:25

## 2024-06-06 RX ADMIN — DULOXETINE HYDROCHLORIDE 90 MG: 30 CAPSULE, DELAYED RELEASE ORAL at 10:15
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
## 2024-06-06 NOTE — PLAN OF CARE
Problem: Alteration in Thoughts and Perception  Goal: Treatment Goal: Gain control of psychotic behaviors/thinking, reduce/eliminate presenting symptoms and demonstrate improved reality functioning upon discharge  6/6/2024 1122 by Randi Ahumada RN  Outcome: Completed  6/6/2024 1122 by Randi Ahumada RN  Outcome: Progressing     Problem: Ineffective Coping  Goal: Identifies ineffective coping skills  6/6/2024 1122 by Randi Ahumada RN  Outcome: Completed  6/6/2024 1122 by Randi Ahumada RN  Outcome: Progressing  Goal: Identifies healthy coping skills  6/6/2024 1122 by Randi Ahumada RN  Outcome: Completed  6/6/2024 1122 by Randi Ahumada RN  Outcome: Progressing  Goal: Demonstrates healthy coping skills  6/6/2024 1122 by Randi Ahumada RN  Outcome: Completed  6/6/2024 1122 by Randi Ahumada RN  Outcome: Progressing  Goal: Participates in unit activities  Description: Interventions:  - Provide therapeutic environment   - Provide required programming   - Redirect inappropriate behaviors   6/6/2024 1122 by Randi Ahumada RN  Outcome: Completed  6/6/2024 1122 by Randi Ahumada RN  Outcome: Progressing  Goal: Patient/Family participate in treatment and DC plans  Description: Interventions:  - Provide therapeutic environment  6/6/2024 1122 by Randi Ahumada RN  Outcome: Completed  6/6/2024 1122 by Randi Ahumada RN  Outcome: Progressing  Goal: Patient/Family verbalizes awareness of resources  6/6/2024 1122 by Randi Ahumada RN  Outcome: Completed  6/6/2024 1122 by Randi Ahumada RN  Outcome: Progressing  Goal: Understands least restrictive measures  Description: Interventions:  - Utilize least restrictive behavior  6/6/2024 1122 by Randi Ahumada RN  Outcome: Completed  6/6/2024 1122 by Randi Ahumada RN  Outcome: Progressing  Goal: Free from restraint events  Description: - Utilize least restrictive measures   - Provide behavioral interventions   - Redirect inappropriate behaviors   6/6/2024 1122 by Randi Ahumada RN  Outcome:  Completed  6/6/2024 1122 by Randi Ahumada RN  Outcome: Progressing     Problem: Potential for Falls  Goal: Patient will remain free of falls  Description: INTERVENTIONS:  - Educate patient on patient safety including physical limitations  - Instruct patient to call for assistance with activity   - Consult OT/PT to assist with strengthening/mobility   - Keep Call bell within reach  - Keep bed low and locked with side rails adjusted as appropriate  - Keep care items and personal belongings within reach  - Initiate and maintain comfort rounds  - Initiate/Maintain bed, chair alarm  - Obtain necessary fall risk management equipment: walker, leg brace  - Apply yellow socks and bracelet for high fall risk patients  6/6/2024 1122 by Randi Ahumada RN  Outcome: Completed  6/6/2024 1122 by Randi Ahumada RN  Outcome: Progressing     Problem: DISCHARGE PLANNING - CARE MANAGEMENT  Goal: Discharge to post-acute care or home with appropriate resources  Description: INTERVENTIONS:  - Conduct assessment to determine patient/family and health care team treatment goals, and need for post-acute services based on payer coverage, community resources, and patient preferences, and barriers to discharge  - Address psychosocial, clinical, and financial barriers to discharge as identified in assessment in conjunction with the patient/family and health care team  - Arrange appropriate level of post-acute services according to patient’s   needs and preference and payer coverage in collaboration with the physician and health care team  - Communicate with and update the patient/family, physician, and health care team regarding progress on the discharge plan  - Arrange appropriate transportation to post-acute venues  6/6/2024 1122 by Randi Ahumada RN  Outcome: Completed  6/6/2024 1122 by Randi Ahumada RN  Outcome: Progressing     Problem: Knowledge Deficit  Goal: Patient/family/caregiver demonstrates understanding of disease process, treatment  plan, medications, and discharge instructions  Description: Complete learning assessment and assess knowledge base.  Interventions:  - Provide teaching at level of understanding  - Provide teaching via preferred learning methods  6/6/2024 1122 by Randi Ahumada RN  Outcome: Completed  6/6/2024 1122 by Randi Ahumada RN  Outcome: Progressing     Problem: SAFETY ADULT  Goal: Patient will remain free of falls  Description: INTERVENTIONS:  - Educate patient/family on patient safety including physical limitations  - Instruct patient to call for assistance with activity   - Consult OT/PT to assist with strengthening/mobility   - Keep Call bell within reach  - Keep bed low and locked with side rails adjusted as appropriate  - Keep care items and personal belongings within reach  - Initiate and maintain comfort rounds  - Make Fall Risk Sign visible to staff  - Offer Toileting every  Hours, in advance of need  - Initiate/Maintain alarm  - Obtain necessary fall risk management equipment:   - Apply yellow socks and bracelet for high fall risk patients  - Consider moving patient to room near nurses station  6/6/2024 1122 by Randi Ahumada RN  Outcome: Completed  6/6/2024 1122 by Randi Ahumada RN  Outcome: Progressing  Goal: Maintain or return to baseline ADL function  Description: INTERVENTIONS:  -  Assess patient's ability to carry out ADLs; assess patient's baseline for ADL function and identify physical deficits which impact ability to perform ADLs (bathing, care of mouth/teeth, toileting, grooming, dressing, etc.)  - Assess/evaluate cause of self-care deficits   - Assess range of motion  - Assess patient's mobility; develop plan if impaired  - Assess patient's need for assistive devices and provide as appropriate  - Encourage maximum independence but intervene and supervise when necessary  - Involve family in performance of ADLs  - Assess for home care needs following discharge   - Consider OT consult to assist with ADL  evaluation and planning for discharge  - Provide patient education as appropriate  6/6/2024 1122 by Randi Ahumada RN  Outcome: Completed  6/6/2024 1122 by Randi Ahumada RN  Outcome: Progressing  Goal: Maintains/Returns to pre admission functional level  Description: INTERVENTIONS:  - Perform AM-PAC 6 Click Basic Mobility/ Daily Activity assessment daily.  - Set and communicate daily mobility goal to care team and patient/family/caregiver.   - Collaborate with rehabilitation services on mobility goals if consulted  - Perform Range of Motion  times a day.  - Reposition patient every  hours.  - Dangle patient  times a day  - Stand patient  times a day  - Ambulate patient  times a day  - Out of bed to chair  times a day   - Out of bed for meals  times a day  - Out of bed for toileting  - Record patient progress and toleration of activity level   6/6/2024 1122 by Randi Ahumada RN  Outcome: Completed  6/6/2024 1122 by Randi Ahumada RN  Outcome: Progressing

## 2024-06-06 NOTE — NURSING NOTE
Patient irritable and needy with some anxiety relative to discharge 6/6/2024.  She was demanding at times requiring redirection and reorientation.  Patient is visible on millieu with chair alarm getting up to get the attention of staff but had no needs.  She denies depression, anxiety, HI/SI/AVH and is able to make needs known.    She takes medications as scheduled.

## 2024-06-06 NOTE — PLAN OF CARE
Problem: Risk for Self Injury/Neglect  Goal: Refrain from harming self  Description: Interventions:  - Monitor patient closely, per order  - Develop a trusting relationship  - Supervise medication ingestion, monitor effects and side effects   Outcome: Progressing  Goal: Attend and participate in unit activities, including therapeutic, recreational, and educational groups  Description: Interventions:  - Provide therapeutic and educational activities daily, encourage attendance and participation, and document same in the medical record  - Obtain collateral information, encourage visitation and family involvement in care   Outcome: Progressing  Goal: Recognize maladaptive responses and adopt new coping mechanisms  Outcome: Progressing     Problem: Depression  Goal: Treatment Goal: Demonstrate behavioral control of depressive symptoms, verbalize feelings of improved mood/affect, and adopt new coping skills prior to discharge  Outcome: Progressing     Problem: Anxiety  Goal: Anxiety is at manageable level  Description: Interventions:  - Assess and monitor patient's anxiety level.   - Monitor for signs and symptoms (heart palpitations, chest pain, shortness of breath, headaches, nausea, feeling jumpy, restlessness, irritable, apprehensive).   - Collaborate with interdisciplinary team and initiate plan and interventions as ordered.  - Ross patient to unit/surroundings  - Explain treatment plan  - Encourage participation in care  - Encourage verbalization of concerns/fears  - Identify coping mechanisms  - Assist in developing anxiety-reducing skills  - Administer/offer alternative therapies  - Limit or eliminate stimulants  Outcome: Progressing

## 2024-06-06 NOTE — NURSING NOTE
Pt leaving unit with MHT and all belongings. Pt's meds given late today as per her request. No signs of distress noted.

## 2024-06-06 NOTE — NURSING NOTE
Patient states tylenol helped a little but headache persisted. Patient awake at 0230 and unable to go back to sleep; up to bathroom several times, reading in bed and standing at nurses desk. Asking several time when she could get ready to go home.

## 2024-06-06 NOTE — BH TRANSITION RECORD
Contact Information: If you have any questions, concerns, pended studies, tests and/or procedures, or emergencies regarding your inpatient behavioral health visit. Please contact [facility] [phone] and ask to speak to a , nurse or physician. A contact is available 24 hours/ 7 days a week at this number.     Summary of Procedures Performed During your Stay:  Below is a list of major procedures performed during your hospital stay and a summary of results:  - Cardiac Procedures/Studies: ECG.  Sinus bradycardia  Nonspecific ST-t wave changes    Pending Studies (From admission, onward)      None          Please follow up on the above pending studies with your PCP and/or referring provider.

## 2024-06-06 NOTE — PROGRESS NOTES
06/06/24 0816   Team Meeting   Meeting Type Daily Rounds   Initial Conference Date 06/06/24   Next Conference Date 06/07/24   Team Members Present   Team Members Present Physician;Nurse;;   Physician Team Member Dr Ruiz, Dr Castro, JENNIFER Aguilar M. Noonan   Nursing Team Member Kendra Mc   Care Management Team Member Keely   Social Work Team Member Taina   Patient/Family Present   Patient Present No   Patient's Family Present No     Very anxious about discharge, needs redirection, discharge today at 11 am.

## 2024-06-10 ENCOUNTER — APPOINTMENT (EMERGENCY)
Dept: CT IMAGING | Facility: HOSPITAL | Age: 59
End: 2024-06-10
Payer: MEDICARE

## 2024-06-10 ENCOUNTER — APPOINTMENT (EMERGENCY)
Dept: RADIOLOGY | Facility: HOSPITAL | Age: 59
End: 2024-06-10
Payer: MEDICARE

## 2024-06-10 ENCOUNTER — HOSPITAL ENCOUNTER (EMERGENCY)
Facility: HOSPITAL | Age: 59
Discharge: NURSING, REHAB OR LONG-TERM CARE | End: 2024-06-10
Attending: EMERGENCY MEDICINE
Payer: MEDICARE

## 2024-06-10 VITALS
SYSTOLIC BLOOD PRESSURE: 140 MMHG | OXYGEN SATURATION: 97 % | DIASTOLIC BLOOD PRESSURE: 75 MMHG | TEMPERATURE: 98.4 F | HEART RATE: 58 BPM | WEIGHT: 216.05 LBS | RESPIRATION RATE: 18 BRPM
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
## 2024-06-10 LAB
BASE EXCESS BLDA CALC-SCNC: 3 MMOL/L (ref -2–3)
CA-I BLD-SCNC: 0.93 MMOL/L (ref 1.12–1.32)
GLUCOSE SERPL-MCNC: 134 MG/DL (ref 65–140)
HCO3 BLDA-SCNC: 23 MMOL/L (ref 24–30)
HCT VFR BLD CALC: 30 % (ref 34.8–46.1)
HGB BLDA-MCNC: 10.2 G/DL (ref 11.5–15.4)
PCO2 BLD: 21.8 MM HG (ref 42–50)
PCO2 BLD: 24 MMOL/L (ref 21–32)
PH BLD: 7.63 [PH] (ref 7.3–7.4)
PO2 BLD: 75 MM HG (ref 35–45)
POTASSIUM BLD-SCNC: 5.2 MMOL/L (ref 3.5–5.3)
SAO2 % BLD FROM PO2: 98 % (ref 60–85)
SODIUM BLD-SCNC: 134 MMOL/L (ref 136–145)
SPECIMEN SOURCE: ABNORMAL

## 2024-06-10 PROCEDURE — 76705 ECHO EXAM OF ABDOMEN: CPT | Performed by: PHYSICIAN ASSISTANT

## 2024-06-10 PROCEDURE — 82947 ASSAY GLUCOSE BLOOD QUANT: CPT

## 2024-06-10 PROCEDURE — EDAIR PR ED AIR: Performed by: EMERGENCY MEDICINE

## 2024-06-10 PROCEDURE — 93308 TTE F-UP OR LMTD: CPT | Performed by: PHYSICIAN ASSISTANT

## 2024-06-10 PROCEDURE — 85014 HEMATOCRIT: CPT

## 2024-06-10 PROCEDURE — 70450 CT HEAD/BRAIN W/O DYE: CPT

## 2024-06-10 PROCEDURE — 82330 ASSAY OF CALCIUM: CPT

## 2024-06-10 PROCEDURE — 84132 ASSAY OF SERUM POTASSIUM: CPT

## 2024-06-10 PROCEDURE — 99285 EMERGENCY DEPT VISIT HI MDM: CPT | Performed by: SURGERY

## 2024-06-10 PROCEDURE — 84295 ASSAY OF SERUM SODIUM: CPT

## 2024-06-10 PROCEDURE — 72125 CT NECK SPINE W/O DYE: CPT

## 2024-06-10 PROCEDURE — 72170 X-RAY EXAM OF PELVIS: CPT

## 2024-06-10 PROCEDURE — 99284 EMERGENCY DEPT VISIT MOD MDM: CPT

## 2024-06-10 PROCEDURE — 82803 BLOOD GASES ANY COMBINATION: CPT

## 2024-06-10 PROCEDURE — 71045 X-RAY EXAM CHEST 1 VIEW: CPT

## 2024-06-10 RX ORDER — ACETAMINOPHEN 325 MG/1
975 TABLET ORAL ONCE
Status: COMPLETED | OUTPATIENT
Start: 2024-06-10 | End: 2024-06-10

## 2024-06-10 RX ADMIN — ACETAMINOPHEN 975 MG: 325 TABLET, FILM COATED ORAL at 21:16

## 2024-06-11 NOTE — H&P
H&P - Trauma   Prema Shepard 58 y.o. female MRN: [id number]  Unit/Bed#: ED-18 Encounter: [id number]    Trauma Alert: Level B   Model of Arrival: Ambulance    Trauma Team: Attending Osmany and WENDY Dodge  Consultants:     None     Assessment & Plan   Active Problems / Assessment:   Mechanical fall  Possible head strike on anticoagulant  Headache  Patient reports she does not want to live anymore but when asked further questions she denies suicidal ideation reporting she has no intention to harm herself and does not want to die.     Plan:   NO acute traumatic injuries  Discharge to group home    History of Present Illness     Chief Complaint: Headache  Mechanism:Fall     HPI:    Prema Shepard is a 58 y.o. female PMH DVT LLE on Eliquis, schizoaffective disorder, HTN who presents after unwitnessed fall. She reports she lost her balance and fell to the ground perhaps striking her head. She reports she had a headache prior to the fall and it got worse after the fall. She denies neck or back pain, chest pain, shortness of breath, abdominal pain, nausea vomiting, extremity pain.     Review of Systems   Constitutional:  Negative for activity change, fatigue and fever.   HENT:  Negative for congestion, ear pain, facial swelling, nosebleeds, postnasal drip, rhinorrhea, sinus pressure, sinus pain, sneezing and sore throat.    Respiratory: Negative.  Negative for chest tightness, shortness of breath and wheezing.    Cardiovascular:  Negative for chest pain and palpitations.   Gastrointestinal:  Negative for abdominal distention, abdominal pain, constipation, diarrhea, nausea and vomiting.   Genitourinary:  Negative for dysuria, flank pain, hematuria and urgency.   Musculoskeletal:  Negative for arthralgias, back pain, joint swelling, neck pain and neck stiffness.   Skin:  Negative for color change, rash and wound.   Neurological:  Negative for dizziness, seizures, weakness, light-headedness, numbness and  headaches.     12-point, complete review of systems was reviewed and negative except as stated above.     Historical Information     Past Medical History:   Diagnosis Date    HTN (hypertension)     Schizoaffective disorder (HCC)      History reviewed. No pertinent surgical history.            There is no immunization history on file for this patient.  Last Tetanus: n/a  Family History: Non-contributory     Meds/Allergies   all current active meds have been reviewed  Allergies have not been reviewed;  Not on File    Objective   Initial Vitals:   Temperature: 98.4 °F (36.9 °C) (06/10/24 1957)  Pulse: 61 (06/10/24 1957)  Respirations: 18 (06/10/24 1957)  Blood Pressure: 158/88 (06/10/24 1957)    Primary Survey:   Airway:        Status: patent;        Pre-hospital Interventions: none        Hospital Interventions: none  Breathing:        Pre-hospital Interventions: none       Effort: normal       Right breath sounds: normal       Left breath sounds: normal  Circulation:        Rhythm: regular       Rate: regular   Right Pulses Left Pulses    R radial: 2+  R femoral: 2+  R pedal: 2+     L radial: 2+  L femoral: 2+  L pedal: 2+       Disability:        GCS: Eye: 4; Verbal: 5 Motor: 6 Total: 15       Right Pupil: round;  reactive         Left Pupil:  round;  reactive      R Motor Strength L Motor Strength    R : 5/5  R dorsiflex: 5/5  R plantarflex: 5/5 L : 5/5  L dorsiflex: 5/5  L plantarflex: 5/5        Sensory:  No sensory deficit  Exposure:       Completed: Yes      Secondary Survey:  Physical Exam  Vitals and nursing note reviewed.   Constitutional:       General: She is not in acute distress.     Appearance: Normal appearance. She is obese. She is not ill-appearing or toxic-appearing.   HENT:      Head: Normocephalic and atraumatic.      Right Ear: External ear normal.      Left Ear: External ear normal.      Nose: Nose normal. No congestion or rhinorrhea.      Mouth/Throat:      Mouth: Mucous membranes are  moist.      Pharynx: Oropharynx is clear. No oropharyngeal exudate or posterior oropharyngeal erythema.   Eyes:      Extraocular Movements: Extraocular movements intact.      Conjunctiva/sclera: Conjunctivae normal.      Pupils: Pupils are equal, round, and reactive to light.   Cardiovascular:      Rate and Rhythm: Normal rate and regular rhythm.      Heart sounds: No murmur heard.     No friction rub. No gallop.   Pulmonary:      Effort: Pulmonary effort is normal. No respiratory distress.      Breath sounds: No wheezing, rhonchi or rales.   Abdominal:      General: There is no distension.      Palpations: Abdomen is soft.      Tenderness: There is no abdominal tenderness. There is no guarding or rebound.   Musculoskeletal:      Right shoulder: Normal.      Left shoulder: Normal.      Right upper arm: Normal.      Left upper arm: Normal.      Right elbow: Normal.      Left elbow: Normal.      Right forearm: Normal.      Left forearm: Normal.      Right wrist: Normal.      Left wrist: Normal.      Right hand: Normal.      Left hand: Normal.      Cervical back: Normal range of motion. No deformity, signs of trauma, lacerations or tenderness.      Thoracic back: No deformity, signs of trauma or tenderness.      Lumbar back: No deformity, signs of trauma or tenderness.      Right hip: Normal. No deformity or tenderness. Normal range of motion.      Left hip: Normal.      Right upper leg: No swelling, deformity or tenderness.      Left upper leg: Normal. No swelling, deformity or tenderness.      Right knee: Normal.      Left knee: Normal.      Right lower leg: Normal.      Left lower leg: Normal.      Right ankle: Normal.      Left ankle: Normal.      Right foot: Normal.      Left foot: Normal.   Skin:     General: Skin is warm and dry.      Capillary Refill: Capillary refill takes less than 2 seconds.      Findings: No bruising or lesion.   Neurological:      General: No focal deficit present.      Mental Status: She  is alert and oriented to person, place, and time.      Sensory: No sensory deficit.      Motor: No weakness.         Invasive Devices       None                 Lab Results: I have personally reviewed all pertinent laboratory/test results 06/10/24 and in the preceding 24 hours.  Recent Labs     06/10/24  2015   HGB 10.2*   HCT 30*   CO2 24   CAIONIZED 0.93*       Imaging Results: I have personally reviewed pertinent images saved in PACS. CT scan findings (and other pertinent positive findings on images) were discussed with radiology. My interpretation of the images/reports are as follows:  Chest Xray(s): negative for acute findings   FAST exam(s): negative for acute findings   CT Scan(s): negative for acute findings   Additional Xray(s): N/A     Other Studies: none    Code Status: No Order  Advance Directive and Living Will:      Power of :    POLST:

## 2024-06-11 NOTE — DISCHARGE INSTR - AVS FIRST PAGE
Trauma Discharge Instructions:    Please follow-up as instructed. If you need a follow-up appointment, please call the office when you leave to schedule an appointment.    Activity:  - PT and OT evaluation and treatment as indicated.  - You may resume activity as tolerated.  - Walking and normal light activities are encouraged.  - Normal daily activities including climbing steps are okay.    Diet:    - You may resume your normal diet.    Medications:  - You should continue your current medication regimen after discharge unless otherwise instructed. Please refer to your discharge medication list for further details.  - Please take the pain medications as directed.    Additional Instructions:  - May shower daily.  - If you have any questions or concerns after discharge please call the office.  - Call office or return to ER if fever greater than 101, chills, persistent nausea/vomiting, worsening/uncontrollable pain, develop productive cough, increasing shortness of breath, difficulty breathing, and/or increasing redness or purulent/foul smelling drainage from incision(s).

## 2024-06-11 NOTE — PROCEDURES
POC FAST US    Date/Time: 6/10/2024 9:29 PM    Performed by: Annalise Dodge PA-C  Authorized by: Annalise Dodge PA-C    Patient location:  Trauma  Procedure details:     Exam Type:  Diagnostic    Indications: blunt abdominal trauma and blunt chest trauma      Assess for:  Intra-abdominal fluid and pericardial effusion    Technique: FAST      Views obtained:  Heart - Pericardial sac, LUQ - Splenorenal space, Suprapubic - Pouch of Pb and RUQ - Colunga's Pouch    Image quality: diagnostic      Image availability:  Images available in PACS and video obtained  FAST Findings:     RUQ (Hepatorenal) free fluid: absent      LUQ (Splenorenal) free fluid: absent      Suprapubic free fluid: absent      Cardiac wall motion: identified      Pericardial effusion: absent    Interpretation:     Impressions: negative

## 2024-06-11 NOTE — ED PROVIDER NOTES
Emergency Department Airway Evaluation and Management Form    History  Obtained from: Patient and EMS  Patient has no allergy information on record.  No chief complaint on file.    HPI    No past medical history on file.  No past surgical history on file.  No family history on file.     I have reviewed and agree with the history as documented.    Review of Systems    Physical Exam  /56   Pulse 62   Temp 98.4 °F (36.9 °C) (Oral)   Resp 18   Wt 98 kg (216 lb 0.8 oz)   SpO2 95%     Physical Exam    ED Medications  Medications - No data to display    Intubation  Procedures    Notes  58-year-old female comes in for evaluation after falling striking the back of her head and on Eliquis.  Patient complains of occipital pain.  Patient uncooperative in the trauma bay does not want to be fully evaluated resisted getting fully undressed.  Also complains of midline neck tenderness.  Also left-sided thigh pain however she claims that that is chronic.  GCS is 15.  The rest the trauma evaluation was done by the trauma team please see their note for further evaluation and treatment    Final Diagnosis  Final diagnoses:   None       ED Provider  Electronically Signed by     Mari Norris DO  06/10/24 2005

## 2024-06-11 NOTE — QUICK NOTE
Cervical Collar Clearance:    The patient had a CT scan of the cervical spine demonstrating no acute injury.  Prior to my exam the patient had removed her cervical collar herself and refused to replace it.  On exam, the patient had no midline point tenderness or paresthesias/numbness/weakness in the extremities. The patient had full range of motion (was then able to flex, extend, and rotate head laterally) without pain. There were no distracting injuries and the patient was not intoxicated.      The patient's cervical spine was cleared radiologically and clinically.

## 2024-06-11 NOTE — TRAUMA DOCUMENTATION
Pt refusing to keep C-collar on despite multiple attempts to redirect and educate on risks of removal. Group home staff bedside. Refusing BP cuff. Provider notified.

## 2024-06-14 ENCOUNTER — HOSPITAL ENCOUNTER (INPATIENT)
Facility: HOSPITAL | Age: 59
LOS: 3 days | Discharge: HOME | DRG: 092 | End: 2024-06-17
Attending: EMERGENCY MEDICINE | Admitting: INTERNAL MEDICINE
Payer: MEDICARE

## 2024-06-14 ENCOUNTER — APPOINTMENT (EMERGENCY)
Dept: RADIOLOGY | Facility: HOSPITAL | Age: 59
DRG: 092 | End: 2024-06-14
Payer: MEDICARE

## 2024-06-14 DIAGNOSIS — I10 PRIMARY HYPERTENSION: ICD-10-CM

## 2024-06-14 DIAGNOSIS — F25.0 SCHIZOAFFECTIVE DISORDER, BIPOLAR TYPE (HCC): ICD-10-CM

## 2024-06-14 DIAGNOSIS — S06.9XAD TRAUMATIC BRAIN INJURY, WITH UNKNOWN LOSS OF CONSCIOUSNESS STATUS, SUBSEQUENT ENCOUNTER: ICD-10-CM

## 2024-06-14 DIAGNOSIS — R29.90 STROKE-LIKE SYMPTOMS: Primary | ICD-10-CM

## 2024-06-14 LAB
ANION GAP SERPL CALCULATED.3IONS-SCNC: 8 MMOL/L (ref 4–13)
APTT PPP: 24 SECONDS (ref 23–37)
ATRIAL RATE: 57 BPM
BUN SERPL-MCNC: 10 MG/DL (ref 5–25)
CALCIUM SERPL-MCNC: 8.8 MG/DL (ref 8.4–10.2)
CARDIAC TROPONIN I PNL SERPL HS: 4 NG/L
CHLORIDE SERPL-SCNC: 102 MMOL/L (ref 96–108)
CO2 SERPL-SCNC: 25 MMOL/L (ref 21–32)
CREAT SERPL-MCNC: 0.56 MG/DL (ref 0.6–1.3)
ERYTHROCYTE [DISTWIDTH] IN BLOOD BY AUTOMATED COUNT: 12.8 % (ref 11.6–15.1)
GFR SERPL CREATININE-BSD FRML MDRD: 103 ML/MIN/1.73SQ M
GLUCOSE SERPL-MCNC: 79 MG/DL (ref 65–140)
GLUCOSE SERPL-MCNC: 94 MG/DL (ref 65–140)
HCT VFR BLD AUTO: 37.3 % (ref 34.8–46.1)
HGB BLD-MCNC: 12.4 G/DL (ref 11.5–15.4)
INR PPP: 1 (ref 0.84–1.19)
MCH RBC QN AUTO: 30.6 PG (ref 26.8–34.3)
MCHC RBC AUTO-ENTMCNC: 33.2 G/DL (ref 31.4–37.4)
MCV RBC AUTO: 92 FL (ref 82–98)
P AXIS: 59 DEGREES
PLATELET # BLD AUTO: 275 THOUSANDS/UL (ref 149–390)
PMV BLD AUTO: 8.4 FL (ref 8.9–12.7)
POTASSIUM SERPL-SCNC: 4.7 MMOL/L (ref 3.5–5.3)
PR INTERVAL: 190 MS
PROTHROMBIN TIME: 13.1 SECONDS (ref 11.6–14.5)
QRS AXIS: -38 DEGREES
QRSD INTERVAL: 102 MS
QT INTERVAL: 430 MS
QTC INTERVAL: 418 MS
RBC # BLD AUTO: 4.05 MILLION/UL (ref 3.81–5.12)
SODIUM SERPL-SCNC: 135 MMOL/L (ref 135–147)
T WAVE AXIS: 37 DEGREES
VENTRICULAR RATE: 57 BPM
WBC # BLD AUTO: 5.68 THOUSAND/UL (ref 4.31–10.16)

## 2024-06-14 PROCEDURE — 99285 EMERGENCY DEPT VISIT HI MDM: CPT | Performed by: EMERGENCY MEDICINE

## 2024-06-14 PROCEDURE — 94760 N-INVAS EAR/PLS OXIMETRY 1: CPT

## 2024-06-14 PROCEDURE — 93010 ELECTROCARDIOGRAM REPORT: CPT | Performed by: INTERNAL MEDICINE

## 2024-06-14 PROCEDURE — 84484 ASSAY OF TROPONIN QUANT: CPT | Performed by: EMERGENCY MEDICINE

## 2024-06-14 PROCEDURE — 99285 EMERGENCY DEPT VISIT HI MDM: CPT

## 2024-06-14 PROCEDURE — 36415 COLL VENOUS BLD VENIPUNCTURE: CPT | Performed by: EMERGENCY MEDICINE

## 2024-06-14 PROCEDURE — 70496 CT ANGIOGRAPHY HEAD: CPT

## 2024-06-14 PROCEDURE — 85730 THROMBOPLASTIN TIME PARTIAL: CPT | Performed by: EMERGENCY MEDICINE

## 2024-06-14 PROCEDURE — 85027 COMPLETE CBC AUTOMATED: CPT | Performed by: EMERGENCY MEDICINE

## 2024-06-14 PROCEDURE — 99285 EMERGENCY DEPT VISIT HI MDM: CPT | Performed by: STUDENT IN AN ORGANIZED HEALTH CARE EDUCATION/TRAINING PROGRAM

## 2024-06-14 PROCEDURE — 99223 1ST HOSP IP/OBS HIGH 75: CPT | Performed by: INTERNAL MEDICINE

## 2024-06-14 PROCEDURE — 94660 CPAP INITIATION&MGMT: CPT

## 2024-06-14 PROCEDURE — 93005 ELECTROCARDIOGRAM TRACING: CPT

## 2024-06-14 PROCEDURE — 70498 CT ANGIOGRAPHY NECK: CPT

## 2024-06-14 PROCEDURE — 82948 REAGENT STRIP/BLOOD GLUCOSE: CPT

## 2024-06-14 PROCEDURE — 80048 BASIC METABOLIC PNL TOTAL CA: CPT | Performed by: EMERGENCY MEDICINE

## 2024-06-14 PROCEDURE — 85610 PROTHROMBIN TIME: CPT | Performed by: EMERGENCY MEDICINE

## 2024-06-14 RX ORDER — LOSARTAN POTASSIUM 50 MG/1
100 TABLET ORAL EVERY EVENING
Status: DISCONTINUED | OUTPATIENT
Start: 2024-06-15 | End: 2024-06-14

## 2024-06-14 RX ORDER — BUSPIRONE HYDROCHLORIDE 10 MG/1
20 TABLET ORAL 2 TIMES DAILY
Status: DISCONTINUED | OUTPATIENT
Start: 2024-06-14 | End: 2024-06-17 | Stop reason: HOSPADM

## 2024-06-14 RX ORDER — CLONAZEPAM 0.5 MG/1
0.5 TABLET ORAL
Status: DISCONTINUED | OUTPATIENT
Start: 2024-06-14 | End: 2024-06-17 | Stop reason: HOSPADM

## 2024-06-14 RX ORDER — ACETAMINOPHEN 325 MG/1
650 TABLET ORAL EVERY 6 HOURS PRN
Status: DISCONTINUED | OUTPATIENT
Start: 2024-06-14 | End: 2024-06-17 | Stop reason: HOSPADM

## 2024-06-14 RX ORDER — PANTOPRAZOLE SODIUM 40 MG/1
40 TABLET, DELAYED RELEASE ORAL
Status: DISCONTINUED | OUTPATIENT
Start: 2024-06-15 | End: 2024-06-17 | Stop reason: HOSPADM

## 2024-06-14 RX ORDER — ZIPRASIDONE HYDROCHLORIDE 40 MG/1
80 CAPSULE ORAL 2 TIMES DAILY WITH MEALS
Status: DISCONTINUED | OUTPATIENT
Start: 2024-06-15 | End: 2024-06-14

## 2024-06-14 RX ORDER — AMLODIPINE BESYLATE 10 MG/1
10 TABLET ORAL DAILY
Status: DISCONTINUED | OUTPATIENT
Start: 2024-06-15 | End: 2024-06-17 | Stop reason: HOSPADM

## 2024-06-14 RX ORDER — CARBAMAZEPINE 200 MG/1
1000 TABLET ORAL
Status: DISCONTINUED | OUTPATIENT
Start: 2024-06-14 | End: 2024-06-17 | Stop reason: HOSPADM

## 2024-06-14 RX ORDER — GABAPENTIN 300 MG/1
300 CAPSULE ORAL
Status: DISCONTINUED | OUTPATIENT
Start: 2024-06-14 | End: 2024-06-17 | Stop reason: HOSPADM

## 2024-06-14 RX ORDER — PRAVASTATIN SODIUM 20 MG
20 TABLET ORAL EVERY EVENING
Status: DISCONTINUED | OUTPATIENT
Start: 2024-06-14 | End: 2024-06-17 | Stop reason: HOSPADM

## 2024-06-14 RX ADMIN — PRAVASTATIN SODIUM 20 MG: 20 TABLET ORAL at 21:57

## 2024-06-14 RX ADMIN — BUSPIRONE HYDROCHLORIDE 20 MG: 10 TABLET ORAL at 21:57

## 2024-06-14 RX ADMIN — CARBAMAZEPINE 1000 MG: 200 TABLET ORAL at 21:57

## 2024-06-14 RX ADMIN — GABAPENTIN 300 MG: 300 CAPSULE ORAL at 21:57

## 2024-06-14 RX ADMIN — CLONAZEPAM 0.5 MG: 0.5 TABLET ORAL at 21:57

## 2024-06-14 NOTE — ED ATTENDING ATTESTATION
6/14/2024  I, Alok Lua DO, saw and evaluated the patient. I have discussed the patient with the resident/non-physician practitioner and agree with the resident's/non-physician practitioner's findings, Plan of Care, and MDM as documented in the resident's/non-physician practitioner's note, except where noted. All available labs and Radiology studies were reviewed.  I was present for key portions of any procedure(s) performed by the resident/non-physician practitioner and I was immediately available to provide assistance.       At this point I agree with the current assessment done in the Emergency Department.  I have conducted an independent evaluation of this patient a history and physical is as follows:      58-year-old female presents via EMS from facility for slurred speech and left-sided weakness. Prehospital stroke alert.     ED Course  ED Course as of 06/14/24 1653 Fri Jun 14, 2024   1649 Stroke alert, in CT scan.    1651 NIH 3, on anticoagulation. Recent head strike 4 days ago and on increased dose of geodon.      Past Medical History:   Diagnosis Date    Anxiety and depression     Chronic deep vein thrombosis (DVT) ([facility])     LLE    DVT (deep venous thrombosis) ([facility])     History of traumatic brain injury     HTN (hypertension)     Hyperlipidemia     Insomnia     Obesity     RUPAL (obstructive sleep apnea)     Schizoaffective disorder ([facility])     TBI (traumatic brain injury) ([facility])      Alert, mild dysarthria, mild LUE ataxia, no resp distress, jolly/regular, no edema, splint on LLE    ECG unremarkable    Stroke alert in progress, neuro at bedside      Critical Care Time  Procedures

## 2024-06-14 NOTE — STROKE DOCUMENTATION
Per Dr. La stroke alert to be ended.  Notified that difficulty obtaining labwork and that pt refused covid swab.

## 2024-06-14 NOTE — Clinical Note
Case was discussed with ED and the patient's admission status was agreed to be Admission Status: observation status to the service of Dr. Preciado.
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
## 2024-06-14 NOTE — ED PROVIDER NOTES
History  Chief Complaint   Patient presents with    STROKE Alert     Pt was a pre hospital stroke alert.      Patient is a 58-year-old female with past medical history of TBI, DVT, schizoaffective disorder, hypertension, strokelike symptoms, who presented the emergency department via EMS for complaint of strokelike symptoms.  Patient's last known well around 12:30 this afternoon, patient has a history of left-sided deficits from a previous TBI approximately x 1 year ago that placed her in rehab.  Patient has new onset dysarthria at around 12:30 this afternoon, patient is on Geodon, Eliquis, reports she received both of these medications.  According to the sending facility/EMS patient recently has had increase in her Geodon, has had previous issues with dysarthria for a similar reason in the past.  Sending facility noticing dysarthria that seems to be worsening with EMS, no other focal neurological deficits reported.  Patient was just seen in the emergency department 4 days ago for a fall while on Eliquis.         Prior to Admission Medications   Prescriptions Last Dose Informant Patient Reported? Taking?   Cholecalciferol (VITAMIN D3) 1,000 units tablet   No No   Sig: Take 2 tablets (2,000 Units total) by mouth daily   DULoxetine (CYMBALTA) 30 mg delayed release capsule   No No   Sig: Take 3 capsules (90 mg total) by mouth daily   MULTIPLE VITAMIN PO  Care Giver Yes No   Sig: Take 1 tablet by mouth in the morning   Probiotic Product (PROBIOTIC PO)  Care Giver Yes No   Sig: Take 250 mg by mouth 2 (two) times a day   apixaban (ELIQUIS) 5 mg  Care Giver Yes No   Sig: Take 5 mg by mouth 2 (two) times a day   busPIRone (BUSPAR) 10 mg tablet   No No   Sig: Take 2 tablets (20 mg total) by mouth 2 (two) times a day   carBAMazepine (TEGretol) 200 mg tablet   No No   Sig: Take 5 tablets (1,000 mg total) by mouth daily at bedtime   clonazePAM (KlonoPIN) 0.5 mg tablet   No No   Sig: Take 1 tablet (0.5 mg total) by mouth daily  at bedtime for 10 days   cyanocobalamin (VITAMIN B-12) 1000 MCG tablet   No No   Sig: Take 1 tablet (1,000 mcg total) by mouth daily   fenofibrate (TRICOR) 145 mg tablet   Yes No   Sig: Take 145 mg by mouth every evening In pm   gabapentin (NEURONTIN) 300 mg capsule   No No   Sig: Take 1 capsule (300 mg total) by mouth daily at bedtime   losartan (COZAAR) 100 MG tablet  Care Giver Yes No   Sig: Take 100 mg by mouth every evening   nystatin (MYCOSTATIN) powder  Care Giver Yes No   Sig: Apply 1 application. topically daily at bedtime Under the breasts   omeprazole (PriLOSEC) 40 MG capsule   Yes No   Sig: Take 40 mg by mouth daily   pravastatin (PRAVACHOL) 20 mg tablet  Care Giver Yes No   Sig: Take 20 mg by mouth every evening   ziprasidone (GEODON) 80 mg capsule   No No   Sig: Take 1 capsule (80 mg total) by mouth 2 (two) times a day with meals      Facility-Administered Medications: None       Past Medical History:   Diagnosis Date    Anxiety and depression     Chronic deep vein thrombosis (DVT) (HCC)     LLE    DVT (deep venous thrombosis) ([facility])     History of traumatic brain injury     HTN (hypertension)     Hyperlipidemia     Insomnia     Obesity     RUPAL (obstructive sleep apnea)     Schizoaffective disorder (HCC)     TBI (traumatic brain injury) ([facility])        History reviewed. No pertinent surgical history.    History reviewed. No pertinent family history.  I have reviewed and agree with the history as documented.    E-Cigarette/Vaping    E-Cigarette Use Never User      E-Cigarette/Vaping Substances     Social History     Tobacco Use    Smoking status: Never    Smokeless tobacco: Never   Vaping Use    Vaping status: Never Used   Substance Use Topics    Alcohol use: Not Currently    Drug use: Never        Review of Systems    Physical Exam  ED Triage Vitals   Temperature Pulse Respirations Blood Pressure SpO2   06/14/24 1700 06/14/24 1700 06/14/24 1700 06/14/24 1700 06/14/24 1700   98.6 °F (37 °C) (!) 42 22 167/58  98 %      Temp src Heart Rate Source Patient Position - Orthostatic VS BP Location FiO2 (%)   -- 06/14/24 1700 06/14/24 2130 06/14/24 2130 --    Monitor Lying Left arm       Pain Score       06/15/24 0531       10 - Worst Possible Pain             Orthostatic Vital Signs  Vitals:    06/15/24 0840 06/15/24 1157 06/15/24 1253 06/15/24 1357   BP: 137/80 141/71 141/71 138/74   Pulse: 63 65 68 68   Patient Position - Orthostatic VS:           Physical Exam  Vitals and nursing note reviewed.   Constitutional:       General: She is not in acute distress.     Appearance: She is well-developed. She is not ill-appearing or toxic-appearing.   HENT:      Head: Normocephalic and atraumatic.      Mouth/Throat:      Mouth: Mucous membranes are moist.      Pharynx: Oropharynx is clear. No oropharyngeal exudate or posterior oropharyngeal erythema.   Eyes:      Conjunctiva/sclera: Conjunctivae normal.   Cardiovascular:      Rate and Rhythm: Normal rate and regular rhythm.      Heart sounds: No murmur heard.  Pulmonary:      Effort: Pulmonary effort is normal. No respiratory distress.      Breath sounds: Normal breath sounds.   Abdominal:      Palpations: Abdomen is soft.      Tenderness: There is no abdominal tenderness. There is no guarding or rebound.   Musculoskeletal:         General: No swelling.      Cervical back: Neck supple.   Skin:     General: Skin is warm and dry.      Capillary Refill: Capillary refill takes less than 2 seconds.   Neurological:      Mental Status: She is alert and oriented to person, place, and time.      Comments: Mild left-sided limb ataxia, dysarthria is present, mild left upper extremity weakness   Psychiatric:         Mood and Affect: Mood normal.         ED Medications  Medications   apixaban (ELIQUIS) tablet 5 mg (5 mg Oral Given 6/15/24 0904)   DULoxetine (CYMBALTA) delayed release capsule 90 mg (90 mg Oral Given 6/15/24 0904)   gabapentin (NEURONTIN) capsule 300 mg (300 mg Oral Given 6/14/24  2157)   pantoprazole (PROTONIX) EC tablet 40 mg (40 mg Oral Given 6/15/24 0513)   pravastatin (PRAVACHOL) tablet 20 mg (20 mg Oral Given 6/14/24 2157)   acetaminophen (TYLENOL) tablet 650 mg (650 mg Oral Given 6/15/24 0531)   busPIRone (BUSPAR) tablet 20 mg (20 mg Oral Given 6/15/24 0904)   carBAMazepine (TEGretol) tablet 1,000 mg (1,000 mg Oral Given 6/14/24 2157)   clonazePAM (KlonoPIN) tablet 0.5 mg (0.5 mg Oral Given 6/14/24 2157)   metoprolol succinate (TOPROL-XL) 24 hr tablet 75 mg (75 mg Oral Given 6/15/24 0904)   amLODIPine (NORVASC) tablet 10 mg (10 mg Oral Given 6/15/24 0904)   ziprasidone (GEODON) capsule 80 mg (has no administration in time range)   spironolactone (ALDACTONE) tablet 25 mg (has no administration in time range)   Artificial Tears ophthalmic solution 1 drop (has no administration in time range)   clonazePAM (KlonoPIN) tablet 0.5 mg (0.5 mg Oral Given 6/15/24 0225)   magnesium sulfate IVPB (premix) SOLN 1 g (0 g Intravenous Stopped 6/15/24 0353)   LORazepam (ATIVAN) injection 1 mg (1 mg Intravenous Given 6/15/24 1012)       Diagnostic Studies  Results Reviewed       Procedure Component Value Units Date/Time    FLU/RSV/COVID - if FLU/RSV clinically relevant [[id number]]  (Normal) Collected: 06/15/24 0848    Lab Status: Final result Specimen: Nares from Nose Updated: 06/15/24 0949     SARS-CoV-2 Negative     INFLUENZA A PCR Negative     INFLUENZA B PCR Negative     RSV PCR Negative    Narrative:      FOR PEDIATRIC PATIENTS - copy/paste COVID Guidelines URL to browser: [email or web address]    SARS-CoV-2 assay is a Nucleic Acid Amplification assay intended for the  qualitative detection of nucleic acid from SARS-CoV-2 in nasopharyngeal  swabs. Results are for the presumptive identification of SARS-CoV-2 RNA.    Positive results are indicative of infection with SARS-CoV-2, the virus  causing COVID-19, but do not rule out bacterial infection or  co-infection  with other viruses. Laboratories within the [place] and its  territories are required to report all positive results to the appropriate  public health authorities. Negative results do not preclude SARS-CoV-2  infection and should not be used as the sole basis for treatment or other  patient management decisions. Negative results must be combined with  clinical observations, patient history, and epidemiological information.  This test has not been FDA cleared or approved.    This test has been authorized by FDA under an Emergency Use Authorization  (EUA). This test is only authorized for the duration of time the  declaration that circumstances exist justifying the authorization of the  emergency use of an in vitro diagnostic tests for detection of SARS-CoV-2  virus and/or diagnosis of COVID-19 infection under section 564(b)(1) of  the Act, 21 U.S.C. 360bbb-3(b)(1), unless the authorization is terminated  or revoked sooner. The test has been validated but independent review by FDA  and CLIA is pending.    Test performed using [employer] GeneXpert: This RT-PCR assay targets N2,  a region unique to SARS-CoV-2. A conserved region in the E-gene was chosen  for pan-Sarbecovirus detection which includes SARS-CoV-2.    According to CMS-2020-01-R, this platform meets the definition of high-throughput technology.    Magnesium [[id number]]  (Abnormal) Collected: 06/15/24 0509    Lab Status: Final result Specimen: Blood from Arm, Right Updated: 06/15/24 0549     Magnesium 3.1 mg/dL     Basic metabolic panel [[id number]]  (Abnormal) Collected: 06/15/24 0509    Lab Status: Final result Specimen: Blood from Arm, Right Updated: 06/15/24 0549     Sodium 136 mmol/L      Potassium 3.9 mmol/L      Chloride 101 mmol/L      CO2 27 mmol/L      ANION GAP 8 mmol/L      BUN 12 mg/dL      Creatinine 0.72 mg/dL      Glucose 141 mg/dL      Calcium 9.3 mg/dL      eGFR 92 ml/min/1.73sq m     Narrative:      National Kidney Disease  Foundation guidelines for Chronic Kidney Disease (CKD):     Stage 1 with normal or high GFR (GFR > 90 mL/min/1.73 square meters)    Stage 2 Mild CKD (GFR = 60-89 mL/min/1.73 square meters)    Stage 3A Moderate CKD (GFR = 45-59 mL/min/1.73 square meters)    Stage 3B Moderate CKD (GFR = 30-44 mL/min/1.73 square meters)    Stage 4 Severe CKD (GFR = 15-29 mL/min/1.73 square meters)    Stage 5 End Stage CKD (GFR <15 mL/min/1.73 square meters)  Note: GFR calculation is accurate only with a steady state creatinine    Protime-INR [[id number]]  (Normal) Collected: 06/15/24 0509    Lab Status: Final result Specimen: Blood from Arm, Right Updated: 06/15/24 0534     Protime 13.7 seconds      INR 1.06    APTT [[id number]]  (Normal) Collected: 06/15/24 0509    Lab Status: Final result Specimen: Blood from Arm, Right Updated: 06/15/24 0534     PTT 26 seconds     CBC and differential [[id number]]  (Abnormal) Collected: 06/15/24 0509    Lab Status: Final result Specimen: Blood from Arm, Right Updated: 06/15/24 0523     WBC 4.32 Thousand/uL      RBC 3.98 Million/uL      Hemoglobin 12.3 g/dL      Hematocrit 36.0 %      MCV 91 fL      MCH 30.9 pg      MCHC 34.2 g/dL      RDW 13.0 %      MPV 8.8 fL      Platelets 315 Thousands/uL      nRBC 0 /100 WBCs      Segmented % 58 %      Immature Grans % 0 %      Lymphocytes % 32 %      Monocytes % 8 %      Eosinophils Relative 2 %      Basophils Relative 0 %      Absolute Neutrophils 2.49 Thousands/µL      Absolute Immature Grans 0.01 Thousand/uL      Absolute Lymphocytes 1.37 Thousands/µL      Absolute Monocytes 0.34 Thousand/µL      Eosinophils Absolute 0.10 Thousand/µL      Basophils Absolute 0.01 Thousands/µL     Urine Microscopic [[id number]]  (Abnormal) Collected: 06/15/24 0226    Lab Status: Final result Specimen: Urine, Clean Catch Updated: 06/15/24 0338     RBC, UA None Seen /hpf      WBC, UA None Seen /hpf      Epithelial Cells Occasional /hpf      Bacteria, UA Occasional /hpf      MUCUS  THREADS Occasional    UA w Reflex to Microscopic w Reflex to Culture [[id number]]  (Abnormal) Collected: 06/15/24 0226    Lab Status: Final result Specimen: Urine, Clean Catch Updated: 06/15/24 0301     Color, UA Yellow     Clarity, UA Clear     Specific Gravity, UA >=1.050     pH, UA 6.5     Leukocytes, UA Negative     Nitrite, UA Negative     Protein, UA 30 (1+) mg/dl      Glucose, UA Negative mg/dl      Ketones, UA Negative mg/dl      Urobilinogen, UA <2.0 mg/dl      Bilirubin, UA Negative     Occult Blood, UA Negative    HS Troponin 0hr (reflex protocol) [[id number]]  (Normal) Collected: 06/14/24 1938    Lab Status: Final result Specimen: Blood from Arm, Right Updated: 06/14/24 2008     hs TnI 0hr 4 ng/L     Protime-INR [[id number]]  (Normal) Collected: 06/14/24 1938    Lab Status: Final result Specimen: Blood from Arm, Right Updated: 06/14/24 1957     Protime 13.1 seconds      INR 1.00    APTT [[id number]]  (Normal) Collected: 06/14/24 1938    Lab Status: Final result Specimen: Blood from Arm, Right Updated: 06/14/24 1957     PTT 24 seconds     Basic metabolic panel [[id number]]  (Abnormal) Collected: 06/14/24 1806    Lab Status: Final result Specimen: Blood from Arm, Right Updated: 06/14/24 1831     Sodium 135 mmol/L      Potassium 4.7 mmol/L      Chloride 102 mmol/L      CO2 25 mmol/L      ANION GAP 8 mmol/L      BUN 10 mg/dL      Creatinine 0.56 mg/dL      Glucose 94 mg/dL      Calcium 8.8 mg/dL      eGFR 103 ml/min/1.73sq m     Narrative:      National Kidney Disease Foundation guidelines for Chronic Kidney Disease (CKD):     Stage 1 with normal or high GFR (GFR > 90 mL/min/1.73 square meters)    Stage 2 Mild CKD (GFR = 60-89 mL/min/1.73 square meters)    Stage 3A Moderate CKD (GFR = 45-59 mL/min/1.73 square meters)    Stage 3B Moderate CKD (GFR = 30-44 mL/min/1.73 square meters)    Stage 4 Severe CKD (GFR = 15-29 mL/min/1.73 square meters)    Stage 5 End Stage CKD (GFR <15 mL/min/1.73 square meters)  Note: GFR  calculation is accurate only with a steady state creatinine    CBC and Platelet [[id number]]  (Abnormal) Collected: 06/14/24 1806    Lab Status: Final result Specimen: Blood from Arm, Right Updated: 06/14/24 1813     WBC 5.68 Thousand/uL      RBC 4.05 Million/uL      Hemoglobin 12.4 g/dL      Hematocrit 37.3 %      MCV 92 fL      MCH 30.6 pg      MCHC 33.2 g/dL      RDW 12.8 %      Platelets 275 Thousands/uL      MPV 8.4 fL     Fingerstick Glucose (POCT) [[id number]]  (Normal) Collected: 06/14/24 1700    Lab Status: Final result Specimen: Blood Updated: 06/14/24 1701     POC Glucose 79 mg/dl                    MRI brain wo contrast   Final Result by Kush Bear MD (06/15 1222)      Moderate-to-severe motion artifact is present on several sequences.   - No acute infarct given suboptimal evaluation of right frontal-parietal-temporal lobe near right parietal bone flap cranioplasty susceptibility hardware artifact.   - Mild chronic microangiopathy given motion degradation.         Workstation performed: [id number]         XR chest portable   Final Result by Edelmira Franks MD (06/15 0638)      No acute cardiopulmonary disease.            Workstation performed: [id number]         CT stroke alert brain   Final Result by Kush Bear MD (06/14 1702)      No acute intracranial abnormality.      Similar mild chronic microangiopathy.      Findings were directly discussed with Mark Schneider at approximately 5:01 p.m on 6/14/2024.      Workstation performed: [id number]         CTA stroke alert (head/neck)   Final Result by Kush Bear MD (06/14 9597)      Delay in CTA stroke alert head and neck due to intravenous access issues.   - Negative CTA head and neck for large vessel occlusion, dissection, aneurysm, or high-grade stenosis.   - Additional chronic/incidental findings as detailed above.                  Findings were directly discussed with Mark Schneider at 5:47 PM on 6/14/2024.                            Workstation performed: [id number]               Procedures  Procedures      ED Course                  Stroke Assessment       Row Name 06/14/24 1655             NIH Stroke Scale    Interval --      Level of Consciousness (1a.) 0      LOC Questions (1b.) 0      LOC Commands (1c.) 0      Best Gaze (2.) 0      Visual (3.) 0      Facial Palsy (4.) 0      Motor Arm, Left (5a.) 0      Motor Arm, Right (5b.) 0      Motor Leg, Left (6a.) 0      Motor Leg, Right (6b.) 0      Limb Ataxia (7.) 1      Sensory (8.) 0      Best Language (9.) 1      Dysarthria (10.) 1      Extinction and Inattention (11.) (Formerly Neglect) 0      Total 3                    Flowsheet Row Most Recent Value   Thrombolytic Decision Options    Thrombolytic Decision Patient not a candidate.   Patient is not a candidate options Bleeding risk.  [on anticoagulation]                              Medical Decision Making  Vital signs on arrival within normal limits.     On exam patient appears alert and oriented, she is speaking with the neurology team, she does have dysarthria, left-sided extremity weakness unclear as patient has baseline left side extremity weakness, no other focal deficits.    History and physical exam most consistent with dysarthria secondary to recent increase in Geodon. However, differential diagnosis included but not limited to CVA, hemorrhage, electrolyte abnormality, polypharmacy, arrhythmia, ACS, infection. Plan stroke workup, CBC/CMP/PT/INR/UA/troponin, EKG, COVID/flu swab, imaging to include CT stroke alert protocol.     View ED course above for further discussion on patient workup.     Patient's laboratory evaluation demonstrates no significant findings pointing to the cause of her dysarthria, no evidence of infectious cause, no evidence of ACS, no evidence of arrhythmia, the patient will occasionally become mildly bradycardic with a heart rate of about 55, but she has no chest pain complaint and is not  symptomatic.     All labs reviewed and utilized in the medical decision making process  All radiology studies independently viewed by me and interpreted by the radiologist.  I reviewed all testing with the patient.     CT and CTA demonstrate no evidence of intracranial abnormality, negative evidence of large vessel occlusion dissection aneurysm or high-grade stenosis.    Upon re-evaluation the neurology team is recommending inpatient admission with eventual MRI, but is not recommending further stroke protocol workup.    Patient admitted to the internal medicine team.      Amount and/or Complexity of Data Reviewed  Labs: ordered.  Radiology: ordered.    Risk  Decision regarding hospitalization.          Disposition  Final diagnoses:   Stroke-like symptoms     Time reflects when diagnosis was documented in both MDM as applicable and the Disposition within this note       Time User Action Codes Description Comment    6/14/2024  4:47 PM Hailey Arredondo Add [R29.90] Stroke-like symptoms     6/15/2024 12:03 AM Sacha Preciado [F25.0] Schizoaffective disorder, bipolar type (HCC)     6/15/2024 12:03 AM Sacha Preciado [S06.9XAD] Traumatic brain injury, with unknown loss of consciousness status, subsequent encounter           ED Disposition       ED Disposition   Admit    Condition   Stable    Date/Time   Sat Eldon 15, 2024  7:18 AM    Comment   Case was discussed with Dr. Preciado and the patient's admission status was agreed to be Admission Status: inpatient status to the service of Dr. Preciado .               Follow-up Information    None         Current Discharge Medication List        CONTINUE these medications which have NOT CHANGED    Details   apixaban (ELIQUIS) 5 mg Take 5 mg by mouth 2 (two) times a day      busPIRone (BUSPAR) 10 mg tablet Take 2 tablets (20 mg total) by mouth 2 (two) times a day  Qty: 120 tablet, Refills: 0    Associated Diagnoses: Schizoaffective disorder, bipolar type (HCC)      carBAMazepine  (TEGretol) 200 mg tablet Take 5 tablets (1,000 mg total) by mouth daily at bedtime  Qty: 150 tablet, Refills: 0    Associated Diagnoses: Schizoaffective disorder, bipolar type (HCC)      Cholecalciferol (VITAMIN D3) 1,000 units tablet Take 2 tablets (2,000 Units total) by mouth daily  Qty: 60 tablet, Refills: 0    Associated Diagnoses: Schizoaffective disorder, bipolar type (HCC)      clonazePAM (KlonoPIN) 0.5 mg tablet Take 1 tablet (0.5 mg total) by mouth daily at bedtime for 10 days  Qty: 10 tablet, Refills: 0    Associated Diagnoses: Schizoaffective disorder, bipolar type (HCC)      cyanocobalamin (VITAMIN B-12) 1000 MCG tablet Take 1 tablet (1,000 mcg total) by mouth daily  Qty: 30 tablet, Refills: 0    Associated Diagnoses: Schizoaffective disorder, bipolar type (HCC)      DULoxetine (CYMBALTA) 30 mg delayed release capsule Take 3 capsules (90 mg total) by mouth daily  Qty: 90 capsule, Refills: 0    Associated Diagnoses: Schizoaffective disorder, bipolar type (HCC)      fenofibrate (TRICOR) 145 mg tablet Take 145 mg by mouth every evening In pm      gabapentin (NEURONTIN) 300 mg capsule Take 1 capsule (300 mg total) by mouth daily at bedtime  Qty: 30 capsule, Refills: 0    Associated Diagnoses: Schizoaffective disorder, bipolar type (HCC)      losartan (COZAAR) 100 MG tablet Take 100 mg by mouth every evening      MULTIPLE VITAMIN PO Take 1 tablet by mouth in the morning      nystatin (MYCOSTATIN) powder Apply 1 application. topically daily at bedtime Under the breasts      omeprazole (PriLOSEC) 40 MG capsule Take 40 mg by mouth daily      pravastatin (PRAVACHOL) 20 mg tablet Take 20 mg by mouth every evening      Probiotic Product (PROBIOTIC PO) Take 250 mg by mouth 2 (two) times a day      ziprasidone (GEODON) 80 mg capsule Take 1 capsule (80 mg total) by mouth 2 (two) times a day with meals  Qty: 60 capsule, Refills: 0    Associated Diagnoses: Schizoaffective disorder, bipolar type (HCC)           No  discharge procedures on file.    PDMP Review         Value Time User    PDMP Reviewed  Yes 6/6/2024  8:04 AM BERNABE Garcia             ED Provider  Attending physically available and evaluated Prema Shepard. I managed the patient along with the ED Attending.    Electronically Signed by           Nacho La DO  06/15/24 3899

## 2024-06-15 ENCOUNTER — APPOINTMENT (INPATIENT)
Dept: RADIOLOGY | Facility: HOSPITAL | Age: 59
DRG: 092 | End: 2024-06-15
Payer: MEDICARE

## 2024-06-15 ENCOUNTER — APPOINTMENT (OUTPATIENT)
Dept: RADIOLOGY | Facility: HOSPITAL | Age: 59
DRG: 092 | End: 2024-06-15
Payer: MEDICARE

## 2024-06-15 LAB
ANION GAP SERPL CALCULATED.3IONS-SCNC: 8 MMOL/L (ref 4–13)
APTT PPP: 26 SECONDS (ref 23–37)
BACTERIA UR QL AUTO: ABNORMAL /HPF
BASOPHILS # BLD AUTO: 0.01 THOUSANDS/ÂΜL (ref 0–0.1)
BASOPHILS NFR BLD AUTO: 0 % (ref 0–1)
BILIRUB UR QL STRIP: NEGATIVE
BUN SERPL-MCNC: 12 MG/DL (ref 5–25)
CALCIUM SERPL-MCNC: 9.3 MG/DL (ref 8.4–10.2)
CHLORIDE SERPL-SCNC: 101 MMOL/L (ref 96–108)
CLARITY UR: CLEAR
CO2 SERPL-SCNC: 27 MMOL/L (ref 21–32)
COLOR UR: YELLOW
CREAT SERPL-MCNC: 0.72 MG/DL (ref 0.6–1.3)
EOSINOPHIL # BLD AUTO: 0.1 THOUSAND/ÂΜL (ref 0–0.61)
EOSINOPHIL NFR BLD AUTO: 2 % (ref 0–6)
ERYTHROCYTE [DISTWIDTH] IN BLOOD BY AUTOMATED COUNT: 13 % (ref 11.6–15.1)
FLUAV RNA RESP QL NAA+PROBE: NEGATIVE
FLUBV RNA RESP QL NAA+PROBE: NEGATIVE
GFR SERPL CREATININE-BSD FRML MDRD: 92 ML/MIN/1.73SQ M
GLUCOSE SERPL-MCNC: 141 MG/DL (ref 65–140)
GLUCOSE UR STRIP-MCNC: NEGATIVE MG/DL
HCT VFR BLD AUTO: 36 % (ref 34.8–46.1)
HGB BLD-MCNC: 12.3 G/DL (ref 11.5–15.4)
HGB UR QL STRIP.AUTO: NEGATIVE
IMM GRANULOCYTES # BLD AUTO: 0.01 THOUSAND/UL (ref 0–0.2)
IMM GRANULOCYTES NFR BLD AUTO: 0 % (ref 0–2)
INR PPP: 1.06 (ref 0.84–1.19)
KETONES UR STRIP-MCNC: NEGATIVE MG/DL
LEUKOCYTE ESTERASE UR QL STRIP: NEGATIVE
LYMPHOCYTES # BLD AUTO: 1.37 THOUSANDS/ÂΜL (ref 0.6–4.47)
LYMPHOCYTES NFR BLD AUTO: 32 % (ref 14–44)
MAGNESIUM SERPL-MCNC: 3.1 MG/DL (ref 1.9–2.7)
MCH RBC QN AUTO: 30.9 PG (ref 26.8–34.3)
MCHC RBC AUTO-ENTMCNC: 34.2 G/DL (ref 31.4–37.4)
MCV RBC AUTO: 91 FL (ref 82–98)
MONOCYTES # BLD AUTO: 0.34 THOUSAND/ÂΜL (ref 0.17–1.22)
MONOCYTES NFR BLD AUTO: 8 % (ref 4–12)
MUCOUS THREADS UR QL AUTO: ABNORMAL
NEUTROPHILS # BLD AUTO: 2.49 THOUSANDS/ÂΜL (ref 1.85–7.62)
NEUTS SEG NFR BLD AUTO: 58 % (ref 43–75)
NITRITE UR QL STRIP: NEGATIVE
NON-SQ EPI CELLS URNS QL MICRO: ABNORMAL /HPF
NRBC BLD AUTO-RTO: 0 /100 WBCS
PH UR STRIP.AUTO: 6.5 [PH]
PLATELET # BLD AUTO: 315 THOUSANDS/UL (ref 149–390)
PMV BLD AUTO: 8.8 FL (ref 8.9–12.7)
POTASSIUM SERPL-SCNC: 3.9 MMOL/L (ref 3.5–5.3)
PROT UR STRIP-MCNC: ABNORMAL MG/DL
PROTHROMBIN TIME: 13.7 SECONDS (ref 11.6–14.5)
RBC # BLD AUTO: 3.98 MILLION/UL (ref 3.81–5.12)
RBC #/AREA URNS AUTO: ABNORMAL /HPF
RSV RNA RESP QL NAA+PROBE: NEGATIVE
SARS-COV-2 RNA RESP QL NAA+PROBE: NEGATIVE
SODIUM SERPL-SCNC: 136 MMOL/L (ref 135–147)
SP GR UR STRIP.AUTO: >=1.05 (ref 1–1.03)
UROBILINOGEN UR STRIP-ACNC: <2 MG/DL
WBC # BLD AUTO: 4.32 THOUSAND/UL (ref 4.31–10.16)
WBC #/AREA URNS AUTO: ABNORMAL /HPF

## 2024-06-15 PROCEDURE — 99232 SBSQ HOSP IP/OBS MODERATE 35: CPT

## 2024-06-15 PROCEDURE — 83735 ASSAY OF MAGNESIUM: CPT | Performed by: INTERNAL MEDICINE

## 2024-06-15 PROCEDURE — 70551 MRI BRAIN STEM W/O DYE: CPT

## 2024-06-15 PROCEDURE — 36415 COLL VENOUS BLD VENIPUNCTURE: CPT | Performed by: INTERNAL MEDICINE

## 2024-06-15 PROCEDURE — 94660 CPAP INITIATION&MGMT: CPT

## 2024-06-15 PROCEDURE — 85730 THROMBOPLASTIN TIME PARTIAL: CPT | Performed by: INTERNAL MEDICINE

## 2024-06-15 PROCEDURE — 0241U HB NFCT DS VIR RESP RNA 4 TRGT: CPT | Performed by: EMERGENCY MEDICINE

## 2024-06-15 PROCEDURE — 97166 OT EVAL MOD COMPLEX 45 MIN: CPT

## 2024-06-15 PROCEDURE — 87081 CULTURE SCREEN ONLY: CPT | Performed by: INTERNAL MEDICINE

## 2024-06-15 PROCEDURE — 94760 N-INVAS EAR/PLS OXIMETRY 1: CPT

## 2024-06-15 PROCEDURE — 81001 URINALYSIS AUTO W/SCOPE: CPT | Performed by: INTERNAL MEDICINE

## 2024-06-15 PROCEDURE — 85610 PROTHROMBIN TIME: CPT | Performed by: INTERNAL MEDICINE

## 2024-06-15 PROCEDURE — 80048 BASIC METABOLIC PNL TOTAL CA: CPT | Performed by: INTERNAL MEDICINE

## 2024-06-15 PROCEDURE — 85025 COMPLETE CBC W/AUTO DIFF WBC: CPT | Performed by: INTERNAL MEDICINE

## 2024-06-15 PROCEDURE — 71045 X-RAY EXAM CHEST 1 VIEW: CPT

## 2024-06-15 RX ORDER — MAGNESIUM SULFATE 1 G/100ML
1 INJECTION INTRAVENOUS ONCE
Status: COMPLETED | OUTPATIENT
Start: 2024-06-15 | End: 2024-06-15

## 2024-06-15 RX ORDER — LORAZEPAM 0.5 MG/1
0.5 TABLET ORAL EVERY 8 HOURS PRN
Status: DISCONTINUED | OUTPATIENT
Start: 2024-06-15 | End: 2024-06-17 | Stop reason: HOSPADM

## 2024-06-15 RX ORDER — ZIPRASIDONE HYDROCHLORIDE 40 MG/1
80 CAPSULE ORAL 2 TIMES DAILY WITH MEALS
Status: DISCONTINUED | OUTPATIENT
Start: 2024-06-15 | End: 2024-06-17 | Stop reason: HOSPADM

## 2024-06-15 RX ORDER — LORAZEPAM 2 MG/ML
1 INJECTION INTRAMUSCULAR ONCE
Status: COMPLETED | OUTPATIENT
Start: 2024-06-15 | End: 2024-06-15

## 2024-06-15 RX ORDER — CLONAZEPAM 0.5 MG/1
0.5 TABLET ORAL ONCE
Status: COMPLETED | OUTPATIENT
Start: 2024-06-15 | End: 2024-06-15

## 2024-06-15 RX ORDER — SPIRONOLACTONE 25 MG/1
25 TABLET ORAL DAILY
Status: DISCONTINUED | OUTPATIENT
Start: 2024-06-15 | End: 2024-06-17 | Stop reason: HOSPADM

## 2024-06-15 RX ADMIN — PANTOPRAZOLE SODIUM 40 MG: 40 TABLET, DELAYED RELEASE ORAL at 05:13

## 2024-06-15 RX ADMIN — ACETAMINOPHEN 650 MG: 325 TABLET, FILM COATED ORAL at 05:31

## 2024-06-15 RX ADMIN — AMLODIPINE BESYLATE 10 MG: 10 TABLET ORAL at 09:04

## 2024-06-15 RX ADMIN — PRAVASTATIN SODIUM 20 MG: 20 TABLET ORAL at 16:42

## 2024-06-15 RX ADMIN — LORAZEPAM 1 MG: 2 INJECTION INTRAMUSCULAR; INTRAVENOUS at 10:12

## 2024-06-15 RX ADMIN — CLONAZEPAM 0.5 MG: 0.5 TABLET ORAL at 02:25

## 2024-06-15 RX ADMIN — METOPROLOL SUCCINATE 75 MG: 50 TABLET, EXTENDED RELEASE ORAL at 09:04

## 2024-06-15 RX ADMIN — DEXTRAN 70, GLYCERIN, HYPROMELLOSE 1 DROP: 1; 2; 3 SOLUTION/ DROPS OPHTHALMIC at 16:42

## 2024-06-15 RX ADMIN — SPIRONOLACTONE 25 MG: 25 TABLET ORAL at 16:47

## 2024-06-15 RX ADMIN — BUSPIRONE HYDROCHLORIDE 20 MG: 10 TABLET ORAL at 16:41

## 2024-06-15 RX ADMIN — APIXABAN 5 MG: 5 TABLET, FILM COATED ORAL at 16:41

## 2024-06-15 RX ADMIN — MAGNESIUM SULFATE HEPTAHYDRATE 1 G: 1 INJECTION, SOLUTION INTRAVENOUS at 02:53

## 2024-06-15 RX ADMIN — APIXABAN 5 MG: 5 TABLET, FILM COATED ORAL at 09:04

## 2024-06-15 RX ADMIN — LORAZEPAM 0.5 MG: 0.5 TABLET ORAL at 16:42

## 2024-06-15 RX ADMIN — ZIPRASIDONE HYDROCHLORIDE 80 MG: 40 CAPSULE ORAL at 16:43

## 2024-06-15 RX ADMIN — DULOXETINE HYDROCHLORIDE 90 MG: 60 CAPSULE, DELAYED RELEASE ORAL at 09:04

## 2024-06-15 RX ADMIN — BUSPIRONE HYDROCHLORIDE 20 MG: 10 TABLET ORAL at 09:04

## 2024-06-15 NOTE — ASSESSMENT & PLAN NOTE
"Hx obtained from speaking with patient, care taker from facility, ER; Will note somewhat limited 2/2 patient's underlying TBI/Schizoaffective disorder  Care taker reports that patient had \"slurred speech\", fatigue and increased thirst today   Code Stroke Alerted in ER   CT Head: No acute intracranial abnormalities, mild chronic microangiopathy  CTA H&N: No LVO, dissection, aneurysm, or high-grade stenosis. Mild atherosclerotic disease in the aortic arch., Mild calcified atherosclerotic disease in the B/L ICA cavernous segments L >R, and poor venous contrast opacification 2/2 arterial bolus timing.   Neurology with low suspicion for acute vascular insult and suspect likely toxic metabolic encephalopathy.   Patient was recently hospitalized at inpatient psych with some adjustments to medications and concern this may be related from their standpoint   Neurology recommending medical admission with MRI brain but does not need CVA protocol  On exam in her room, patient at baseline without any evidence of slurring of words and eating a bologna sandwich. Caretaker reports that she appears back to baseline  Afebrile, no leukocytosis  Admit to medicine. Check UA. Check CXR. F/u with MRI brain. Appreciate neurology recs. Given patient's extensive psychiatric history will continue her pta medications for now and monitor.   " Last refill #90 x 1 on 6/11/19  Last office visit on 9/25/19  No future appointments.

## 2024-06-15 NOTE — ASSESSMENT & PLAN NOTE
Current medications on medications from facility: Amlodipine 10mg qd, Toprol 75mg daily, Spironolactone 25 mg daily, continue

## 2024-06-15 NOTE — QUICK NOTE
-Spoke with patient's mother via telephone and she is concerned that patient's ongoing medication psychiatric medication changes and specifically her Ziprasidone is etiology of symptoms. I did review her medications and appears her dosage of 80mg bid has been on for at least a year. Mother requesting it be held this evening. As such have held it this evening per Mother request. Mother would also like for psychiatry to weigh in on her medications as she feels this is large component of her issues. Mother is agreeable to MRI brain.

## 2024-06-15 NOTE — ASSESSMENT & PLAN NOTE
Continue pta buspirone 20mg bid, Carbamazepine 1000mg qhs, Cymbalta 90mg daily, Gabapentin 300mg qhs, Ziprasidone 80mg bid, Clonazepam 0.5mg qd  Medications and dosages from sheet patient came with from facility of her current medications

## 2024-06-15 NOTE — PROGRESS NOTES
"[facility]  Progress Note  Name: Prema Shepard I  MRN: [id number]  Unit/Bed#: [facility] 709-01 I Date of Admission: 6/14/2024   Date of Service: 6/15/2024 I Hospital Day: 1    Assessment & Plan   * Stroke-like symptoms  Assessment & Plan  Hx obtained from speaking with patient, care taker from facility, ER; Will note somewhat limited 2/2 patient's underlying TBI/Schizoaffective disorder  Care taker reports that patient had \"slurred speech\", fatigue and increased thirst today   Code Stroke Alerted in ER   CT Head: No acute intracranial abnormalities, mild chronic microangiopathy  CTA H&N: No LVO, dissection, aneurysm, or high-grade stenosis. Mild atherosclerotic disease in the aortic arch., Mild calcified atherosclerotic disease in the B/L ICA cavernous segments L >R, and poor venous contrast opacification 2/2 arterial bolus timing.   Neurology with low suspicion for acute vascular insult and suspect likely toxic metabolic encephalopathy.   Patient was recently hospitalized at inpatient psych with some adjustments to medications and concern this may be related from their standpoint   Neurology recommending medical admission with MRI brain but does not need CVA protocol  On exam in her room, patient at baseline without any evidence of slurring of words and eating a bologna sandwich. Caretaker reports that she appears back to baseline  Afebrile, no leukocytosis  UA without infection   CXR: no acute cardiopulmonary disease   MRI: No acute infarct given suboptimal evaluation of right frontal-parietal-temporal lobe near right parietal bone flap cranioplasty susceptibility hardware artifact. Mild chronic microangiopathy   No further neuro recommendations   Psych consulted, awaiting recs - I did explain to patient's mother that psych would unlikely change her chronic psych meds in the hospital and she would need to continue follow up with her psychiatrist outpatient for medication " adjustments. Mother states patient has an appt 6/20. Unable to discharge back to facility over the weekend. Plan to d/c Monday     Hypertension  Assessment & Plan  Current medications on medications from facility: Amlodipine 10mg qd, Toprol 75mg daily, Spironolactone 25 mg daily, continue     Schizoaffective disorder (HCC)  Assessment & Plan  Continue pta buspirone 20mg bid, Carbamazepine 1000mg qhs, Cymbalta 90mg daily, Gabapentin 300mg qhs, Ziprasidone 80mg bid, Clonazepam 0.5mg qd  Medications and dosages from sheet patient came with from facility of her current medications   Per previous provider, mother is concerned that these medications are contributing to her symptoms. Requesting psychiatric evaluation, psych consulted     History of DVT (deep vein thrombosis)  Assessment & Plan  Continue pta eliquis    TBI (traumatic brain injury) ([facility])  Assessment & Plan  Hx TBI and lives at Group home - unable to facilitate return over the weekend.         VTE Pharmacologic Prophylaxis: VTE Score: 3 High Risk (Score >/= 5) - Pharmacological DVT Prophylaxis Ordered: apixaban (Eliquis). Sequential Compression Devices Ordered.    Mobility:      [facility] Goal achieved. Continue to encourage appropriate mobility.    Patient Centered Rounds: I performed bedside rounds with nursing staff today.   Discussions with Specialists or Other Care Team Provider: Neuro     Education and Discussions with Family / Patient: Updated  (mother) via phone.    Total Time Spent on Date of Encounter in care of patient: This time was spent on one or more of the following: performing physical exam; counseling and coordination of care; obtaining or reviewing history; documenting in the medical record; reviewing/ordering tests, medications or procedures; communicating with other healthcare professionals and discussing with patient's family/caregivers.    Current Length of Stay: 1 day(s)  Current Patient Status: Inpatient   Certification  Statement: The patient will continue to require additional inpatient hospital stay due to facility unable to accept patient on weekend  Discharge Plan:  Plan for d/c Monday back to  [facility]     Code Status: Level 1 - Full Code    Subjective:   Seen and examined.  No acute events overnight.  States she does not like taking the Geodon.  No additional complaints at this time    Objective:     Vitals:   Temp (24hrs), Av.4 °F (36.9 °C), Min:98 °F (36.7 °C), Max:98.6 °F (37 °C)    Temp:  [98 °F (36.7 °C)-98.6 °F (37 °C)] 98.6 °F (37 °C)  HR:  [42-68] 68  Resp:  [17-25] 20  BP: (130-176)/(58-86) 138/74  SpO2:  [91 %-100 %] 96 %  Body mass index is 42.58 kg/m².     Input and Output Summary (last 24 hours):     Intake/Output Summary (Last 24 hours) at 6/15/2024 1430  Last data filed at 6/15/2024 0353  Gross per 24 hour   Intake 100 ml   Output --   Net 100 ml       Physical Exam:   Physical Exam  Constitutional:       General: She is not in acute distress.     Appearance: She is obese.   HENT:      Mouth/Throat:      Mouth: Mucous membranes are moist.   Eyes:      Conjunctiva/sclera: Conjunctivae normal.   Cardiovascular:      Heart sounds: Normal heart sounds.   Pulmonary:      Breath sounds: Normal breath sounds.   Abdominal:      Palpations: Abdomen is soft.   Musculoskeletal:      Right lower leg: No edema.      Left lower leg: No edema.   Neurological:      General: No focal deficit present.      Comments: Baseline L sided deficits noted           Additional Data:     Labs:  Results from last 7 days   Lab Units 06/15/24  0509   WBC Thousand/uL 4.32   HEMOGLOBIN g/dL 12.3   HEMATOCRIT % 36.0   PLATELETS Thousands/uL 315   SEGS PCT % 58   LYMPHO PCT % 32   MONO PCT % 8   EOS PCT % 2     Results from last 7 days   Lab Units 06/15/24  0509   SODIUM mmol/L 136   POTASSIUM mmol/L 3.9   CHLORIDE mmol/L 101   CO2 mmol/L 27   BUN mg/dL 12   CREATININE mg/dL 0.72   ANION GAP mmol/L 8   CALCIUM mg/dL 9.3   GLUCOSE  RANDOM mg/dL 141*     Results from last 7 days   Lab Units 06/15/24  0509   INR  1.06     Results from last 7 days   Lab Units 06/14/24  1700   POC GLUCOSE mg/dl 79               Lines/Drains:  Invasive Devices       Peripheral Intravenous Line  Duration             Peripheral IV 06/15/24 Proximal;Right;Ventral (anterior) Forearm <1 day                          Imaging: Reviewed radiology reports from this admission including: MRI brain    Recent Cultures (last 7 days):         Last 24 Hours Medication List:   Current Facility-Administered Medications   Medication Dose Route Frequency Provider Last Rate    acetaminophen  650 mg Oral Q6H PRN Sacha Preciado, DO      amLODIPine  10 mg Oral Daily Sacha Preciado, DO      apixaban  5 mg Oral BID Sacha Preciado, DO      Artificial Tears  1 drop Both Eyes Q6H PRN Randi Vazquez PA-C      busPIRone  20 mg Oral BID Sacha Preciado, DO      carBAMazepine  1,000 mg Oral HS Sacha Preciado, DO      clonazePAM  0.5 mg Oral HS Sacha Preciado, DO      DULoxetine  90 mg Oral Daily Sacha Preciado, DO      gabapentin  300 mg Oral HS Sacha Preciado, DO      metoprolol succinate  75 mg Oral Daily Sacha Preciado, DO      pantoprazole  40 mg Oral Early Morning Sacha Preciado, DO      pravastatin  20 mg Oral QPM Sacha Preciado, DO      spironolactone  25 mg Oral Daily Randi Vazquez PA-C      ziprasidone  80 mg Oral BID With Meals Randi Vazquez PA-C          Today, Patient Was Seen By: Randi Vazquez PA-C    **Please Note: This note may have been constructed using a voice recognition system.**

## 2024-06-15 NOTE — OCCUPATIONAL THERAPY NOTE
"    Occupational Therapy Evaluation     Patient Name: Prema Shepard  Today's Date: 6/15/2024  Problem List  Principal Problem:    Stroke-like symptoms  Active Problems:    TBI (traumatic brain injury) (HCC)    History of DVT (deep vein thrombosis)    Schizoaffective disorder (HCC)    Hypertension    Past Medical History  Past Medical History:   Diagnosis Date    Anxiety and depression     Chronic deep vein thrombosis (DVT) (HCC)     LLE    DVT (deep venous thrombosis) (HCC)     History of traumatic brain injury     HTN (hypertension)     Hyperlipidemia     Insomnia     Obesity     RUPAL (obstructive sleep apnea)     Schizoaffective disorder (HCC)     TBI (traumatic brain injury) (HCC)      Past Surgical History  History reviewed. No pertinent surgical history.      06/15/24 1030   OT Last Visit   OT Visit Date 06/15/24   Note Type   Note type Evaluation   Pain Assessment   Pain Assessment Tool 0-10   Pain Score No Pain   Restrictions/Precautions   Weight Bearing Precautions Per Order No   Other Precautions Contact/isolation;Airborne/isolation;Telemetry;Fall Risk;Cognitive;Chair Alarm;Bed Alarm  (+History of TBI, psych, suspect COVID testing undetermined at this time.)   Home Living   Type of Home Group Home   Home Layout Ramped entrance   Bathroom Shower/Tub Walk-in shower   Bathroom Toilet Raised   Bathroom Equipment Shower chair   Prior Function   Level of [place] Independent with ADLs;Needs assistance with IADLS   Lives With Facility staff, roommates   Receives Help From Personal care attendant;Family   IADLs Family/Friend/Other provides medication management;Family/Friend/Other provides meals;Family/Friend/Other provides transportation   Falls in the last 6 months 1 to 4  (Per Pt \"I lose my balance\")   Vocational Unemployed   Lifestyle   Autonomy I with ADL's/assistance with IaDL's, +RW with functional mobiltiy   Reciprocal Relationships facility, mother, brother, sister   Service to Others unemployed "   Intrinsic Gratification visiting family/friends   General   Family/Caregiver Present No   ADL   Eating Assistance 7  Independent   Grooming Assistance 7  Independent   UB Bathing Assistance 5  Supervision/Setup   LB Bathing Assistance 5  Supervision/Setup   UB Dressing Assistance 5  Supervision/Setup   LB Dressing Assistance 5  Supervision/Setup   Toileting Assistance  5  Supervision/Setup   Bed Mobility   Supine to Sit 6  Modified independent   Additional items Assist x 1   Sit to Supine 6  Modified independent   Additional items Assist x 1   Transfers   Sit to Stand 5  Supervision   Additional items Assist x 1   Stand to Sit 5  Supervision   Additional items Assist x 1   Toilet transfer 5  Supervision   Functional Mobility   Functional Mobility 5  Supervision   Additional Comments S with RW short distanced functional mobility, mild unsteady, no overt LOB. +impulsive at times.   Balance   Static Sitting Normal   Dynamic Sitting Good   Static Standing Fair +   Dynamic Standing Fair -   Ambulatory Fair -   Activity Tolerance   Activity Tolerance Patient tolerated treatment well   Nurse Made Aware RN cleared pt for therapy   RUE Assessment   RUE Assessment WFL   LUE Assessment   LUE Assessment WFL   Hand Function   Gross Motor Coordination Functional   Fine Motor Coordination Functional   Cognition   Overall Cognitive Status Impaired  (History of TBI/psych)   Arousal/Participation Alert;Responsive;Cooperative   Attention Attends with cues to redirect   Orientation Level Oriented X4   Memory Decreased recall of precautions   Following Commands Follows multistep commands with increased time or repetition   Comments pt pleasant and motivated, impulsive at times requiring safety cues for RW use with all functional mobility, suspect pt cognition baseline and has Supervision with ADL's/IADL's upon discharge.   Assessment   Assessment Pt is a 58 y.o. female who was admitted to [facility] on 6/14/2024 with slurred  speech, fatigue, increased thirst and now here with  Stroke-like symptoms MRI pending . Patient   has a past medical history of Anxiety and depression, Chronic deep vein thrombosis (DVT) ([facility]), DVT (deep venous thrombosis) ([facility]), History of traumatic brain injury, HTN (hypertension), Hyperlipidemia, Insomnia, Obesity, RUPAL (obstructive sleep apnea), Schizoaffective disorder ([facility]), and TBI (traumatic brain injury) ([facility]). At baseline pt was completing I with ADL's/assistance with IaDL's, +RW with functional mobility. Pt lives at a group home with ramped entrance with facility staff to assist with IaDL's.. Currently pt requires S/set-up for overall ADLS and S with RW for functional mobility/transfers. Pt currently presents with impairments in the following categories -difficulty performing IADLS  activity tolerance. These impairments, as well as pt's fatigue  limit pt's ability to safely engage in all baseline areas of occupation, includingfunctional mobility/transfers, community mobility, laundry , house maintenance, medication management, meal prep, cleaning, social participation , and leisure activities  From OT standpoint, recommend increased social support upon D/C.The patient's raw score on the AM-PAC Daily Activity Inpatient Short Form is 20. A raw score of greater than or equal to 19 suggests the patient may benefit from discharge to home. Please refer to the recommendation of the Occupational Therapist for safe discharge planning.  No further acute OT needs indicated at this time - Anticipate d/c home with family/facility support when medically cleared - d/c from caseload   Goals   Patient Goals go home   Discharge Recommendation   Rehab Resource Intensity Level, OT No post-acute rehabilitation needs   AM-PAC Daily Activity Inpatient   Lower Body Dressing 3   Bathing 3   Toileting 3   Upper Body Dressing 3   Grooming 4   Eating 4   Daily Activity Raw Score 20   Daily Activity Standardized Score (Calc for Raw  Score >=11) 42.03   AM-PAC Applied Cognition Inpatient   Following a Speech/Presentation 2   Understanding Ordinary Conversation 4   Taking Medications 2   Remembering Where Things Are Placed or Put Away 3   Remembering List of 4-5 Errands 3   Taking Care of Complicated Tasks 3   Applied Cognition Raw Score 17   Applied Cognition Standardized Score 36.52   End of Consult   Patient Position at End of Consult Supine;Bed/Chair alarm activated;All needs within reach   Nurse Communication Nurse aware of consult     Amna Ibarra OTR/L

## 2024-06-15 NOTE — CASE MANAGEMENT
Case Management Discharge Planning Note    Patient name Prema Shepard  Location [facility] 709/[facility] 709-01 MRN [id number]  : 1965 Date 6/15/2024       Current Admission Date: 2024  Current Admission Diagnosis:Stroke-like symptoms   Patient Active Problem List    Diagnosis Date Noted Date Diagnosed    Stroke-like symptoms 2024     Medical clearance for psychiatric admission 2024     Vitamin D deficiency 2024     Vitamin B12 deficiency 2024     Obesity 2024     Closed head injury 2024     Hypertension 2024     Hyponatremia 2023     TBI (traumatic brain injury) ([facility]) 2023     History of DVT (deep vein thrombosis) 2023     Schizoaffective disorder (HCC) 2023     Hyperlipemia 2023       LOS (days): 1  Geometric Mean LOS (GMLOS) (days):   Days to GMLOS:     OBJECTIVE:  Risk of Unplanned Readmission Score: 28.29         Current admission status: Inpatient   Preferred Pharmacy:   [facility] & [address] [address]  Phone: [phone] Fax: [phone]    Primary Care Provider: Sotero Peterson MD    Primary Insurance: MEDICARE  Secondary Insurance: [facility]    DISCHARGE DETAILS:    CM called group home at :[phone], and spoke with Josh.  They were updated that pt is medically cleared for DC.  They stated due to staffing they are not able to accept weekend returns.  She will need to return on Monday.  CM updated provider.

## 2024-06-15 NOTE — ASSESSMENT & PLAN NOTE
Current medications on medications from facility: Amlodipine 10mg qd, Toprol 75mg daily, Spironolactone 25 mg daily   Continuing Toprol and Amlodipine overnight. Will hold aldactone overnight.

## 2024-06-15 NOTE — QUICK NOTE
"Notified by nursing that patient wanted to talk about her medications. Upon entering the room patient states \"I want to die and I don't want to be on this earth anymore.\" Reassurance provided to patient. Continued to state \"I feel anxious and want to go home.\" Discussed with patient plan to return back to facility on Monday as they do not accept back on the weekends. Nursing notified of above. 1:1 ordered for safety.   "

## 2024-06-15 NOTE — QUICK NOTE
Brief Neurology Note    MRI brain completed, no infarct noted within limitations of artifact. Pt has since remained at baseline. Lower suspicion for stroke or TIA as etiology of presentation. Psychiatry consult was ordered to help with medications. No further inpatient neurologic recommendations at this time. Care per primary. Please reach out with questions or concerns in the interim.

## 2024-06-15 NOTE — H&P
"[facility]  H&P  Name: Prema Shepard 58 y.o. female I MRN: [id number]  Unit/Bed#: ED 01 I Date of Admission: 6/14/2024   Date of Service: 6/14/2024 I Hospital Day: 0      Assessment & Plan   * Stroke-like symptoms  Assessment & Plan  Hx obtained from speaking with patient, care taker from facility, ER; Will note somewhat limited 2/2 patient's underlying TBI/Schizoaffective disorder  Care taker reports that patient had \"slurred speech\", fatigue and increased thirst today   Code Stroke Alerted in ER   CT Head: No acute intracranial abnormalities, mild chronic microangiopathy  CTA H&N: No LVO, dissection, aneurysm, or high-grade stenosis. Mild atherosclerotic disease in the aortic arch., Mild calcified atherosclerotic disease in the B/L ICA cavernous segments L >R, and poor venous contrast opacification 2/2 arterial bolus timing.   Neurology with low suspicion for acute vascular insult and suspect likely toxic metabolic encephalopathy.   Patient was recently hospitalized at inpatient psych with some adjustments to medications and concern this may be related from their standpoint   Neurology recommending medical admission with MRI brain but does not need CVA protocol  On exam in her room, patient at baseline without any evidence of slurring of words and eating a bologna sandwich. Caretaker reports that she appears back to baseline  Afebrile, no leukocytosis  Admit to medicine. Check UA. Check CXR. F/u with MRI brain. Appreciate neurology recs. Given patient's extensive psychiatric history will continue her pta medications for now and monitor.     Hypertension  Assessment & Plan  Current medications on medications from facility: Amlodipine 10mg qd, Toprol 75mg daily, Spironolactone 25 mg daily   Continuing Toprol and Amlodipine overnight. Will hold aldactone overnight.     Schizoaffective disorder (HCC)  Assessment & Plan  Continue pta buspirone 20mg bid, Carbamazepine " 1000mg qhs, Cymbalta 90mg daily, Gabapentin 300mg qhs, Ziprasidone 80mg bid, Clonazepam 0.5mg qd  Medications and dosages from sheet patient came with from facility of her current medications     History of DVT (deep vein thrombosis)  Assessment & Plan  Continue pta eliquis    TBI (traumatic brain injury) ([facility])  Assessment & Plan  Hx TBI and lives at [facility]              VTE Prophylaxis: Apixaban (Eliquis)  / sequential compression device and foot pump applied   Code Status: Level 1 - Full Code       Anticipated Length of Stay:  Patient will be admitted on an Inpatient basis with an anticipated length of stay of  > 2 midnights.   Justification for Hospital Stay: Please see detailed plans noted above.    Chief Complaint:     Stroke like symptoms  History of Present Illness:  Prema Shepard is a 58 y.o. female who has past medical history significant for TBI, Schizoaffective disorder, Obesity, DVT, HTN who presented to \[facility]\"" ER on the evening of 6/14 from [facility] after reports of slurring of speech earlier today, fatigue and increased thirst. Further details limited 2/2 patients underlying TBI/Schizoaffective disorder. Upon arrival, patient was a code stroke and evaluated by neurology who had low suspicion for acute vascular insult, recommending admission to medicine for further metabolic workup as etiology and neurology does think MRI brain not unreasonable and as such has been ordered. Patient currently reports that she feels at her baseline and caretaker reports that she seems to be acting like herself.       Review of Systems: Limited 2/2 patient's underlying TBI/Schizoaffective disorder  Past Medical and Surgical History:   Past Medical History:   Diagnosis Date    Anxiety and depression     Chronic deep vein thrombosis (DVT) ([facility])     LLE    DVT (deep venous thrombosis) ([facility])     History of traumatic brain injury     HTN (hypertension)     Hyperlipidemia     Insomnia     Obesity     RUPAL (obstructive  sleep apnea)     Schizoaffective disorder (HCC)     TBI (traumatic brain injury) ([facility])      History reviewed. No pertinent surgical history.    Meds/Allergies:  (Not in a hospital admission)      Allergies:   Allergies   Allergen Reactions    Latex Hives, Itching, Other (See Comments), Shortness Of Breath and Rash    Amoxicillin Hives    Apricot Flavor - Food Allergy Hives    Apricot Flavor - Food Allergy Other (See Comments)     unknown    Eql Apricot Scrub [Salicylic Acid] Hives    Haloperidol Other (See Comments)     Dystonia    Latex Other (See Comments)     unknown    Pollen Extract Sneezing    Medical Tape Rash       History:  Marital Status: Single     Substance Use History:   Social History     Substance and Sexual Activity   Alcohol Use Not Currently     Social History     Tobacco Use   Smoking Status Never   Smokeless Tobacco Never     Social History     Substance and Sexual Activity   Drug Use Never       Family History:  History reviewed. No pertinent family history.    Physical Exam:     Vitals:   Blood Pressure: 145/64 (06/14/24 1845)  Pulse: 60 (06/14/24 2030)  Temperature: 98.6 °F (37 °C) (06/14/24 1700)  Respirations: 19 (06/14/24 2030)  Weight - Scale: 98.9 kg (218 lb 0.6 oz) (06/14/24 1700)  SpO2: 99 % (06/14/24 2030)    Constitutional:  Awake, Obese, Non-toxic appearance  Eyes:  EOMI, No scleral icterus   HENT:   oropharynx moist, external ears normal, external nose normal   Respiratory:  No respiratory distress, no wheezing   Cardiovascular:  Normal rate, no murmurs   GI:  Soft, nondistended, no guarding   :  No costovertebral angle tenderness   Musculoskeletal:  brace on LLE  Integument:  no jaundice, no rash   Neurologic:  Alert &awake, communicative, CN 2-12 normal,  no focal deficits noted         Lab Results: I have personally reviewed pertinent reports.      Results from last 7 days   Lab Units 06/14/24  1806   WBC Thousand/uL 5.68   HEMOGLOBIN g/dL 12.4   HEMATOCRIT % 37.3   PLATELETS  Thousands/uL 275     Results from last 7 days   Lab Units 06/14/24  1806 06/10/24  2015   POTASSIUM mmol/L 4.7  --    CHLORIDE mmol/L 102  --    CO2 mmol/L 25  --    CO2, I-STAT mmol/L  --  24   BUN mg/dL 10  --    CREATININE mg/dL 0.56*  --    CALCIUM mg/dL 8.8  --    GLUCOSE, ISTAT mg/dl  --  134     Results from last 7 days   Lab Units 06/14/24  1938   INR  1.00         Imaging: I have personally reviewed pertinent reports.      CTA stroke alert (head/neck)    Result Date: 6/14/2024  Narrative: CTA NECK AND BRAIN WITH CONTRAST INDICATION: Stroke Alert COMPARISON:   Same day CT stroke alert brain. CT head and cervical spine without contrast 6/10/2024. MRI brain without contrast 4/29/2023. CTA stroke alert head neck 4/28/2023. TECHNIQUE:   Post contrast imaging was performed after administration of iodinated contrast through the neck and brain. Post contrast axial 0.625 mm images timed to opacify the arterial system. 3D rendering was performed on an independent workstation.   MIP reconstructions performed. Coronal reconstructions were performed of the noncontrast portion of the brain. Radiation dose length product (DLP) for this visit:  525.06 mGy-cm .  This examination, like all CT scans performed in the [facility], was performed utilizing techniques to minimize radiation dose exposure, including the use of iterative  reconstruction and automated exposure control. IV Contrast: 85 mL Omnipaque 350. IMAGE QUALITY:   Diagnostic FINDINGS: CERVICAL VASCULATURE AORTIC ARCH AND GREAT VESSELS: Mild calcified atherosclerotic disease in aortic arch. Normal great vessel origins. Normal visualized subclavian vessels. RIGHT VERTEBRAL ARTERY CERVICAL SEGMENT:  Normal origin. The vessel is normal in caliber throughout the neck. LEFT VERTEBRAL ARTERY CERVICAL SEGMENT:  Normal origin. The vessel is normal in caliber throughout the neck. RIGHT EXTRACRANIAL CAROTID SEGMENT:  Normal caliber common carotid artery.   Normal bifurcation. Minor calcified atherosclerotic disease in proximal cervical internal carotid artery.  No stenosis or dissection. LEFT EXTRACRANIAL CAROTID SEGMENT:  Normal caliber common carotid artery.  Normal bifurcation and cervical internal carotid artery.  No stenosis or dissection. NASCET criteria was used to determine the degree of internal carotid artery diameter stenosis. INTRACRANIAL VASCULATURE INTERNAL CAROTID ARTERIES:  Normal enhancement of the intracranial portions of the internal carotid arteries with mild calcified atherosclerotic disease in bilateral ICA cavernous segments (left worse than right). Normal ophthalmic artery origins.  Normal ICA terminus. ANTERIOR CIRCULATION:  Symmetric A1 segments and anterior cerebral arteries with normal enhancement.  Normal anterior communicating artery. MIDDLE CEREBRAL ARTERY CIRCULATION:  M1 segment and middle cerebral artery branches demonstrate normal enhancement bilaterally. DISTAL VERTEBRAL ARTERIES:  Normal distal vertebral arteries.  Posterior inferior cerebellar artery origins are normal. Normal vertebral basilar junction. BASILAR ARTERY:  Basilar artery is normal in caliber.  Normal superior cerebellar arteries. POSTERIOR CEREBRAL ARTERIES: Both posterior cerebral arteries arises from the basilar tip.  Both arteries demonstrate normal enhancement. VENOUS STRUCTURES: Poor venous contrast opacification due to arterial bolus timing. NON VASCULAR ANATOMY BONY STRUCTURES:  No acute osseous abnormality. Right parietal craniotomy with bone flap cranioplasty and cerclage wire fixation. Osteoarthritis of bilateral temporomandibular joints. Mild spinal degenerative changes of the cervical spine, worse at C6-C7. Partially imaged right shoulder hardware. SOFT TISSUES OF THE NECK: No acute neck abnormality. Dental amalgam with extensive beam hardening streak artifact limiting. Subcentimeter hypodense nodule in left thyroid lobe. Incidental discovery of one or  more thyroid nodule(s) measuring less than 1.5 cm and without suspicious features is noted in this patient who is above 35 years old; according to guidelines published in  the February 2015 white paper on incidental thyroid nodules in the Journal of the American College of Radiology (JACR), no further evaluation is recommended. THORACIC INLET:  Unremarkable.     Impression: Delay in CTA stroke alert head and neck due to intravenous access issues. - Negative CTA head and neck for large vessel occlusion, dissection, aneurysm, or high-grade stenosis. - Additional chronic/incidental findings as detailed above. Findings were directly discussed with Mark Schneider at 5:47 PM on 6/14/2024. Workstation performed: [id number]     CT stroke alert brain    Result Date: 6/14/2024  Narrative: CT BRAIN - STROKE ALERT PROTOCOL INDICATION:   Stroke Alert. COMPARISON: Pending same day CTA stroke alert head neck. CT head without contrast 6/10/2024, 5/8/2024. TECHNIQUE:  CT examination of the brain was performed.  In addition to axial images, coronal reformatted images were created and submitted for interpretation. Radiation dose length product (DLP) for this visit: 860.94 mGy-cm.  This examination, like all CT scans performed in the [facility], was performed utilizing techniques to minimize radiation dose exposure, including the use of iterative reconstruction and automated exposure control. IMAGE QUALITY:  Diagnostic. FINDINGS: PARENCHYMA: Similar mild nonspecific periventricular hypodensities, likely chronic microangiopathy. No CT signs of acute infarction.  No intracranial mass, mass effect or midline shift.  No acute parenchymal hemorrhage. Unchanged mild superior cerebellar  atrophy. Mild calcification of the cavernous internal carotid arteries. VENTRICLES AND EXTRA-AXIAL SPACES:  Normal for the patient's age. VISUALIZED ORBITS: Normal visualized orbits. PARANASAL SINUSES: Normal visualized paranasal sinuses.  CALVARIUM AND EXTRACRANIAL SOFT TISSUES: Right parietal craniotomy with cerclage wire bone flap cranioplasty and cerclage wire fixation.     Impression: No acute intracranial abnormality. Similar mild chronic microangiopathy. Findings were directly discussed with Mark Schneider at approximately 5:01 p.m on 6/14/2024. Workstation performed: [id number]     XR chest 1 view    Result Date: 6/11/2024  Narrative: INDICATION: Trauma. As per review of electronic medical record, patient on Eliquis status post fall with head strike. COMPARISON: No similar prior studies are available for comparison under this medical record number. Comparison was made to chest radiograph from 5/8/2024 and radiographs of the left femur from 12/15/2022 under the patient's second medical record number. TECHNIQUE: XR TRAUMA MULTIPLE Images - 2. FINDINGS: CHEST - 1 image Evaluation is somewhat limited by patient rotation. The lungs are clear. No pleural effusions. No evidence of pneumothorax. Cardiac silhouette not accurately accessed on this projection. No obvious displaced thoracic fractures within limitation of supine AP chest technique. The patient is status post right shoulder arthroplasty. PELVIS -  1 image Evaluation of the pelvis is limited as it is not centered in the field-of-view. As per the technologist note, patient was uncooperative and these were the best images that could be obtained. No obvious pelvic or proximal femoral fracture. No hip dislocation.     Impression: No acute pulmonary pathology. No obvious displaced thoracic fractures within limitation of supine AP chest technique. No obvious pelvic or proximal femoral fracture or hip dislocation within limitations discussed above. Workstation performed: [id number]     XR pelvis ap only 1 or 2 vw    Result Date: 6/11/2024  Narrative: INDICATION: Trauma. As per review of electronic medical record, patient on Eliquis status post fall with head strike. COMPARISON: No similar prior  studies are available for comparison under this medical record number. Comparison was made to chest radiograph from 5/8/2024 and radiographs of the left femur from 12/15/2022 under the patient's second medical record number. TECHNIQUE: XR TRAUMA MULTIPLE Images - 2. FINDINGS: CHEST - 1 image Evaluation is somewhat limited by patient rotation. The lungs are clear. No pleural effusions. No evidence of pneumothorax. Cardiac silhouette not accurately accessed on this projection. No obvious displaced thoracic fractures within limitation of supine AP chest technique. The patient is status post right shoulder arthroplasty. PELVIS -  1 image Evaluation of the pelvis is limited as it is not centered in the field-of-view. As per the technologist note, patient was uncooperative and these were the best images that could be obtained. No obvious pelvic or proximal femoral fracture. No hip dislocation.     Impression: No acute pulmonary pathology. No obvious displaced thoracic fractures within limitation of supine AP chest technique. No obvious pelvic or proximal femoral fracture or hip dislocation within limitations discussed above. Workstation performed: [id number]     US bedside procedure    Result Date: 6/10/2024  Narrative: [id number]    XR trauma multiple    Result Date: 6/10/2024  Narrative: INDICATION: Trauma. As per review of electronic medical record, patient on Eliquis status post fall with head strike. COMPARISON: No similar prior studies are available for comparison under this medical record number. Comparison was made to chest radiograph from 5/8/2024 and radiographs of the left femur from 12/15/2022 under the patient's second medical record number. TECHNIQUE: XR TRAUMA MULTIPLE Images - 2. FINDINGS: CHEST - 1 image Evaluation is somewhat limited by patient rotation. The lungs are clear. No pleural effusions. No evidence of pneumothorax. Cardiac silhouette not accurately  accessed on this projection. No obvious displaced thoracic fractures within limitation of supine AP chest technique. The patient is status post right shoulder arthroplasty. PELVIS -  1 image Evaluation of the pelvis is limited as it is not centered in the field-of-view. As per the technologist note, patient was uncooperative and these were the best images that could be obtained. No obvious pelvic or proximal femoral fracture. No hip dislocation.     Impression: No acute pulmonary pathology. No obvious displaced thoracic fractures within limitation of supine AP chest technique. No obvious pelvic or proximal femoral fracture or hip dislocation within limitations discussed above. Workstation performed: [id number]     TRAUMA - CT head wo contrast    Result Date: 6/10/2024  Narrative: CT HEAD WITHOUT CONTRAST INDICATION: TRAUMA. As per review of electronic medical record, patient on Eliquis status post fall with head strike. COMPARISON: No similar prior studies are available for comparison under this medical record number. Comparison was made to CT of the head from 5/8/2024 and 2/16/2024 under the patient's second medical record number. TECHNIQUE: CT examination of the brain was performed. Multiplanar 2D reformatted images were created from the source data. Radiation dose length product (DLP) for this visit: 1022 mGy-cm . This examination, like all CT scans performed in the [facility], was performed utilizing techniques to minimize radiation dose exposure, including the use of iterative reconstruction and automated exposure control. IMAGE QUALITY: Evaluation is somewhat limited by motion artifact and patient's head tilted to the left. FINDINGS: PARENCHYMA:  No mass effect or midline shift. No evidence of acute infarction. No acute parenchymal hemorrhage. EXTRA-AXIAL SPACES AND VENTRICLES: Within normal limits for the patient's age. No extra-axial fluid collections. VISUALIZED ORBITS AND PARANASAL SINUSES:   Unremarkable visualized orbits.  Clear paranasal sinuses. CALVARIUM AND EXTRACRANIAL SOFT TISSUES: No calvarial fracture. The patient is status post prior left parietal craniotomy. There are degenerative changes of the right temporomandibular joint. Significant cerumen seen in both external auditory canals.     Impression: No acute intracranial pathology. In particular, no intracranial hemorrhage or calvarial fracture. I personally discussed this study with WHITNEY MCCARTY on 6/10/2024 at 8:32 PM. Workstation performed: [id number]     TRAUMA - CT spine cervical wo contrast    Result Date: 6/10/2024  Narrative: CT CERVICAL SPINE - WITHOUT CONTRAST INDICATION: TRAUMA. COMPARISON: No similar prior studies are available for comparison under this medical record number. Comparison was made to CT of the cervical spine from 5/8/2024 under the patient's second medical record number. TECHNIQUE:  CT examination of the cervical spine was performed without intravenous contrast.  Contiguous axial images were obtained. Multiplanar 2D reformatted images were created from the source data. Radiation dose length product (DLP) for this visit:  503 mGy-cm .  This examination, like all CT scans performed in the [facility], was performed utilizing techniques to minimize radiation dose exposure, including the use of iterative reconstruction and automated exposure control. IMAGE QUALITY: Evaluation is somewhat limited by motion artifact and by patient's head being turned to the left. FINDINGS: ALIGNMENT:  Normal alignment of the cervical spine. No subluxation. VERTEBRAE:  No fracture. DEGENERATIVE CHANGES:  Mild multilevel cervical degenerative changes are noted without critical central canal stenosis. PREVERTEBRAL AND PARASPINAL SOFT TISSUES: Unremarkable. THORACIC INLET:  Within normal limits.     Impression: No cervical spine fracture or traumatic malalignment. I personally discussed this study with WHITNEY VELASQUEZ  BIBIANA on 6/10/2024 at 8:32 PM. Workstation performed: [id number]       Total time for visit, including counseling/coordination of care: 45 minutes. Greater than 50% of this total time spent on direct patient counseling and coorination of care.     Epic Records Reviewed as well as Records in Care Everywhere    ** Please Note: Dragon 360 Dictation voice to text software was used in the creation of this document. **

## 2024-06-15 NOTE — CASE MANAGEMENT
Case Management Discharge Planning Note    Patient name Prema Shepard  Location [facility] 709/[facility] 709-01 MRN [id number]  : 1965 Date 6/15/2024       Current Admission Date: 2024  Current Admission Diagnosis:Stroke-like symptoms   Patient Active Problem List    Diagnosis Date Noted Date Diagnosed    Stroke-like symptoms 2024     Medical clearance for psychiatric admission 2024     Vitamin D deficiency 2024     Vitamin B12 deficiency 2024     Obesity 2024     Closed head injury 2024     Hypertension 2024     Hyponatremia 2023     TBI (traumatic brain injury) ([facility]) 2023     History of DVT (deep vein thrombosis) 2023     Schizoaffective disorder (HCC) 2023     Hyperlipemia 2023       LOS (days): 1  Geometric Mean LOS (GMLOS) (days):   Days to GMLOS:     OBJECTIVE:  Risk of Unplanned Readmission Score: 28.29         Current admission status: Inpatient   Preferred Pharmacy:   [facility] [place] [facility] & [address] [address]  Phone: [phone] Fax: [phone]    Primary Care Provider: Sotero Peterson MD    Primary Insurance: MEDICARE  Secondary Insurance: [facility]    DISCHARGE DETAILS:    Discharge planning discussed with:: Josh at [facility]     Other Referral/Resources/Interventions Provided:  Referral Comments: Patient is medically cleared for DC.  CM called Josh at the [facility], [phone] to see if patient can return over the weekend.  They said due to staffing, they do not accept weekend returns.  Pt anticipated to return on Monday.  Provider updated.     Pt is medically cleared for DC.  CM called [facility] and spoke to Josh at [phone] to provide them update and see if pt can return.  They do not accept weekend returns due to staffing.  Anticipated return on  Monday.  Provider updated.

## 2024-06-15 NOTE — ASSESSMENT & PLAN NOTE
"Hx obtained from speaking with patient, care taker from facility, ER; Will note somewhat limited 2/2 patient's underlying TBI/Schizoaffective disorder  Care taker reports that patient had \"slurred speech\", fatigue and increased thirst today   Code Stroke Alerted in ER   CT Head: No acute intracranial abnormalities, mild chronic microangiopathy  CTA H&N: No LVO, dissection, aneurysm, or high-grade stenosis. Mild atherosclerotic disease in the aortic arch., Mild calcified atherosclerotic disease in the B/L ICA cavernous segments L >R, and poor venous contrast opacification 2/2 arterial bolus timing.   Neurology with low suspicion for acute vascular insult and suspect likely toxic metabolic encephalopathy.   Patient was recently hospitalized at inpatient psych with some adjustments to medications and concern this may be related from their standpoint   Neurology recommending medical admission with MRI brain but does not need CVA protocol  On exam in her room, patient at baseline without any evidence of slurring of words and eating a bologna sandwich. Caretaker reports that she appears back to baseline  Afebrile, no leukocytosis  UA without infection   CXR: no acute cardiopulmonary disease   MRI: No acute infarct given suboptimal evaluation of right frontal-parietal-temporal lobe near right parietal bone flap cranioplasty susceptibility hardware artifact. Mild chronic microangiopathy   No further neuro recommendations   Psych consulted, awaiting recs - I did explain to patient's mother that psych would unlikely change her chronic psych meds in the hospital and she would need to continue follow up with her psychiatrist outpatient for medication adjustments. Mother states patient has an appt 6/20. Unable to discharge back to facility over the weekend. Plan to d/c Monday   "

## 2024-06-15 NOTE — ASSESSMENT & PLAN NOTE
Continue pta buspirone 20mg bid, Carbamazepine 1000mg qhs, Cymbalta 90mg daily, Gabapentin 300mg qhs, Ziprasidone 80mg bid, Clonazepam 0.5mg qd  Medications and dosages from sheet patient came with from facility of her current medications   Per previous provider, mother is concerned that these medications are contributing to her symptoms. Requesting psychiatric evaluation, psych consulted

## 2024-06-16 LAB — MRSA NOSE QL CULT: NORMAL

## 2024-06-16 PROCEDURE — 94760 N-INVAS EAR/PLS OXIMETRY 1: CPT

## 2024-06-16 PROCEDURE — 99232 SBSQ HOSP IP/OBS MODERATE 35: CPT | Performed by: STUDENT IN AN ORGANIZED HEALTH CARE EDUCATION/TRAINING PROGRAM

## 2024-06-16 PROCEDURE — 94660 CPAP INITIATION&MGMT: CPT

## 2024-06-16 RX ADMIN — PRAVASTATIN SODIUM 20 MG: 20 TABLET ORAL at 18:20

## 2024-06-16 RX ADMIN — PANTOPRAZOLE SODIUM 40 MG: 40 TABLET, DELAYED RELEASE ORAL at 05:49

## 2024-06-16 RX ADMIN — GABAPENTIN 300 MG: 300 CAPSULE ORAL at 21:00

## 2024-06-16 RX ADMIN — SPIRONOLACTONE 25 MG: 25 TABLET ORAL at 08:06

## 2024-06-16 RX ADMIN — ZIPRASIDONE HYDROCHLORIDE 80 MG: 40 CAPSULE ORAL at 18:19

## 2024-06-16 RX ADMIN — LORAZEPAM 0.5 MG: 0.5 TABLET ORAL at 08:05

## 2024-06-16 RX ADMIN — AMLODIPINE BESYLATE 10 MG: 10 TABLET ORAL at 08:06

## 2024-06-16 RX ADMIN — APIXABAN 5 MG: 5 TABLET, FILM COATED ORAL at 08:05

## 2024-06-16 RX ADMIN — METOPROLOL SUCCINATE 75 MG: 50 TABLET, EXTENDED RELEASE ORAL at 08:05

## 2024-06-16 RX ADMIN — ZIPRASIDONE HYDROCHLORIDE 80 MG: 40 CAPSULE ORAL at 08:08

## 2024-06-16 RX ADMIN — APIXABAN 5 MG: 5 TABLET, FILM COATED ORAL at 18:20

## 2024-06-16 RX ADMIN — ACETAMINOPHEN 650 MG: 325 TABLET, FILM COATED ORAL at 08:43

## 2024-06-16 RX ADMIN — DULOXETINE HYDROCHLORIDE 90 MG: 60 CAPSULE, DELAYED RELEASE ORAL at 08:06

## 2024-06-16 RX ADMIN — BUSPIRONE HYDROCHLORIDE 20 MG: 10 TABLET ORAL at 18:20

## 2024-06-16 RX ADMIN — CARBAMAZEPINE 1000 MG: 200 TABLET ORAL at 21:00

## 2024-06-16 RX ADMIN — BUSPIRONE HYDROCHLORIDE 20 MG: 10 TABLET ORAL at 08:05

## 2024-06-16 RX ADMIN — DEXTRAN 70, GLYCERIN, HYPROMELLOSE 1 DROP: 1; 2; 3 SOLUTION/ DROPS OPHTHALMIC at 18:19

## 2024-06-16 RX ADMIN — CLONAZEPAM 0.5 MG: 0.5 TABLET ORAL at 21:00

## 2024-06-16 NOTE — ASSESSMENT & PLAN NOTE
"Hx obtained from speaking with patient, care taker from facility, ER; Will note somewhat limited 2/2 patient's underlying TBI/Schizoaffective disorder  Care taker reports that patient had \"slurred speech\", fatigue and increased thirst today   Code Stroke Alerted in ER   CT Head: No acute intracranial abnormalities, mild chronic microangiopathy  CTA H&N: No LVO, dissection, aneurysm, or high-grade stenosis. Mild atherosclerotic disease in the aortic arch., Mild calcified atherosclerotic disease in the B/L ICA cavernous segments L >R, and poor venous contrast opacification 2/2 arterial bolus timing.   Neurology with low suspicion for acute vascular insult and suspect likely toxic metabolic encephalopathy.   Patient was recently hospitalized at inpatient psych with some adjustments to medications and concern this may be related from their standpoint   Neurology recommending medical admission with MRI brain but does not need CVA protocol  On exam in her room, patient at baseline without any evidence of slurring of words and eating a bologna sandwich. Caretaker reports that she appears back to baseline  Afebrile, no leukocytosis  UA without infection   CXR: no acute cardiopulmonary disease   MRI: No acute infarct given suboptimal evaluation of right frontal-parietal-temporal lobe near right parietal bone flap cranioplasty susceptibility hardware artifact. Mild chronic microangiopathy   No further neuro recommendations.  Previous provider had discussed with pt's mother \" I did explain to patient's mother that psych would unlikely change her chronic psych meds in the hospital and she would need to continue follow up with her psychiatrist outpatient for medication adjustments. Mother states patient has an appt 6/20. Unable to discharge back to facility over the weekend. Plan to d/c Monday.  "

## 2024-06-16 NOTE — PROGRESS NOTES
"[facility]  Progress Note  Name: Prema Shepard I  MRN: [id number]  Unit/Bed#: [facility] 709-01 I Date of Admission: 6/14/2024   Date of Service: 6/16/2024 I Hospital Day: 2    Assessment & Plan   * Stroke-like symptoms  Assessment & Plan  Hx obtained from speaking with patient, care taker from facility, ER; Will note somewhat limited 2/2 patient's underlying TBI/Schizoaffective disorder  Care taker reports that patient had \"slurred speech\", fatigue and increased thirst today   Code Stroke Alerted in ER   CT Head: No acute intracranial abnormalities, mild chronic microangiopathy  CTA H&N: No LVO, dissection, aneurysm, or high-grade stenosis. Mild atherosclerotic disease in the aortic arch., Mild calcified atherosclerotic disease in the B/L ICA cavernous segments L >R, and poor venous contrast opacification 2/2 arterial bolus timing.   Neurology with low suspicion for acute vascular insult and suspect likely toxic metabolic encephalopathy.   Patient was recently hospitalized at inpatient psych with some adjustments to medications and concern this may be related from their standpoint   Neurology recommending medical admission with MRI brain but does not need CVA protocol  On exam in her room, patient at baseline without any evidence of slurring of words and eating a bologna sandwich. Caretaker reports that she appears back to baseline  Afebrile, no leukocytosis  UA without infection   CXR: no acute cardiopulmonary disease   MRI: No acute infarct given suboptimal evaluation of right frontal-parietal-temporal lobe near right parietal bone flap cranioplasty susceptibility hardware artifact. Mild chronic microangiopathy   No further neuro recommendations.  Previous provider had discussed with pt's mother \" I did explain to patient's mother that psych would unlikely change her chronic psych meds in the hospital and she would need to continue follow up with her psychiatrist " outpatient for medication adjustments. Mother states patient has an appt 6/20. Unable to discharge back to facility over the weekend. Plan to d/c Monday.    Hypertension  Assessment & Plan  Current medications on medications from facility: Amlodipine 10mg qd, Toprol 75mg daily, Spironolactone 25 mg daily, continue     Schizoaffective disorder (HCC)  Assessment & Plan  Continue pta buspirone 20mg bid, Carbamazepine 1000mg qhs, Cymbalta 90mg daily, Gabapentin 300mg qhs, Ziprasidone 80mg bid, Clonazepam 0.5mg qd  Medications and dosages from sheet patient came with from facility of her current medications   Per previous provider, mother is concerned that these medications are contributing to her symptoms. Requesting psychiatric evaluation, psych consulted     History of DVT (deep vein thrombosis)  Assessment & Plan  Continue pta eliquis    TBI (traumatic brain injury) ([facility])  Assessment & Plan  Hx TBI and lives at [facility] home - unable to facilitate return over the weekend.               VTE Pharmacologic Prophylaxis: VTE Score: 3 Moderate Risk (Score 3-4) - Pharmacological DVT Prophylaxis Ordered: apixaban (Eliquis).    Mobility:   Basic Mobility Inpatient Raw Score: 19  JH-HLM Goal: 6: Walk 10 steps or more  JH-HLM Achieved: 7: Walk 25 feet or more  JH-HLM Goal achieved. Continue to encourage appropriate mobility.    Patient Centered Rounds: I performed bedside rounds with nursing staff today.   Discussions with Specialists or Other Care Team Provider: Neurology.    Education and Discussions with Family / Patient: Attempted to update  (mother) via phone. Unable to contact.    Total Time Spent on Date of Encounter in care of patient: 38 mins. This time was spent on one or more of the following: performing physical exam; counseling and coordination of care; obtaining or reviewing history; documenting in the medical record; reviewing/ordering tests, medications or procedures; communicating with other  healthcare professionals and discussing with patient's family/caregivers.    Current Length of Stay: 2 day(s)  Current Patient Status: Inpatient   Certification Statement: The patient will continue to require additional inpatient hospital stay due to ongoing dispo planning.  Discharge Plan: Anticipate discharge in 24-48 hrs to rehab facility.    Code Status: Level 1 - Full Code    Subjective:   Patient seen at bedside, reports chronic leg pain that has started since she had accident in .  No other complaints to offer.    Objective:     Vitals:   Temp (24hrs), Av.8 °F (36.6 °C), Min:97.5 °F (36.4 °C), Max:98.1 °F (36.7 °C)    Temp:  [97.5 °F (36.4 °C)-98.1 °F (36.7 °C)] 97.9 °F (36.6 °C)  HR:  [47-71] 59  Resp:  [16] 16  BP: (131-192)/(70-95) 140/75  SpO2:  [95 %-96 %] 96 %  Body mass index is 42.58 kg/m².     Input and Output Summary (last 24 hours):     Intake/Output Summary (Last 24 hours) at 2024 1534  Last data filed at 2024 0600  Gross per 24 hour   Intake 440 ml   Output --   Net 440 ml       Physical Exam:   Physical Exam  Constitutional:       Appearance: Normal appearance.   HENT:      Head: Normocephalic.   Cardiovascular:      Rate and Rhythm: Normal rate and regular rhythm.   Pulmonary:      Effort: Pulmonary effort is normal.      Breath sounds: Normal breath sounds.   Abdominal:      General: Bowel sounds are normal.      Palpations: Abdomen is soft.   Musculoskeletal:      Comments: Left lower extremity more swollen than right, per chart review that is chronic.   Neurological:      Mental Status: She is alert. She is disoriented.          Additional Data:     Labs:  Results from last 7 days   Lab Units 06/15/24  0509   WBC Thousand/uL 4.32   HEMOGLOBIN g/dL 12.3   HEMATOCRIT % 36.0   PLATELETS Thousands/uL 315   SEGS PCT % 58   LYMPHO PCT % 32   MONO PCT % 8   EOS PCT % 2     Results from last 7 days   Lab Units 06/15/24  0509   SODIUM mmol/L 136   POTASSIUM mmol/L 3.9   CHLORIDE  mmol/L 101   CO2 mmol/L 27   BUN mg/dL 12   CREATININE mg/dL 0.72   ANION GAP mmol/L 8   CALCIUM mg/dL 9.3   GLUCOSE RANDOM mg/dL 141*     Results from last 7 days   Lab Units 06/15/24  0509   INR  1.06     Results from last 7 days   Lab Units 06/14/24  1700   POC GLUCOSE mg/dl 79               Lines/Drains:  Invasive Devices       Peripheral Intravenous Line  Duration             Peripheral IV 06/15/24 Proximal;Right;Ventral (anterior) Forearm 1 day                          Imaging: Reviewed radiology reports from this admission including: CT head and MRI brain    Recent Cultures (last 7 days):         Last 24 Hours Medication List:   Current Facility-Administered Medications   Medication Dose Route Frequency Provider Last Rate    acetaminophen  650 mg Oral Q6H PRN Sacha Preciado, DO      amLODIPine  10 mg Oral Daily Sacha Preciado, DO      apixaban  5 mg Oral BID Sacha Preciado, DO      Artificial Tears  1 drop Both Eyes Q6H PRN Randi Vazquez PA-C      busPIRone  20 mg Oral BID Sacha Preciado, DO      carBAMazepine  1,000 mg Oral HS Sacha Preciado, DO      clonazePAM  0.5 mg Oral HS Sacha Preciado, DO      DULoxetine  90 mg Oral Daily Sacha Preciado, DO      gabapentin  300 mg Oral HS Sacha Preciado, DO      LORazepam  0.5 mg Oral Q8H PRN Randi Vazquez PA-C      metoprolol succinate  75 mg Oral Daily Sacha Preciado, DO      pantoprazole  40 mg Oral Early Morning Sacha Preciado, DO      pravastatin  20 mg Oral QPM Sacha Preciado, DO      spironolactone  25 mg Oral Daily Randi Vazquez PA-C      ziprasidone  80 mg Oral BID With Meals Randi Vazquez PA-C          Today, Patient Was Seen By: Florinda Kc MD    **Please Note: This note may have been constructed using a voice recognition system.**

## 2024-06-16 NOTE — PLAN OF CARE
Problem: PAIN - ADULT  Goal: Verbalizes/displays adequate comfort level or baseline comfort level  Description: Interventions:  - Encourage patient to monitor pain and request assistance  - Assess pain using appropriate pain scale  - Administer analgesics based on type and severity of pain and evaluate response  - Implement non-pharmacological measures as appropriate and evaluate response  - Consider cultural and social influences on pain and pain management  - Notify physician/advanced practitioner if interventions unsuccessful or patient reports new pain  Outcome: Progressing     Problem: INFECTION - ADULT  Goal: Absence or prevention of progression during hospitalization  Description: INTERVENTIONS:  - Assess and monitor for signs and symptoms of infection  - Monitor lab/diagnostic results  - Monitor all insertion sites, i.e. indwelling lines, tubes, and drains  - Monitor endotracheal if appropriate and nasal secretions for changes in amount and color  - [place] appropriate cooling/warming therapies per order  - Administer medications as ordered  - Instruct and encourage patient and family to use good hand hygiene technique  - Identify and instruct in appropriate isolation precautions for identified infection/condition  Outcome: Progressing  Goal: Absence of fever/infection during neutropenic period  Description: INTERVENTIONS:  - Monitor WBC    Outcome: Progressing     Problem: SAFETY ADULT  Goal: Patient will remain free of falls  Description: INTERVENTIONS:  - Educate patient/family on patient safety including physical limitations  - Instruct patient to call for assistance with activity   - Consult OT/PT to assist with strengthening/mobility   - Keep Call bell within reach  - Keep bed low and locked with side rails adjusted as appropriate  - Keep care items and personal belongings within reach  - Initiate and maintain comfort rounds  - Make Fall Risk Sign visible to staff  - Apply yellow socks and bracelet  for high fall risk patients  - Consider moving patient to room near nurses station  Outcome: Progressing  Goal: Maintain or return to baseline ADL function  Description: INTERVENTIONS:  -  Assess patient's ability to carry out ADLs; assess patient's baseline for ADL function and identify physical deficits which impact ability to perform ADLs (bathing, care of mouth/teeth, toileting, grooming, dressing, etc.)  - Assess/evaluate cause of self-care deficits   - Assess range of motion  - Assess patient's mobility; develop plan if impaired  - Assess patient's need for assistive devices and provide as appropriate  - Encourage maximum independence but intervene and supervise when necessary  - Involve family in performance of ADLs  - Assess for home care needs following discharge   - Consider OT consult to assist with ADL evaluation and planning for discharge  - Provide patient education as appropriate  Outcome: Progressing  Goal: Maintains/Returns to pre admission functional level  Description: INTERVENTIONS:  - Perform AM-PAC 6 Click Basic Mobility/ Daily Activity assessment daily.  - Set and communicate daily mobility goal to care team and patient/family/caregiver.   - Collaborate with rehabilitation services on mobility goals if consulted  - Out of bed for toileting  - Record patient progress and toleration of activity level   Outcome: Progressing     Problem: DISCHARGE PLANNING  Goal: Discharge to home or other facility with appropriate resources  Description: INTERVENTIONS:  - Identify barriers to discharge w/patient and caregiver  - Arrange for needed discharge resources and transportation as appropriate  - Identify discharge learning needs (meds, wound care, etc.)  - Arrange for interpretive services to assist at discharge as needed  - Refer to Case Management Department for coordinating discharge planning if the patient needs post-hospital services based on physician/advanced practitioner order or complex needs  related to functional status, cognitive ability, or social support system  Outcome: Progressing     Problem: Knowledge Deficit  Goal: Patient/family/caregiver demonstrates understanding of disease process, treatment plan, medications, and discharge instructions  Description: Complete learning assessment and assess knowledge base.  Interventions:  - Provide teaching at level of understanding  - Provide teaching via preferred learning methods  Outcome: Progressing

## 2024-06-16 NOTE — PLAN OF CARE
Problem: PAIN - ADULT  Goal: Verbalizes/displays adequate comfort level or baseline comfort level  Description: Interventions:  - Encourage patient to monitor pain and request assistance  - Assess pain using appropriate pain scale  - Administer analgesics based on type and severity of pain and evaluate response  - Implement non-pharmacological measures as appropriate and evaluate response  - Consider cultural and social influences on pain and pain management  - Notify physician/advanced practitioner if interventions unsuccessful or patient reports new pain  6/16/2024 0817 by Desi Aguilar RN  Outcome: Progressing  6/15/2024 2035 by Desi Aguilar RN  Outcome: Progressing     Problem: INFECTION - ADULT  Goal: Absence or prevention of progression during hospitalization  Description: INTERVENTIONS:  - Assess and monitor for signs and symptoms of infection  - Monitor lab/diagnostic results  - Monitor all insertion sites, i.e. indwelling lines, tubes, and drains  - Monitor endotracheal if appropriate and nasal secretions for changes in amount and color  - [place] appropriate cooling/warming therapies per order  - Administer medications as ordered  - Instruct and encourage patient and family to use good hand hygiene technique  - Identify and instruct in appropriate isolation precautions for identified infection/condition  6/16/2024 0817 by Desi Aguilar RN  Outcome: Progressing  6/15/2024 2035 by Desi Aguilar RN  Outcome: Progressing  Goal: Absence of fever/infection during neutropenic period  Description: INTERVENTIONS:  - Monitor WBC    6/16/2024 0817 by Desi Aguilar RN  Outcome: Progressing  6/15/2024 2035 by Desi Aguilar RN  Outcome: Progressing     Problem: SAFETY ADULT  Goal: Patient will remain free of falls  Description: INTERVENTIONS:  - Educate patient/family on patient safety including physical limitations  - Instruct patient to call for assistance with activity   - Consult OT/PT to assist with  strengthening/mobility   - Keep Call bell within reach  - Keep bed low and locked with side rails adjusted as appropriate  - Keep care items and personal belongings within reach  - Initiate and maintain comfort rounds  - Make Fall Risk Sign visible to staff  - Apply yellow socks and bracelet for high fall risk patients  - Consider moving patient to room near nurses station  6/16/2024 0817 by Desi Aguilar RN  Outcome: Progressing  6/15/2024 2035 by Desi Aguilar RN  Outcome: Progressing  Goal: Maintain or return to baseline ADL function  Description: INTERVENTIONS:  -  Assess patient's ability to carry out ADLs; assess patient's baseline for ADL function and identify physical deficits which impact ability to perform ADLs (bathing, care of mouth/teeth, toileting, grooming, dressing, etc.)  - Assess/evaluate cause of self-care deficits   - Assess range of motion  - Assess patient's mobility; develop plan if impaired  - Assess patient's need for assistive devices and provide as appropriate  - Encourage maximum independence but intervene and supervise when necessary  - Involve family in performance of ADLs  - Assess for home care needs following discharge   - Consider OT consult to assist with ADL evaluation and planning for discharge  - Provide patient education as appropriate  6/16/2024 0817 by Desi Aguilar RN  Outcome: Progressing  6/15/2024 2035 by Desi Aguilar RN  Outcome: Progressing  Goal: Maintains/Returns to pre admission functional level  Description: INTERVENTIONS:  - Perform AM-PAC 6 Click Basic Mobility/ Daily Activity assessment daily.  - Set and communicate daily mobility goal to care team and patient/family/caregiver.   - Collaborate with rehabilitation services on mobility goals if consulted  - Out of bed for toileting  - Record patient progress and toleration of activity level   6/16/2024 0817 by Desi Aguilar RN  Outcome: Progressing  6/15/2024 2035 by Desi Aguilar RN  Outcome: Progressing      Problem: DISCHARGE PLANNING  Goal: Discharge to home or other facility with appropriate resources  Description: INTERVENTIONS:  - Identify barriers to discharge w/patient and caregiver  - Arrange for needed discharge resources and transportation as appropriate  - Identify discharge learning needs (meds, wound care, etc.)  - Arrange for interpretive services to assist at discharge as needed  - Refer to Case Management Department for coordinating discharge planning if the patient needs post-hospital services based on physician/advanced practitioner order or complex needs related to functional status, cognitive ability, or social support system  6/16/2024 0817 by Desi Aguilar RN  Outcome: Progressing  6/15/2024 2035 by Desi Aguilar RN  Outcome: Progressing     Problem: Knowledge Deficit  Goal: Patient/family/caregiver demonstrates understanding of disease process, treatment plan, medications, and discharge instructions  Description: Complete learning assessment and assess knowledge base.  Interventions:  - Provide teaching at level of understanding  - Provide teaching via preferred learning methods  6/16/2024 0817 by Desi Aguilar RN  Outcome: Progressing  6/15/2024 2035 by Desi Aguilar RN  Outcome: Progressing     Problem: Prexisting or High Potential for Compromised Skin Integrity  Goal: Skin integrity is maintained or improved  Description: INTERVENTIONS:  - Identify patients at risk for skin breakdown  - Assess and monitor skin integrity  - Assess and monitor nutrition and hydration status  - Monitor labs   - Assess for incontinence   - Turn and reposition patient  - Assist with mobility/ambulation  - Relieve pressure over bony prominences  - Avoid friction and shearing  - Provide appropriate hygiene as needed including keeping skin clean and dry  - Evaluate need for skin moisturizer/barrier cream  - Collaborate with interdisciplinary team   - Patient/family teaching  - Consider wound care consult   Outcome:  Progressing

## 2024-06-17 PROCEDURE — 97162 PT EVAL MOD COMPLEX 30 MIN: CPT

## 2024-06-17 PROCEDURE — 94760 N-INVAS EAR/PLS OXIMETRY 1: CPT

## 2024-06-17 PROCEDURE — 99239 HOSP IP/OBS DSCHRG MGMT >30: CPT | Performed by: STUDENT IN AN ORGANIZED HEALTH CARE EDUCATION/TRAINING PROGRAM

## 2024-06-17 PROCEDURE — 99222 1ST HOSP IP/OBS MODERATE 55: CPT | Performed by: PSYCHIATRY & NEUROLOGY

## 2024-06-17 RX ORDER — METOPROLOL SUCCINATE 25 MG/1
75 TABLET, EXTENDED RELEASE ORAL DAILY
Qty: 90 TABLET | Refills: 0 | Status: SHIPPED | OUTPATIENT
Start: 2024-06-18 | End: 2024-07-18

## 2024-06-17 RX ORDER — AMLODIPINE BESYLATE 10 MG/1
10 TABLET ORAL DAILY
Qty: 30 TABLET | Refills: 0 | Status: SHIPPED | OUTPATIENT
Start: 2024-06-18 | End: 2024-07-18

## 2024-06-17 RX ORDER — SPIRONOLACTONE 25 MG/1
25 TABLET ORAL DAILY
Qty: 30 TABLET | Refills: 0 | Status: SHIPPED | OUTPATIENT
Start: 2024-06-18 | End: 2024-07-18

## 2024-06-17 RX ADMIN — APIXABAN 5 MG: 5 TABLET, FILM COATED ORAL at 09:04

## 2024-06-17 RX ADMIN — METOPROLOL SUCCINATE 75 MG: 50 TABLET, EXTENDED RELEASE ORAL at 09:04

## 2024-06-17 RX ADMIN — BUSPIRONE HYDROCHLORIDE 20 MG: 10 TABLET ORAL at 09:04

## 2024-06-17 RX ADMIN — LORAZEPAM 0.5 MG: 0.5 TABLET ORAL at 04:33

## 2024-06-17 RX ADMIN — ACETAMINOPHEN 650 MG: 325 TABLET, FILM COATED ORAL at 04:33

## 2024-06-17 RX ADMIN — SPIRONOLACTONE 25 MG: 25 TABLET ORAL at 09:04

## 2024-06-17 RX ADMIN — PANTOPRAZOLE SODIUM 40 MG: 40 TABLET, DELAYED RELEASE ORAL at 04:33

## 2024-06-17 RX ADMIN — DULOXETINE HYDROCHLORIDE 90 MG: 60 CAPSULE, DELAYED RELEASE ORAL at 09:04

## 2024-06-17 RX ADMIN — ZIPRASIDONE HYDROCHLORIDE 80 MG: 40 CAPSULE ORAL at 09:05

## 2024-06-17 RX ADMIN — AMLODIPINE BESYLATE 10 MG: 10 TABLET ORAL at 09:04

## 2024-06-17 NOTE — PHYSICAL THERAPY NOTE
Physical Therapy Evaluation    Patient's Name: Prema Shepard    Admitting Diagnosis  Schizoaffective disorder, bipolar type ([facility]) [F25.0]  Stroke ([facility]) [I63.9]  Stroke-like symptoms [R29.90]  Traumatic brain injury, with unknown loss of consciousness status, subsequent encounter [S06.9XAD]    Problem List  Patient Active Problem List   Diagnosis    Hyponatremia    TBI (traumatic brain injury) ([facility])    History of DVT (deep vein thrombosis)    Schizoaffective disorder ([facility])    Hyperlipemia    Hypertension    Closed head injury    Medical clearance for psychiatric admission    Vitamin D deficiency    Vitamin B12 deficiency    Obesity    Stroke-like symptoms       Past Medical History  Past Medical History:   Diagnosis Date    Anxiety and depression     Chronic deep vein thrombosis (DVT) ([facility])     LLE    DVT (deep venous thrombosis) ([facility])     History of traumatic brain injury     HTN (hypertension)     Hyperlipidemia     Insomnia     Obesity     RUPAL (obstructive sleep apnea)     Schizoaffective disorder (HCC)     TBI (traumatic brain injury) ([facility])        Past Surgical History  History reviewed. No pertinent surgical history.     06/17/24 1222   PT Last Visit   PT Visit Date 06/17/24   Note Type   Note type Evaluation   Pain Assessment   Pain Assessment Tool 0-10   Pain Score No Pain   Restrictions/Precautions   Weight Bearing Precautions Per Order No   Braces or Orthoses   (L AFO)   Other Precautions Impulsive;Cognitive;Chair Alarm;Bed Alarm;Fall Risk  (hx of psych / TBI)   Home Living   Type of Home Group Home   Home Layout Ramped entrance   Additional Comments Pt unable to recall if she has to do stairs at her group home.   Prior Function   Level of [place] Independent with functional mobility  (Gurpreet w/ RW)   Lives With Facility staff   Receives Help From Personal care attendant;Family   IADLs Family/Friend/Other provides medication management;Family/Friend/Other provides meals;Family/Friend/Other  provides transportation   Falls in the last 6 months 1 to 4   Comments Pt wears gait belt.   General   Family/Caregiver Present No   Cognition   Overall Cognitive Status Impaired   Arousal/Participation Alert   Orientation Level Oriented to person;Oriented to place;Oriented to time   Subjective   Subjective Agreeble to mobilize.   RLE Assessment   RLE Assessment   (grossly 4/5)   LLE Assessment   LLE Assessment   (grossly 4-/5 except anterior tibialis (+foot drop))   Coordination   Movements are Fluid and Coordinated 0   Bed Mobility   Sit to Supine 6  Modified independent   Additional Comments Pt greeted seated EOB.   Transfers   Sit to Stand 5  Supervision   Additional items Impulsive;Verbal cues   Stand to Sit 5  Supervision   Additional items Impulsive;Verbal cues   Toilet transfer 5  Supervision   Additional items Standard toilet;Impulsive;Verbal cues   Additional Comments RW   Ambulation/Elevation   Gait pattern Decreased foot clearance;Ataxia   Gait Assistance 4  Minimal assist   Additional items Assist x 1;Verbal cues;Tactile cues   Assistive Device Rolling walker   Distance 10'x2   Balance   Static Sitting Good   Dynamic Sitting Fair +   Static Standing Fair -   Dynamic Standing Poor +   Ambulatory Poor +  (RW)   Endurance Deficit   Endurance Deficit Yes   Activity Tolerance   Activity Tolerance Patient tolerated treatment well   Medical Staff Made Aware CM   Nurse Made Aware yes - cleared for PT   Assessment   Assessment Pt is 58 y.o. female seen for a PT evaluation s/p admit to [facility] on 6/14/2024. Pt presenting w/ slurred speech + increased thirst. CTH / MRI brain without acute findings. Please see above for other active problem list / PMH.     PT now consulted to assess functional mobility and needs for safe d/c planning. Prior to admission, pt was Gurpreet w/ ambulation w/ RW (although does wear gait belt), lives in a group home.     Currently pt is Gurpreet for bed skills; S for functional  transfers; MinAx1 for ambulation w/ RW. Pt presents w/ overall mobility deficits 2* to: decreased LE strength; decreased endurance; impaired balance; gait deviations. These impairments, however, appear to be related to hx of TBI.     Anticipate pt will function better in a familiar environment w/ familiar caregivers. Recommend d/c to group home when medically cleared.   Goals   Patient Goals go home   Plan   PT Frequency   (d/c IPPT)   Discharge Recommendation   Rehab Resource Intensity Level, PT III (Minimum Resource Intensity)  (group home)   AM-PAC Basic Mobility Inpatient   Turning in Flat Bed Without Bedrails 4   Lying on Back to Sitting on Edge of Flat Bed Without Bedrails 4   Moving Bed to Chair 3   Standing Up From Chair Using Arms 3   Walk in Room 3   Climb 3-5 Stairs With Railing 2   Basic Mobility Inpatient Raw Score 19   Basic Mobility Standardized Score 42.48   [facility] Highest Level Of Mobility   -HLM Goal 6: Walk 10 steps or more   -HLM Achieved 6: Walk 10 steps or more   End of Consult   Patient Position at End of Consult Supine;Bed/Chair alarm activated;All needs within reach     Hellen Key, PT, DPT

## 2024-06-17 NOTE — CASE MANAGEMENT
Case Management Discharge Planning Note    Patient name Prema Shepard  Location [facility] 709/[facility] 709-01 MRN [id number]  : 1965 Date 2024       Current Admission Date: 2024  Current Admission Diagnosis:Stroke-like symptoms   Patient Active Problem List    Diagnosis Date Noted Date Diagnosed    Stroke-like symptoms 2024     Medical clearance for psychiatric admission 2024     Vitamin D deficiency 2024     Vitamin B12 deficiency 2024     Obesity 2024     Closed head injury 2024     Hypertension 2024     Hyponatremia 2023     TBI (traumatic brain injury) ([facility]) 2023     History of DVT (deep vein thrombosis) 2023     Schizoaffective disorder (HCC) 2023     Hyperlipemia 2023       LOS (days): 3  Geometric Mean LOS (GMLOS) (days): 2.2  Days to GMLOS:-0.5     OBJECTIVE:  Risk of Unplanned Readmission Score: 29.27         Current admission status: Inpatient   Preferred Pharmacy:   [facility] [place] [place] [facility] [address]  [facility] [address]  Phone: [phone] Fax: [phone]    Primary Care Provider: Sotero Peterson MD    Primary Insurance: MEDICARE  Secondary Insurance: [facility]    DISCHARGE DETAILS:    Discharge planning discussed with:: Josh at [facility] (group home)        CM contacted family/caregiver?: Yes  Were Treatment Team discharge recommendations reviewed with patient/caregiver?: Yes  Did patient/caregiver verbalize understanding of patient care needs?: Yes  Were patient/caregiver advised of the risks associated with not following Treatment Team discharge recommendations?: Yes    Other Referral/Resources/Interventions Provided:  Referral Comments: Patient for DC to [facility], which is her group home.  Contacted Josh, who will be sending a ride at 1230.         Treatment Team Recommendation:  Group Home  Discharge Destination Plan:: Group Home  Transport at Discharge : Automobile        Transfer Mode: Ambulate  Accompanied by: Caregiver     IMM Given (Date):: 06/17/24  IMM Given to:: Patient  IMM reviewed with patient, patient agrees with discharge determination.
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
## 2024-06-18 VITALS
OXYGEN SATURATION: 94 % | BODY MASS INDEX: 42.58 KG/M2 | RESPIRATION RATE: 16 BRPM | WEIGHT: 218.03 LBS | HEART RATE: 81 BPM | SYSTOLIC BLOOD PRESSURE: 136 MMHG | DIASTOLIC BLOOD PRESSURE: 78 MMHG | TEMPERATURE: 99 F

## 2024-06-18 PROBLEM — E66.01 CLASS 3 SEVERE OBESITY DUE TO EXCESS CALORIES WITHOUT SERIOUS COMORBIDITY WITH BODY MASS INDEX (BMI) OF 40.0 TO 44.9 IN ADULT (HCC): Status: ACTIVE | Noted: 2024-06-01

## 2024-06-18 PROBLEM — E66.813 CLASS 3 SEVERE OBESITY DUE TO EXCESS CALORIES WITHOUT SERIOUS COMORBIDITY WITH BODY MASS INDEX (BMI) OF 40.0 TO 44.9 IN ADULT (HCC): Status: ACTIVE | Noted: 2024-06-01

## 2024-06-18 NOTE — ASSESSMENT & PLAN NOTE
"Hx obtained from speaking with patient, care taker from facility, ER; Will note somewhat limited 2/2 patient's underlying TBI/Schizoaffective disorder  Care taker reports that patient had \"slurred speech\", fatigue and increased thirst today   Code Stroke Alerted in ER   CT Head: No acute intracranial abnormalities, mild chronic microangiopathy  CTA H&N: No LVO, dissection, aneurysm, or high-grade stenosis. Mild atherosclerotic disease in the aortic arch., Mild calcified atherosclerotic disease in the B/L ICA cavernous segments L >R, and poor venous contrast opacification 2/2 arterial bolus timing.   Neurology with low suspicion for acute vascular insult and suspect likely toxic metabolic encephalopathy.   Patient was recently hospitalized at inpatient psych with some adjustments to medications and concern this may be related from their standpoint   Neurology recommending medical admission with MRI brain but does not need CVA protocol  On exam in her room, patient at baseline without any evidence of slurring of words and eating a bologna sandwich. Caretaker reports that she appears back to baseline  Afebrile, no leukocytosis  UA without infection   CXR: no acute cardiopulmonary disease   MRI: No acute infarct given suboptimal evaluation of right frontal-parietal-temporal lobe near right parietal bone flap cranioplasty susceptibility hardware artifact. Mild chronic microangiopathy   No further neuro recommendations.  Previous provider had discussed with pt's mother \" I did explain to patient's mother that psych would unlikely change her chronic psych meds in the hospital and she would need to continue follow up with her psychiatrist outpatient for medication adjustments. Mother states patient has an appt 6/20. Unable to discharge back to facility over the weekend.   Patient will be discharged back to group home.  "

## 2024-06-18 NOTE — ASSESSMENT & PLAN NOTE
Continue pta buspirone 20mg bid, Carbamazepine 1000mg qhs, Cymbalta 90mg daily, Gabapentin 300mg qhs, Ziprasidone 80mg bid, Clonazepam 0.5mg qd  Medications and dosages from sheet patient came with from facility of her current medications   Per previous provider, mother is concerned that these medications are contributing to her symptoms. Requesting psychiatric evaluation, psych consulted, recommended to continue current regimen, she has outpatient appointment with psychiatrist on 6/20 patient was encouraged to keep that appointment.

## 2024-06-18 NOTE — ASSESSMENT & PLAN NOTE
Counseled on lifestyle modification however due to patient's ongoing anxiety and stress from being on medications, she is not willing to cut down on calorie count.

## 2024-06-18 NOTE — DISCHARGE SUMMARY
"[facility]  Discharge- Prema Shepard 1965, 58 y.o. female MRN: [id number]  [facility] Encounter: [id number]  Primary Care Provider: Sotero Peterson MD   Date and time admitted to hospital: 6/14/2024  4:45 PM    * Stroke-like symptoms  Assessment & Plan  Hx obtained from speaking with patient, care taker from facility, ER; Will note somewhat limited 2/2 patient's underlying TBI/Schizoaffective disorder  Care taker reports that patient had \"slurred speech\", fatigue and increased thirst today   Code Stroke Alerted in ER   CT Head: No acute intracranial abnormalities, mild chronic microangiopathy  CTA H&N: No LVO, dissection, aneurysm, or high-grade stenosis. Mild atherosclerotic disease in the aortic arch., Mild calcified atherosclerotic disease in the B/L ICA cavernous segments L >R, and poor venous contrast opacification 2/2 arterial bolus timing.   Neurology with low suspicion for acute vascular insult and suspect likely toxic metabolic encephalopathy.   Patient was recently hospitalized at inpatient psych with some adjustments to medications and concern this may be related from their standpoint   Neurology recommending medical admission with MRI brain but does not need CVA protocol  On exam in her room, patient at baseline without any evidence of slurring of words and eating a bologna sandwich. Caretaker reports that she appears back to baseline  Afebrile, no leukocytosis  UA without infection   CXR: no acute cardiopulmonary disease   MRI: No acute infarct given suboptimal evaluation of right frontal-parietal-temporal lobe near right parietal bone flap cranioplasty susceptibility hardware artifact. Mild chronic microangiopathy   No further neuro recommendations.  Previous provider had discussed with pt's mother \" I did explain to patient's mother that psych would unlikely change her chronic psych meds in the hospital and she would need to continue follow " up with her psychiatrist outpatient for medication adjustments. Mother states patient has an appt 6/20. Unable to discharge back to facility over the weekend.   Patient will be discharged back to group home.    Class 3 severe obesity due to excess calories without serious comorbidity with body mass index (BMI) of 40.0 to 44.9 in adult ([facility])  Assessment & Plan  Counseled on lifestyle modification however due to patient's ongoing anxiety and stress from being on medications, she is not willing to cut down on calorie count.    Hypertension  Assessment & Plan  Current medications on medications from facility: Amlodipine 10mg qd, Toprol 75mg daily, Spironolactone 25 mg daily, continue     Schizoaffective disorder (HCC)  Assessment & Plan  Continue pta buspirone 20mg bid, Carbamazepine 1000mg qhs, Cymbalta 90mg daily, Gabapentin 300mg qhs, Ziprasidone 80mg bid, Clonazepam 0.5mg qd  Medications and dosages from sheet patient came with from facility of her current medications   Per previous provider, mother is concerned that these medications are contributing to her symptoms. Requesting psychiatric evaluation, psych consulted, recommended to continue current regimen, she has outpatient appointment with psychiatrist on 6/20 patient was encouraged to keep that appointment.    History of DVT (deep vein thrombosis)  Assessment & Plan  Continue pta eliquis    TBI (traumatic brain injury) ([facility])  Assessment & Plan  Hx TBI and lives at Group home - unable to facilitate return over the weekend.        Medical Problems       Resolved Problems  Date Reviewed: 6/18/2024   None       Discharging Physician / Practitioner: Florinda Kc MD  PCP: Sotero Peterson MD  Admission Date:   Admission Orders (From admission, onward)       Ordered        06/14/24 2033  INPATIENT ADMISSION  Once                          Discharge Date: 6/17/24    Consultations During Hospital Stay:  Neurology.  Behavioral health.    Procedures Performed:        Significant Findings / Test Results:   CT stroke:  IMPRESSION:     No acute intracranial abnormality.     Similar mild chronic microangiopathy.    MRI Brain:  IMPRESSION:     Moderate-to-severe motion artifact is present on several sequences.  - No acute infarct given suboptimal evaluation of right frontal-parietal-temporal lobe near right parietal bone flap cranioplasty susceptibility hardware artifact.  - Mild chronic microangiopathy given motion degradation.    Incidental Findings:       Test Results Pending at Discharge (will require follow up):   no     Outpatient Tests Requested:  no    Complications:  no    Reason for Admission: Stroke like symptoms    Hospital Course:   Prema Shepard is a 58 y.o. female patient who originally presented to the hospital on 6/14/2024 due to strokelike symptoms.  Patient evaluated by neurology, CTA head unremarkable for any acute abnormalities, MRI brain unremarkable.  Patient's mother was concerned that all her symptoms are due to her psych medications and changes made and had requested to hold ziprasidone that was held overnight of admission but resumed a stay due to concerns of changes in behavior.  During his stay patient had expressed suicidal ideation and was placed on virtual one-to-one, next day she declined any ideas and said her statement was out of frustration as she wanted to be discharged back to group home.  Patient was evaluated by behavioral health, not a candidate for inpatient psych unit, no changes were made in her psych medications, she was encouraged to follow-up with her outpatient psychiatrist that she has appointment scheduled on 6/20.      Please see above list of diagnoses and related plan for additional information.     Condition at Discharge: stable    Discharge Day Visit / Exam:   Subjective: Patient seen at bedside, no overnight events reported.  Vitals: Blood Pressure: 136/78 (06/17/24 1601)  Pulse: 81 (06/17/24 1601)  Temperature: 99  °F (37.2 °C) (06/17/24 1601)  Temp Source: Oral (06/17/24 0730)  Respirations: 16 (06/17/24 1601)  Weight - Scale: 98.9 kg (218 lb 0.6 oz) (06/14/24 1700)  SpO2: 94 % (06/17/24 1601)  Exam:   Physical Exam  Constitutional:       Appearance: Normal appearance.   Cardiovascular:      Pulses: Normal pulses.      Heart sounds: Normal heart sounds.   Pulmonary:      Effort: Pulmonary effort is normal.      Breath sounds: Normal breath sounds.   Abdominal:      General: Bowel sounds are normal.      Palpations: Abdomen is soft.   Neurological:      Mental Status: She is alert.      Comments: Requires frequent redirection.          Discussion with Family:  Patient's mother was updated by case management..     Discharge instructions/Information to patient and family:   See after visit summary for information provided to patient and family.      Provisions for Follow-Up Care:  See after visit summary for information related to follow-up care and any pertinent home health orders.      Mobility at time of Discharge:   Basic Mobility Inpatient Raw Score: 19  JH-HLM Goal: 6: Walk 10 steps or more  JH-HLM Achieved: 6: Walk 10 steps or more  HLM Goal achieved. Continue to encourage appropriate mobility.     Disposition:   Group Home / [facility] [place] at bedside    Planned Readmission: no     Discharge Statement:  I spent 41 minutes discharging the patient. This time was spent on the day of discharge. I had direct contact with the patient on the day of discharge. Greater than 50% of the total time was spent examining patient, answering all patient questions, arranging and discussing plan of care with patient as well as directly providing post-discharge instructions.  Additional time then spent on discharge activities.    Discharge Medications:  See after visit summary for reconciled discharge medications provided to patient and/or family.      **Please Note: This note may have been constructed using a voice  recognition system**

## 2024-06-21 ENCOUNTER — APPOINTMENT (EMERGENCY)
Dept: CT IMAGING | Facility: HOSPITAL | Age: 59
End: 2024-06-21
Payer: MEDICARE

## 2024-06-21 ENCOUNTER — APPOINTMENT (EMERGENCY)
Dept: RADIOLOGY | Facility: HOSPITAL | Age: 59
End: 2024-06-21
Payer: MEDICARE

## 2024-06-21 ENCOUNTER — HOSPITAL ENCOUNTER (EMERGENCY)
Facility: HOSPITAL | Age: 59
Discharge: HOME | End: 2024-06-21
Attending: SURGERY
Payer: MEDICARE

## 2024-06-21 VITALS
WEIGHT: 220 LBS | DIASTOLIC BLOOD PRESSURE: 73 MMHG | SYSTOLIC BLOOD PRESSURE: 140 MMHG | HEART RATE: 51 BPM | WEIGHT: 220 LBS | OXYGEN SATURATION: 98 % | OXYGEN SATURATION: 98 % | DIASTOLIC BLOOD PRESSURE: 73 MMHG | RESPIRATION RATE: 18 BRPM | SYSTOLIC BLOOD PRESSURE: 140 MMHG | TEMPERATURE: 97.5 F | TEMPERATURE: 97.5 F | HEART RATE: 51 BPM | RESPIRATION RATE: 18 BRPM
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
## 2024-06-21 LAB
BASE EXCESS BLDA CALC-SCNC: 3 MMOL/L (ref -2–3)
BASE EXCESS BLDA CALC-SCNC: 3 MMOL/L (ref -2–3)
CA-I BLD-SCNC: 0.98 MMOL/L (ref 1.12–1.32)
CA-I BLD-SCNC: 0.98 MMOL/L (ref 1.12–1.32)
GLUCOSE SERPL-MCNC: 93 MG/DL (ref 65–140)
GLUCOSE SERPL-MCNC: 93 MG/DL (ref 65–140)
HCO3 BLDA-SCNC: 25.5 MMOL/L (ref 24–30)
HCO3 BLDA-SCNC: 25.5 MMOL/L (ref 24–30)
HCT VFR BLD CALC: 33 % (ref 34.8–46.1)
HCT VFR BLD CALC: 33 % (ref 34.8–46.1)
HGB BLDA-MCNC: 11.2 G/DL (ref 11.5–15.4)
HGB BLDA-MCNC: 11.2 G/DL (ref 11.5–15.4)
PCO2 BLD: 26 MMOL/L (ref 21–32)
PCO2 BLD: 26 MMOL/L (ref 21–32)
PCO2 BLD: 31.1 MM HG (ref 42–50)
PCO2 BLD: 31.1 MM HG (ref 42–50)
PH BLD: 7.52 [PH] (ref 7.3–7.4)
PH BLD: 7.52 [PH] (ref 7.3–7.4)
PO2 BLD: 30 MM HG (ref 35–45)
PO2 BLD: 30 MM HG (ref 35–45)
POTASSIUM BLD-SCNC: 4.7 MMOL/L (ref 3.5–5.3)
POTASSIUM BLD-SCNC: 4.7 MMOL/L (ref 3.5–5.3)
SAO2 % BLD FROM PO2: 66 % (ref 60–85)
SAO2 % BLD FROM PO2: 66 % (ref 60–85)
SODIUM BLD-SCNC: 132 MMOL/L (ref 136–145)
SODIUM BLD-SCNC: 132 MMOL/L (ref 136–145)
SPECIMEN SOURCE: ABNORMAL
SPECIMEN SOURCE: ABNORMAL

## 2024-06-21 PROCEDURE — 70450 CT HEAD/BRAIN W/O DYE: CPT

## 2024-06-21 PROCEDURE — 82330 ASSAY OF CALCIUM: CPT

## 2024-06-21 PROCEDURE — 72125 CT NECK SPINE W/O DYE: CPT

## 2024-06-21 PROCEDURE — 84295 ASSAY OF SERUM SODIUM: CPT

## 2024-06-21 PROCEDURE — 82803 BLOOD GASES ANY COMBINATION: CPT

## 2024-06-21 PROCEDURE — 84132 ASSAY OF SERUM POTASSIUM: CPT

## 2024-06-21 PROCEDURE — 71045 X-RAY EXAM CHEST 1 VIEW: CPT

## 2024-06-21 PROCEDURE — 82947 ASSAY GLUCOSE BLOOD QUANT: CPT

## 2024-06-21 PROCEDURE — 76705 ECHO EXAM OF ABDOMEN: CPT

## 2024-06-21 PROCEDURE — 93308 TTE F-UP OR LMTD: CPT

## 2024-06-21 PROCEDURE — 99284 EMERGENCY DEPT VISIT MOD MDM: CPT

## 2024-06-21 PROCEDURE — 99285 EMERGENCY DEPT VISIT HI MDM: CPT | Performed by: SURGERY

## 2024-06-21 PROCEDURE — EDAIR PR ED AIR: Performed by: EMERGENCY MEDICINE

## 2024-06-21 PROCEDURE — 85014 HEMATOCRIT: CPT

## 2024-06-21 NOTE — QUICK NOTE
Cervical Collar Clearance:    The patient had a CT scan of the cervical spine demonstrating no acute injury. On exam, the patient had no midline point tenderness or paresthesias/numbness/weakness in the extremities. The patient had full range of motion (was then able to flex, extend, and rotate head laterally) without pain. There were no distracting injuries and the patient was not intoxicated.      The patient's cervical spine was cleared radiologically and clinically. Cervical collar removed at this time.     MAYNOR Tejada  6/21/2024 4:50 PM       Discharge Note:    Patient lives in a group home. Passed ambulatory and PO trial. Group home agreed to take patient back. Follow up with PCP in 1 week. Follow up with trauma as needed.

## 2024-06-21 NOTE — ED PROVIDER NOTES
Emergency Department Airway Evaluation and Management Form    History  Obtained from: Patient, EMS  Patient has no allergy information on record.  Chief Complaint   Patient presents with    Fall     HPI    Patient presents as a prehospital trauma level B activation due to fall, head strike on Eliquis    No past medical history on file.  No past surgical history on file.  No family history on file.     I have reviewed and agree with the history as documented.    Review of Systems    Per trauma    Physical Exam  /80   Pulse (!) 50   Temp 97.5 °F (36.4 °C) (Oral)   Resp 17   SpO2 98%     Physical Exam     Per trauma    ED Medications  Medications - No data to display    Intubation  Procedures    Notes  Airway intact, equal, bilateral breath sounds.  Additional workup, documentation, disposition per trauma team who was at bedside in trauma bay as a trauma level B activation.    Final Diagnosis  Final diagnoses:   None       ED Provider  Electronically Signed by     Thony Almeida MD  06/21/24 1751

## 2024-06-21 NOTE — PROCEDURES
POC FAST US    Date/Time: 6/21/2024 5:19 PM    Performed by: Marisol Krause PA-C  Authorized by: Marisol Krause PA-C    Patient location:  Trauma  Other Assisting Provider: No    Procedure details:     Exam Type:  Diagnostic    Indications: blunt abdominal trauma and blunt chest trauma      Assess for:  Intra-abdominal fluid and pericardial effusion    Technique: FAST      Views obtained:  Heart - Pericardial sac, LUQ - Splenorenal space, RUQ - Bahena's Pouch and Suprapubic - Pouch of [place]    Image quality: diagnostic      Image availability:  Images available in PACS  FAST Findings:     RUQ (Hepatorenal) free fluid: absent      LUQ (Splenorenal) free fluid: absent      Suprapubic free fluid: absent      Cardiac wall motion: identified      Pericardial effusion: absent    Interpretation:     Impressions: negative

## 2024-06-21 NOTE — CASE MANAGEMENT
Case Management ED Progress Note    Patient name Carolyne Amador  Location ED-07/ED-07 MRN [id number]  : 1965 Date 2024        OBJECTIVE:    Chief Complaint:   Patient Class: Emergency  Preferred Pharmacy: No Pharmacies Listed  Primary Care Provider: No primary care provider on file.    Primary Insurance:   Secondary Insurance:     ED Progress Note:  CM responded to trauma alert. Patient transported into trauma bay by [facility] for eval. Patient responsive to medical team.     CM located patients emergency  contact from [facility] Jere mejía ([phone]). Call made to brother- no response. Trauma AP aware of above.     No current identified CM needs. CM will follow and update screening, assessment, and discharge planning as appropriate.

## 2024-06-21 NOTE — H&P
"H&P - Trauma   Carolyne Amador 58 y.o. female MRN: [id number]  Unit/Bed#: ED-07 Encounter: [id number]    Trauma Alert: Level B   Model of Arrival: Ambulance    Trauma Team: Attending To and HEAVEN Krause  Consultants:     None     Assessment & Plan   Active Problems / Assessment:   Fall  Anticoagulated with Eliquis     Plan:   CT head/C-spine negative for acute traumatic injuries  FAST exam negative  Will clear c-collar  Plan for p.o. challenge and ambulation trial  Disposition pending results of ambulation trial  Likely discharge back to group home with outpatient follow-up with PCP    History of Present Illness     Chief Complaint: \"I fell\"  Mechanism:Fall     HPI:    Carolyne Amador is a 58 y.o. female who presents status post fall.  Per patient, she has balance issues and lost her balance causing her to fall and hit her head on the ground.  She denies loss of consciousness.  She takes Eliquis daily.  On arrival to the trauma bay, she is alert and oriented, GCS 15.  She denies any complaints at this time.    Review of Systems   Constitutional: Negative.    HENT: Negative.     Eyes: Negative.    Respiratory: Negative.     Cardiovascular: Negative.    Gastrointestinal: Negative.    Genitourinary: Negative.    Musculoskeletal: Negative.    Skin: Negative.    Neurological: Negative.    Psychiatric/Behavioral: Negative.       12-point, complete review of systems was reviewed and negative except as stated above.     Historical Information     Past Medical History:   Diagnosis Date    DVT (deep venous thrombosis) ([facility])     Hyperlipidemia     Hypertension     Schizoaffective disorder (HCC)     TBI (traumatic brain injury) ([facility])      History reviewed. No pertinent surgical history.          There is no immunization history on file for this patient.  Last Tetanus: Unknown  Family History: Non-contributory     Meds/Allergies   all current active meds have been reviewed and current meds:   No current " facility-administered medications for this encounter.     Allergies have not been reviewed;  Not on File    Objective   Initial Vitals:   Temperature: 97.5 °F (36.4 °C) (06/21/24 1553)  Pulse: (!) 53 (06/21/24 1553)  Respirations: 17 (06/21/24 1553)  Blood Pressure: 123/82 (06/21/24 1553)    Primary Survey:   Airway:        Status: patent;        Pre-hospital Interventions: none        Hospital Interventions: none  Breathing:        Pre-hospital Interventions: none       Effort: normal       Right breath sounds: normal       Left breath sounds: normal  Circulation:        Rhythm: regular       Rate: regular   Right Pulses Left Pulses    R radial: 2+  R femoral: 2+  R pedal: 2+     L radial: 2+  L femoral: 2+  L pedal: 2+       Disability:        GCS: Eye: 4; Verbal: 5 Motor: 6 Total: 15       Right Pupil: round;  reactive         Left Pupil:  round;  reactive      R Motor Strength L Motor Strength    R : 5/5  R dorsiflex: 5/5  R plantarflex: 5/5 L : 5/5  L dorsiflex: 5/5  L plantarflex: 5/5        Sensory:  No sensory deficit  Exposure:       Completed: Yes      Secondary Survey:  Physical Exam  Constitutional:       General: She is not in acute distress.     Appearance: She is not ill-appearing.   HENT:      Head: Normocephalic.      Right Ear: External ear normal.      Left Ear: External ear normal.      Nose: Nose normal.      Mouth/Throat:      Mouth: Mucous membranes are moist.   Eyes:      Extraocular Movements: Extraocular movements intact.      Pupils: Pupils are equal, round, and reactive to light.   Neck:      Comments: Cervical collar in place  Cardiovascular:      Rate and Rhythm: Normal rate and regular rhythm.      Pulses: Normal pulses.      Heart sounds: Normal heart sounds.   Pulmonary:      Effort: Pulmonary effort is normal. No respiratory distress.      Breath sounds: Normal breath sounds.   Chest:      Chest wall: No tenderness.   Abdominal:      General: Abdomen is flat. There is no  distension.      Palpations: Abdomen is soft.      Tenderness: There is no abdominal tenderness. There is no guarding.   Musculoskeletal:         General: No tenderness. Normal range of motion.      Cervical back: No deformity or tenderness.      Thoracic back: No deformity or tenderness.      Lumbar back: No deformity or tenderness.   Skin:     General: Skin is warm and dry.      Capillary Refill: Capillary refill takes less than 2 seconds.   Neurological:      General: No focal deficit present.      Mental Status: She is alert and oriented to person, place, and time. Mental status is at baseline.      Sensory: No sensory deficit.      Motor: No weakness.   Psychiatric:         Mood and Affect: Mood normal.         Behavior: Behavior normal.         Invasive Devices       None                 Lab Results: I have personally reviewed all pertinent laboratory/test results 06/21/24 and in the preceding 24 hours.  Recent Labs     06/21/24  1600   HGB 11.2*   HCT 33*   CO2 26   CAIONIZED 0.98*       Imaging Results: I have personally reviewed pertinent images saved in PACS. CT scan findings (and other pertinent positive findings on images) were discussed with radiology. My interpretation of the images/reports are as follows:  Chest Xray(s): negative for acute findings   FAST exam(s): negative for acute findings   CT Scan(s): negative for acute findings   Additional Xray(s): N/A     Other Studies: None    Code Status: No Order  Advance Directive and Living Will:      Power of :    POLST:

## 2024-09-15 ENCOUNTER — HOSPITAL ENCOUNTER (EMERGENCY)
Facility: HOSPITAL | Age: 59
Discharge: HOME | End: 2024-09-15
Attending: SURGERY
Payer: MEDICARE

## 2024-09-15 ENCOUNTER — APPOINTMENT (EMERGENCY)
Dept: RADIOLOGY | Facility: HOSPITAL | Age: 59
End: 2024-09-15
Payer: MEDICARE

## 2024-09-15 ENCOUNTER — APPOINTMENT (EMERGENCY)
Dept: CT IMAGING | Facility: HOSPITAL | Age: 59
End: 2024-09-15
Payer: MEDICARE

## 2024-09-15 VITALS
BODY MASS INDEX: 35.57 KG/M2 | HEIGHT: 64 IN | DIASTOLIC BLOOD PRESSURE: 72 MMHG | RESPIRATION RATE: 18 BRPM | DIASTOLIC BLOOD PRESSURE: 72 MMHG | HEART RATE: 77 BPM | SYSTOLIC BLOOD PRESSURE: 145 MMHG | SYSTOLIC BLOOD PRESSURE: 145 MMHG | WEIGHT: 208.34 LBS | TEMPERATURE: 98.2 F | OXYGEN SATURATION: 99 % | HEIGHT: 64 IN | OXYGEN SATURATION: 99 % | RESPIRATION RATE: 18 BRPM | BODY MASS INDEX: 35.57 KG/M2 | WEIGHT: 208.34 LBS | HEART RATE: 77 BPM | TEMPERATURE: 98.2 F
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
## 2024-09-15 LAB
BASE EXCESS BLDA CALC-SCNC: 2 MMOL/L (ref -2–3)
BASE EXCESS BLDA CALC-SCNC: 2 MMOL/L (ref -2–3)
CA-I BLD-SCNC: 1.09 MMOL/L (ref 1.12–1.32)
CA-I BLD-SCNC: 1.09 MMOL/L (ref 1.12–1.32)
GLUCOSE SERPL-MCNC: 106 MG/DL (ref 65–140)
GLUCOSE SERPL-MCNC: 106 MG/DL (ref 65–140)
HCO3 BLDA-SCNC: 26.1 MMOL/L (ref 24–30)
HCO3 BLDA-SCNC: 26.1 MMOL/L (ref 24–30)
HCT VFR BLD CALC: 36 % (ref 34.8–46.1)
HCT VFR BLD CALC: 36 % (ref 34.8–46.1)
HGB BLDA-MCNC: 12.2 G/DL (ref 11.5–15.4)
HGB BLDA-MCNC: 12.2 G/DL (ref 11.5–15.4)
PCO2 BLD: 27 MMOL/L (ref 21–32)
PCO2 BLD: 27 MMOL/L (ref 21–32)
PCO2 BLD: 38.5 MM HG (ref 42–50)
PCO2 BLD: 38.5 MM HG (ref 42–50)
PH BLD: 7.44 [PH] (ref 7.3–7.4)
PH BLD: 7.44 [PH] (ref 7.3–7.4)
PO2 BLD: 34 MM HG (ref 35–45)
PO2 BLD: 34 MM HG (ref 35–45)
POTASSIUM BLD-SCNC: 4.3 MMOL/L (ref 3.5–5.3)
POTASSIUM BLD-SCNC: 4.3 MMOL/L (ref 3.5–5.3)
SAO2 % BLD FROM PO2: 69 % (ref 60–85)
SAO2 % BLD FROM PO2: 69 % (ref 60–85)
SODIUM BLD-SCNC: 133 MMOL/L (ref 136–145)
SODIUM BLD-SCNC: 133 MMOL/L (ref 136–145)
SPECIMEN SOURCE: ABNORMAL
SPECIMEN SOURCE: ABNORMAL

## 2024-09-15 PROCEDURE — 84295 ASSAY OF SERUM SODIUM: CPT

## 2024-09-15 PROCEDURE — 72125 CT NECK SPINE W/O DYE: CPT

## 2024-09-15 PROCEDURE — 71045 X-RAY EXAM CHEST 1 VIEW: CPT

## 2024-09-15 PROCEDURE — 74177 CT ABD & PELVIS W/CONTRAST: CPT

## 2024-09-15 PROCEDURE — 82947 ASSAY GLUCOSE BLOOD QUANT: CPT

## 2024-09-15 PROCEDURE — 76705 ECHO EXAM OF ABDOMEN: CPT | Performed by: NURSE PRACTITIONER

## 2024-09-15 PROCEDURE — 70450 CT HEAD/BRAIN W/O DYE: CPT

## 2024-09-15 PROCEDURE — 82330 ASSAY OF CALCIUM: CPT

## 2024-09-15 PROCEDURE — 82803 BLOOD GASES ANY COMBINATION: CPT

## 2024-09-15 PROCEDURE — EDAIR PR ED AIR: Performed by: EMERGENCY MEDICINE

## 2024-09-15 PROCEDURE — 85014 HEMATOCRIT: CPT

## 2024-09-15 PROCEDURE — 84132 ASSAY OF SERUM POTASSIUM: CPT

## 2024-09-15 PROCEDURE — 99284 EMERGENCY DEPT VISIT MOD MDM: CPT | Performed by: SURGERY

## 2024-09-15 PROCEDURE — 93308 TTE F-UP OR LMTD: CPT | Performed by: NURSE PRACTITIONER

## 2024-09-15 PROCEDURE — 99284 EMERGENCY DEPT VISIT MOD MDM: CPT

## 2024-09-15 PROCEDURE — 71260 CT THORAX DX C+: CPT

## 2024-09-15 RX ORDER — ACETAMINOPHEN 325 MG/1
975 TABLET ORAL ONCE
Status: COMPLETED | OUTPATIENT
Start: 2024-09-15 | End: 2024-09-15

## 2024-09-15 RX ADMIN — IOHEXOL 85 ML: 350 INJECTION, SOLUTION INTRAVENOUS at 17:05

## 2024-09-15 RX ADMIN — ACETAMINOPHEN 975 MG: 325 TABLET ORAL at 18:35

## 2024-09-15 NOTE — PROCEDURES
POC FAST US    Date/Time: 9/15/2024 4:58 PM    Performed by: ELEANOR Floyd  Authorized by: ELEANOR Floyd    Patient location:  Trauma  Procedure details:     Exam Type:  Diagnostic    Indications: blunt abdominal trauma and blunt chest trauma      Assess for:  Intra-abdominal fluid and pericardial effusion    Technique: FAST      Views obtained:  Heart - Pericardial sac, LUQ - Splenorenal space, Suprapubic - Pouch of [place]ava and RUQ - Ventura's Pouch    Image quality: diagnostic      Image availability:  Images available in PACS and video obtained  FAST Findings:     RUQ (Hepatorenal) free fluid: absent      LUQ (Splenorenal) free fluid: absent      Suprapubic free fluid: absent      Cardiac wall motion: identified      Pericardial effusion: absent    Interpretation:     Impressions: negative

## 2024-09-15 NOTE — H&P
"H&P - Trauma   Name: Vidhya Huerta 58 y.o. female I MRN: [id number]  Unit/Bed#: ED-01 I Date of Admission: 9/15/2024   Date of Service: 9/15/2024 I Hospital Day: 0     Assessment & Plan  Fall, initial encounter  Unwitnessed fall.  Reported as mechanical  CT imaging including head, C-spine, chest, abdomen, pelvis were negative for acute traumatic injury  Ambulatory/p.o. trial: Passed  Call patient daughter. Approved discharge back to [facility] group home.       Trauma Alert: Level B   Model of Arrival: Ambulance    Trauma Team: Attending Pippa and SHIRA Hanley  Consultants:     None     History of Present Illness   Chief Complaint: \"Do not touch my clothes\"  Mechanism:Fall     Vidhya Huerta is a 58 y.o. female with history of schizoaffective disorder, previous TBI, and previous DVT that she takes Eliquis for, presenting today from her group home for evaluation after suffering an unwitnessed fall.  Patient reports using her walker to ambulate at baseline.  Today she lost her balance falling and striking her posterior head.  No reported loss of consciousness.  No reported presyncopal type symptoms.  Patient complains of head and neck pain.  She adamantly refuses cervical collar and taking off her close, becoming extremely argumentative and borderline combative despite explaining rationale.  Patient is reported to have a GCS of 14 at baseline.    Review of Systems   Musculoskeletal:  Positive for neck pain.   Neurological:  Positive for headaches.   All other systems reviewed and are negative.    I have reviewed the patient's PMH, PSH, Social History, Family History, Meds, and Allergies    There is no immunization history on file for this patient.  Last Tetanus: 5/2024       Objective   Initial Vitals:   Temperature: 98.2 °F (36.8 °C) (09/15/24 1732)  Pulse: 67 (09/15/24 1649)  Respirations: 16 (09/15/24 1649)  Blood Pressure: 163/79 (09/15/24 1649)    Primary Survey:   Airway:        Status: patent;        " Pre-hospital Interventions: none        Hospital Interventions: none  Breathing:        Pre-hospital Interventions: none       Effort: normal       Right breath sounds: normal       Left breath sounds: normal  Circulation:        Rhythm: regular       Rate: regular   Right Pulses Left Pulses    R radial: 2+  R femoral: 2+       L radial: 2+  L femoral: 2+         Disability:        GCS: Eye: 4; Verbal: 4 Motor: 6 Total: 14       Right Pupil: round;  reactive         Left Pupil:  round;  reactive      R Motor Strength L Motor Strength    R : 5/5  R dorsiflex: 5/5  R plantarflex: 5/5 L : 5/5  L dorsiflex: 5/5  L plantarflex: 5/5        Sensory:  No sensory deficit  Exposure:       Completed: Yes      Secondary Survey:  Physical Exam  Vitals reviewed.   Constitutional:       General: She is not in acute distress.     Appearance: She is not ill-appearing.   HENT:      Head: Normocephalic and atraumatic.      Right Ear: External ear normal.      Left Ear: External ear normal.      Nose: Nose normal.      Mouth/Throat:      Mouth: Mucous membranes are moist.      Pharynx: Oropharynx is clear.   Eyes:      Extraocular Movements: Extraocular movements intact.      Pupils: Pupils are equal, round, and reactive to light.   Neck:      Comments: Patient adamantly refuses cervical collar per EMS and on arrival to trauma bay.  Attempted to apply inpatient swatted provider way.  No localized tenderness or palpable step-offs--generalized tenderness patient displays full ranging of neck while getting wheeled in by EMS-no signs of discomfort or no complaints of worsening pain with ranging.  Cardiovascular:      Rate and Rhythm: Normal rate and regular rhythm.      Pulses: Normal pulses.      Heart sounds: Normal heart sounds.   Pulmonary:      Effort: Pulmonary effort is normal. No respiratory distress.      Breath sounds: Normal breath sounds. No stridor. No wheezing, rhonchi or rales.   Chest:      Chest wall: No  tenderness.   Abdominal:      General: Abdomen is flat. Bowel sounds are normal. There is no distension.      Tenderness: There is no abdominal tenderness. There is no guarding.   Genitourinary:     Comments: Pelvis is stable  Musculoskeletal:      Cervical back: Normal range of motion and neck supple. Tenderness present. No rigidity.      Comments: Generalized C, T, L-spine tenderness.  No palpable step-offs or localized tenderness.  Moving all extremities.  No extremity tenderness.  No contusions or hematomas appreciated   Skin:     General: Skin is warm and dry.      Capillary Refill: Capillary refill takes less than 2 seconds.   Neurological:      Mental Status: She is alert and oriented to person, place, and time.      Sensory: No sensory deficit.      Motor: No weakness.   Psychiatric:         Behavior: Behavior normal.         Thought Content: Thought content normal.       Invasive Devices       Peripheral Intravenous Line  Duration             Peripheral IV 09/15/24 Left;Proximal;Ventral (anterior) Forearm <1 day                    Lab Results: I have reviewed the following results:   Recent Labs     09/15/24  1701   HGB 12.2   HCT 36   CO2 27   CAIONIZED 1.09*       Imaging Results: I have personally reviewed pertinent images saved in PACS. CT scan findings (and other pertinent positive findings on images) were discussed with radiology. My interpretation of the images/reports are as follows:  Chest Xray(s): negative for acute findings   FAST exam(s): negative for acute findings   CT Scan(s): negative for acute findings   Additional Xray(s): N/A     Other Studies: No additional pertinent studies reviewed.

## 2024-09-15 NOTE — ED PROVIDER NOTES
Emergency Department Airway Evaluation and Management Form    History  Obtained from: EMS  Patient has no allergy information on record.  Chief Complaint   Patient presents with    Trauma     HPI    57 y/o Fall, head strike on eliquis.     Airway intact. Rest of eval and treatment by trauma team    No past medical history on file.  No past surgical history on file.  No family history on file.     I have reviewed and agree with the history as documented.    Review of Systems    Physical Exam  There were no vitals taken for this visit.    Physical Exam    ED Medications  Medications - No data to display    Intubation  Procedures    Notes      Final Diagnosis  Final diagnoses:   None       ED Provider  Electronically Signed by     Mary Benjamin MD  09/15/24 0516

## 2024-09-27 ENCOUNTER — APPOINTMENT (EMERGENCY)
Dept: CT IMAGING | Facility: HOSPITAL | Age: 59
End: 2024-09-27
Payer: MEDICARE

## 2024-09-27 ENCOUNTER — HOSPITAL ENCOUNTER (EMERGENCY)
Facility: HOSPITAL | Age: 59
Discharge: HOME | End: 2024-09-27
Attending: EMERGENCY MEDICINE
Payer: MEDICARE

## 2024-09-27 VITALS
DIASTOLIC BLOOD PRESSURE: 81 MMHG | TEMPERATURE: 97.6 F | HEART RATE: 68 BPM | SYSTOLIC BLOOD PRESSURE: 124 MMHG | RESPIRATION RATE: 18 BRPM | OXYGEN SATURATION: 98 %

## 2024-09-27 DIAGNOSIS — R55 NEAR SYNCOPE: Primary | ICD-10-CM

## 2024-09-27 LAB
ALBUMIN SERPL BCG-MCNC: 4 G/DL (ref 3.5–5)
ALP SERPL-CCNC: 38 U/L (ref 34–104)
ALT SERPL W P-5'-P-CCNC: 10 U/L (ref 7–52)
ANION GAP SERPL CALCULATED.3IONS-SCNC: 5 MMOL/L (ref 4–13)
AST SERPL W P-5'-P-CCNC: 17 U/L (ref 13–39)
BASOPHILS # BLD AUTO: 0.01 THOUSANDS/ΜL (ref 0–0.1)
BASOPHILS NFR BLD AUTO: 0 % (ref 0–1)
BILIRUB SERPL-MCNC: 0.28 MG/DL (ref 0.2–1)
BILIRUB UR QL STRIP: NEGATIVE
BUN SERPL-MCNC: 10 MG/DL (ref 5–25)
CALCIUM SERPL-MCNC: 8.7 MG/DL (ref 8.4–10.2)
CHLORIDE SERPL-SCNC: 97 MMOL/L (ref 96–108)
CLARITY UR: CLEAR
CO2 SERPL-SCNC: 27 MMOL/L (ref 21–32)
COLOR UR: YELLOW
CREAT SERPL-MCNC: 0.63 MG/DL (ref 0.6–1.3)
EOSINOPHIL # BLD AUTO: 0.11 THOUSAND/ΜL (ref 0–0.61)
EOSINOPHIL NFR BLD AUTO: 4 % (ref 0–6)
ERYTHROCYTE [DISTWIDTH] IN BLOOD BY AUTOMATED COUNT: 12.5 % (ref 11.6–15.1)
GFR SERPL CREATININE-BSD FRML MDRD: 99 ML/MIN/1.73SQ M
GLUCOSE SERPL-MCNC: 99 MG/DL (ref 65–140)
GLUCOSE UR STRIP-MCNC: NEGATIVE MG/DL
HCT VFR BLD AUTO: 31.9 % (ref 34.8–46.1)
HGB BLD-MCNC: 10.8 G/DL (ref 11.5–15.4)
HGB UR QL STRIP.AUTO: NEGATIVE
IMM GRANULOCYTES # BLD AUTO: 0.01 THOUSAND/UL (ref 0–0.2)
IMM GRANULOCYTES NFR BLD AUTO: 0 % (ref 0–2)
KETONES UR STRIP-MCNC: NEGATIVE MG/DL
LEUKOCYTE ESTERASE UR QL STRIP: NEGATIVE
LYMPHOCYTES # BLD AUTO: 0.71 THOUSANDS/ΜL (ref 0.6–4.47)
LYMPHOCYTES NFR BLD AUTO: 27 % (ref 14–44)
MCH RBC QN AUTO: 30.3 PG (ref 26.8–34.3)
MCHC RBC AUTO-ENTMCNC: 33.9 G/DL (ref 31.4–37.4)
MCV RBC AUTO: 89 FL (ref 82–98)
MONOCYTES # BLD AUTO: 0.23 THOUSAND/ΜL (ref 0.17–1.22)
MONOCYTES NFR BLD AUTO: 9 % (ref 4–12)
NEUTROPHILS # BLD AUTO: 1.52 THOUSANDS/ΜL (ref 1.85–7.62)
NEUTS SEG NFR BLD AUTO: 60 % (ref 43–75)
NITRITE UR QL STRIP: NEGATIVE
NRBC BLD AUTO-RTO: 0 /100 WBCS
PH UR STRIP.AUTO: 7 [PH]
PLATELET # BLD AUTO: 234 THOUSANDS/UL (ref 149–390)
PMV BLD AUTO: 8.5 FL (ref 8.9–12.7)
POTASSIUM SERPL-SCNC: 4.2 MMOL/L (ref 3.5–5.3)
PROT SERPL-MCNC: 6.7 G/DL (ref 6.4–8.4)
PROT UR STRIP-MCNC: NEGATIVE MG/DL
RBC # BLD AUTO: 3.57 MILLION/UL (ref 3.81–5.12)
SODIUM SERPL-SCNC: 129 MMOL/L (ref 135–147)
SP GR UR STRIP.AUTO: 1.01 (ref 1–1.03)
UROBILINOGEN UR STRIP-ACNC: <2 MG/DL
WBC # BLD AUTO: 2.59 THOUSAND/UL (ref 4.31–10.16)

## 2024-09-27 PROCEDURE — 99284 EMERGENCY DEPT VISIT MOD MDM: CPT

## 2024-09-27 PROCEDURE — 96374 THER/PROPH/DIAG INJ IV PUSH: CPT

## 2024-09-27 PROCEDURE — 99285 EMERGENCY DEPT VISIT HI MDM: CPT | Performed by: EMERGENCY MEDICINE

## 2024-09-27 PROCEDURE — 96361 HYDRATE IV INFUSION ADD-ON: CPT

## 2024-09-27 PROCEDURE — 80053 COMPREHEN METABOLIC PANEL: CPT

## 2024-09-27 PROCEDURE — 85025 COMPLETE CBC W/AUTO DIFF WBC: CPT

## 2024-09-27 PROCEDURE — 81003 URINALYSIS AUTO W/O SCOPE: CPT

## 2024-09-27 PROCEDURE — 36415 COLL VENOUS BLD VENIPUNCTURE: CPT

## 2024-09-27 PROCEDURE — 70450 CT HEAD/BRAIN W/O DYE: CPT

## 2024-09-27 PROCEDURE — 93005 ELECTROCARDIOGRAM TRACING: CPT

## 2024-09-27 RX ORDER — LABETALOL HYDROCHLORIDE 5 MG/ML
10 INJECTION, SOLUTION INTRAVENOUS ONCE
Status: DISCONTINUED | OUTPATIENT
Start: 2024-09-27 | End: 2024-09-27

## 2024-09-27 RX ORDER — ACETAMINOPHEN 10 MG/ML
1000 INJECTION, SOLUTION INTRAVENOUS ONCE
Status: COMPLETED | OUTPATIENT
Start: 2024-09-27 | End: 2024-09-27

## 2024-09-27 RX ADMIN — ACETAMINOPHEN 1000 MG: 10 INJECTION INTRAVENOUS at 10:05

## 2024-09-27 RX ADMIN — SODIUM CHLORIDE 1000 ML: 0.9 INJECTION, SOLUTION INTRAVENOUS at 08:51

## 2024-09-27 NOTE — ED PROVIDER NOTES
Final diagnoses:   Near syncope     ED Disposition       ED Disposition   Discharge    Condition   Stable    Date/Time   Fri Sep 27, 2024 11:48 AM    Comment   Prema Drea discharge to home/self care.                   Assessment & Plan       Medical Decision Making  Patient is a 58-year-old female, with a history of TBI who presents to the emergency department for evaluation of lightheadedness without syncope, occurring while brushing her teeth earlier this morning.     Differential diagnoses included but not limited to: Vasovagal syncope, dehydration, arrhythmia.    Patient initially treated with NS 1L and Tylenol IV.     Orders written for basic labs, EKG.    See ED course for full details.     Amount and/or Complexity of Data Reviewed  Labs: ordered.  Radiology: ordered.    Risk  Prescription drug management.        ED Course as of 09/27/24 1544   Fri Sep 27, 2024   0915 Spoke with patient's mother who states patient has been experiencing headaches ongoing the past 2 weeks.  Orders written for CT Head without contrast for further evaluation.    1045 Comprehensive metabolic panel(!)  Sodium noted to be mildly low but consistent with previous values.  No evidence of end organ damage.    1541 UA w Reflex to Microscopic w Reflex to Culture  UA negative for acute infection   1541 CBC and differential(!)  WBC low, consistent with previous levels.    1541 CT head without contrast  IMPRESSION:     No acute intracranial abnormality.    Workstation performed: [id number]     1542 On reevaluation the patient appears well, in no acute distress, and is comfortable discharge at this time.  Patient tolerating p.o. without difficulty.  No evidence of arrhythmia on EKG, metabolic derangement, blood work, abnormalities on CT scan.  Symptoms likely secondary to dehydration or vasovagal.     With RN at the bedside patient was able to ambulate with a walker without difficulty.     Advised the patient that she should follow-up  with her primary care doctor in 1 week for repeat blood work.        Medications   sodium chloride 0.9 % bolus 1,000 mL (0 mL Intravenous Stopped 9/27/24 1043)   acetaminophen (Ofirmev) injection 1,000 mg (0 mg Intravenous Stopped 9/27/24 1020)       ED Risk Strat Scores                                               History of Present Illness       Chief Complaint   Patient presents with    Dizziness     Pt states not feeling herself the past few days, and this morning had episode of dizziness while brushing teeth. Aox 4.        Past Medical History:   Diagnosis Date    Anxiety and depression     Chronic deep vein thrombosis (DVT) ([facility]ly [facility])     LLE    DVT (deep venous thrombosis) (Formerly [facility])     History of traumatic brain injury     HTN (hypertension)     Hyperlipidemia     Hypertension     Insomnia     Obesity     RUPAL (obstructive sleep apnea)     Schizoaffective disorder (HCC)     TBI (traumatic brain injury) (Formerly [facility])       History reviewed. No pertinent surgical history.   History reviewed. No pertinent family history.   Social History     Tobacco Use    Smoking status: Never    Smokeless tobacco: Never   Vaping Use    Vaping status: Never Used   Substance Use Topics    Alcohol use: Not Currently    Drug use: Never      E-Cigarette/Vaping    E-Cigarette Use Never User       E-Cigarette/Vaping Substances      I have reviewed and agree with the history as documented.     HPI  Patient is a 58-year-old female, with a history of previous TBI, on Eliquis, who presents to the emergency department for evaluation of lightheadedness which occurred while brushing her teeth just prior to arrival.  Patient states she woke this morning feeling okay, had breakfast without issue, but started feeling lightheaded when she went to the bathroom, reporting a near syncope feeling.  Patient states she called for her friend who called EMS and helped guide her to the bedroom.  Patient states she feels thirsty and has to go to the bathroom, but  otherwise feels okay. Patient denies any loss of consciousness, vision changes, changes to her chronic headaches, new medications, chest pain, difficulty breathing, abdominal pain, urinary symptoms.     Review of Systems   Constitutional:  Negative for chills and fatigue.   Respiratory:  Negative for cough and shortness of breath.    Cardiovascular:  Negative for chest pain and palpitations.   Gastrointestinal:  Negative for abdominal pain.   Neurological:  Positive for light-headedness. Negative for dizziness and headaches.   All other systems reviewed and are negative.          Objective       ED Triage Vitals [09/27/24 0759]   Temperature Pulse Blood Pressure Respirations SpO2 Patient Position - Orthostatic VS   97.6 °F (36.4 °C) 69 123/71 18 97 % --      Temp Source Heart Rate Source BP Location FiO2 (%) Pain Score    Oral -- Left arm -- --      Vitals      Date and Time Temp Pulse SpO2 Resp BP Pain Score FACES Pain Rating User   09/27/24 1144 -- 68 98 % 18 124/81 -- -- CS   09/27/24 0759 97.6 °F (36.4 °C) 69 97 % 18 123/71 -- -- CS            Physical Exam  Vitals reviewed.   Constitutional:       General: She is not in acute distress.     Appearance: Normal appearance. She is not ill-appearing or toxic-appearing.   HENT:      Head: Normocephalic and atraumatic.      Right Ear: External ear normal.      Left Ear: External ear normal.      Nose: Nose normal.      Mouth/Throat:      Mouth: Mucous membranes are moist.   Eyes:      Extraocular Movements: Extraocular movements intact.      Pupils: Pupils are equal, round, and reactive to light.   Cardiovascular:      Rate and Rhythm: Normal rate and regular rhythm.      Pulses: Normal pulses.      Heart sounds: Normal heart sounds. No murmur heard.     No friction rub. No gallop.   Pulmonary:      Effort: Pulmonary effort is normal.      Breath sounds: Normal breath sounds. No wheezing, rhonchi or rales.   Abdominal:      Palpations: Abdomen is soft.       Tenderness: There is no abdominal tenderness.   Musculoskeletal:         General: No tenderness. Normal range of motion.      Cervical back: Normal range of motion and neck supple. No tenderness.   Skin:     General: Skin is warm.      Capillary Refill: Capillary refill takes less than 2 seconds.   Neurological:      General: No focal deficit present.      Mental Status: She is alert and oriented to person, place, and time. Mental status is at baseline.      Cranial Nerves: No cranial nerve deficit.      Motor: No weakness.      Coordination: Coordination normal.   Psychiatric:         Mood and Affect: Mood normal.         Behavior: Behavior normal.         Thought Content: Thought content normal.         Judgment: Judgment normal.         Results Reviewed       Procedure Component Value Units Date/Time    UA w Reflex to Microscopic w Reflex to Culture [[id number]] Collected: 09/27/24 0923    Lab Status: Final result Specimen: Urine, Other Updated: 09/27/24 0950     Color, UA Yellow     Clarity, UA Clear     Specific Gravity, UA 1.015     pH, UA 7.0     Leukocytes, UA Negative     Nitrite, UA Negative     Protein, UA Negative mg/dl      Glucose, UA Negative mg/dl      Ketones, UA Negative mg/dl      Urobilinogen, UA <2.0 mg/dl      Bilirubin, UA Negative     Occult Blood, UA Negative    Comprehensive metabolic panel [[id number]]  (Abnormal) Collected: 09/27/24 0844    Lab Status: Final result Specimen: Blood from Hand, Right Updated: 09/27/24 0908     Sodium 129 mmol/L      Potassium 4.2 mmol/L      Chloride 97 mmol/L      CO2 27 mmol/L      ANION GAP 5 mmol/L      BUN 10 mg/dL      Creatinine 0.63 mg/dL      Glucose 99 mg/dL      Calcium 8.7 mg/dL      AST 17 U/L      ALT 10 U/L      Alkaline Phosphatase 38 U/L      Total Protein 6.7 g/dL      Albumin 4.0 g/dL      Total Bilirubin 0.28 mg/dL      eGFR 99 ml/min/1.73sq m     Narrative:      National [employer] guidelines for Chronic Kidney Disease  (CKD):     Stage 1 with normal or high GFR (GFR > 90 mL/min/1.73 square meters)    Stage 2 Mild CKD (GFR = 60-89 mL/min/1.73 square meters)    Stage 3A Moderate CKD (GFR = 45-59 mL/min/1.73 square meters)    Stage 3B Moderate CKD (GFR = 30-44 mL/min/1.73 square meters)    Stage 4 Severe CKD (GFR = 15-29 mL/min/1.73 square meters)    Stage 5 End Stage CKD (GFR <15 mL/min/1.73 square meters)  Note: GFR calculation is accurate only with a steady state creatinine    CBC and differential [[id number]]  (Abnormal) Collected: 09/27/24 0844    Lab Status: Final result Specimen: Blood from Hand, Right Updated: 09/27/24 0857     WBC 2.59 Thousand/uL      RBC 3.57 Million/uL      Hemoglobin 10.8 g/dL      Hematocrit 31.9 %      MCV 89 fL      MCH 30.3 pg      MCHC 33.9 g/dL      RDW 12.5 %      MPV 8.5 fL      Platelets 234 Thousands/uL      nRBC 0 /100 WBCs      Segmented % 60 %      Immature Grans % 0 %      Lymphocytes % 27 %      Monocytes % 9 %      Eosinophils Relative 4 %      Basophils Relative 0 %      Absolute Neutrophils 1.52 Thousands/µL      Absolute Immature Grans 0.01 Thousand/uL      Absolute Lymphocytes 0.71 Thousands/µL      Absolute Monocytes 0.23 Thousand/µL      Eosinophils Absolute 0.11 Thousand/µL      Basophils Absolute 0.01 Thousands/µL             CT head without contrast   Final Interpretation by Chad Norris MD (09/27 3068)      No acute intracranial abnormality.                  Workstation performed: [id number]             Procedures    ED Medication and Procedure Management   Prior to Admission Medications   Prescriptions Last Dose Informant Patient Reported? Taking?   Cholecalciferol (VITAMIN D3) 1,000 units tablet   No No   Sig: Take 2 tablets (2,000 Units total) by mouth daily   DULoxetine (CYMBALTA) 30 mg delayed release capsule   No No   Sig: Take 3 capsules (90 mg total) by mouth daily   MULTIPLE VITAMIN PO  Care Giver Yes No   Sig: Take 1 tablet by mouth in the morning   Probiotic  Product (PROBIOTIC PO)  Care Giver Yes No   Sig: Take 250 mg by mouth 2 (two) times a day   amLODIPine (NORVASC) 10 mg tablet   No No   Sig: Take 1 tablet (10 mg total) by mouth daily   apixaban (ELIQUIS) 5 mg  Care Giver Yes No   Sig: Take 5 mg by mouth 2 (two) times a day   busPIRone (BUSPAR) 10 mg tablet   No No   Sig: Take 2 tablets (20 mg total) by mouth 2 (two) times a day   carBAMazepine (TEGretol) 200 mg tablet   No No   Sig: Take 5 tablets (1,000 mg total) by mouth daily at bedtime   clonazePAM (KlonoPIN) 0.5 mg tablet   No No   Sig: Take 1 tablet (0.5 mg total) by mouth daily at bedtime for 10 days   cyanocobalamin (VITAMIN B-12) 1000 MCG tablet   No No   Sig: Take 1 tablet (1,000 mcg total) by mouth daily   fenofibrate (TRICOR) 145 mg tablet   Yes No   Sig: Take 145 mg by mouth every evening In pm   gabapentin (NEURONTIN) 300 mg capsule   No No   Sig: Take 1 capsule (300 mg total) by mouth daily at bedtime   metoprolol succinate (TOPROL-XL) 25 mg 24 hr tablet   No No   Sig: Take 3 tablets (75 mg total) by mouth daily   nystatin (MYCOSTATIN) powder  Care Giver Yes No   Sig: Apply 1 application. topically daily at bedtime Under the breasts   omeprazole (PriLOSEC) 40 MG capsule   Yes No   Sig: Take 40 mg by mouth daily   pravastatin (PRAVACHOL) 20 mg tablet  Care Giver Yes No   Sig: Take 20 mg by mouth every evening   spironolactone (ALDACTONE) 25 mg tablet   No No   Sig: Take 1 tablet (25 mg total) by mouth daily   ziprasidone (GEODON) 80 mg capsule   No No   Sig: Take 1 capsule (80 mg total) by mouth 2 (two) times a day with meals      Facility-Administered Medications: None     Discharge Medication List as of 9/27/2024 11:50 AM        CONTINUE these medications which have NOT CHANGED    Details   amLODIPine (NORVASC) 10 mg tablet Take 1 tablet (10 mg total) by mouth daily, Starting Tue 6/18/2024, Until Thu 7/18/2024, Normal      apixaban (ELIQUIS) 5 mg Take 5 mg by mouth 2 (two) times a day, Historical  Med      busPIRone (BUSPAR) 10 mg tablet Take 2 tablets (20 mg total) by mouth 2 (two) times a day, Starting Thu 6/6/2024, Until Sat 7/6/2024, Normal      carBAMazepine (TEGretol) 200 mg tablet Take 5 tablets (1,000 mg total) by mouth daily at bedtime, Starting Thu 6/6/2024, Until Sat 7/6/2024, Normal      Cholecalciferol (VITAMIN D3) 1,000 units tablet Take 2 tablets (2,000 Units total) by mouth daily, Starting Thu 6/6/2024, Until Sat 7/6/2024, Normal      clonazePAM (KlonoPIN) 0.5 mg tablet Take 1 tablet (0.5 mg total) by mouth daily at bedtime for 10 days, Starting Thu 6/6/2024, Until Sun 6/16/2024, Normal      cyanocobalamin (VITAMIN B-12) 1000 MCG tablet Take 1 tablet (1,000 mcg total) by mouth daily, Starting Thu 6/6/2024, Until Sat 7/6/2024, Normal      DULoxetine (CYMBALTA) 30 mg delayed release capsule Take 3 capsules (90 mg total) by mouth daily, Starting Thu 6/6/2024, Until Sat 7/6/2024, Normal      fenofibrate (TRICOR) 145 mg tablet Take 145 mg by mouth every evening In pm, Historical Med      gabapentin (NEURONTIN) 300 mg capsule Take 1 capsule (300 mg total) by mouth daily at bedtime, Starting Thu 6/6/2024, Until Sat 7/6/2024, Normal      metoprolol succinate (TOPROL-XL) 25 mg 24 hr tablet Take 3 tablets (75 mg total) by mouth daily, Starting Tue 6/18/2024, Until Thu 7/18/2024, Normal      MULTIPLE VITAMIN PO Take 1 tablet by mouth in the morning, Historical Med      nystatin (MYCOSTATIN) powder Apply 1 application. topically daily at bedtime Under the breasts, Historical Med      omeprazole (PriLOSEC) 40 MG capsule Take 40 mg by mouth daily, Historical Med      pravastatin (PRAVACHOL) 20 mg tablet Take 20 mg by mouth every evening, Historical Med      Probiotic Product (PROBIOTIC PO) Take 250 mg by mouth 2 (two) times a day, Historical Med      spironolactone (ALDACTONE) 25 mg tablet Take 1 tablet (25 mg total) by mouth daily, Starting Tue 6/18/2024, Until Thu 7/18/2024, Normal      ziprasidone  (GEODON) 80 mg capsule Take 1 capsule (80 mg total) by mouth 2 (two) times a day with meals, Starting Thu 6/6/2024, Until Sat 7/6/2024, Normal           No discharge procedures on file.  ED SEPSIS DOCUMENTATION   Time reflects when diagnosis was documented in both MDM as applicable and the Disposition within this note       Time User Action Codes Description Comment    9/27/2024 11:48 AM Franki Lerner Add [R55] Near syncope                  Franki Lerner V DO  09/27/24 9202

## 2024-09-27 NOTE — ED ATTENDING ATTESTATION
9/27/2024  ILucita, DO, saw and evaluated the patient. I have discussed the patient with the resident/non-physician practitioner and agree with the resident's/non-physician practitioner's findings, Plan of Care, and MDM as documented in the resident's/non-physician practitioner's note, except where noted. All available labs and Radiology studies were reviewed.  I was present for key portions of any procedure(s) performed by the resident/non-physician practitioner and I was immediately available to provide assistance.       At this point I agree with the current assessment done in the Emergency Department.  I have conducted an independent evaluation of this patient a history and physical is as follows:    ED Course  ED Course as of 09/29/24 0626   Fri Sep 27, 2024   1153 Patient was able to ambulate without difficulty using her walker   58 year old female presents from her group home for evaluation of not feeling well.  Patient reports feeling lightheaded and near syncopal this am while brushing her teeth.  Patient reports no chest pain, no trouble breathing.  Patient has a history of TBI and is a poor historian.  Patient is reporting headache.  We contacted her mother for more information.  Mother reports that the patient has been complaining of headache for the past 2 weeks.  She has been complaining of recurrent episodes of lightheadedness.  She feels increased thirst and has been urinating frequently but denies dysuria.  Past Medical history: Traumatic brain injury, deep vein thrombosis, migraine headaches, schizoaffective disorder, hyperlipidemia    Physical exam: Patient is awake and alert, no acute distress.  Resting comfortably.  Pupils are equal and reactive.  No nystagmus.  Neck is supple without JVD.  Heart is regular rate and rhythm without ectopy.  Lungs are clear to auscultation.  Chest wall is nontender to palpation.  Abdomen is soft, nontender, nondistended with normal active bowel sounds.  No  lower extremity edema.  No focal motor or sensory deficits.  Speech is slow, slightly slurred but appropriate.     Assessment/plan: 58-year-old female with a history of previous TBI presents for evaluation of not feeling well, with headache and lightheadedness, difficulty ambulating.  Differential diagnosis includes but is not limited to acute dehydration, hypotension, electrolyte abnormality, medication side effect, cardiac dysrhythmia, intracranial neoplasm, intracranial hemorrhage.    Will check electrolytes, EKG, start IV fluid hydration, check urinalysis.  Will check CT head.  Will evaluate patient's ability to ambulate, reassess.    Critical Care Time  Procedures       Allergy; Fall Risk;

## 2024-09-29 LAB
ATRIAL RATE: 66 BPM
P AXIS: 19 DEGREES
PR INTERVAL: 190 MS
QRS AXIS: -15 DEGREES
QRSD INTERVAL: 96 MS
QT INTERVAL: 404 MS
QTC INTERVAL: 423 MS
T WAVE AXIS: 17 DEGREES
VENTRICULAR RATE: 66 BPM

## 2024-09-29 PROCEDURE — 93010 ELECTROCARDIOGRAM REPORT: CPT | Performed by: INTERNAL MEDICINE

## 2024-12-23 ENCOUNTER — APPOINTMENT (EMERGENCY)
Dept: RADIOLOGY | Facility: HOSPITAL | Age: 59
End: 2024-12-23
Payer: MEDICARE

## 2024-12-23 ENCOUNTER — HOSPITAL ENCOUNTER (OUTPATIENT)
Facility: HOSPITAL | Age: 59
Setting detail: OBSERVATION
Discharge: HOME | End: 2024-12-24
Attending: EMERGENCY MEDICINE | Admitting: INTERNAL MEDICINE
Payer: MEDICARE

## 2024-12-23 ENCOUNTER — APPOINTMENT (EMERGENCY)
Dept: CT IMAGING | Facility: HOSPITAL | Age: 59
End: 2024-12-23
Payer: MEDICARE

## 2024-12-23 DIAGNOSIS — Z91.89 AT RISK FOR POLYPHARMACY: ICD-10-CM

## 2024-12-23 DIAGNOSIS — I10 PRIMARY HYPERTENSION: ICD-10-CM

## 2024-12-23 DIAGNOSIS — T42.1X1A ACCIDENTAL CARBAMAZEPINE POISONING, INITIAL ENCOUNTER: ICD-10-CM

## 2024-12-23 DIAGNOSIS — E87.1 HYPONATREMIA: ICD-10-CM

## 2024-12-23 DIAGNOSIS — F25.0 SCHIZOAFFECTIVE DISORDER, BIPOLAR TYPE (HCC): ICD-10-CM

## 2024-12-23 DIAGNOSIS — R26.2 AMBULATORY DYSFUNCTION: Primary | ICD-10-CM

## 2024-12-23 PROBLEM — R42 DIZZINESS: Status: ACTIVE | Noted: 2024-12-23

## 2024-12-23 PROBLEM — D64.9 ANEMIA: Status: ACTIVE | Noted: 2024-12-23

## 2024-12-23 LAB
ANION GAP SERPL CALCULATED.3IONS-SCNC: 5 MMOL/L (ref 4–13)
ATRIAL RATE: 57 BPM
BASE EX.OXY STD BLDV CALC-SCNC: 92.6 % (ref 60–80)
BASE EXCESS BLDV CALC-SCNC: -2 MMOL/L
BASOPHILS # BLD AUTO: 0.02 THOUSANDS/ÂΜL (ref 0–0.1)
BASOPHILS NFR BLD AUTO: 0 % (ref 0–1)
BILIRUB UR QL STRIP: NEGATIVE
BUN SERPL-MCNC: 10 MG/DL (ref 5–25)
CALCIUM SERPL-MCNC: 8.8 MG/DL (ref 8.4–10.2)
CARBAMAZEPINE SERPL-MCNC: 15.4 UG/ML (ref 4–12)
CHLORIDE SERPL-SCNC: 95 MMOL/L (ref 96–108)
CHOLEST SERPL-MCNC: 140 MG/DL (ref ?–200)
CLARITY UR: CLEAR
CO2 SERPL-SCNC: 28 MMOL/L (ref 21–32)
COLOR UR: NORMAL
CREAT SERPL-MCNC: 0.6 MG/DL (ref 0.6–1.3)
EOSINOPHIL # BLD AUTO: 0.13 THOUSAND/ÂΜL (ref 0–0.61)
EOSINOPHIL NFR BLD AUTO: 3 % (ref 0–6)
ERYTHROCYTE [DISTWIDTH] IN BLOOD BY AUTOMATED COUNT: 12.5 % (ref 11.6–15.1)
EST. AVERAGE GLUCOSE BLD GHB EST-MCNC: 126 MG/DL
GFR SERPL CREATININE-BSD FRML MDRD: 100 ML/MIN/1.73SQ M
GLUCOSE SERPL-MCNC: 97 MG/DL (ref 65–140)
GLUCOSE UR STRIP-MCNC: NEGATIVE MG/DL
HBA1C MFR BLD: 6 %
HCO3 BLDV-SCNC: 23 MMOL/L (ref 24–30)
HCT VFR BLD AUTO: 32.7 % (ref 34.8–46.1)
HDLC SERPL-MCNC: 46 MG/DL
HGB BLD-MCNC: 11 G/DL (ref 11.5–15.4)
HGB UR QL STRIP.AUTO: NEGATIVE
IMM GRANULOCYTES # BLD AUTO: 0.02 THOUSAND/UL (ref 0–0.2)
IMM GRANULOCYTES NFR BLD AUTO: 0 % (ref 0–2)
KETONES UR STRIP-MCNC: NEGATIVE MG/DL
LDLC SERPL CALC-MCNC: 73 MG/DL (ref 0–100)
LEUKOCYTE ESTERASE UR QL STRIP: NEGATIVE
LYMPHOCYTES # BLD AUTO: 1.08 THOUSANDS/ÂΜL (ref 0.6–4.47)
LYMPHOCYTES NFR BLD AUTO: 24 % (ref 14–44)
MCH RBC QN AUTO: 30.1 PG (ref 26.8–34.3)
MCHC RBC AUTO-ENTMCNC: 33.6 G/DL (ref 31.4–37.4)
MCV RBC AUTO: 89 FL (ref 82–98)
MONOCYTES # BLD AUTO: 0.26 THOUSAND/ÂΜL (ref 0.17–1.22)
MONOCYTES NFR BLD AUTO: 6 % (ref 4–12)
NEUTROPHILS # BLD AUTO: 3.09 THOUSANDS/ÂΜL (ref 1.85–7.62)
NEUTS SEG NFR BLD AUTO: 67 % (ref 43–75)
NITRITE UR QL STRIP: NEGATIVE
NRBC BLD AUTO-RTO: 0 /100 WBCS
O2 CT BLDV-SCNC: 15.1 ML/DL
P AXIS: 53 DEGREES
PCO2 BLDV: 40.2 MM HG (ref 42–50)
PH BLDV: 7.38 [PH] (ref 7.3–7.4)
PH UR STRIP.AUTO: 7 [PH]
PLATELET # BLD AUTO: 344 THOUSANDS/UL (ref 149–390)
PMV BLD AUTO: 8.1 FL (ref 8.9–12.7)
PO2 BLDV: 83.3 MM HG (ref 35–45)
POTASSIUM SERPL-SCNC: 4.2 MMOL/L (ref 3.5–5.3)
PR INTERVAL: 216 MS
PROT UR STRIP-MCNC: NEGATIVE MG/DL
QRS AXIS: -5 DEGREES
QRSD INTERVAL: 100 MS
QT INTERVAL: 424 MS
QTC INTERVAL: 412 MS
RBC # BLD AUTO: 3.66 MILLION/UL (ref 3.81–5.12)
SODIUM SERPL-SCNC: 128 MMOL/L (ref 135–147)
SP GR UR STRIP.AUTO: 1.01 (ref 1–1.03)
T WAVE AXIS: 5 DEGREES
TRIGL SERPL-MCNC: 105 MG/DL (ref ?–150)
UROBILINOGEN UR STRIP-ACNC: <2 MG/DL
VENTRICULAR RATE: 57 BPM
WBC # BLD AUTO: 4.6 THOUSAND/UL (ref 4.31–10.16)

## 2024-12-23 PROCEDURE — 99285 EMERGENCY DEPT VISIT HI MDM: CPT

## 2024-12-23 PROCEDURE — 82805 BLOOD GASES W/O2 SATURATION: CPT | Performed by: EMERGENCY MEDICINE

## 2024-12-23 PROCEDURE — 36415 COLL VENOUS BLD VENIPUNCTURE: CPT | Performed by: EMERGENCY MEDICINE

## 2024-12-23 PROCEDURE — 71046 X-RAY EXAM CHEST 2 VIEWS: CPT

## 2024-12-23 PROCEDURE — 80156 ASSAY CARBAMAZEPINE TOTAL: CPT | Performed by: EMERGENCY MEDICINE

## 2024-12-23 PROCEDURE — 80048 BASIC METABOLIC PNL TOTAL CA: CPT | Performed by: EMERGENCY MEDICINE

## 2024-12-23 PROCEDURE — 99285 EMERGENCY DEPT VISIT HI MDM: CPT | Performed by: EMERGENCY MEDICINE

## 2024-12-23 PROCEDURE — 70450 CT HEAD/BRAIN W/O DYE: CPT

## 2024-12-23 PROCEDURE — 82728 ASSAY OF FERRITIN: CPT | Performed by: INTERNAL MEDICINE

## 2024-12-23 PROCEDURE — 93005 ELECTROCARDIOGRAM TRACING: CPT

## 2024-12-23 PROCEDURE — 85025 COMPLETE CBC W/AUTO DIFF WBC: CPT | Performed by: EMERGENCY MEDICINE

## 2024-12-23 PROCEDURE — 80061 LIPID PANEL: CPT | Performed by: INTERNAL MEDICINE

## 2024-12-23 PROCEDURE — 84300 ASSAY OF URINE SODIUM: CPT

## 2024-12-23 PROCEDURE — 83540 ASSAY OF IRON: CPT | Performed by: INTERNAL MEDICINE

## 2024-12-23 PROCEDURE — 83550 IRON BINDING TEST: CPT | Performed by: INTERNAL MEDICINE

## 2024-12-23 PROCEDURE — 81003 URINALYSIS AUTO W/O SCOPE: CPT | Performed by: INTERNAL MEDICINE

## 2024-12-23 PROCEDURE — 83036 HEMOGLOBIN GLYCOSYLATED A1C: CPT | Performed by: INTERNAL MEDICINE

## 2024-12-23 PROCEDURE — 99223 1ST HOSP IP/OBS HIGH 75: CPT | Performed by: INTERNAL MEDICINE

## 2024-12-23 PROCEDURE — 83935 ASSAY OF URINE OSMOLALITY: CPT

## 2024-12-23 RX ORDER — PRAVASTATIN SODIUM 10 MG
20 TABLET ORAL EVERY EVENING
Status: DISCONTINUED | OUTPATIENT
Start: 2024-12-23 | End: 2024-12-24 | Stop reason: HOSPADM

## 2024-12-23 RX ORDER — MECLIZINE HCL 12.5 MG 12.5 MG/1
25 TABLET ORAL EVERY 8 HOURS SCHEDULED
Status: COMPLETED | OUTPATIENT
Start: 2024-12-23 | End: 2024-12-24

## 2024-12-23 RX ORDER — CARBAMAZEPINE 200 MG/1
1000 TABLET ORAL
Status: DISCONTINUED | OUTPATIENT
Start: 2024-12-23 | End: 2024-12-24

## 2024-12-23 RX ORDER — QUETIAPINE 200 MG/1
200 TABLET, FILM COATED, EXTENDED RELEASE ORAL EVERY 12 HOURS SCHEDULED
Status: DISCONTINUED | OUTPATIENT
Start: 2024-12-23 | End: 2024-12-24 | Stop reason: HOSPADM

## 2024-12-23 RX ORDER — PANTOPRAZOLE SODIUM 40 MG/1
40 TABLET, DELAYED RELEASE ORAL
Status: DISCONTINUED | OUTPATIENT
Start: 2024-12-24 | End: 2024-12-24 | Stop reason: HOSPADM

## 2024-12-23 RX ORDER — MECLIZINE HCL 12.5 MG 12.5 MG/1
12.5 TABLET ORAL EVERY 8 HOURS SCHEDULED
Status: CANCELLED | OUTPATIENT
Start: 2024-12-23

## 2024-12-23 RX ORDER — SODIUM CHLORIDE, SODIUM LACTATE, POTASSIUM CHLORIDE, CALCIUM CHLORIDE 600; 310; 30; 20 MG/100ML; MG/100ML; MG/100ML; MG/100ML
75 INJECTION, SOLUTION INTRAVENOUS CONTINUOUS
Status: DISPENSED | OUTPATIENT
Start: 2024-12-23 | End: 2024-12-24

## 2024-12-23 RX ORDER — CLONAZEPAM 0.5 MG/1
0.5 TABLET ORAL
Status: DISCONTINUED | OUTPATIENT
Start: 2024-12-23 | End: 2024-12-24 | Stop reason: HOSPADM

## 2024-12-23 RX ORDER — FENOFIBRATE 145 MG/1
145 TABLET, COATED ORAL EVERY EVENING
Status: DISCONTINUED | OUTPATIENT
Start: 2024-12-23 | End: 2024-12-24 | Stop reason: HOSPADM

## 2024-12-23 RX ORDER — SPIRONOLACTONE 25 MG/1
50 TABLET ORAL DAILY
Status: DISCONTINUED | OUTPATIENT
Start: 2024-12-23 | End: 2024-12-24 | Stop reason: HOSPADM

## 2024-12-23 RX ORDER — AMLODIPINE BESYLATE 10 MG/1
10 TABLET ORAL DAILY
Status: DISCONTINUED | OUTPATIENT
Start: 2024-12-23 | End: 2024-12-24 | Stop reason: HOSPADM

## 2024-12-23 RX ADMIN — FENOFIBRATE 145 MG: 145 TABLET ORAL at 17:10

## 2024-12-23 RX ADMIN — APIXABAN 5 MG: 5 TABLET, FILM COATED ORAL at 17:09

## 2024-12-23 RX ADMIN — PRAVASTATIN SODIUM 20 MG: 10 TABLET ORAL at 17:10

## 2024-12-23 RX ADMIN — QUETIAPINE 200 MG: 200 TABLET, FILM COATED, EXTENDED RELEASE ORAL at 15:28

## 2024-12-23 RX ADMIN — DULOXETINE HYDROCHLORIDE 90 MG: 60 CAPSULE, DELAYED RELEASE ORAL at 15:28

## 2024-12-23 RX ADMIN — MECLIZINE 25 MG: 12.5 TABLET ORAL at 17:09

## 2024-12-23 RX ADMIN — MECLIZINE 25 MG: 12.5 TABLET ORAL at 21:54

## 2024-12-23 RX ADMIN — CLONAZEPAM 0.5 MG: 0.5 TABLET ORAL at 21:54

## 2024-12-23 RX ADMIN — QUETIAPINE 200 MG: 200 TABLET, FILM COATED, EXTENDED RELEASE ORAL at 21:54

## 2024-12-23 RX ADMIN — SODIUM CHLORIDE, SODIUM LACTATE, POTASSIUM CHLORIDE, AND CALCIUM CHLORIDE 125 ML/HR: .6; .31; .03; .02 INJECTION, SOLUTION INTRAVENOUS at 13:10

## 2024-12-23 NOTE — ASSESSMENT & PLAN NOTE
Patient's sodium levels are around 129-130 since last September.  Sodium on admission 128.  Likely caused by carbamazepine.    Plan  Monitor sodium levels

## 2024-12-23 NOTE — ASSESSMENT & PLAN NOTE
Patient is on carbamazepine 1000 mg daily, Klonopin 0.5 at bedtime, Cymbalta 90 mg daily, quetiapine 200 mg twice daily.  Carbamazepine levels on admission elevated.    Plan  Hold carbamazepine  Check carbamazepine levels a.m.  Continue all other psych medications.

## 2024-12-23 NOTE — ED NOTES
Update given to Esther at pt facility at this time. Aware of admission and dx.      Denise Pederson RN  12/23/24 2705

## 2024-12-23 NOTE — ASSESSMENT & PLAN NOTE
Patient had a TBI when she was 14 years old.  No focal neurological deficits.  But need assistance with walking.  Having recurrent falls.    Plan  Fall precautions  PT/OT

## 2024-12-23 NOTE — ED PROCEDURE NOTE
PROCEDURE  ECG 12 Lead Documentation Only    Date/Time: 12/23/2024 11:26 AM    Performed by: Petros Sanders MD  Authorized by: Petros Sanders MD    Indications / Diagnosis:  DIZZINESS  ECG reviewed by me, the ED Provider: yes    Patient location:  ED and bedside  Previous ECG:     Previous ECG:  Compared to current    Comparison ECG info:  6/14/24    Similarity:  No change    Comparison to cardiac monitor: Yes    Interpretation:     Interpretation: non-specific    Rate:     ECG rate:  57    ECG rate assessment: bradycardic    Rhythm:     Rhythm: sinus bradycardia    Ectopy:     Ectopy: none    QRS:     QRS axis:  Normal    QRS intervals:  Normal  Conduction:     Conduction: abnormal      Abnormal conduction: 1st degree    ST segments:     ST segments:  Normal  T waves:     T waves: flattening      Flattening:  III and V1  Other findings:     Other findings: U wave    Comments:      NO ECG SIGNS OF ISCHEMIA/ INJURY        Petros Sanders MD  12/23/24 1128

## 2024-12-23 NOTE — ASSESSMENT & PLAN NOTE
Hemoglobin 11.  Baseline hemoglobin around last June was 12.  Denies any evidence of bleeding.    Plan  Check iron panel  Monitor for bleeding.

## 2024-12-23 NOTE — ED PROVIDER NOTES
ED Disposition       None          Assessment & Plan       Medical Decision Making  Pt with non focal neur o exam- pt with - tp has baseline ambulatory dysfunction - but tday falls x 2--  after h and p- likely polypharmacy-- pt will need labs-  head ct and re-eval     Problems Addressed:  Accidental carbamazepine poisoning, initial encounter: chronic illness or injury     Details: See chart and above   Ambulatory dysfunction: acute illness or injury     Details: Acute on chronic   At risk for polypharmacy: chronic illness or injury  Hyponatremia: chronic illness or injury     Details: At baseline     Amount and/or Complexity of Data Reviewed  Independent Historian: caregiver  External Data Reviewed: labs, radiology, ECG and notes.     Details: All reviewed   Labs: ordered. Decision-making details documented in ED Course.     Details: All reviewed and comapred   Radiology: ordered and independent interpretation performed. Decision-making details documented in ED Course.     Details: All reviewed and compared   ECG/medicine tests: ordered and independent interpretation performed. Decision-making details documented in ED Course.     Details: All reviewed and compared by er md  Discussion of management or test interpretation with external provider(s): Moderate amount of er md thought complexity and workup     Risk  Prescription drug management.  Decision regarding hospitalization.        ED Course as of 12/23/24 1501   Mon Dec 23, 2024   0923 Er md note- pt placed on augmentin 3 days ago for 10 day course for 3 weeks of uri./cough as per group  home worker    0924 Er md note- recent albs all reviewed by er md   1220 Cxr pa/lat- compared to previous 3/2/24- no sign changes- no change in mediastinum/cardiac silhouette- no free/sq air- no infiltrate- ptx- pulm edema- pleural effusions    1229 Er md note current labs reviewed and compared by er md   1242 Er md note- no network toxicology available   at present--         Medications - No data to display    ED Risk Strat Scores                                              History of Present Illness       Chief Complaint   Patient presents with    Fall     Pt from [facility] s/p witnessed fall at group home. -HS -LOC        Past Medical History:   Diagnosis Date    Anxiety and depression     Chronic deep vein thrombosis (DVT) (HCC)     LLE    DVT (deep venous thrombosis) ([facility])     History of traumatic brain injury     HTN (hypertension)     Hyperlipidemia     Hypertension     Insomnia     Obesity     RUPAL (obstructive sleep apnea)     Schizoaffective disorder (HCC)     TBI (traumatic brain injury) ([facility])       No past surgical history on file.   No family history on file.   Social History     Tobacco Use    Smoking status: Never    Smokeless tobacco: Never   Vaping Use    Vaping status: Never Used   Substance Use Topics    Alcohol use: Not Currently    Drug use: Never      E-Cigarette/Vaping    E-Cigarette Use Never User       E-Cigarette/Vaping Substances      I have reviewed and agree with the history as documented.     59 yr female from group home --  3 weeks of uri/cough - placed on augmentin 3 days ago-- usually walks with walker - - has rle brace on - chronic- has belt around her for group home workers to help her with walking-- -- this adam as per ems /caregiver-  after am meds- c/o feeling dizzy- not room spinning as per pt and fell x 2-- with walker- but no injury was caught by staff-- pt c/o feeling dizzy-- caregiver present states speech is slurred-- er md reviewed group home trip sheet--  statements found that pt family feel that pt is over medicated and  seems tired with slurred speech at times      History provided by:  Caregiver   used: No    Fall  Mechanism of injury: fall    Associated symptoms: no back pain, no headaches, no hearing loss, no neck pain and no seizures        Review of Systems   Constitutional: Negative.    HENT:   Positive for congestion. Negative for dental problem, drooling, ear discharge, ear pain, facial swelling, hearing loss, mouth sores, nosebleeds, postnasal drip, rhinorrhea, sinus pressure, sinus pain, sneezing, sore throat, tinnitus, trouble swallowing and voice change.    Eyes: Negative.    Respiratory:  Positive for cough. Negative for apnea, choking, chest tightness, shortness of breath, wheezing and stridor.    Cardiovascular: Negative.    Gastrointestinal: Negative.    Endocrine: Negative.    Genitourinary: Negative.    Musculoskeletal:  Positive for gait problem. Negative for arthralgias, back pain, joint swelling, myalgias, neck pain and neck stiffness.   Skin: Negative.    Allergic/Immunologic: Negative.    Neurological:  Negative for dizziness, tremors, seizures, syncope, facial asymmetry, speech difficulty, weakness, light-headedness, numbness and headaches.   Hematological: Negative.    Psychiatric/Behavioral: Negative.             Objective       ED Triage Vitals [12/23/24 0851]   Temperature Pulse Blood Pressure Respirations SpO2 Patient Position - Orthostatic VS   97.8 °F (36.6 °C) 63 128/81 18 97 % Lying      Temp Source Heart Rate Source BP Location FiO2 (%) Pain Score    Oral Monitor Right arm -- --      Vitals      Date and Time Temp Pulse SpO2 Resp BP Pain Score FACES Pain Rating User   12/23/24 0851 97.8 °F (36.6 °C) 63 97 % 18 128/81 -- -- MM            Physical Exam  Vitals and nursing note reviewed.   Constitutional:       General: She is not in acute distress.     Appearance: Normal appearance. She is not ill-appearing, toxic-appearing or diaphoretic.      Comments: Avss-  pulse ox 97 % on ra- interpretation is normal- no intervention    HENT:      Head: Normocephalic and atraumatic.      Comments: No scalp contusion/ hematoma/ tendenress-      Right Ear: Tympanic membrane, ear canal and external ear normal. There is impacted cerumen.      Left Ear: Tympanic membrane, ear canal and external  ear normal. There is impacted cerumen.      Ears:      Comments: No hemotympnaum- normal mastoid areas bilaterally      Nose: Congestion present. No rhinorrhea.      Mouth/Throat:      Mouth: Mucous membranes are moist.      Pharynx: Oropharynx is clear. No oropharyngeal exudate or posterior oropharyngeal erythema.      Comments: Sym palate rise   Eyes:      General: No scleral icterus.        Right eye: No discharge.         Left eye: No discharge.      Extraocular Movements: Extraocular movements intact.      Conjunctiva/sclera: Conjunctivae normal.      Pupils: Pupils are equal, round, and reactive to light.      Comments: Mm pink    Neck:      Vascular: No carotid bruit.      Comments: No pmt c/t/l/s spine   Cardiovascular:      Rate and Rhythm: Normal rate and regular rhythm.      Pulses: Normal pulses.      Heart sounds: Normal heart sounds. No murmur heard.     No friction rub. No gallop.   Pulmonary:      Effort: Pulmonary effort is normal. No respiratory distress.      Breath sounds: Normal breath sounds. No stridor. No wheezing, rhonchi or rales.   Chest:      Chest wall: No tenderness.   Abdominal:      General: Bowel sounds are normal. There is no distension.      Palpations: Abdomen is soft. There is no mass.      Tenderness: There is no abdominal tenderness. There is no right CVA tenderness, left CVA tenderness, guarding or rebound.      Hernia: No hernia is present.      Comments: Soft nt/nd- no hsm- no cva tenderness- no asc ites- no peritoneal signs    Musculoskeletal:         General: No swelling, tenderness, deformity or signs of injury. Normal range of motion.      Cervical back: Normal range of motion and neck supple. No rigidity or tenderness.      Right lower leg: No edema.      Left lower leg: No edema.      Comments: Lle brace - equal bilateral radial/dp pulses-    Lymphadenopathy:      Cervical: No cervical adenopathy.   Skin:     General: Skin is warm.      Capillary Refill: Capillary  refill takes less than 2 seconds.      Coloration: Skin is not jaundiced or pale.      Findings: No bruising, erythema, lesion or rash.   Neurological:      General: No focal deficit present.      Mental Status: She is alert. Mental status is at baseline.      Cranial Nerves: No cranial nerve deficit.      Sensory: No sensory deficit.      Motor: No weakness.      Comments: Normal non focal neuro exam- normal cn exam- normal bue/ble strenght-sensation- pt unable to walk with walker in er-    Psychiatric:         Mood and Affect: Mood normal.         Behavior: Behavior normal.         Results Reviewed       Procedure Component Value Units Date/Time    Blood gas, venous [[id number]]     Lab Status: No result Specimen: Blood     Basic metabolic panel [[id number]]     Lab Status: No result Specimen: Blood     CBC and differential [[id number]]     Lab Status: No result Specimen: Blood     Carbamazepine level, total [[id number]]     Lab Status: No result Specimen: Blood             CT head without contrast    (Results Pending)   XR chest pa and lateral    (Results Pending)       Procedures    ED Medication and Procedure Management   Prior to Admission Medications   Prescriptions Last Dose Informant Patient Reported? Taking?   Cholecalciferol (VITAMIN D3) 1,000 units tablet   No No   Sig: Take 2 tablets (2,000 Units total) by mouth daily   DULoxetine (CYMBALTA) 30 mg delayed release capsule   No No   Sig: Take 3 capsules (90 mg total) by mouth daily   MULTIPLE VITAMIN PO  Care Giver Yes No   Sig: Take 1 tablet by mouth in the morning   Probiotic Product (PROBIOTIC PO)  Care Giver Yes No   Sig: Take 250 mg by mouth 2 (two) times a day   amLODIPine (NORVASC) 10 mg tablet   No No   Sig: Take 1 tablet (10 mg total) by mouth daily   apixaban (ELIQUIS) 5 mg  Care Giver Yes No   Sig: Take 5 mg by mouth 2 (two) times a day   busPIRone (BUSPAR) 10 mg tablet   No No   Sig: Take 2 tablets (20 mg total) by mouth 2 (two) times a day    carBAMazepine (TEGretol) 200 mg tablet   No No   Sig: Take 5 tablets (1,000 mg total) by mouth daily at bedtime   clonazePAM (KlonoPIN) 0.5 mg tablet   No No   Sig: Take 1 tablet (0.5 mg total) by mouth daily at bedtime for 10 days   cyanocobalamin (VITAMIN B-12) 1000 MCG tablet   No No   Sig: Take 1 tablet (1,000 mcg total) by mouth daily   fenofibrate (TRICOR) 145 mg tablet   Yes No   Sig: Take 145 mg by mouth every evening In pm   gabapentin (NEURONTIN) 300 mg capsule   No No   Sig: Take 1 capsule (300 mg total) by mouth daily at bedtime   metoprolol succinate (TOPROL-XL) 25 mg 24 hr tablet   No No   Sig: Take 3 tablets (75 mg total) by mouth daily   nystatin (MYCOSTATIN) powder  Care Giver Yes No   Sig: Apply 1 application. topically daily at bedtime Under the breasts   omeprazole (PriLOSEC) 40 MG capsule   Yes No   Sig: Take 40 mg by mouth daily   pravastatin (PRAVACHOL) 20 mg tablet  Care Giver Yes No   Sig: Take 20 mg by mouth every evening   spironolactone (ALDACTONE) 25 mg tablet   No No   Sig: Take 1 tablet (25 mg total) by mouth daily   ziprasidone (GEODON) 80 mg capsule   No No   Sig: Take 1 capsule (80 mg total) by mouth 2 (two) times a day with meals      Facility-Administered Medications: None     Patient's Medications   Discharge Prescriptions    No medications on file     No discharge procedures on file.  ED SEPSIS DOCUMENTATION            Petros Sanders MD  12/23/24 2681       Petros Sanders MD  12/23/24 [id number]

## 2024-12-23 NOTE — ASSESSMENT & PLAN NOTE
Patient presents with a complaint of dizziness.  But unable to explain what she means by dizziness.  Mentioned that she does not like the way she feels.  Patient has a history of TBI and she was having recurrent falls.  Patient requires assistance with ambulation.  Patient had a respiratory tract infection last week and was treated with Augmentin. Last night she had 3 falls which is unusual for her.  Patient denies any other discomfort or symptoms.   Patient does not have symptoms of infection.   Afebrile and vital stable in the ED.  Satting well on room air. Does not meet SIRS.  Neurological examination unremarkable, including finger-nose test (but slow likely related to her TBI).  Patient has chronic hyponatremia.  Sodium on admission 129 which is at her baseline.  Likley due to carbamazepine.  CT head without any acute changes.  Etiology for dizziness could be vestibular stroke/TIA versus vestibular neuritis/labyrinthitis versus due to elevated carbamazepine levels versus hyponatremia (less likely) versus underlying infection (less likely).    Plan  Hold carbamazepine levels and check levels in a.m.  Trial of Meclizine 25 mg  Check MRI brain if symptoms does not improve with meclizine tomorrow.  Monitor on telemetry  Hold blood pressure medications to allow permissive hypertension  Frequent neuro checks  Monitor sodium levels  Check HbA1c, Lipid panel  IV fluids for hydration.  Check UA  Check orthostatic vitals

## 2024-12-23 NOTE — H&P
H&P - Hospitalist   Name: Prema Shepard 59 y.o. female I MRN: [id number]  Unit/Bed#: [facility] I Date of Admission: 12/23/2024   Date of Service: 12/23/2024 I Hospital Day: 0     Assessment & Plan  Dizziness  Patient presents with a complaint of dizziness.  But unable to explain what she means by dizziness.  Mentioned that she does not like the way she feels.  Patient has a history of TBI and she was having recurrent falls.  Patient requires assistance with ambulation.  Patient had a respiratory tract infection last week and was treated with Augmentin. Last night she had 3 falls which is unusual for her.  Patient denies any other discomfort or symptoms.   Patient does not have symptoms of infection.   Afebrile and vital stable in the ED.  Satting well on room air. Does not meet SIRS.  Neurological examination unremarkable, including finger-nose test (but slow likely related to her TBI).  Patient has chronic hyponatremia.  Sodium on admission 129 which is at her baseline.  Likley due to carbamazepine.  CT head without any acute changes.  Etiology for dizziness could be vestibular stroke/TIA versus vestibular neuritis/labyrinthitis versus due to elevated carbamazepine levels versus hyponatremia (less likely) versus underlying infection (less likely).    Plan  Hold carbamazepine levels and check levels in a.m.  Trial of Meclizine 25 mg  Check MRI brain if symptoms does not improve with meclizine tomorrow.  Monitor on telemetry  Hold blood pressure medications to allow permissive hypertension  Frequent neuro checks  Monitor sodium levels  Check HbA1c, Lipid panel  IV fluids for hydration.  Check UA  Check orthostatic vitals  Hyponatremia  Patient's sodium levels are around 129-130 since last September.  Sodium on admission 128.  Likely caused by carbamazepine.    Plan  Monitor sodium levels  Anemia  Hemoglobin 11.  Baseline hemoglobin around last June was 12.  Denies any evidence of bleeding.    Plan  Check iron  "panel  Monitor for bleeding.  TBI (traumatic brain injury) ([facility])  Patient had a TBI when she was 14 years old.  No focal neurological deficits.  But need assistance with walking.  Having recurrent falls.    Plan  Fall precautions  PT/OT  History of DVT (deep vein thrombosis)  Patient is on Eliquis due to recurrent DVTs.    Plan  Continue Eliquis  Schizoaffective disorder (HCC)  Patient is on carbamazepine 1000 mg daily, Klonopin 0.5 at bedtime, Cymbalta 90 mg daily, quetiapine 200 mg twice daily.  Carbamazepine levels on admission elevated.    Plan  Hold carbamazepine  Check carbamazepine levels a.m.  Continue all other psych medications.  Hypertension  Patient is on amlodipine 10 mg, spironolactone 50 mg.    Plan  Monitor blood pressure    VTE Pharmacologic Prophylaxis: VTE Score: 6 High Risk (Score >/= 5) - Pharmacological DVT Prophylaxis Ordered: apixaban (Eliquis). Sequential Compression Devices Ordered.  Code Status: Level 1 - Full Code   Discussion with family: Updated  (sister) via phone.    Anticipated Length of Stay: Patient will be admitted on an observation basis with an anticipated length of stay of less than 2 midnights secondary to dizziness.    History of Present Illness   Chief Complaint: dizziness    Prema Shepard is a 59 y.o. female with a PMH of 2ry HTN due to Hyperaldosteronism, recurrent DVT on apixban, schizoaffective disorder, brain injury, RUPAL on CPAP,anxiety and depression  who presents with dizziness.  Patient had upper respiratory tract infection last week and treated with Augmentin.  According to patient's sister patient had 3 falls yesterday.  Patient usually need assistance with walking due to high risk for falls following TBI.  But mentioned that it is unusual to get 3 falls on the same day.    Patient is a poor historian.  She is unable to describe what she she mean by \" dizziness\".  She said she does not feel right and does not like the way she feels.  Denies any " other discomfort or symptoms.  Neurological examination without any focal neurological deficits.  Carbamazepine level was found to be elevated.    Review of Systems   Constitutional:  Negative for chills and fever.   HENT:  Negative for ear pain and sore throat.    Eyes:  Negative for pain and visual disturbance.   Respiratory:  Negative for cough and shortness of breath.    Cardiovascular:  Negative for chest pain and palpitations.   Gastrointestinal:  Negative for abdominal pain and vomiting.   Genitourinary:  Negative for dysuria and hematuria.   Musculoskeletal:  Negative for arthralgias and back pain.   Skin:  Negative for color change and rash.   Neurological:  Positive for dizziness. Negative for seizures and syncope.   All other systems reviewed and are negative.      Historical Information   Past Medical History:   Diagnosis Date    Anxiety and depression     Chronic deep vein thrombosis (DVT) ([facility])     LLE    DVT (deep venous thrombosis) ([facility])     History of traumatic brain injury     HTN (hypertension)     Hyperlipidemia     Hypertension     Insomnia     Obesity     RUPAL (obstructive sleep apnea)     Schizoaffective disorder ([facility])     TBI (traumatic brain injury) ([facility])      No past surgical history on file.  Social History     Tobacco Use    Smoking status: Never    Smokeless tobacco: Never   Vaping Use    Vaping status: Never Used   Substance and Sexual Activity    Alcohol use: Not Currently    Drug use: Never    Sexual activity: Not on file     E-Cigarette/Vaping    E-Cigarette Use Never User      E-Cigarette/Vaping Substances     No family history on file.  Social History:  Marital Status: Single   Occupation:   Patient Pre-hospital Living Situation: Skilled Nursing Facility:    Patient Pre-hospital Level of Mobility: walks with person assist  Patient Pre-hospital Diet Restrictions:     Meds/Allergies   I have reveiwed home medications using records provided by SNF.  Prior to Admission medications     Medication Sig Start Date End Date Taking? Authorizing Provider   amLODIPine (NORVASC) 10 mg tablet Take 1 tablet (10 mg total) by mouth daily 6/18/24 7/18/24  Florinda Kc MD   apixaban (ELIQUIS) 5 mg Take 5 mg by mouth 2 (two) times a day    Historical Provider, MD   busPIRone (BUSPAR) 10 mg tablet Take 2 tablets (20 mg total) by mouth 2 (two) times a day 6/6/24 7/6/24  BERNABE Garcia   carBAMazepine (TEGretol) 200 mg tablet Take 5 tablets (1,000 mg total) by mouth daily at bedtime 6/6/24 7/6/24  BERNABE Garcia   Cholecalciferol (VITAMIN D3) 1,000 units tablet Take 2 tablets (2,000 Units total) by mouth daily 6/6/24 7/6/24  BERNABE Garcia   clonazePAM (KlonoPIN) 0.5 mg tablet Take 1 tablet (0.5 mg total) by mouth daily at bedtime for 10 days 6/6/24 6/16/24  BERNABE Garcia   cyanocobalamin (VITAMIN B-12) 1000 MCG tablet Take 1 tablet (1,000 mcg total) by mouth daily 6/6/24 7/6/24  BERNABE Garcia   DULoxetine (CYMBALTA) 30 mg delayed release capsule Take 3 capsules (90 mg total) by mouth daily 6/6/24 7/6/24  BERNABE Garcia   fenofibrate (TRICOR) 145 mg tablet Take 145 mg by mouth every evening In pm    Historical Provider, MD   gabapentin (NEURONTIN) 300 mg capsule Take 1 capsule (300 mg total) by mouth daily at bedtime 6/6/24 7/6/24  BERNABE Garcia   metoprolol succinate (TOPROL-XL) 25 mg 24 hr tablet Take 3 tablets (75 mg total) by mouth daily 6/18/24 7/18/24  Florinda Kc MD   MULTIPLE VITAMIN PO Take 1 tablet by mouth in the morning    Historical Provider, MD   nystatin (MYCOSTATIN) powder Apply 1 application. topically daily at bedtime Under the breasts    Historical Provider, MD   omeprazole (PriLOSEC) 40 MG capsule Take 40 mg by mouth daily    Historical Provider, MD   pravastatin (PRAVACHOL) 20 mg tablet Take 20 mg by mouth every evening    Historical Provider, MD   Probiotic Product (PROBIOTIC PO) Take 250 mg by mouth 2  (two) times a day    Historical Provider, MD   spironolactone (ALDACTONE) 25 mg tablet Take 1 tablet (25 mg total) by mouth daily 6/18/24 7/18/24  Florinda Kc MD   ziprasidone (GEODON) 80 mg capsule Take 1 capsule (80 mg total) by mouth 2 (two) times a day with meals 6/6/24 7/6/24  BERNABE Garcia     Allergies   Allergen Reactions    Latex Hives, Itching, Other (See Comments), Shortness Of Breath and Rash    Amoxicillin Hives    Apricot Flavor - Food Allergy Hives    Apricot Flavor - Food Allergy Other (See Comments)     unknown    Eql Apricot Scrub [Salicylic Acid] Hives    Haloperidol Other (See Comments)     Dystonia    Latex Other (See Comments)     unknown    Pollen Extract Sneezing    Medical Tape Rash       Objective :  Temp:  [97.8 °F (36.6 °C)] 97.8 °F (36.6 °C)  HR:  [63] 63  BP: (128)/(81) 128/81  Resp:  [18] 18  SpO2:  [97 %] 97 %  O2 Device: None (Room air)    Physical Exam  Vitals and nursing note reviewed.   Constitutional:       General: She is not in acute distress.     Appearance: She is well-developed.   HENT:      Head: Normocephalic and atraumatic.      Mouth/Throat:      Mouth: Mucous membranes are moist.      Pharynx: Oropharynx is clear.   Eyes:      Conjunctiva/sclera: Conjunctivae normal.   Cardiovascular:      Rate and Rhythm: Normal rate and regular rhythm.      Heart sounds: No murmur heard.  Pulmonary:      Effort: Pulmonary effort is normal. No respiratory distress.      Breath sounds: Normal breath sounds.   Abdominal:      Palpations: Abdomen is soft.      Tenderness: There is no abdominal tenderness.   Musculoskeletal:         General: No swelling.      Cervical back: Neck supple.      Right lower leg: No edema.      Left lower leg: No edema.   Skin:     General: Skin is warm and dry.      Capillary Refill: Capillary refill takes less than 2 seconds.   Neurological:      Mental Status: She is alert.      Comments: Neurologic Physical Exam:    Mental Status  Orientation  to: person, place  GCS: Eye Spontaneous = 4, Verbal Oriented = 5, Motor Obeys commands = 6.  Follows commands x2: Can follow 2 step commands  Speech: dysarthric    Cranial Nerves  Pupils: reactive  EOM: full and intact  Facial Symmetry: facial symmetry equal  Visual fields: 0 = No visual field loss    Motor  RUE strength: 5/5: Full ROM against gravity and full resistance  RLE strength: 5/5: Full ROM against gravity and full resistance    LUE strength: 5/5: Full ROM against gravity and full resistance  LLE strength: 5/5: Full ROM against gravity and full resistance    Sensation  RUE sensation: normal  RLE sensation: normal    LUE sensation: normal  LLE sensation: normal    Coordination  Finger-to-nose: bilaterally intact, but slow     Psychiatric:         Mood and Affect: Mood normal.         Lines/Drains:        Lab Results: I have reviewed the following results:  Results from last 7 days   Lab Units 12/23/24  1049   WBC Thousand/uL 4.60   HEMOGLOBIN g/dL 11.0*   HEMATOCRIT % 32.7*   PLATELETS Thousands/uL 344   SEGS PCT % 67   LYMPHO PCT % 24   MONO PCT % 6   EOS PCT % 3     Results from last 7 days   Lab Units 12/23/24  1049   SODIUM mmol/L 128*   POTASSIUM mmol/L 4.2   CHLORIDE mmol/L 95*   CO2 mmol/L 28   BUN mg/dL 10   CREATININE mg/dL 0.60   ANION GAP mmol/L 5   CALCIUM mg/dL 8.8   GLUCOSE RANDOM mg/dL 97             Lab Results   Component Value Date    HGBA1C 5.8 (H) 05/31/2024    HGBA1C 5.6 04/29/2023    HGBA1C 5.9 08/07/2018           Imaging Results Review: I reviewed radiology reports from this admission including: CT head.  Other Study Results Review: EKG was reviewed.     Administrative Statements   I have spent a total time of 30 minutes in caring for this patient on the day of the visit/encounter including Diagnostic results, Prognosis, Risks and benefits of tx options, Instructions for management, Patient and family education, Documenting in the medical record, Reviewing / ordering tests, medicine,  procedures  , and Obtaining or reviewing history  .    ** Please Note: This note has been constructed using a voice recognition system. **

## 2024-12-24 VITALS
RESPIRATION RATE: 19 BRPM | SYSTOLIC BLOOD PRESSURE: 123 MMHG | TEMPERATURE: 97.9 F | OXYGEN SATURATION: 97 % | HEART RATE: 68 BPM | DIASTOLIC BLOOD PRESSURE: 73 MMHG

## 2024-12-24 LAB
ANION GAP SERPL CALCULATED.3IONS-SCNC: 5 MMOL/L (ref 4–13)
BUN SERPL-MCNC: 10 MG/DL (ref 5–25)
CALCIUM SERPL-MCNC: 8.9 MG/DL (ref 8.4–10.2)
CARBAMAZEPINE SERPL-MCNC: 5.8 UG/ML (ref 4–12)
CHLORIDE SERPL-SCNC: 99 MMOL/L (ref 96–108)
CO2 SERPL-SCNC: 28 MMOL/L (ref 21–32)
CREAT SERPL-MCNC: 0.6 MG/DL (ref 0.6–1.3)
ERYTHROCYTE [DISTWIDTH] IN BLOOD BY AUTOMATED COUNT: 12.6 % (ref 11.6–15.1)
FERRITIN SERPL-MCNC: 48 NG/ML (ref 11–307)
GFR SERPL CREATININE-BSD FRML MDRD: 100 ML/MIN/1.73SQ M
GLUCOSE SERPL-MCNC: 98 MG/DL (ref 65–140)
HCT VFR BLD AUTO: 33.1 % (ref 34.8–46.1)
HGB BLD-MCNC: 11.1 G/DL (ref 11.5–15.4)
IRON SATN MFR SERPL: 26 % (ref 15–50)
IRON SERPL-MCNC: 97 UG/DL (ref 50–212)
MCH RBC QN AUTO: 30 PG (ref 26.8–34.3)
MCHC RBC AUTO-ENTMCNC: 33.5 G/DL (ref 31.4–37.4)
MCV RBC AUTO: 90 FL (ref 82–98)
OSMOLALITY UR: 395 MMOL/KG (ref 250–900)
PLATELET # BLD AUTO: 363 THOUSANDS/UL (ref 149–390)
PMV BLD AUTO: 8.1 FL (ref 8.9–12.7)
POTASSIUM SERPL-SCNC: 4.4 MMOL/L (ref 3.5–5.3)
RBC # BLD AUTO: 3.7 MILLION/UL (ref 3.81–5.12)
SODIUM 24H UR-SCNC: 99 MOL/L
SODIUM SERPL-SCNC: 132 MMOL/L (ref 135–147)
TIBC SERPL-MCNC: 371 UG/DL (ref 250–450)
TRANSFERRIN SERPL-MCNC: 265 MG/DL (ref 203–362)
UIBC SERPL-MCNC: 274 UG/DL (ref 155–355)
WBC # BLD AUTO: 4.03 THOUSAND/UL (ref 4.31–10.16)

## 2024-12-24 PROCEDURE — 80156 ASSAY CARBAMAZEPINE TOTAL: CPT | Performed by: INTERNAL MEDICINE

## 2024-12-24 PROCEDURE — 85027 COMPLETE CBC AUTOMATED: CPT | Performed by: INTERNAL MEDICINE

## 2024-12-24 PROCEDURE — 80048 BASIC METABOLIC PNL TOTAL CA: CPT | Performed by: INTERNAL MEDICINE

## 2024-12-24 PROCEDURE — 99239 HOSP IP/OBS DSCHRG MGMT >30: CPT | Performed by: INTERNAL MEDICINE

## 2024-12-24 RX ORDER — CARBAMAZEPINE 200 MG/1
200 TABLET ORAL 2 TIMES DAILY
Qty: 60 TABLET | Refills: 0 | Status: SHIPPED | OUTPATIENT
Start: 2024-12-24

## 2024-12-24 RX ORDER — SPIRONOLACTONE 25 MG/1
50 TABLET ORAL DAILY
Qty: 60 TABLET | Refills: 0 | Status: SHIPPED | OUTPATIENT
Start: 2024-12-24 | End: 2025-01-23

## 2024-12-24 RX ORDER — SODIUM CHLORIDE 9 MG/ML
50 INJECTION, SOLUTION INTRAVENOUS CONTINUOUS
Status: DISCONTINUED | OUTPATIENT
Start: 2024-12-24 | End: 2024-12-24 | Stop reason: HOSPADM

## 2024-12-24 RX ORDER — CARBAMAZEPINE 200 MG/1
200 TABLET ORAL 2 TIMES DAILY
Qty: 60 TABLET | Refills: 0 | Status: CANCELLED | OUTPATIENT
Start: 2024-12-24 | End: 2025-01-23

## 2024-12-24 RX ORDER — CARBAMAZEPINE 200 MG/1
200 TABLET ORAL 2 TIMES DAILY
Status: DISCONTINUED | OUTPATIENT
Start: 2024-12-24 | End: 2024-12-24 | Stop reason: HOSPADM

## 2024-12-24 RX ORDER — QUETIAPINE FUMARATE 200 MG/1
200 TABLET, FILM COATED ORAL 2 TIMES DAILY
COMMUNITY

## 2024-12-24 RX ADMIN — MECLIZINE 25 MG: 12.5 TABLET ORAL at 06:22

## 2024-12-24 RX ADMIN — DULOXETINE HYDROCHLORIDE 90 MG: 60 CAPSULE, DELAYED RELEASE ORAL at 09:39

## 2024-12-24 RX ADMIN — PANTOPRAZOLE SODIUM 40 MG: 40 TABLET, DELAYED RELEASE ORAL at 06:22

## 2024-12-24 RX ADMIN — QUETIAPINE 200 MG: 200 TABLET, FILM COATED, EXTENDED RELEASE ORAL at 09:39

## 2024-12-24 RX ADMIN — APIXABAN 5 MG: 5 TABLET, FILM COATED ORAL at 09:39

## 2024-12-24 RX ADMIN — SODIUM CHLORIDE 50 ML/HR: 0.9 INJECTION, SOLUTION INTRAVENOUS at 11:09

## 2024-12-24 NOTE — DISCHARGE INSTR - AVS FIRST PAGE
Dear Prema Shepard,     It was our pleasure to care for you here at [facility].  It is our hope that we were always able to exceed the expected standards for your care during your stay.  You were hospitalized due to dizziness.  You were cared for on the MS 1 floor by Nasima Jorge DO under the service of Vicky Cantu MD with the [facility] who covers for your primary care physician (PCP), Yazmin Emerson MD, while you were hospitalized.  If you have any questions or concerns related to this hospitalization, you may contact us at .  For follow up as well as any medication refills, we recommend that you follow up with your primary care physician.  A registered nurse will reach out to you by phone within a few days after your discharge to answer any additional questions that you may have after going home.  However, at this time we provide for you here, the most important instructions / recommendations at discharge:     Notable Medication Adjustments -   Discontinue Carbamezapine 1000 mg HS. Start taking Carbamezapine 200 mg twice daily.  Testing Required after Discharge -   Please follow up with your Psychiatrist for repeat Carbamezapine level as necessary.  ** Please contact your PCP to request testing orders for any of the testing recommended here **  Important follow up information -   Please follow up with you Psychiatrist within 1 week for further Carbamezapine dosing.  Please follow up with your PCP within 1 week. They can check your sodium at that time  Please limit your fluid intake to 1500 ml until your are seen by your PCP and sodium is rechecked  Other Instructions -   Please return to the ED or call 911 with any new or worsening symptoms.  Please review this entire after visit summary as additional general instructions including medication list, appointments, activity, diet, any pertinent wound care, and other  HBA1C is elevated 7.3 will discuss at office visit.   Urine microalbumin creatinine ratio has slightly increased will discuss at office visit  Sent to my chart additional recommendations from your care team that may be provided for you.      Sincerely,     Nasima Jorge, DO

## 2024-12-24 NOTE — CASE MANAGEMENT
Case Management Discharge Planning Note    Patient name Prema Shepard  Location /-01 MRN [id number]  : 1965 Date 2024       Current Admission Date: 2024  Current Admission Diagnosis:Dizziness   Patient Active Problem List    Diagnosis Date Noted Date Diagnosed    Dizziness 2024     Anemia 2024     Stroke-like symptoms 2024     Medical clearance for psychiatric admission 2024     Vitamin D deficiency 2024     Vitamin B12 deficiency 2024     Class 3 severe obesity due to excess calories without serious comorbidity with body mass index (BMI) of 40.0 to 44.9 in adult ([facility]) 2024     Closed head injury 2024     Hypertension 2024     Hyponatremia 2023     TBI (traumatic brain injury) ([facility]) 2023     History of DVT (deep vein thrombosis) 2023     Schizoaffective disorder ([facility]) 2023     Hyperlipemia 2023       LOS (days): 0  Geometric Mean LOS (GMLOS) (days):   Days to GMLOS:     OBJECTIVE:            Current admission status: Observation   Preferred Pharmacy:   UNKNOWN - FOLLOW UP PRIOR TO DISCHARGE TO E-PRESCRIBE  No address on file      [facility] [address] [address]  Phone: [phone] Fax: [phone]    Primary Care Provider: Yazmin Emerson MD    Primary Insurance: MEDICARE  Secondary Insurance: [facility]    DISCHARGE DETAILS:    Discharge planning discussed with:: patients , and Priya at [place]  Freedom of Choice: Yes  Comments - Freedom of Choice: CC contacted today in PM to ask if patient can return to group home. CC contacted facility and obtained Priya's contact number of [phone] to ask what is needed for patient's return. CC informed that patient is medically stable for dc. CC informed that there was a change in medications and was informed by Priya that medication  would need to be sent to [facility]'s pharmacy. Transport was also requested since group home does not have transport. CC contacted MD and scripts sent to desired pharmacy. CC confrmed sanjana Powers that all needs confirmed that transport can be arranged. WC van arranged for 4:30pm. CC called and provided update to MD Priya, patient's sister. All in agreement with dc plan.  CM contacted family/caregiver?: Yes  Were Treatment Team discharge recommendations reviewed with patient/caregiver?: Yes  Did patient/caregiver verbalize understanding of patient care needs?: N/A- going to facility ([facility])  Were patient/caregiver advised of the risks associated with not following Treatment Team discharge recommendations?: Yes    Contacts  Patient Contacts: [facility] - Amanda Rivers [phone] and mom Frannie Shepard (mother) [phone]  Relationship to Patient:: Family  Contact Method: Phone  Phone Number: [phone]  Reason/Outcome: Emergency Contact    Requested Home Health Care         Is the patient interested in HHC at discharge?: No    DME Referral Provided  Referral made for DME?: No              Treatment Team Recommendation: Group Home  Discharge Destination Plan:: Group Home  Transport at Discharge : Wheelchair van     Number/Name of Dispatcher: Roundtrip  Transported by (Company and Unit #): [facility]  ETA of Transport (Date): 12/24/24  ETA of Transport (Time): 1630                            Accepting Facility Name, City & State : [facility]  Receiving Facility/Agency Phone Number: [phone]-Leana

## 2024-12-24 NOTE — PLAN OF CARE
Problem: SAFETY ADULT  Goal: Patient will remain free of falls  Description: INTERVENTIONS:  - Educate patient/family on patient safety including physical limitations  - Instruct patient to call for assistance with activity   - Consult OT/PT to assist with strengthening/mobility   - Keep Call bell within reach  - Keep bed low and locked with side rails adjusted as appropriate  - Keep care items and personal belongings within reach  - Initiate and maintain comfort rounds  - Make Fall Risk Sign visible to staff  - Offer Toileting every  Hours, in advance of need  - Initiate/Maintainalarm  - Obtain necessary fall risk management equipment:   - Apply yellow socks and bracelet for high fall risk patients  - Consider moving patient to room near nurses station  Outcome: Progressing  Goal: Maintain or return to baseline ADL function  Description: INTERVENTIONS:  -  Assess patient's ability to carry out ADLs; assess patient's baseline for ADL function and identify physical deficits which impact ability to perform ADLs (bathing, care of mouth/teeth, toileting, grooming, dressing, etc.)  - Assess/evaluate cause of self-care deficits   - Assess range of motion  - Assess patient's mobility; develop plan if impaired  - Assess patient's need for assistive devices and provide as appropriate  - Encourage maximum independence but intervene and supervise when necessary  - Involve family in performance of ADLs  - Assess for home care needs following discharge   - Consider OT consult to assist with ADL evaluation and planning for discharge  - Provide patient education as appropriate  Outcome: Progressing  Goal: Maintains/Returns to pre admission functional level  Description: INTERVENTIONS:  - Perform AM-PAC 6 Click Basic Mobility/ Daily Activity assessment daily.  - Set and communicate daily mobility goal to care team and patient/family/caregiver.   - Collaborate with rehabilitation services on mobility goals if consulted  - Perform  Range of Motion  times a day.  - Reposition patient every  hours.  - Dangle patient  times a day  - Stand patient  times a day  - Ambulate patient  times a day  - Out of bed to chair  times a day   - Out of bed for meals  times a day  - Out of bed for toileting  - Record patient progress and toleration of activity level   Outcome: Progressing     Problem: DISCHARGE PLANNING  Goal: Discharge to home or other facility with appropriate resources  Description: INTERVENTIONS:  - Identify barriers to discharge w/patient and caregiver  - Arrange for needed discharge resources and transportation as appropriate  - Identify discharge learning needs (meds, wound care, etc.)  - Arrange for interpretive services to assist at discharge as needed  - Refer to Case Management Department for coordinating discharge planning if the patient needs post-hospital services based on physician/advanced practitioner order or complex needs related to functional status, cognitive ability, or social support system  Outcome: Progressing     Problem: NEUROSENSORY - ADULT  Goal: Achieves stable or improved neurological status  Description: INTERVENTIONS  - Monitor and report changes in neurological status  - Monitor vital signs such as temperature, blood pressure, glucose, and any other labs ordered   - Initiate measures to prevent increased intracranial pressure  - Monitor for seizure activity and implement precautions if appropriate      Outcome: Progressing  Goal: Remains free of injury related to seizures activity  Description: INTERVENTIONS  - Maintain airway, patient safety  and administer oxygen as ordered  - Monitor patient for seizure activity, document and report duration and description of seizure to physician/advanced practitioner  - If seizure occurs,  ensure patient safety during seizure  - Reorient patient post seizure  - Seizure pads on all 4 side rails  - Instruct patient/family to notify RN of any seizure activity including if an  aura is experienced  - Instruct patient/family to call for assistance with activity based on nursing assessment  - Administer anti-seizure medications if ordered    Outcome: Progressing  Goal: Achieves maximal functionality and self care  Description: INTERVENTIONS  - Monitor swallowing and airway patency with patient fatigue and changes in neurological status  - Encourage and assist patient to increase activity and self care.   - Encourage visually impaired, hearing impaired and aphasic patients to use assistive/communication devices  Outcome: Progressing

## 2024-12-24 NOTE — ASSESSMENT & PLAN NOTE
Hemoglobin 11.  Baseline hemoglobin around last June was 12.  Denies any evidence of bleeding.    Plan  Hgb stable, no intervention required

## 2024-12-24 NOTE — ASSESSMENT & PLAN NOTE
Patient's sodium levels are around 129-130 since last September.  Sodium on admission 128.  Likely caused by carbamazepine    Plan  Discussed with psychopharmacologist - will decrease Carbamezapine from 1000 mg HS to 200 mg BID  Patient will require follow up with outpatient Psychiatrist for further management

## 2024-12-24 NOTE — ASSESSMENT & PLAN NOTE
Patient had a TBI when she was 14 years old.  No focal neurological deficits.  But need assistance with walking.  Will return to group home with 24hr services.

## 2024-12-24 NOTE — PLAN OF CARE
Problem: SAFETY ADULT  Goal: Patient will remain free of falls  Description: INTERVENTIONS:  - Educate patient/family on patient safety including physical limitations  - Instruct patient to call for assistance with activity   - Consult OT/PT to assist with strengthening/mobility   - Keep Call bell within reach  - Keep bed low and locked with side rails adjusted as appropriate  - Keep care items and personal belongings within reach  - Initiate and maintain comfort rounds  - Make Fall Risk Sign visible to staff  - Offer Toileting every 2 Hours, in advance of need  - Initiate/Maintain bed/chair alarm  - Obtain necessary fall risk management equipment  Problem: NEUROSENSORY - ADULT  Goal: Achieves stable or improved neurological status  Description: INTERVENTIONS  - Monitor and report changes in neurological status  - Monitor vital signs such as temperature, blood pressure, glucose, and any other labs ordered   - Initiate measures to prevent increased intracranial pressure  - Monitor for seizure activity and implement precautions if appropriate      Outcome: Progressing     - Apply yellow socks and bracelet for high fall risk patients  - Consider moving patient to room near nurses station  Outcome: Progressing     Problem: DISCHARGE PLANNING  Goal: Discharge to home or other facility with appropriate resources  Description: INTERVENTIONS:  - Identify barriers to discharge w/patient and caregiver  - Arrange for needed discharge resources and transportation as appropriate  - Identify discharge learning needs (meds, wound care, etc.)  - Arrange for interpretive services to assist at discharge as needed  - Refer to Case Management Department for coordinating discharge planning if the patient needs post-hospital services based on physician/advanced practitioner order or complex needs related to functional status, cognitive ability, or social support system  Outcome: Progressing

## 2024-12-24 NOTE — ASSESSMENT & PLAN NOTE
Patient is on carbamazepine 1000 mg daily, Klonopin 0.5 at bedtime, Cymbalta 90 mg daily, quetiapine 200 mg twice daily.  Carbamazepine levels on admission elevated.    Plan  Discussed with psychopharmacologist - will decrease Carbamezapine from 1000 mg HS to 200 mg BID  Continue klonopin, cymbalta, quetiapine as prescribed  Patient will require follow up with outpatient Psychiatrist for further management

## 2024-12-24 NOTE — DISCHARGE SUMMARY
Discharge Summary - Hospitalist   Name: Prema Shepard 59 y.o. female I MRN: [id number]  Unit/Bed#: -01 I Date of Admission: 12/23/2024   Date of Service: 12/24/2024 I Hospital Day: 0     Assessment & Plan  Dizziness  Patient with hx of TBI, recurrent falls, presents with a complaint of dizziness associated with 3 falls PTA  Patient denies any other discomfort or symptoms. No signs or symptoms of infection.  Afebrile and vital signs remained stable.  Satting well on room air.   Neurological examination unremarkable, including finger-nose test (but slow likely related to her TBI).  Patient has chronic hyponatremia.  Sodium on admission 129 which is at her baseline, improved to 132 on discharge  CT head without any acute changes  Elevated Carbamezapine level  Orthostatic vital signs negative  Etiology for dizziness and hyponatremia likely due to elevated Carbamezapine level    Plan  Discussed with psychopharmacologist - will decrease Carbamezapine from 1000 mg HS to 200 mg BID  Patient will require follow up with outpatient Psychiatrist for further management  Hyponatremia  Patient's sodium levels are around 129-130 since last September.  Sodium on admission 128.  Likely caused by carbamazepine    Plan  Discussed with psychopharmacologist - will decrease Carbamezapine from 1000 mg HS to 200 mg BID  Patient will require follow up with outpatient Psychiatrist for further management  Anemia  Hemoglobin 11.  Baseline hemoglobin around last June was 12.  Denies any evidence of bleeding.    Plan  Hgb stable, no intervention required  TBI (traumatic brain injury) (HCC)  Patient had a TBI when she was 14 years old.  No focal neurological deficits.  But need assistance with walking.  Will return to group home with 24hr services.  History of DVT (deep vein thrombosis)  Patient is on Eliquis due to recurrent DVTs.    Plan  Continue Eliquis at discharge  Schizoaffective disorder (HCC)  Patient is on carbamazepine  1000 mg daily, Klonopin 0.5 at bedtime, Cymbalta 90 mg daily, quetiapine 200 mg twice daily.  Carbamazepine levels on admission elevated.    Plan  Discussed with psychopharmacologist - will decrease Carbamezapine from 1000 mg HS to 200 mg BID  Continue klonopin, cymbalta, quetiapine as prescribed  Patient will require follow up with outpatient Psychiatrist for further management  Hypertension  Patient is on amlodipine 10 mg, spironolactone 50 mg.    Plan  Continue PTA medications     Medical Problems       Resolved Problems  Date Reviewed: 6/18/2024   None       Discharging Physician / Practitioner: Nasima Jorge DO  PCP: Yazmin Emerson MD  Admission Date:   Admission Orders (From admission, onward)       Ordered        12/23/24 1320  Place in Observation  Once                          Discharge Date: 12/24/24    Consultations During Hospital Stay:  None     Procedures Performed:   None    Significant Findings / Test Results:   Elevated carbamazepine level    Incidental Findings:   None    Test Results Pending at Discharge (will require follow up):   None     Outpatient Tests Requested:  Follow-up with outpatient psychiatry for carbamazepine level as necessary    Complications: None    Reason for Admission: Dizziness    Hospital Course:   Prema Shepard is a 59 y.o. female patient who originally presented to the hospital on 12/23/2024 due to dizziness.  Patient had a CT head which showed no acute intracranial abnormality.  Chest x-ray showed no acute cardiopulmonary disease.  She was noted to have an elevated carbamazepine level.  Her carbamazepine was held for 1 day and carbamazepine level normalized.  Has also had chronic hyponatremia for the past year likely due to carbamazepine.  Discussed with patient who was adamant about staying on carbamazepine. Restarted patient on a lower dose of 200 twice a day.  Patient's dizziness resolved while in the hospital. Orthostatic vital signs were negative.  Her vitals  remained stable.  Patient is stable for discharge, will require outpatient psychiatry follow-up for further carbamazepine titration.    Please see above list of diagnoses and related plan for additional information.     Condition at Discharge: stable    Discharge Day Visit / Exam:   Subjective:  Patient feels well today. She has no complaints.  Vitals: Blood Pressure: 123/73 (12/24/24 1456)  Pulse: 68 (12/24/24 1456)  Temperature: 97.9 °F (36.6 °C) (12/24/24 1456)  Temp Source: Oral (12/24/24 1456)  Respirations: 19 (12/24/24 1456)  SpO2: 97 % (12/24/24 1456)  Physical Exam  Vitals and nursing note reviewed.   Constitutional:       General: She is not in acute distress.     Appearance: She is well-developed.   HENT:      Head: Normocephalic and atraumatic.   Eyes:      Conjunctiva/sclera: Conjunctivae normal.   Cardiovascular:      Rate and Rhythm: Normal rate and regular rhythm.      Heart sounds: No murmur heard.     No friction rub. No gallop.   Pulmonary:      Effort: Pulmonary effort is normal. No respiratory distress.      Breath sounds: Normal breath sounds. No wheezing, rhonchi or rales.   Abdominal:      Palpations: Abdomen is soft.      Tenderness: There is no abdominal tenderness.   Musculoskeletal:         General: No swelling.      Cervical back: Neck supple.   Skin:     General: Skin is warm and dry.   Neurological:      Mental Status: She is alert.   Psychiatric:         Mood and Affect: Mood normal.         Discussion with Family: Updated  (sister) via phone.    Discharge instructions/Information to patient and family:   See after visit summary for information provided to patient and family.      Provisions for Follow-Up Care:  See after visit summary for information related to follow-up care and any pertinent home health orders.      Mobility at time of Discharge:   Basic Mobility Inpatient Raw Score: 17  JH-HLM Goal: 5: Stand one or more mins  JH-HLM Achieved: 5: Stand (1 or more  minutes)  HLM Goal achieved. Continue to encourage appropriate mobility.     Disposition:   Group Home    Planned Readmission: No    Discharge Medications:  See after visit summary for reconciled discharge medications provided to patient and/or family.      Administrative Statements   Discharge Statement:  I have spent a total time of 30 minutes in caring for this patient on the day of the visit/encounter. >30 minutes of time was spent on: Impressions, Counseling / Coordination of care, Documenting in the medical record, Reviewing / ordering tests, medicine, procedures  , and Communicating with other healthcare professionals .    **Please Note: This note may have been constructed using a voice recognition system**

## 2024-12-24 NOTE — ASSESSMENT & PLAN NOTE
Patient with hx of TBI, recurrent falls, presents with a complaint of dizziness associated with 3 falls PTA  Patient denies any other discomfort or symptoms. No signs or symptoms of infection.  Afebrile and vital signs remained stable.  Satting well on room air.   Neurological examination unremarkable, including finger-nose test (but slow likely related to her TBI).  Patient has chronic hyponatremia.  Sodium on admission 129 which is at her baseline, improved to 132 on discharge  CT head without any acute changes  Elevated Carbamezapine level  Orthostatic vital signs negative  Etiology for dizziness and hyponatremia likely due to elevated Carbamezapine level    Plan  Discussed with psychopharmacologist - will decrease Carbamezapine from 1000 mg HS to 200 mg BID  Patient will require follow up with outpatient Psychiatrist for further management

## 2025-01-02 LAB
ATRIAL RATE: 57 BPM
P AXIS: 53 DEGREES
PR INTERVAL: 216 MS
QRS AXIS: -5 DEGREES
QRSD INTERVAL: 100 MS
QT INTERVAL: 424 MS
QTC INTERVAL: 412 MS
T WAVE AXIS: 5 DEGREES
VENTRICULAR RATE: 57 BPM

## 2025-02-17 NOTE — PROGRESS NOTES
PT DISCHARGE NOTE FOR OUTPATIENT THERAPY    Patient: Tarah Holley MRN: [id number]  : 1965 56 y.o.  Referring Physician: Miles Pitts MD  Date of Visit: 2022      Certification Dates: 22 through 22    Total Visit Count: 11    Chief Complaints:  Chief Complaint   Patient presents with   • Difficulty Walking   • Balance Deficits   • Decreased Endurance       Precautions:        TODAY'S VISIT:    Time In Session:  Start Time: 1005  Stop Time: 1100  Time Calculation (min): 55 min     Daily Falls Screen - 22 0959        Daily Falls Assessment    Patient reported fall since last visit Yes     Recommended services to follow up Broke nose; following with ENT     Recommended plan to address falls Toe slide issued to pt to help reduce toe catch and increased risk for falls     Fall prevention interventions recommended Use assistive and mobility devices                Pain/Vitals - 22 0959        Pain Assessment    Currently in pain No/Denies        Pre Activity Vital Signs    Pulse 80     /66     BP Location Left upper arm     BP Method Manual     Patient Position Sitting        Pain Intervention    Intervention  none     Post Intervention Comments none                PT - 22 0959        Physical Therapy    Physical Therapy Neuro        PT Plan    Frequency of treatment 2 times/week     PT Duration 3 months     PT Cert From 22     PT Cert To 22     Date PT POC was sent to provider 22     Signed PT Plan of Care received?  Yes                Assessment and Plan - 22 1527        Assessment    Plan of Care reviewed and patient/family in agreement Yes     System Pathology/Pathophysiology Noted musculoskeletal;neuromuscular;cardiovascular     Functional Limitations in Following Categories (PT Eval) self-care;home management;community/leisure     Problem List decreased strength;impaired balance;impaired cognition;impaired coordination;impaired  What Type Of Note Output Would You Prefer (Optional)?: Bullet Format What Is The Reason For Today's Visit?: Full Body Skin Examination motor control;impaired postural control;pain;decreased endurance     Demonstrates Need for Referral to Another Service psychology services     Clinical Assessment Tarah is a 56 year old female with longstanding physical and cognitive deficits due to TBI who has been participating in skilled OP PT services for the past 2 months due to increasing falls and imbalance. A major component of her POC involved acquisition of a L toe slide, as it was noted that she would intermittently catch her left toe and lose her balance despite the use of an AFO. This recommendation was made during her last POC, but did not get followed through upon until now. She reports inconsistent compliance with use of L AFO and toe slide at home, saying that she likes to wear other shoes at home and doesn't wear the AFO after she takes her shower, which is around 11 am. She also reports not doing any activity provided by PT at home despite reinforcement that she needs to be active to get better. She recently fell at home and broke her nose because she was not being mindful and exhibited poor problem solving in accessing the kitchen with the  door open. Despite all these issues, she did show some more gains with her gait speed (especially since acquiring her toe slide), improving from 0.66 m/sec to 0.74 m/sec, and made gains in the Mittal from 38/56 to 44/56 to work towards reduced risk of falls overall. She did not show gains on the 30 sec sit-stand or the 6 MWT, but she was able to stand for over 30 minutes during her previous session while engaged in a meaningful activity (Scrabble) with no LOB. Tarah's refusal to perform certain activities as well as her rigidity of thought/dislike of change impacted her ability to reach all of her goals, and she also has a pending hysterectomy along with additional medical appointments and pending move in July, all of which have lead to the decision to discharge at this time.     Plan and  Recommendations D/C from skilled OP PT services; pt about to proceed with surgery and has made maximum gains due to cognitive and behavioral limitations.                 OBJECTIVE MEASUREMENTS/DATA:    Outcome Measures    PT Outcome Measures - 05/19/22 0959        Objective Outcome Measures    6 Minute Walk Test 532 feet with RW, permanent toe slide L     Gait Speed (m/sec) 0.74 m/sec   RW with L toe slide (permanent)    Five Times to Sit to Stand (FTSTS) 11 reps from red chair, hands on knees        Mittal Balance Scale    Sitting to Standing Able to stand without using hands and stabilize independently     Standing Unsupported Able to stand safely for 2 minutes     Sitting with Back Unsupported but Feet Supported on Floor or on a Stool Able to sit safely and securely for 2 minutes     Standing to Sitting Sits safely with minimal use of hands     Transfers Able to transfer safely with minor use of hands     Standing Unsupported with Eyes Closed Able to stand 10 seconds with supervision     Standing Unsupported with Feet Together Able to place feet together independently and stand 1 minute safely     Reach Forward with Outstretched Arm While Standing Can reach forward confidently 25 cm (10 inches)      Object from Floor from a Standing Position Able to  slipper safely and easily     Turning to Look Behind Over Left and Right Shoulders While Standing Looks behind from both sides and weight shifts well     Turn 360 Degrees Needs close supervision or verbal cueing     Place Alternate Foot on Step or Stool While Standing Unsupported Able to complete greater than 2 steps needs minimal assist     Standing Unsupported One Foot in Front Able to take small step independently and hold 30 seconds     Standing on One Leg Tries to lift leg unable to hold 3 seconds but remains standing independently     Mittal Balance Score 44        Timed Up and Go Test    Trial One: Timed Up and Go Test 22.9     Trial Two: Timed Up  What Is The Reason For Today's Visit? (Being Monitored For X): concerning skin lesions on an annual basis and Go Test 19.3     Trial Three: Timed Up and Go Test 19.55     Mean of 3 Trials: Timed Up and Go Test 21     Results, Timed Up and Go Test (Balance) with RW and L MAFO, toe slide (permanent) on L. Still above threshold for increased risk for falls, needing cues and supervision for safety.                 Today's Treatment:    Education provided:  Yes: See treatment log for details of education provided  Methods: Discussion  Readiness: acceptance  Response: Needs reinforcement and Verbalizes understanding      PT Neuro Exercises Current Session  Performed Today? (y/n)   THER ACT   (CPT 30453) TOTAL TIME FOR SESSION 23-37 min    Pain, vitals, subjective See flow yes   Patient Education Patient and CM educated on outcomes of testing, goals, and POC, as well as scheduling.  Both verbalized understanding.    Pt issued iniital HEP with seated LE exc as noted.  Pt verbalized understanding.    Discussed use of toe slide and how it can help prevent toe catching and LOB. Informed pt that her mother would be contacted to let her know how the process works; pt got her AFO through [facility] and toe slide will go through them as well. Pt verbalized understanding.     Time spent trying to find substitute shoe to be worn with AFO when she needs to leave her own shoe here for modification. Pt brought her own Fixmo shoe, but it is too small; she wears a men's 9W to accommodate her AFO, but a women's 9 when not using brace. Trialed shoes from [facility]; found New Balance in a 9W that fit pt's AFO with insert removed. Plan made for this shoe to be reserved and issued to pt when script for toe slide arrives and pt leaves her own shoe for Ted Motley, orthotist from [facility], to apply. Pt and mother verbalized understanding.      5/5 Printed up new calendar and discussed plan with pt to d/c on 5/13. Pt's mother had been contacted to discuss concerns about resuming PT post-operatively (had PT visits on the schedule) and agreed that she  "will likely have a change in medical status and assessment post-op by MD will determine readiness to resume therapies. May even need home care to start. Pt provided list of upcoming appointments and verified that no conflict would occur in preparation for surgery. Pt was provided with loaner shoe today for L foot in order for orthotist to take pt's shoe for toe slide application (script still pending), but orthotist aware of situation and will coordinate with mother to get pt's shoe back to her when completed if she discharges before then. Pt and mother both verbalized understanding.     5/19 Educated pt on benefit of new toe slide; she stated she does not like it but could not describe why. She reports not using brace often at home; reinforced to pt that use of brace along with toe slide is imperative to helping prevent falls. Pt acknowledged education but continues to be rigid and resistant to change at times. Mother contacted by CM to reinforce d/c today; pt was asking for more visits.                                                                                                                    yes   Bathroom Pt utilized bathroom with A of PT at beginning and end of session--supervision for standing balance tasks for hygiene and clothing management, transfers   yes   HEP Pt stated she is \"lazy\" at home, does not do much. Provided sit-stand exercise for HEP and encouraged to do 15 times, 3-4 times a day to get used to doing one thing consistently. Handout provided, pt verbalized understanding. Pt stated she could not find her handout--reprinted and handed to her    Transfer training Supervision sit-stand due to hand placement cues, pt also pushes RW too far from her with SPT yes   Subjective Outcomes Measures     ABC Scale     PSFS assess    THER EX  (CPT 08514) TOTAL TIME FOR SESSION Billed with TA    STRETCHING     Stretching by patient     Stretching by therapist/PROM     CARDIOVASCULAR     Nu Step Trial " "nustep L1 , LE only   5 min    Stationary Bike     Treadmill     Endurance Testing     2mWT assessed   yes   6mWT Assessed  yes   STRENGTH TRAINING     Standing Ther-Ex STS off EOM w/ cueing for anterior wt shift, 2 x 10  Standing marches at raised mat on one UE, handheld assist on the other 2 x 10  Hip abd, alternating, 10x, facing elevated mat, B UE support  Step touch 5x          Side-lying there-ex     Supine Ther-Ex SAQ x 20  Hooklying clamshells x 20  Lower truncal rotation x 20  Bridges x 7--terminated due to discomfort in L thigh (resolved quickly but pt not wanting to continue)      Seated Ther-Ex Marches x10 B  LAQ x10 B with 2\" hold  Ankle pumps x10 B    Trial seated hip add ball squeeze, 3 sec hold 10x   glut sets 10x w/ 3 sec hold              Core exercises Seated- overhead ball lift 10x  chest press with ball or band 10x   trunk rotation with ball 10x  Unsupported bicep curls 15x          Functional Strength Testing     30 sec STS test (60y +) Assessed--see flowsheet yes   5x STS     NEURO RE-ED  (CPT 26774) TOTAL TIME FOR SESSION 30 min    VESTIBULAR     SOT     MSQ     Adaptation     Compensation     Habiutation     COORDINATION     Target Tapping     Coordination ladder               Pushing weighted shopping cart     Cary's     POSTURAL RE-ED     Seated & standing reaching for trunk strengthening Standing reaching out of RACHAEL and across midline for cones on half wall and putting them on seat of chair and floor, then returning them to half wall. No UE support to intermittent 1 UE support, close supervision with 1 minor LOB needing min A    Janeth-scapular strengthening     PRE-GAIT ACTIVITIES      Tap-ups At half wall:  To 6\" block, 2 x 10 w/ 1 UE support in forward direction,  Toe taps on 6\" block laterally 2 x 10 each side, 1 UE support      Step-ups 10 reps with 1-2 UE each LE in forward and lateral direction, touching assist on 6\" block    Standing weight shifting          BALANCE TRAINING   " "  Standing balance - static/dynamic     Tile Stood x 33 min without UE support while playing Scrabble (pt's favorite game). No sitting breaks, no agitation. Noted pt to contact L LE against mat when fatigued, but could stand without instability when mat was not against body      Airex Static stdg w/ FA 60 sec      Rockerboard     Hurdles 6\" hurdles, lateral step overs w/ B UE support, fwd steps 2x U/L UE    Split Stance          Dynamic gait      Side stepping 12 ft x 2 w/o UE support    Retro walking 10 ft x 8 w/ one UE support    Tandem Walking     Gait with eyes closed          Balance Testing     Mittal Assessed; see flowsheet yes   DGI     TUG Assessed; see flowsheet yes   10mWT Assessed; see flowsheet yes   GAIT TRAINING  (CPT 53654) TOTAL TIME FOR SESSION  Billed with TA   Ambulation with AD  Gait with RW in and out of session, 200 ft x3 with cues to keep RW closer to body, cues to lift L foot when ambulating to/from bathroom (no toe slide)      Switched shoes and applied L toe slide to loaner shoe. Pt ambulated with RW x 300 feet with use of green theraband around her pelvis and affixed to RW to provide more tactile cue to keep RW closer to body vs verbal cue. Noted better success with L step length and posture with tactile cue, and pt was able to have conversation discussing recipes during ambulation as cognitive task, no LOB        Gait with RW and L MAFO, Cl S. Noted catching of L toe intermittently. Trialed toe slide on L, ambulated without any toe catch x 150 feet on tile and carpet and close supervision. Removed toe slide and pt exhibited intermittent toe catch again, verifying need for acquisition to prevent falls. Cues needed to keep RW closer to body for safer ambulation and better posture Yes                    Stair negotiation 4,6\" steps with R rail up and down, reciprocal up and down, cl S    Curb negotiation Practice 6-8\" curb with RW    Ramp negotiation Without toe slide and close " supervision/RW, no catch observed      Outdoor ambulation With RW, cL S from building to van         MANUAL  (CPT 54272) TOTAL TIME FOR SESSION  Not performed   Mobilization      MODALITIES TOTAL TIME FOR SESSION  Not performed   Ice     Heat     ATTENDED E-STIM  (CPT 71407) TOTAL TIME FOR SESSION  Not performed   Attended E-Stim     Unattended E-stim              Goals Addressed                    This Visit's Progress       Patient Stated    •  COMPLETED: PT PSFS (pt-stated)         Pt to complete at next visit--not assessed.           Other    •  COMPLETED: Mutually agreed upon pain goal         Mutually agreed upon pain goal: Pt does not have pain complaints related to PT referral for gait and balance.        •  COMPLETED: PT Goals- Gait and Balance         Short Term Goals Time Frame Result Comment/Progress   Pt will demonstrate improved static balance noted by DAO >=36/56  4 weeks met 4/28 increased to 38/56   Assess TUG, 2/6 MWT, 30 sec sit to stand, DGI 4 weeks met All assessed at first f/u; 30 sec sit-stand and 2/6 MWT reassessed 4/28 with improvement in tolerating full 6 MWT   Tolerate 10  min of CV activity with VSS 4 weeks progressing Refused Nustep at progress note; had done 5 min previously   Progress gait speed by >=0.07 m/sec without decline in safety or quality 4 weeks met Increased from 0.39 to 0.66 m/sec, use of toe slide   Pt and caregiver will be mod I with HEP 4 weeks Not met Pt needs cues to execute     Long Term Goals Time Frame Result Comment/Progress   Pt will be mod I with LRAD for transfers and amb in the home, dept, facility, and supervision for limited community mobility 12 weeks Not met 5/19 still requires close supervision and verbal cues to keep RW closer to her    Progress TUG to </= TBD seconds 12 weeks Not met Grossly unchanged since 4/28 (21-22 seconds) with RW   Pt will demonstrate improved balance via Dao >/= 45/56 12 weeks Progressing but not met 5/19 increased form 38 to  44/56   Pt will demonstrate improved dynamic balance and decreased risk for falls via DGI >/= TBD 12 weeks Not reassessed    Progress functional endurance via 6MWT to >/= TBD 12 weeks Not met 5/19 reduced from 631 feet to 532 feet   Progress comfortable gait speed to 0.54 m/s (age norm) 12 weeks Met 0.74 m/sec, improved with use of toe slide   Tolerate 20 minutes of CV activity with VSS 12 weeks Not met Pt refused Nustep activity; only prefers walking at most   Progress 30 second sit to stand to >/= TBD reps without decline sin safety or technique 12 weeks Not met Unchanged 5/19 (11 reps)                   Discharge information for CARF:    Reason for D/C: Maximum potential met (also pending surgery)  Hospitalization during treatment: No  Increased independence: Increased functional activity  Increased [place] - Other: increased endurance  Interrupted for medical reason: No (missed one session due to fall/nose fracture, but no other disruption from a medical standpoint.)  Skin integrity: Impaired (bruising on face from recent nose fracture)

## 2025-03-25 ENCOUNTER — CONSULT (OUTPATIENT)
Dept: VASCULAR SURGERY | Facility: CLINIC | Age: 60
End: 2025-03-25
Payer: MEDICARE

## 2025-03-25 VITALS
HEIGHT: 64 IN | DIASTOLIC BLOOD PRESSURE: 60 MMHG | BODY MASS INDEX: 35.76 KG/M2 | HEART RATE: 68 BPM | OXYGEN SATURATION: 95 % | SYSTOLIC BLOOD PRESSURE: 100 MMHG

## 2025-03-25 DIAGNOSIS — E11.9 TYPE 2 DIABETES MELLITUS WITHOUT COMPLICATION, UNSPECIFIED WHETHER LONG TERM INSULIN USE (HCC): ICD-10-CM

## 2025-03-25 DIAGNOSIS — I82.512 CHRONIC DEEP VEIN THROMBOSIS (DVT) OF LEFT FEMORAL VEIN (HCC): ICD-10-CM

## 2025-03-25 DIAGNOSIS — I89.0 SECONDARY LYMPHEDEMA: Primary | ICD-10-CM

## 2025-03-25 PROCEDURE — 99203 OFFICE O/P NEW LOW 30 MIN: CPT | Performed by: NURSE PRACTITIONER

## 2025-03-25 NOTE — PATIENT INSTRUCTIONS
Chronic left lower extremity DVT on long-term Eliquis  Chronic pain in the left leg, difficult for her to describe.  Pain is not acute.  Few varicosities of the left medial proximal calf, soft, compressible, non phlebitic  Secondary lymphedema left lower extremity.  Left thigh chronically larger than the right thigh.  Patient previously had compression pumps several years ago that were lost with relocating.  Will order for new compression pumps.  Measurements taken today.  Compression pumps to be used twice daily for 1 hour each  Also recommending gradient compression hose.  Patient requesting thigh-high compression with waist belt.  Prescription given.  Additionally periodic leg elevation and calf pump exercises would be beneficial in management lower extremity swelling  Follow-up in the office in 1 month to recheck lower extremity swelling and second measurements
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
## 2025-03-31 ENCOUNTER — TELEPHONE (OUTPATIENT)
Age: 60
End: 2025-03-31

## 2025-03-31 NOTE — TELEPHONE ENCOUNTER
Theresa from [facility] called wanting to schedule a 1month follow up appt per MBM,theres no appt available till June , added patient on wait L/S 03/25/25 MBM-Secondary lymphedema- 1Month F/U willing to go to the [place] location as well, please call Theresa back with an update at [phone].

## 2025-04-03 NOTE — TELEPHONE ENCOUNTER
Theresa reached out today again regarding appt needed, states that medicare is requiring a visit this month even if is virtual, patient can go to [facility] , even if the appt is virtual that would work as well, please call theresa back with an bmitwm-[phone].

## 2025-04-07 NOTE — TELEPHONE ENCOUNTER
Spoke with [facility] and offered 4/8 at 2:30pm and 4/10 at 8:30am. They cannot do these dates/times. For transportation they need 9am-2pm OV. I let them know we will have to give them a call back since we are fully booked, pt is still on the waitlist

## 2025-04-07 NOTE — TELEPHONE ENCOUNTER
[facility] reached out today again regarding appt needed, states that patient can go to [place], [facility], [facility] , even if the appt is virtual that would work as well, please call murphy back with an update-. [phone]

## 2025-05-28 ENCOUNTER — APPOINTMENT (EMERGENCY)
Dept: RADIOLOGY | Facility: HOSPITAL | Age: 60
End: 2025-05-28
Payer: MEDICARE

## 2025-05-28 ENCOUNTER — APPOINTMENT (EMERGENCY)
Dept: CT IMAGING | Facility: HOSPITAL | Age: 60
End: 2025-05-28
Payer: MEDICARE

## 2025-05-28 ENCOUNTER — HOSPITAL ENCOUNTER (EMERGENCY)
Facility: HOSPITAL | Age: 60
Discharge: HOME | End: 2025-05-28
Attending: EMERGENCY MEDICINE | Admitting: EMERGENCY MEDICINE
Payer: MEDICARE

## 2025-05-28 VITALS
BODY MASS INDEX: 36.93 KG/M2 | SYSTOLIC BLOOD PRESSURE: 146 MMHG | DIASTOLIC BLOOD PRESSURE: 80 MMHG | OXYGEN SATURATION: 98 % | WEIGHT: 215.17 LBS | RESPIRATION RATE: 18 BRPM | TEMPERATURE: 98.7 F | HEART RATE: 74 BPM
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
## 2025-05-28 DIAGNOSIS — R93.89 CHEST X-RAY ABNORMALITY: ICD-10-CM

## 2025-05-28 LAB
BASE EXCESS BLDA CALC-SCNC: 0 MMOL/L (ref -2–3)
CA-I BLD-SCNC: 1.14 MMOL/L (ref 1.12–1.32)
GLUCOSE SERPL-MCNC: 124 MG/DL (ref 65–140)
HCO3 BLDA-SCNC: 24.2 MMOL/L (ref 24–30)
HCT VFR BLD CALC: 34 % (ref 34.8–46.1)
HGB BLDA-MCNC: 11.6 G/DL (ref 11.5–15.4)
PCO2 BLD: 25 MMOL/L (ref 21–32)
PCO2 BLD: 38.1 MM HG (ref 42–50)
PH BLD: 7.41 [PH] (ref 7.3–7.4)
PO2 BLD: 63 MM HG (ref 35–45)
POTASSIUM BLD-SCNC: 3.9 MMOL/L (ref 3.5–5.3)
SAO2 % BLD FROM PO2: 92 % (ref 60–85)
SODIUM BLD-SCNC: 138 MMOL/L (ref 136–145)
SPECIMEN SOURCE: ABNORMAL

## 2025-05-28 PROCEDURE — 70450 CT HEAD/BRAIN W/O DYE: CPT

## 2025-05-28 PROCEDURE — 82803 BLOOD GASES ANY COMBINATION: CPT

## 2025-05-28 PROCEDURE — 84295 ASSAY OF SERUM SODIUM: CPT

## 2025-05-28 PROCEDURE — 93308 TTE F-UP OR LMTD: CPT | Performed by: NURSE PRACTITIONER

## 2025-05-28 PROCEDURE — EDAIR PR ED AIR: Performed by: EMERGENCY MEDICINE

## 2025-05-28 PROCEDURE — 82330 ASSAY OF CALCIUM: CPT

## 2025-05-28 PROCEDURE — 99284 EMERGENCY DEPT VISIT MOD MDM: CPT

## 2025-05-28 PROCEDURE — 84132 ASSAY OF SERUM POTASSIUM: CPT

## 2025-05-28 PROCEDURE — 82947 ASSAY GLUCOSE BLOOD QUANT: CPT

## 2025-05-28 PROCEDURE — 72125 CT NECK SPINE W/O DYE: CPT

## 2025-05-28 PROCEDURE — 76705 ECHO EXAM OF ABDOMEN: CPT | Performed by: NURSE PRACTITIONER

## 2025-05-28 PROCEDURE — 71045 X-RAY EXAM CHEST 1 VIEW: CPT

## 2025-05-28 PROCEDURE — 99284 EMERGENCY DEPT VISIT MOD MDM: CPT | Performed by: SURGERY

## 2025-05-28 PROCEDURE — 85014 HEMATOCRIT: CPT

## 2025-05-28 NOTE — PROCEDURES
POC FAST US    Date/Time: 5/28/2025 7:36 PM    Performed by: BERNABE Chavez  Authorized by: BERNABE Chavez    Patient location:  Trauma  Procedure details:     Exam Type:  Diagnostic    Indications: blunt abdominal trauma and blunt chest trauma      Assess for:  Intra-abdominal fluid and pericardial effusion    Technique: FAST      Views obtained:  Heart - Pericardial sac, LUQ - Splenorenal space, Suprapubic - Pouch of Pb and RUQ - Colunga's Pouch    Image quality: diagnostic      Image availability:  Images available in PACS and video obtained  FAST Findings:     RUQ (Hepatorenal) free fluid: absent      LUQ (Splenorenal) free fluid: absent      Suprapubic free fluid: absent      Cardiac wall motion: identified      Pericardial effusion: absent    Interpretation:     Impressions: negative

## 2025-05-28 NOTE — H&P
"H&P - Trauma   Name: Prema Shepard 59 y.o. female I MRN: [id number]  Unit/Bed#: TR-01 I Date of Admission: 5/28/2025   Date of Service: 5/28/2025 I Hospital Day: 0     Assessment & Plan  Fall, initial encounter  Described as mechanical fall  No injuries found on primary secondary evaluation.  Passed ambulatory and PO trial.  Chest x-ray abnormality  CXR was concerning for \"Contour irregularity of the lateral fifth sixth and seventh ribs consistent with nondisplaced fractures\"  Patient is nontender on exam and displays the ability to take a large breath without any pain or discomfort--no clinical signs of rib fracture  Follow-up with trauma as needed.    Trauma Alert: Level B   Model of Arrival: Ambulance    Trauma Team: Attending Gela and WENDY Dickinson  Consultants:     None     History of Present Illness   Chief Complaint: \"I fell\"  Mechanism:Fall     Prema Shepard is a 59 y.o. female with history of schizoaffective disorder, TBI, and DVT that she takes Eliquis for, presenting today for evaluation from her group home after suffering a fall.  Patient describes that she uses a walker at baseline and she tripped while ambulating at.  No reported loss of consciousness.  She is unsure if she struck her head.  Patient complaining of neck pain.  Otherwise reports chronic generalized extremity pains-no other new pain reported.    Review of Systems   Musculoskeletal:  Positive for arthralgias (General), myalgias (General) and neck pain.   All other systems reviewed and are negative.    Medical History Review: I have reviewed the patient's PMH, PSH, Social History, Family History, Meds, and Allergies   Immunization History   Administered Date(s) Administered    COVID-19 PFIZER VACCINE 0.3 ML IM 12/07/2021    COVID-19 Pfizer Vac BIVALENT Edmond-sucrose 12 Yr+ IM 09/15/2022    COVID-19 Pfizer mRNA vacc PF edmond-sucrose 12 yr and older (Comirnaty) 01/23/2024    Tdap 05/08/2024     Last Tetanus: See Above     "     Objective :  Temp:  [98.7 °F (37.1 °C)] 98.7 °F (37.1 °C)  HR:  [66-73] 70  BP: (144-154)/(86-90) 144/87  Resp:  [16-18] 18  SpO2:  [95 %-98 %] 98 %  O2 Device: None (Room air)    Initial Vitals:   Temperature: 98.7 °F (37.1 °C) (05/28/25 1921)  Pulse: 73 (05/28/25 1921)  Respirations: 16 (05/28/25 1921)  Blood Pressure: 154/86 (05/28/25 1921)    Primary Survey:   Airway:        Status: patent;        Pre-hospital Interventions: none        Hospital Interventions: none  Breathing:        Pre-hospital Interventions: none       Effort: normal       Right breath sounds: normal       Left breath sounds: normal  Circulation:        Rhythm: regular       Rate: regular   Right Pulses Left Pulses    R radial: 2+  R femoral: 2+  R pedal: 2+     L radial: 2+  L femoral: 2+  L pedal: 2+       Disability:        GCS: Eye: 4; Verbal: 5 Motor: 6 Total: 15       Right Pupil: 3 mm;  round;  reactive         Left Pupil:  3 mm;  round;  reactive      R Motor Strength L Motor Strength    R : 5/5  R dorsiflex: 5/5  R plantarflex: 5/5 L : 5/5  L dorsiflex: 5/5  L plantarflex: 5/5        Sensory:  No sensory deficit  Exposure:       Completed: Yes      Secondary Survey:  Physical Exam  Constitutional:       General: She is not in acute distress.     Appearance: She is not ill-appearing.   HENT:      Head: Normocephalic and atraumatic.      Right Ear: External ear normal.      Left Ear: External ear normal.      Nose: Nose normal.      Mouth/Throat:      Mouth: Mucous membranes are moist.      Pharynx: Oropharynx is clear.     Eyes:      Extraocular Movements: Extraocular movements intact.      Pupils: Pupils are equal, round, and reactive to light.     Neck:      Comments: Neck tenderness. C-collar in place  Cardiovascular:      Rate and Rhythm: Normal rate and regular rhythm.      Pulses: Normal pulses.      Heart sounds: Normal heart sounds. No murmur heard.     No friction rub. No gallop.   Pulmonary:      Effort:  "Pulmonary effort is normal. No respiratory distress.      Breath sounds: Normal breath sounds. No stridor. No wheezing, rhonchi or rales.   Chest:      Chest wall: No tenderness.   Abdominal:      General: Abdomen is flat. There is no distension.      Palpations: Abdomen is soft.      Tenderness: There is no abdominal tenderness. There is no guarding.   Genitourinary:     Comments: Pelvis is stable    Musculoskeletal:      Cervical back: No tenderness.      Comments: T, L-spine tenderness.  No palpable step-offs.  Moving all extremities.  No deformities or contusions appreciated.     Skin:     General: Skin is warm and dry.      Capillary Refill: Capillary refill takes less than 2 seconds.          Neurological:      General: No focal deficit present.      Mental Status: She is alert and oriented to person, place, and time.      Sensory: No sensory deficit.      Motor: No weakness.     Psychiatric:         Speech: Speech normal.           Lab Results: I have reviewed the following results:  No results for input(s): \"WBC\", \"HGB\", \"HCT\", \"PLT\", \"BANDSPCT\", \"SODIUM\", \"K\", \"CL\", \"CO2\", \"BUN\", \"CREATININE\", \"GLUC\", \"CAIONIZED\", \"MG\", \"PHOS\", \"AST\", \"ALT\", \"ALB\", \"TBILI\", \"DBILI\", \"ALKPHOS\", \"PTT\", \"INR\", \"HSTNI0\", \"HSTNI2\", \"BNP\", \"LACTICACID\" in the last 72 hours.    Imaging Results: I have personally reviewed pertinent images saved in PACS. CT scan findings (and other pertinent positive findings on images) were discussed with radiology. My interpretation of the images/reports are as follows:  Chest Xray(s): See above   FAST exam(s): negative for acute findings   CT Scan(s): negative for acute findings   Additional Xray(s): N/A     Other Studies: Other Study Results Review: No additional pertinent studies reviewed.    Cervical Collar Clearance:    The patient had a CT scan of the cervical spine demonstrating no acute injury. On exam, the patient had no midline point tenderness or paresthesias/numbness/weakness in the " extremities. The patient had full range of motion (was then able to flex, extend, and rotate head laterally) without pain. There were no distracting injuries and the patient was not intoxicated.      The patient's cervical spine was cleared radiologically and clinically. Cervical collar removed at this time.     BERNABE Chavez  5/28/2025 9:06 PM

## 2025-05-28 NOTE — ED PROVIDER NOTES
Emergency Department Airway Evaluation and Management Form    History  Obtained from: Patient  Latex, Amoxicillin, Apricot flavor - food allergy, Apricot flavor - food allergy, Eql apricot scrub [salicylic acid], Haloperidol, Latex, Pollen extract, and Medical tape  No chief complaint on file.    HPI - fall, on AC    Past Medical History[1]  Past Surgical History[2]  Family History[3]  Social History[4]  I have reviewed and agree with the history as documented.    Review of Systems    Physical Exam  /87   Pulse 70   Temp 98.7 °F (37.1 °C) (Oral)   Resp 18   Wt 97.6 kg (215 lb 2.7 oz)   SpO2 98%   BMI 36.93 kg/m²     Physical Exam    ED Medications  Medications - No data to display    Intubation  Procedures    Notes  Airway in tact  Lungs CTAB    Final Diagnosis  Final diagnoses:   Fall, initial encounter       ED Provider  Electronically Signed by       [1]   Past Medical History:  Diagnosis Date    Anxiety and depression     Chronic deep vein thrombosis (DVT) (HCC)     LLE    DVT (deep venous thrombosis) (HCC)     History of traumatic brain injury     HTN (hypertension)     Hyperlipidemia     Hypertension     Insomnia     Obesity     RUPAL (obstructive sleep apnea)     Schizoaffective disorder (HCC)     TBI (traumatic brain injury) (HCC)    [2] No past surgical history on file.  [3] No family history on file.  [4]   Social History  Tobacco Use    Smoking status: Never    Smokeless tobacco: Never   Vaping Use    Vaping status: Never Used   Substance Use Topics    Alcohol use: Not Currently    Drug use: Never        Jacob Mares DO  05/28/25 1943

## 2025-05-29 NOTE — DISCHARGE INSTR - AVS FIRST PAGE
No injuries found on assessment.  Continue all previous home medications.  Please call trauma with any questions or concerns.

## 2025-05-29 NOTE — ED NOTES
"Increased agitation noted, pt removed BP cuff, stated \"I don't want that fucking on\". Pt unable to understand reasoning/treatment plan. Group home attendant at bedside. Pt encouraged again to keep c-collar on until CT scan results are received. Unable to obtain required trauma vital signs D/T pt increased agitation.  Trauma updated     Lashawn Crooks RN  05/28/25 2004    "

## 2025-06-27 NOTE — PROGRESS NOTES
"Name: Prema Shepard      : 1965      MRN: [id number]  Encounter Provider: BERNABE Ramos  Encounter Date: 2025   Encounter department: [facility]  :  Assessment & Plan  Secondary lymphedema  59-year-old female with HTN, HLD, TBI, schizophrenia, obesity, chronic left femoral DVT on long-term Eliquis, LLE phlebitis    Patient returns today for second set of lymphedema measurements    -Stage I/II Left leg   -Second set of lymphedema measurements taken today.  Await lymphedema compression pumps from supplier  -Once obtain compression pumps use twice daily for 1 hour each  -Continue compression  -Recommend calf pump exercises or ambulation daily  -Periodic leg elevation to help lower extremity swelling  -Follow-up in office in 4 to 6 weeks to recheck lymphedema         History of Present Illness   Prema Shepard is a 59 y.o. female who presents to recheck left lower extremity swelling. Patient accompanied by aid from facility.     Pt is here for LE swelling recheck and second Lymphedema measurements.  Pt states welling is the same and she has been having some tiredness as well.     [place]   Reg Shah   Phone: [phone]  Fax: [phone]   E-mail: [email or web address]    Ordering Provider:  BERNABE - Florecita Bolden (NPI: [id number])  [facility]  [address] [place] [address]   [address] [place] [address]  Phone: [phone]  Fax: [phone]      Patient Information  MRN: [id number]   Prema Shepard  1965  [address] [place] [address]  [phone]     Insurance Information  Payor: MEDICARE / Plan: MEDICARE A AND B / Product Type: Medicare A & B Fee for Service /     [id number] - (Medicare )     Patient Height and Weight 5' 4\" (1.626 m)    Wt Readings from Last 1 Encounters:   25 98.9 kg (218 lb)         Post 4-week Measurements      Body Part Right Left   Ankle / Forearm 20 cm 22 cm " "  Calf / Elbow  33 cm 42 cm   Knee / Bicep  38 cm 41 cm   Mid-Thigh / Axilla  49 cm 53 cm     Patient outcome after 4-weeks of conservative therapy:   [x] Patient's condition has NOT improved          History obtained from: patient    Review of Systems   Cardiovascular:  Positive for leg swelling.     Medical History Reviewed by provider this encounter:  Tobacco  Allergies  Meds  Problems  Med Hx  Surg Hx  Fam Hx     .     Objective   I have reviewed and made appropriate changes to the review of systems input by the medical assistant.    Vitals:    07/01/25 1052   BP: 100/64   BP Location: Right arm   Patient Position: Sitting   Cuff Size: Large   Pulse: 68   SpO2: 96%   Weight: 98.9 kg (218 lb)   Height: 5' 4\" (1.626 m)       Problem List[1]    Past Surgical History[2]    Family History[3]    Social History     Socioeconomic History    Marital status: Single     Spouse name: Not on file    Number of children: Not on file    Years of education: Not on file    Highest education level: Not on file   Occupational History    Not on file   Tobacco Use    Smoking status: Never    Smokeless tobacco: Never   Vaping Use    Vaping status: Never Used   Substance and Sexual Activity    Alcohol use: Not Currently    Drug use: Never    Sexual activity: Not on file   Other Topics Concern    Not on file   Social History Narrative    ** Merged History Encounter **         ** Merged History Encounter **         ** Merged History Encounter **          Social Drivers of Health     Financial Resource Strain: Low Risk  (6/3/2024)    Overall Financial Resource Strain (CARDIA)     Difficulty of Paying Living Expenses: Not hard at all   Food Insecurity: No Food Insecurity (6/3/2024)    Nursing - Inadequate Food Risk Classification     Worried About Running Out of Food in the Last Year: Never true     Ran Out of Food in the Last Year: Never true     Ran Out of Food in the Last Year: Not on file   Transportation Needs: No " "Transportation Needs (6/3/2024)    PRAPARE - Transportation     Lack of Transportation (Medical): No     Lack of Transportation (Non-Medical): No   Physical Activity: Inactive (9/2/2022)    Received from [facility]    Exercise Vital Sign     On average, how many days per week do you engage in moderate to strenuous exercise (like a brisk walk)?: 0 days     On average, how many minutes do you engage in exercise at this level?: 0 min   Stress: No Stress Concern Present (9/2/2022)    Received from [facility] - Occupational Stress Questionnaire     Feeling of Stress : Not at all   Social Connections: Feeling Socially Isolated (5/21/2024)    Received from [facility]    OASIS : Social Isolation     How often do you feel lonely or isolated from those around you?: Often   Intimate Partner Violence: Not At Risk (6/3/2024)    Humiliation, Afraid, Rape, and Kick questionnaire     Fear of Current or Ex-Partner: No     Emotionally Abused: No     Physically Abused: No     Sexually Abused: No   Housing Stability: Low Risk  (6/3/2024)    Housing Stability Vital Sign     Unable to Pay for Housing in the Last Year: No     Number of Times Moved in the Last Year: 0     Homeless in the Last Year: No       Allergies[4]    Current Medications[5]    /64 (BP Location: Right arm, Patient Position: Sitting, Cuff Size: Large)   Pulse 68   Ht 5' 4\" (1.626 m)   Wt 98.9 kg (218 lb) Comment: pt gave verbal weight confirmation  SpO2 96%   BMI 37.42 kg/m²      Physical Exam  Vitals and nursing note reviewed. Exam conducted with a chaperone present.     Eyes:      Extraocular Movements: Extraocular movements intact.       Cardiovascular:      Heart sounds: Normal heart sounds.   Pulmonary:      Effort: Pulmonary effort is normal.     Musculoskeletal:         General: Swelling present.     Neurological:      Mental " Status: She is alert and oriented to person, place, and time.                [1]   Patient Active Problem List  Diagnosis    Hyponatremia    TBI (traumatic brain injury) (HCC)    History of DVT (deep vein thrombosis)    Schizoaffective disorder (HCC)    Hyperlipemia    Hypertension    Closed head injury    Medical clearance for psychiatric admission    Vitamin D deficiency    Vitamin B12 deficiency    Class 3 severe obesity due to excess calories without serious comorbidity with body mass index (BMI) of 40.0 to 44.9 in adult    Stroke-like symptoms    Dizziness    Anemia    Type 2 diabetes mellitus without complication, unspecified whether long term insulin use (HCC)    Secondary lymphedema    Chronic deep vein thrombosis (DVT) of left femoral vein ([facility])   [2] No past surgical history on file.  [3] No family history on file.  [4]   Allergies  Allergen Reactions    Latex Hives, Itching, Other (See Comments), Shortness Of Breath and Rash    Amoxicillin Hives    Apricot Flavor - Food Allergy Hives    Apricot Flavor - Food Allergy Other (See Comments)     unknown    Eql Apricot Scrub [Salicylic Acid] Hives    Haloperidol Other (See Comments)     Dystonia    Latex Other (See Comments)     unknown    Pollen Extract Sneezing    Medical Tape Rash   [5]   Current Outpatient Medications:     amLODIPine (NORVASC) 10 mg tablet, Take 1 tablet (10 mg total) by mouth daily, Disp: 30 tablet, Rfl: 0    apixaban (ELIQUIS) 5 mg, Take 5 mg by mouth in the morning and 5 mg in the evening., Disp: , Rfl:     carBAMazepine (TEGretol) 200 mg tablet, Take 1 tablet (200 mg total) by mouth 2 (two) times a day, Disp: 60 tablet, Rfl: 0    DULoxetine (CYMBALTA) 30 mg delayed release capsule, Take 3 capsules (90 mg total) by mouth daily, Disp: 90 capsule, Rfl: 0    omeprazole (PriLOSEC) 40 MG capsule, Take 40 mg by mouth in the morning., Disp: , Rfl:     pravastatin (PRAVACHOL) 20 mg tablet, Take 20 mg by mouth every evening, Disp: , Rfl:      QUEtiapine (SEROquel) 200 mg tablet, Take 200 mg by mouth in the morning and 200 mg in the evening., Disp: , Rfl:     fenofibrate (TRICOR) 145 mg tablet, Take 145 mg by mouth every evening In pm (Patient not taking: Reported on 3/25/2025), Disp: , Rfl:     MULTIPLE VITAMIN PO, Take 1 tablet by mouth in the morning (Patient not taking: Reported on 7/1/2025), Disp: , Rfl:     nystatin (MYCOSTATIN) powder, Apply 1 application. topically daily at bedtime Under the breasts (Patient not taking: Reported on 3/25/2025), Disp: , Rfl:     Probiotic Product (PROBIOTIC PO), Take 250 mg by mouth 2 (two) times a day (Patient not taking: Reported on 3/25/2025), Disp: , Rfl:     spironolactone (ALDACTONE) 25 mg tablet, Take 2 tablets (50 mg total) by mouth daily (Patient not taking: Reported on 7/1/2025), Disp: 60 tablet, Rfl: 0

## 2025-07-01 ENCOUNTER — OFFICE VISIT (OUTPATIENT)
Dept: VASCULAR SURGERY | Facility: CLINIC | Age: 60
End: 2025-07-01
Payer: MEDICARE

## 2025-07-01 VITALS
HEIGHT: 64 IN | OXYGEN SATURATION: 96 % | HEART RATE: 68 BPM | DIASTOLIC BLOOD PRESSURE: 64 MMHG | SYSTOLIC BLOOD PRESSURE: 100 MMHG | BODY MASS INDEX: 37.22 KG/M2 | WEIGHT: 218 LBS

## 2025-07-01 DIAGNOSIS — I89.0 SECONDARY LYMPHEDEMA: Primary | ICD-10-CM

## 2025-07-01 PROCEDURE — 99213 OFFICE O/P EST LOW 20 MIN: CPT | Performed by: NURSE PRACTITIONER

## 2025-07-01 NOTE — ASSESSMENT & PLAN NOTE
59-year-old female with HTN, HLD, TBI, schizophrenia, obesity, chronic left femoral DVT on long-term Eliquis, LLE phlebitis    Patient returns today for second set of lymphedema measurements    -Stage I/II Left leg   -Second set of lymphedema measurements taken today.  Await lymphedema compression pumps from supplier  -Once obtain compression pumps use twice daily for 1 hour each  -Continue compression  -Recommend calf pump exercises or ambulation daily  -Periodic leg elevation to help lower extremity swelling  -Follow-up in office in 4 to 6 weeks to recheck lymphedema

## 2025-07-01 NOTE — PATIENT INSTRUCTIONS
History of left lower extremity DVT, recurrent DVT, secondary lymphedema.  Second set of lymphedema measurements taken today.  Await lymphedema compression pumps from supplier.  Once obtained use twice daily for 1 hour each.  Continue compression.  Recommend calf pump exercises or ambulation daily.  Periodic leg elevation to help lower extremity swelling.  Follow-up in office in 4 to 6 weeks to recheck lymphedema

## 2025-07-01 NOTE — LETTER
2025     [facility]  [id number]  [place] [address]    Patient: Prema Shepard   YOB: 1965   Date of Visit: 2025       Dear  [facility]:    Thank you for referring Prema Shepard to me for evaluation. Below are my notes for this consultation.    If you have questions, please do not hesitate to call me. I look forward to following your patient along with you.         Sincerely,        BERNABE Ramos        CC: No Recipients    BERNABE Ramos  2025 11:20 AM  Sign when Signing Visit  Name: Prema Shepard      : 1965      MRN: [id number]  Encounter Provider: BERNABE Ramos  Encounter Date: 2025   Enc[facility]: [facility] [place]  :  Assessment & Plan  Secondary lymphedema  59-year-old female with HTN, HLD, TBI, schizophrenia, obesity, chronic left femoral DVT on long-term Eliquis, LLE phlebitis    Patient returns today for second set of lymphedema measurements    -Stage I/II Left leg   -Second set of lymphedema measurements taken today.  Await lymphedema compression pumps from supplier  -Once obtain compression pumps use twice daily for 1 hour each  -Continue compression  -Recommend calf pump exercises or ambulation daily  -Periodic leg elevation to help lower extremity swelling  -Follow-up in office in 4 to 6 weeks to recheck lymphedema         History of Present Illness  Prema Shepard is a 59 y.o. female who presents to recheck left lower extremity swelling. Patient accompanied by aid from facility.     Pt is here for LE swelling recheck and second Lymphedema measurements.  Pt states welling is the same and she has been having some tiredness as well.     [place]   Reg Shah   Phone: [phone]  Fax: [phone]   E-mail: [email or web address]    Ordering Provider:  Florecita Arana (NPI:  "[id number])  [facility]  [address]   [address]  [place] [address]  Phone: [phone]  Fax: [phone]      Patient Information  MRN: [id number]   Prema Shepard  1965  [address] [address]  [phone]     Insurance Information  Payor: MEDICARE / Plan: MEDICARE A AND B / Product Type: Medicare A & B Fee for Service /     [id number] - (Medicare )     Patient Height and Weight 5' 4\" (1.626 m)    Wt Readings from Last 1 Encounters:   07/01/25 98.9 kg (218 lb)         Post 4-week Measurements      Body Part Right Left   Ankle / Forearm 20 cm 22 cm   Calf / Elbow  33 cm 42 cm   Knee / Bicep  38 cm 41 cm   Mid-Thigh / Axilla  49 cm 53 cm     Patient outcome after 4-weeks of conservative therapy:   [x] Patient's condition has NOT improved          History obtained from: patient    Review of Systems   Cardiovascular:  Positive for leg swelling.     Medical History Reviewed by provider this encounter:  Tobacco  Allergies  Meds  Problems  Med Hx  Surg Hx  Fam Hx     .     Objective  I have reviewed and made appropriate changes to the review of systems input by the medical assistant.    Vitals:    07/01/25 1052   BP: 100/64   BP Location: Right arm   Patient Position: Sitting   Cuff Size: Large   Pulse: 68   SpO2: 96%   Weight: 98.9 kg (218 lb)   Height: 5' 4\" (1.626 m)       Problem List[1]    Past Surgical History[2]    Family History[3]    Social History     Socioeconomic History   • Marital status: Single     Spouse name: Not on file   • Number of children: Not on file   • Years of education: Not on file   • Highest education level: Not on file   Occupational History   • Not on file   Tobacco Use   • Smoking status: Never   • Smokeless tobacco: Never   Vaping Use   • Vaping status: Never Used   Substance and Sexual Activity   • Alcohol use: Not Currently   • Drug use: Never   • Sexual activity: Not on file   Other Topics Concern   • Not on file   Social " History Narrative    ** Merged History Encounter **         ** Merged History Encounter **         ** Merged History Encounter **          Social Drivers of Health     Financial Resource Strain: Low Risk  (6/3/2024)    Overall Financial Resource Strain (CARDIA)    • Difficulty of Paying Living Expenses: Not hard at all   Food Insecurity: No Food Insecurity (6/3/2024)    Nursing - Inadequate Food Risk Classification    • Worried About Running Out of Food in the Last Year: Never true    • Ran Out of Food in the Last Year: Never true    • Ran Out of Food in the Last Year: Not on file   Transportation Needs: No Transportation Needs (6/3/2024)    PRAPARE - Transportation    • Lack of Transportation (Medical): No    • Lack of Transportation (Non-Medical): No   Physical Activity: Inactive (9/2/2022)    Received from [facility]    Exercise Vital Sign    • On average, how many days per week do you engage in moderate to strenuous exercise (like a brisk walk)?: 0 days    • On average, how many minutes do you engage in exercise at this level?: 0 min   Stress: No Stress Concern Present (9/2/2022)    Received from [facility]    [facility] - Occupational Stress Questionnaire    • Feeling of Stress : Not at all   Social Connections: Feeling Socially Isolated (5/21/2024)    Received from [facility]    OASIS : Social Isolation    • How often do you feel lonely or isolated from those around you?: Often   Intimate Partner Violence: Not At Risk (6/3/2024)    Humiliation, Afraid, Rape, and Kick questionnaire    • Fear of Current or Ex-Partner: No    • Emotionally Abused: No    • Physically Abused: No    • Sexually Abused: No   Housing Stability: Low Risk  (6/3/2024)    Housing Stability Vital Sign    • Unable to Pay for Housing in the Last Year: No    • Number of Times Moved in the Last Year: 0    • Homeless in the Last  "Year: No       Allergies[4]    Current Medications[5]    /64 (BP Location: Right arm, Patient Position: Sitting, Cuff Size: Large)   Pulse 68   Ht 5' 4\" (1.626 m)   Wt 98.9 kg (218 lb) Comment: pt gave verbal weight confirmation  SpO2 96%   BMI 37.42 kg/m²      Physical Exam  Vitals and nursing note reviewed. Exam conducted with a chaperone present.     Eyes:      Extraocular Movements: Extraocular movements intact.       Cardiovascular:      Heart sounds: Normal heart sounds.   Pulmonary:      Effort: Pulmonary effort is normal.     Musculoskeletal:         General: Swelling present.     Neurological:      Mental Status: She is alert and oriented to person, place, and time.                [1]   Patient Active Problem List  Diagnosis   • Hyponatremia   • TBI (traumatic brain injury) ([facility])   • History of DVT (deep vein thrombosis)   • Schizoaffective disorder ([facility])   • Hyperlipemia   • Hypertension   • Closed head injury   • Medical clearance for psychiatric admission   • Vitamin D deficiency   • Vitamin B12 deficiency   • Class 3 severe obesity due to excess calories without serious comorbidity with body mass index (BMI) of 40.0 to 44.9 in adult   • Stroke-like symptoms   • Dizziness   • Anemia   • Type 2 diabetes mellitus without complication, unspecified whether long term insulin use ([facility])   • Secondary lymphedema   • Chronic deep vein thrombosis (DVT) of left femoral vein ([facility])   [2] No past surgical history on file.  [3] No family history on file.  [4]   Allergies  Allergen Reactions   • Latex Hives, Itching, Other (See Comments), Shortness Of Breath and Rash   • Amoxicillin Hives   • Apricot Flavor - Food Allergy Hives   • Apricot Flavor - Food Allergy Other (See Comments)     unknown   • Eql Apricot Scrub [Salicylic Acid] Hives   • Haloperidol Other (See Comments)     Dystonia   • Latex Other (See Comments)     unknown   • Pollen Extract Sneezing   • Medical Tape Rash   [5]   Current Outpatient " Medications:   •  amLODIPine (NORVASC) 10 mg tablet, Take 1 tablet (10 mg total) by mouth daily, Disp: 30 tablet, Rfl: 0  •  apixaban (ELIQUIS) 5 mg, Take 5 mg by mouth in the morning and 5 mg in the evening., Disp: , Rfl:   •  carBAMazepine (TEGretol) 200 mg tablet, Take 1 tablet (200 mg total) by mouth 2 (two) times a day, Disp: 60 tablet, Rfl: 0  •  DULoxetine (CYMBALTA) 30 mg delayed release capsule, Take 3 capsules (90 mg total) by mouth daily, Disp: 90 capsule, Rfl: 0  •  omeprazole (PriLOSEC) 40 MG capsule, Take 40 mg by mouth in the morning., Disp: , Rfl:   •  pravastatin (PRAVACHOL) 20 mg tablet, Take 20 mg by mouth every evening, Disp: , Rfl:   •  QUEtiapine (SEROquel) 200 mg tablet, Take 200 mg by mouth in the morning and 200 mg in the evening., Disp: , Rfl:   •  fenofibrate (TRICOR) 145 mg tablet, Take 145 mg by mouth every evening In pm (Patient not taking: Reported on 3/25/2025), Disp: , Rfl:   •  MULTIPLE VITAMIN PO, Take 1 tablet by mouth in the morning (Patient not taking: Reported on 7/1/2025), Disp: , Rfl:   •  nystatin (MYCOSTATIN) powder, Apply 1 application. topically daily at bedtime Under the breasts (Patient not taking: Reported on 3/25/2025), Disp: , Rfl:   •  Probiotic Product (PROBIOTIC PO), Take 250 mg by mouth 2 (two) times a day (Patient not taking: Reported on 3/25/2025), Disp: , Rfl:   •  spironolactone (ALDACTONE) 25 mg tablet, Take 2 tablets (50 mg total) by mouth daily (Patient not taking: Reported on 7/1/2025), Disp: 60 tablet, Rfl: 0

## 2025-07-22 ENCOUNTER — TELEPHONE (OUTPATIENT)
Age: 60
End: 2025-07-22

## 2025-07-22 NOTE — TELEPHONE ENCOUNTER
"Esther from [facility] called, pt resides in one of their group homes.  They received her pneumatic compression pumps yesterday and it came w/ two sleeves. Esther would like to confirm pt only needs for LLE as that is the leg w/ swelling, RLE is fine and pt does not wish to wear on both legs.    Advised per note on order \"  Left leg full leg sleeve.  Pneumatic   compression 40 to 50 mmHg   compression use twice daily for 1 hour each   \"      "

## 2025-08-13 ENCOUNTER — HOSPITAL ENCOUNTER (EMERGENCY)
Facility: HOSPITAL | Age: 60
Discharge: HOME | End: 2025-08-13
Attending: SURGERY | Admitting: SURGERY
Payer: MEDICARE

## 2025-08-13 ENCOUNTER — APPOINTMENT (EMERGENCY)
Dept: RADIOLOGY | Facility: HOSPITAL | Age: 60
End: 2025-08-13
Payer: MEDICARE

## 2025-08-13 ENCOUNTER — APPOINTMENT (EMERGENCY)
Dept: CT IMAGING | Facility: HOSPITAL | Age: 60
End: 2025-08-13
Payer: MEDICARE

## 2025-08-21 PROBLEM — M25.511 ACUTE PAIN OF BOTH SHOULDERS: Status: ACTIVE | Noted: 2025-08-21

## 2025-08-21 PROBLEM — M25.512 ACUTE PAIN OF BOTH SHOULDERS: Status: ACTIVE | Noted: 2025-08-21

## (undated) DEVICE — ***USE 138557*** SUTURE VICRYL 1 J959H CT 36IN UNDYED

## (undated) DEVICE — ***USE 57698*** SLEEVE FLOWTRON DVT CALF SINGLE USE

## (undated) DEVICE — ADHESIVE SKIN DERMABOND ADVANCED 0.7ML

## (undated) DEVICE — SUTURE MONOCRYL 3-0  Y427H

## (undated) DEVICE — SUCTION 18FR FRAZIER DISPOSABLE

## (undated) DEVICE — FIBERWIRE 5-0 BLUE

## (undated) DEVICE — Device

## (undated) DEVICE — PAD GROUND ELECTROSURGICAL W/CORD

## (undated) DEVICE — FIBERWIRE #2 W/TAPERED NEEDLE

## (undated) DEVICE — ***USE 138572*** SUTURE ETHIBOND 2 X519H OS-4

## (undated) DEVICE — PACK BASIC I

## (undated) DEVICE — SYRINGE DISP LUER-LOK 10 CC

## (undated) DEVICE — DRAPE 3/4 REINFORCED

## (undated) DEVICE — HOOD FLYTE SURGICOOL

## (undated) DEVICE — BLADE SCALPEL #15

## (undated) DEVICE — SHOULDER IMMOB W/VELPEAU MED

## (undated) DEVICE — BLADE SAGITTAL WIDE MEDIUM

## (undated) DEVICE — DRAPE LARGE REVERSE FOLD

## (undated) DEVICE — MAT MAGNET STERILE DISPOSABLE

## (undated) DEVICE — ***USE 56956*** SUTURE VICRYL 2-0  J589H

## (undated) DEVICE — FACEMASK FOR SHOULDER POSITONER

## (undated) DEVICE — DRESSING MEPILEX 2X5 BORDER LITE

## (undated) DEVICE — ***USE 138718*** SUTURE VICRYL 3-0  J442H

## (undated) DEVICE — GLOVE PROTEXIS PI ORTHO 8.5

## (undated) DEVICE — MANIFOLD SINGLE PORT NEPTUNE

## (undated) DEVICE — ***USE 57020*** SUTURE ETHIBOND 2   MX69G

## (undated) DEVICE — APPLICATOR CHLORAPREP 26ML ORANGE TINT

## (undated) DEVICE — NEEDLE DISP HYPO 22GX1-1/2IN

## (undated) DEVICE — KIT SHOULDER STABILIZATION SPIDER

## (undated) DEVICE — VEST STERILE

## (undated) DEVICE — KIT CEMENT MIXING W/SPATULA

## (undated) DEVICE — GUIDE WIRE 2.5 X220MM PERFORM+ AEQUALIS

## (undated) DEVICE — GLOVE SZ 8.5 LINER PROTEXIS PI BL

## (undated) DEVICE — DRESSING MEPILEX BORDER AG 4X8

## (undated) DEVICE — MANIFOLD FOUR PORT NEPTUNE

## (undated) DEVICE — DRAPE SHOULDER BEACH CHAIR W/POUCH

## (undated) DEVICE — ***USE 143206***SYRINGE BULB

## (undated) DEVICE — DRAPE SPIDER2 SWITCH

## (undated) DEVICE — SET HANDPIECE INTERPULSE

## (undated) DEVICE — SOLN IRRIG STER WATER 1000ML

## (undated) DEVICE — ***USE 140147***DRESSING MEPILEX 3X3 BORDER LITE

## (undated) DEVICE — DRAPE SPLIT U IMPERVIOUS 89331

## (undated) DEVICE — PENEVAC1 NONSTICK SMOKE EVAC

## (undated) DEVICE — DRAPE IOBAN

## (undated) DEVICE — ***USE 124736***SYRINGE TOOMEY

## (undated) DEVICE — SPONGE LAP 18X18 SAFE-T RFID ENHANCED XRAY

## (undated) DEVICE — SOLN IRRIG .9%SOD 1000ML